# Patient Record
Sex: MALE | Race: WHITE | NOT HISPANIC OR LATINO | Employment: FULL TIME | URBAN - METROPOLITAN AREA
[De-identification: names, ages, dates, MRNs, and addresses within clinical notes are randomized per-mention and may not be internally consistent; named-entity substitution may affect disease eponyms.]

---

## 2017-04-28 DIAGNOSIS — E11.9 TYPE 2 DIABETES MELLITUS WITHOUT COMPLICATION: ICD-10-CM

## 2017-08-09 DIAGNOSIS — Z79.4 TYPE 2 DIABETES MELLITUS TREATED WITH INSULIN: ICD-10-CM

## 2017-08-09 DIAGNOSIS — E11.9 TYPE 2 DIABETES MELLITUS TREATED WITH INSULIN: ICD-10-CM

## 2017-08-10 RX ORDER — DAPAGLIFLOZIN 5 MG/1
5 TABLET, FILM COATED ORAL DAILY
Qty: 30 TABLET | Refills: 5 | OUTPATIENT
Start: 2017-08-10

## 2017-08-16 ENCOUNTER — OFFICE VISIT (OUTPATIENT)
Dept: FAMILY MEDICINE | Facility: CLINIC | Age: 47
End: 2017-08-16
Payer: COMMERCIAL

## 2017-08-16 VITALS
HEIGHT: 69 IN | DIASTOLIC BLOOD PRESSURE: 92 MMHG | RESPIRATION RATE: 16 BRPM | HEART RATE: 90 BPM | WEIGHT: 227.81 LBS | BODY MASS INDEX: 33.74 KG/M2 | SYSTOLIC BLOOD PRESSURE: 132 MMHG

## 2017-08-16 DIAGNOSIS — E78.5 HYPERLIPIDEMIA, UNSPECIFIED HYPERLIPIDEMIA TYPE: ICD-10-CM

## 2017-08-16 DIAGNOSIS — Z79.4 TYPE 2 DIABETES MELLITUS TREATED WITH INSULIN: ICD-10-CM

## 2017-08-16 DIAGNOSIS — Z79.4 TYPE 2 DIABETES MELLITUS WITH RETINOPATHY OF LEFT EYE, WITH LONG-TERM CURRENT USE OF INSULIN, MACULAR EDEMA PRESENCE UNSPECIFIED, UNSPECIFIED RETINOPATHY SEVERITY: Primary | ICD-10-CM

## 2017-08-16 DIAGNOSIS — F41.1 GENERALIZED ANXIETY DISORDER: ICD-10-CM

## 2017-08-16 DIAGNOSIS — E11.319 TYPE 2 DIABETES MELLITUS WITH RETINOPATHY OF LEFT EYE, WITH LONG-TERM CURRENT USE OF INSULIN, MACULAR EDEMA PRESENCE UNSPECIFIED, UNSPECIFIED RETINOPATHY SEVERITY: Primary | ICD-10-CM

## 2017-08-16 DIAGNOSIS — E11.9 TYPE 2 DIABETES MELLITUS TREATED WITH INSULIN: ICD-10-CM

## 2017-08-16 DIAGNOSIS — N52.9 ERECTILE DYSFUNCTION, UNSPECIFIED ERECTILE DYSFUNCTION TYPE: ICD-10-CM

## 2017-08-16 PROCEDURE — 99999 PR PBB SHADOW E&M-EST. PATIENT-LVL III: CPT | Mod: PBBFAC,,, | Performed by: FAMILY MEDICINE

## 2017-08-16 PROCEDURE — 3008F BODY MASS INDEX DOCD: CPT | Mod: S$GLB,,, | Performed by: FAMILY MEDICINE

## 2017-08-16 PROCEDURE — 99214 OFFICE O/P EST MOD 30 MIN: CPT | Mod: S$GLB,,, | Performed by: FAMILY MEDICINE

## 2017-08-16 RX ORDER — ESCITALOPRAM OXALATE 20 MG/1
20 TABLET ORAL DAILY
Qty: 90 TABLET | Refills: 1 | Status: SHIPPED | OUTPATIENT
Start: 2017-08-16 | End: 2018-01-22 | Stop reason: SDUPTHER

## 2017-08-16 RX ORDER — VARDENAFIL HYDROCHLORIDE 10 MG/1
10 TABLET ORAL DAILY PRN
Qty: 15 TABLET | Refills: 2 | Status: SHIPPED | OUTPATIENT
Start: 2017-08-16 | End: 2018-11-09

## 2017-08-16 RX ORDER — DAPAGLIFLOZIN 5 MG/1
5 TABLET, FILM COATED ORAL DAILY
Qty: 30 TABLET | Refills: 5 | Status: SHIPPED | OUTPATIENT
Start: 2017-08-16 | End: 2017-09-11 | Stop reason: SDUPTHER

## 2017-08-16 RX ORDER — PIOGLITAZONEHYDROCHLORIDE 30 MG/1
30 TABLET ORAL DAILY
Qty: 90 TABLET | Refills: 3 | Status: SHIPPED | OUTPATIENT
Start: 2017-08-16 | End: 2017-08-24

## 2017-08-16 RX ORDER — METFORMIN HYDROCHLORIDE 500 MG/1
500 TABLET, EXTENDED RELEASE ORAL
Qty: 30 TABLET | Refills: 2 | Status: SHIPPED | OUTPATIENT
Start: 2017-08-16 | End: 2017-09-08 | Stop reason: SDUPTHER

## 2017-08-16 RX ORDER — ATORVASTATIN CALCIUM 10 MG/1
10 TABLET, FILM COATED ORAL DAILY
Qty: 90 TABLET | Refills: 1 | Status: SHIPPED | OUTPATIENT
Start: 2017-08-16 | End: 2018-03-21 | Stop reason: SDUPTHER

## 2017-08-16 RX ORDER — INSULIN GLARGINE 100 [IU]/ML
70 INJECTION, SOLUTION SUBCUTANEOUS NIGHTLY
Qty: 5 SYRINGE | Refills: 5 | Status: SHIPPED | OUTPATIENT
Start: 2017-08-16 | End: 2017-08-24

## 2017-08-16 RX ORDER — TRAZODONE HYDROCHLORIDE 50 MG/1
50 TABLET ORAL NIGHTLY
Qty: 30 TABLET | Refills: 2 | Status: SHIPPED | OUTPATIENT
Start: 2017-08-16 | End: 2017-11-03 | Stop reason: SDUPTHER

## 2017-08-16 NOTE — PROGRESS NOTES
Subjective:       Patient ID: Abel Hackett is a 47 y.o. male.    Chief Complaint: Annual Exam and Medication Refill    HPI  47 year old non compliant patient comes in after not having been seen for a year. He notes that over the last 3 months he has been titrating his insulin by 5 U at night for blood sugars in the morning. He notes that he has blood sugars of 100-120s. He says that he has had some lows in the evenings when he is working out.    PMH, PSH, ALLERGIES, SH, FH reviewed in nurse's notes above  Medications reconciled in the nurse's notes      Review of Systems   Constitutional: Negative for chills and fever.   HENT: Negative for congestion, ear pain, postnasal drip, rhinorrhea, sore throat and trouble swallowing.    Eyes: Negative for redness and itching.   Respiratory: Negative for cough, shortness of breath and wheezing.    Cardiovascular: Negative for chest pain and palpitations.   Gastrointestinal: Negative for abdominal pain, diarrhea, nausea and vomiting.   Genitourinary: Negative for dysuria and frequency.   Musculoskeletal: Positive for arthralgias.   Skin: Negative for rash.   Neurological: Negative for weakness and headaches.       Objective:      Physical Exam   Constitutional: He is oriented to person, place, and time. He appears well-developed. No distress.   HENT:   Head: Normocephalic and atraumatic.   Eyes: Conjunctivae are normal. Pupils are equal, round, and reactive to light.   Neck: Normal range of motion. Neck supple. No thyromegaly present.   Cardiovascular: Normal rate, regular rhythm, normal heart sounds and intact distal pulses.    Pulses:       Dorsalis pedis pulses are 2+ on the right side, and 2+ on the left side.        Posterior tibial pulses are 2+ on the right side, and 2+ on the left side.   Pulmonary/Chest: Effort normal and breath sounds normal. No respiratory distress. He has no wheezes.   Abdominal: Soft. Bowel sounds are normal. There is no tenderness.    Musculoskeletal: Normal range of motion. He exhibits no edema.   Feet:   Right Foot:   Protective Sensation: 5 sites tested. 5 sites sensed.   Skin Integrity: Negative for ulcer, blister or callus.   Left Foot:   Protective Sensation: 5 sites tested.   Skin Integrity: Negative for ulcer, blister or callus.   Lymphadenopathy:     He has no cervical adenopathy.   Neurological: He is alert and oriented to person, place, and time.   Skin: Skin is warm and dry. No rash noted.   Psychiatric: He has a normal mood and affect. His behavior is normal.   Nursing note and vitals reviewed.         Results for orders placed or performed in visit on 08/05/16   Hemoglobin A1c   Result Value Ref Range    Hemoglobin A1C 8.2 (H) 4.5 - 6.2 %    Estimated Avg Glucose 189 (H) 68 - 131 mg/dL   Results for orders placed or performed in visit on 04/20/16   Hemoglobin A1c   Result Value Ref Range    Hemoglobin A1C 12.2 (H) 4.5 - 6.2 %    Estimated Avg Glucose 303 (H) 68 - 131 mg/dL     Results for orders placed or performed in visit on 04/20/16   Microalbumin/creatinine urine ratio   Result Value Ref Range    Microalbum.,U,Random 7.0 ug/mL    Creatinine, Random Ur 68.6 23.0 - 375.0 mg/dL    Microalb Creat Ratio 10.2 0.0 - 30.0 ug/mg     Results for orders placed or performed in visit on 08/05/16   Lipid panel   Result Value Ref Range    Cholesterol 132 120 - 199 mg/dL    Triglycerides 72 30 - 150 mg/dL    HDL 63 40 - 75 mg/dL    LDL Cholesterol 54.6 (L) 63.0 - 159.0 mg/dL    HDL/Chol Ratio 47.7 20.0 - 50.0 %    Total Cholesterol/HDL Ratio 2.1 2.0 - 5.0    Non-HDL Cholesterol 69 mg/dL       Health Maintenance   Topic Date Due    Pneumococcal PPSV23 (Medium Risk) (1) 04/17/1990    Urine Microalbumin  04/20/2017    Influenza Vaccine  08/01/2017    Eye Exam  09/22/2017 (Originally 7/13/2017)    Hemoglobin A1c  02/17/2018    Foot Exam  08/16/2018    Lipid Panel  08/17/2018    TETANUS VACCINE  08/30/2025       Assessment/Plan:       Abel  was seen today for annual exam and medication refill.    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  -     CBC auto differential; Future  -     Comprehensive metabolic panel; Future  -     Lipid panel; Future  -     Hemoglobin A1c; Future  -     Microalbumin/creatinine urine ratio    Type 2 diabetes mellitus treated with insulin  -     dapagliflozin (FARXIGA) 5 mg Tab tablet; Take 1 tablet (5 mg total) by mouth once daily.  -     CBC auto differential; Future  -     Comprehensive metabolic panel; Future  -     Lipid panel; Future  -     Hemoglobin A1c; Future  -     Microalbumin/creatinine urine ratio    Generalized anxiety disorder  -     trazodone (DESYREL) 50 MG tablet; Take 1 tablet (50 mg total) by mouth every evening.    Erectile dysfunction, unspecified erectile dysfunction type  -     Testosterone, free; Future    Other orders  -     escitalopram oxalate (LEXAPRO) 20 MG tablet; Take 1 tablet (20 mg total) by mouth once daily.  -     insulin glargine (LANTUS SOLOSTAR) 100 unit/mL (3 mL) InPn pen; Inject 70 Units into the skin every evening.  -     metformin (GLUCOPHAGE-XR) 500 MG 24 hr tablet; Take 1 tablet (500 mg total) by mouth daily with breakfast.  -     pioglitazone (ACTOS) 30 MG tablet; Take 1 tablet (30 mg total) by mouth once daily.  -     SITagliptin (JANUVIA) 100 MG Tab; Take 1 tablet (100 mg total) by mouth once daily.  -     vardenafil (LEVITRA) 10 MG tablet; Take 1 tablet (10 mg total) by mouth daily as needed for  Erectile Dysfunction.    RTC if condition acutely worsens or any other concerns, otherwise RTC as scheduled

## 2017-08-17 ENCOUNTER — PATIENT MESSAGE (OUTPATIENT)
Dept: FAMILY MEDICINE | Facility: CLINIC | Age: 47
End: 2017-08-17

## 2017-08-17 ENCOUNTER — LAB VISIT (OUTPATIENT)
Dept: LAB | Facility: HOSPITAL | Age: 47
End: 2017-08-17
Attending: FAMILY MEDICINE
Payer: COMMERCIAL

## 2017-08-17 DIAGNOSIS — E78.5 HYPERLIPIDEMIA, UNSPECIFIED HYPERLIPIDEMIA TYPE: ICD-10-CM

## 2017-08-17 DIAGNOSIS — N52.9 ERECTILE DYSFUNCTION, UNSPECIFIED ERECTILE DYSFUNCTION TYPE: ICD-10-CM

## 2017-08-17 DIAGNOSIS — E11.9 TYPE 2 DIABETES MELLITUS TREATED WITH INSULIN: ICD-10-CM

## 2017-08-17 DIAGNOSIS — Z79.4 TYPE 2 DIABETES MELLITUS WITH RETINOPATHY OF LEFT EYE, WITH LONG-TERM CURRENT USE OF INSULIN, MACULAR EDEMA PRESENCE UNSPECIFIED, UNSPECIFIED RETINOPATHY SEVERITY: ICD-10-CM

## 2017-08-17 DIAGNOSIS — E11.319 TYPE 2 DIABETES MELLITUS WITH RETINOPATHY OF LEFT EYE, WITH LONG-TERM CURRENT USE OF INSULIN, MACULAR EDEMA PRESENCE UNSPECIFIED, UNSPECIFIED RETINOPATHY SEVERITY: ICD-10-CM

## 2017-08-17 DIAGNOSIS — Z79.4 TYPE 2 DIABETES MELLITUS TREATED WITH INSULIN: ICD-10-CM

## 2017-08-17 LAB
ALBUMIN SERPL BCP-MCNC: 3.5 G/DL
ALP SERPL-CCNC: 55 U/L
ALT SERPL W/O P-5'-P-CCNC: 22 U/L
ANION GAP SERPL CALC-SCNC: 11 MMOL/L
AST SERPL-CCNC: 17 U/L
BASOPHILS # BLD AUTO: 0.02 K/UL
BASOPHILS NFR BLD: 0.3 %
BILIRUB SERPL-MCNC: 0.3 MG/DL
BUN SERPL-MCNC: 24 MG/DL
CALCIUM SERPL-MCNC: 9.2 MG/DL
CHLORIDE SERPL-SCNC: 104 MMOL/L
CHOLEST/HDLC SERPL: 3.4 {RATIO}
CO2 SERPL-SCNC: 25 MMOL/L
CREAT SERPL-MCNC: 0.9 MG/DL
DIFFERENTIAL METHOD: NORMAL
EOSINOPHIL # BLD AUTO: 0.1 K/UL
EOSINOPHIL NFR BLD: 1.8 %
ERYTHROCYTE [DISTWIDTH] IN BLOOD BY AUTOMATED COUNT: 13.2 %
EST. GFR  (AFRICAN AMERICAN): >60 ML/MIN/1.73 M^2
EST. GFR  (NON AFRICAN AMERICAN): >60 ML/MIN/1.73 M^2
ESTIMATED AVG GLUCOSE: 206 MG/DL
GLUCOSE SERPL-MCNC: 108 MG/DL
HBA1C MFR BLD HPLC: 8.8 %
HCT VFR BLD AUTO: 43.9 %
HDL/CHOLESTEROL RATIO: 29.5 %
HDLC SERPL-MCNC: 190 MG/DL
HDLC SERPL-MCNC: 56 MG/DL
HGB BLD-MCNC: 14.3 G/DL
LDLC SERPL CALC-MCNC: 119.6 MG/DL
LYMPHOCYTES # BLD AUTO: 1.4 K/UL
LYMPHOCYTES NFR BLD: 23 %
MCH RBC QN AUTO: 27.7 PG
MCHC RBC AUTO-ENTMCNC: 32.6 G/DL
MCV RBC AUTO: 85 FL
MONOCYTES # BLD AUTO: 0.6 K/UL
MONOCYTES NFR BLD: 10 %
NEUTROPHILS # BLD AUTO: 4 K/UL
NEUTROPHILS NFR BLD: 64.9 %
NONHDLC SERPL-MCNC: 134 MG/DL
PLATELET # BLD AUTO: 226 K/UL
PMV BLD AUTO: 9.6 FL
POTASSIUM SERPL-SCNC: 3.9 MMOL/L
PROT SERPL-MCNC: 7.6 G/DL
RBC # BLD AUTO: 5.17 M/UL
SODIUM SERPL-SCNC: 140 MMOL/L
TRIGL SERPL-MCNC: 72 MG/DL
WBC # BLD AUTO: 6.1 K/UL

## 2017-08-17 PROCEDURE — 80053 COMPREHEN METABOLIC PANEL: CPT

## 2017-08-17 PROCEDURE — 85025 COMPLETE CBC W/AUTO DIFF WBC: CPT

## 2017-08-17 PROCEDURE — 84402 ASSAY OF FREE TESTOSTERONE: CPT

## 2017-08-17 PROCEDURE — 83036 HEMOGLOBIN GLYCOSYLATED A1C: CPT

## 2017-08-17 PROCEDURE — 36415 COLL VENOUS BLD VENIPUNCTURE: CPT

## 2017-08-17 PROCEDURE — 80061 LIPID PANEL: CPT

## 2017-08-18 ENCOUNTER — PATIENT MESSAGE (OUTPATIENT)
Dept: FAMILY MEDICINE | Facility: CLINIC | Age: 47
End: 2017-08-18

## 2017-08-21 ENCOUNTER — PATIENT MESSAGE (OUTPATIENT)
Dept: FAMILY MEDICINE | Facility: CLINIC | Age: 47
End: 2017-08-21

## 2017-08-21 LAB — TESTOST FREE SERPL-MCNC: 8.9 PG/ML

## 2017-08-23 ENCOUNTER — TELEPHONE (OUTPATIENT)
Dept: FAMILY MEDICINE | Facility: CLINIC | Age: 47
End: 2017-08-23

## 2017-08-23 NOTE — TELEPHONE ENCOUNTER
----- Message from Keira Wheat MD sent at 8/23/2017  8:17 AM CDT -----  Please let Mr. Hackett know that his labs have returned.  As we know his a1c is not to goal and he has slipped some since last visit.  There is a newer insulin out that has a medication similar (but a little more efficient than) to actos.  It seems that he shuold be able to get this for $0 and it would replace his lantus and actos.  It is called soliqua. It'll take some adjustments.  If he would like to try it, let me know and I will call him with instructions on how to use it.  We also have to verify that it'll be covered.    Testosterone is normal as well as otherlabs.  mh

## 2017-08-23 NOTE — TELEPHONE ENCOUNTER
Notified pt's wife of results and pt verbalized understanding.   She will give results to pt and have him call office back in the morning with an answer about insulin.

## 2017-08-24 ENCOUNTER — PATIENT MESSAGE (OUTPATIENT)
Dept: FAMILY MEDICINE | Facility: CLINIC | Age: 47
End: 2017-08-24

## 2017-08-24 NOTE — TELEPHONE ENCOUNTER
soliqua at pharmacy.  Needs to stop lantus and actos.    Take soliqua in AM not in PM.  Start at 30 units and check blood sugar every morning.  If blood sugar is greater than 150 in the morning go up by 1 unit until 150 am fasting blood sugar is achieved.    RTC in 2 weeks with blood sugar logs.  mh

## 2017-08-28 ENCOUNTER — PATIENT MESSAGE (OUTPATIENT)
Dept: FAMILY MEDICINE | Facility: CLINIC | Age: 47
End: 2017-08-28

## 2017-09-06 ENCOUNTER — TELEPHONE (OUTPATIENT)
Dept: FAMILY MEDICINE | Facility: CLINIC | Age: 47
End: 2017-09-06

## 2017-09-06 NOTE — TELEPHONE ENCOUNTER
Called pt to confirm what pharmacy he is currently using . I received a fax from [facility] [place] requesting pts diabetic medications to be sent to them.    No answer LM

## 2017-09-08 ENCOUNTER — PATIENT MESSAGE (OUTPATIENT)
Dept: FAMILY MEDICINE | Facility: CLINIC | Age: 47
End: 2017-09-08

## 2017-09-08 RX ORDER — METFORMIN HYDROCHLORIDE 500 MG/1
500 TABLET, EXTENDED RELEASE ORAL
Qty: 30 TABLET | Refills: 2 | Status: SHIPPED | OUTPATIENT
Start: 2017-09-08 | End: 2017-11-27 | Stop reason: SDUPTHER

## 2017-09-11 DIAGNOSIS — E11.9 TYPE 2 DIABETES MELLITUS TREATED WITH INSULIN: ICD-10-CM

## 2017-09-11 DIAGNOSIS — Z79.4 TYPE 2 DIABETES MELLITUS TREATED WITH INSULIN: ICD-10-CM

## 2017-09-11 RX ORDER — DAPAGLIFLOZIN 5 MG/1
5 TABLET, FILM COATED ORAL DAILY
Qty: 30 TABLET | Refills: 5 | Status: SHIPPED | OUTPATIENT
Start: 2017-09-11 | End: 2017-09-15 | Stop reason: SDUPTHER

## 2017-09-15 DIAGNOSIS — E11.9 TYPE 2 DIABETES MELLITUS TREATED WITH INSULIN: ICD-10-CM

## 2017-09-15 DIAGNOSIS — Z79.4 TYPE 2 DIABETES MELLITUS TREATED WITH INSULIN: ICD-10-CM

## 2017-09-15 RX ORDER — DAPAGLIFLOZIN 5 MG/1
5 TABLET, FILM COATED ORAL DAILY
Qty: 30 TABLET | Refills: 5 | Status: SHIPPED | OUTPATIENT
Start: 2017-09-15 | End: 2017-09-18 | Stop reason: SDUPTHER

## 2017-09-15 NOTE — TELEPHONE ENCOUNTER
----- Message from Ghada Morrell sent at 9/15/2017  8:46 AM CDT -----  Contact: Self  Abel Hackett  MRN: [id number]  : 1970  PCP: Keira Wheat  Home Phone      [phone]  Work Phone      Not on file.  Mobile          [phone]    MESSAGE:   Needs RX SITagliptin (JANUVIA) 100 MG Tab and RX dapagliflozin (FARXIGA) 5 mg Tab tablet and RX insulin glargine-lixisenatide (SOLIQUA 100/33) 100 unit-33 mcg/mL InPn called in to different pharmacy    Pharmacy:  [facility]    Phone:  [phone]

## 2017-09-18 DIAGNOSIS — Z79.4 TYPE 2 DIABETES MELLITUS TREATED WITH INSULIN: ICD-10-CM

## 2017-09-18 DIAGNOSIS — E11.9 TYPE 2 DIABETES MELLITUS TREATED WITH INSULIN: ICD-10-CM

## 2017-09-18 NOTE — TELEPHONE ENCOUNTER
----- Message from Jessie Medina sent at 2017 10:40 AM CDT -----  Contact: SAMMIE WITH [facility] MIC[facility]EL Hackett  MRN: [id number]  : 1970  PCP: Keira Wheat  Home Phone      [phone]  Work Phone      Not on file.  Mobile          [phone]      MESSAGE: NEEDS FARXIGA AND SOLIQUA TO BE RE-SENT WITH 90 DAY SUPPLIES.     PHONE: [phone] PRESS ZERO, ASK FOR SAMMIE    [facility]

## 2017-09-19 ENCOUNTER — TELEPHONE (OUTPATIENT)
Dept: FAMILY MEDICINE | Facility: CLINIC | Age: 47
End: 2017-09-19

## 2017-09-19 RX ORDER — DAPAGLIFLOZIN 5 MG/1
5 TABLET, FILM COATED ORAL DAILY
Qty: 90 TABLET | Refills: 1 | Status: SHIPPED | OUTPATIENT
Start: 2017-09-19 | End: 2018-11-09 | Stop reason: SDUPTHER

## 2017-09-19 NOTE — TELEPHONE ENCOUNTER
----- Message from Dedra Villalba sent at 2017 12:32 PM CDT -----  Contact: marianne/ [facility]  Abel Hackett  MRN: [id number]  : 1970  PCP: Keria Wheat  Home Phone      [phone]  Work Phone      Not on file.  Mobile          [phone]      MESSAGE:    Insulin needed to be sent as 90 day supply so it needs to be sent as 30 mL    Phone:  [phone]    [facility]armacy:   [facility]

## 2017-09-19 NOTE — TELEPHONE ENCOUNTER
----- Message from Dedra Vlilalba sent at 2017 12:32 PM CDT -----  Contact: marianne/ [facility]  Abel Hackett  MRN: [id number]  : 1970  PCP: Keira Wheat  Home Phone      [phone]  Work Phone      Not on file.  Mobile          [phone]      MESSAGE:    Insulin needed to be sent as 90 day supply so it needs to be sent as 30 mL    Phone:  [phone]    [facility]   [facility]

## 2017-09-19 NOTE — TELEPHONE ENCOUNTER
[facility] called and stated pt's Soliqua needs to be resent in as a 90 day supply so it needs to be sent as 30 mL. PLease advise,thank you

## 2017-11-03 DIAGNOSIS — F41.1 GENERALIZED ANXIETY DISORDER: ICD-10-CM

## 2017-11-03 RX ORDER — TRAZODONE HYDROCHLORIDE 50 MG/1
TABLET ORAL
Qty: 30 TABLET | Refills: 2 | Status: SHIPPED | OUTPATIENT
Start: 2017-11-03 | End: 2018-02-01 | Stop reason: SDUPTHER

## 2017-11-03 RX ORDER — METFORMIN HYDROCHLORIDE 500 MG/1
TABLET, EXTENDED RELEASE ORAL
Qty: 30 TABLET | Refills: 2 | Status: SHIPPED | OUTPATIENT
Start: 2017-11-03 | End: 2017-11-15

## 2017-11-15 ENCOUNTER — OFFICE VISIT (OUTPATIENT)
Dept: FAMILY MEDICINE | Facility: CLINIC | Age: 47
End: 2017-11-15
Payer: COMMERCIAL

## 2017-11-15 ENCOUNTER — PATIENT OUTREACH (OUTPATIENT)
Dept: ADMINISTRATIVE | Facility: HOSPITAL | Age: 47
End: 2017-11-15

## 2017-11-15 VITALS
RESPIRATION RATE: 16 BRPM | BODY MASS INDEX: 34.34 KG/M2 | WEIGHT: 239.88 LBS | DIASTOLIC BLOOD PRESSURE: 76 MMHG | HEIGHT: 70 IN | HEART RATE: 106 BPM | SYSTOLIC BLOOD PRESSURE: 140 MMHG

## 2017-11-15 DIAGNOSIS — Z79.4 TYPE 2 DIABETES MELLITUS TREATED WITH INSULIN: Primary | ICD-10-CM

## 2017-11-15 DIAGNOSIS — E11.9 TYPE 2 DIABETES MELLITUS TREATED WITH INSULIN: Primary | ICD-10-CM

## 2017-11-15 PROCEDURE — 99213 OFFICE O/P EST LOW 20 MIN: CPT | Mod: S$GLB,,, | Performed by: FAMILY MEDICINE

## 2017-11-15 PROCEDURE — 99999 PR PBB SHADOW E&M-EST. PATIENT-LVL III: CPT | Mod: PBBFAC,,, | Performed by: FAMILY MEDICINE

## 2017-11-15 NOTE — PROGRESS NOTES
Subjective:       Patient ID: Abel Hackett is a 47 y.o. male.    Chief Complaint: Follow-up ( 3month )    HPI  47 year old male comes in for f/u of his dm. He is getting blood sugars of 110-120. He is still doing 30 U of soliqua and says it is doing very well. He has no complaints.    PMH, PSH, ALLERGIES, SH, FH reviewed in nurse's notes above  Medications reconciled in the nurse's notes      Review of Systems   Constitutional: Negative for chills and fever.   HENT: Negative for congestion, ear pain, postnasal drip, rhinorrhea, sore throat and trouble swallowing.    Eyes: Negative for redness and itching.   Respiratory: Negative for cough, shortness of breath and wheezing.    Cardiovascular: Negative for chest pain and palpitations.   Gastrointestinal: Negative for abdominal pain, diarrhea, nausea and vomiting.   Genitourinary: Negative for dysuria and frequency.   Skin: Negative for rash.   Neurological: Negative for weakness and headaches.       Objective:      Physical Exam   Constitutional: He is oriented to person, place, and time. He appears well-developed. No distress.   HENT:   Head: Normocephalic and atraumatic.   Eyes: Conjunctivae are normal. Pupils are equal, round, and reactive to light.   Neck: Normal range of motion. Neck supple. No thyromegaly present.   Cardiovascular: Normal rate, regular rhythm, normal heart sounds and intact distal pulses.    Pulmonary/Chest: Effort normal and breath sounds normal. No respiratory distress. He has no wheezes.   Abdominal: Soft. Bowel sounds are normal. There is no tenderness.   Musculoskeletal: Normal range of motion. He exhibits no edema.   Lymphadenopathy:     He has no cervical adenopathy.   Neurological: He is alert and oriented to person, place, and time.   Skin: Skin is warm and dry. No rash noted.   Psychiatric: He has a normal mood and affect. His behavior is normal.   Nursing note and vitals reviewed.       Assessment/Plan:       Problem List Items  Addressed This Visit        Endocrine    Type 2 diabetes mellitus treated with insulin - Primary    Relevant Orders    Hemoglobin A1c    Microalbumin/creatinine urine ratio      RTC if condition acutely worsens or any other concerns, otherwise RTC as scheduled

## 2017-11-17 ENCOUNTER — CLINICAL SUPPORT (OUTPATIENT)
Dept: FAMILY MEDICINE | Facility: CLINIC | Age: 47
End: 2017-11-17
Payer: COMMERCIAL

## 2017-11-17 DIAGNOSIS — Z79.4 TYPE 2 DIABETES MELLITUS TREATED WITH INSULIN: ICD-10-CM

## 2017-11-17 DIAGNOSIS — E11.9 TYPE 2 DIABETES MELLITUS TREATED WITH INSULIN: ICD-10-CM

## 2017-11-17 LAB
ESTIMATED AVG GLUCOSE: 134 MG/DL
HBA1C MFR BLD HPLC: 6.3 %

## 2017-11-17 PROCEDURE — 83036 HEMOGLOBIN GLYCOSYLATED A1C: CPT

## 2017-11-17 PROCEDURE — 36415 COLL VENOUS BLD VENIPUNCTURE: CPT | Mod: S$GLB,,, | Performed by: FAMILY MEDICINE

## 2017-11-17 PROCEDURE — 99999 PR PBB SHADOW E&M-EST. PATIENT-LVL I: CPT | Mod: PBBFAC,,,

## 2017-11-28 RX ORDER — METFORMIN HYDROCHLORIDE 500 MG/1
500 TABLET, EXTENDED RELEASE ORAL
Qty: 30 TABLET | Refills: 2 | Status: SHIPPED | OUTPATIENT
Start: 2017-11-28 | End: 2018-01-09 | Stop reason: SDUPTHER

## 2018-01-09 RX ORDER — METFORMIN HYDROCHLORIDE 500 MG/1
500 TABLET, EXTENDED RELEASE ORAL
Qty: 30 TABLET | Refills: 0 | Status: SHIPPED | OUTPATIENT
Start: 2018-01-09 | End: 2018-03-03 | Stop reason: SDUPTHER

## 2018-01-25 RX ORDER — ESCITALOPRAM OXALATE 20 MG/1
20 TABLET ORAL DAILY
Qty: 90 TABLET | Refills: 0 | Status: SHIPPED | OUTPATIENT
Start: 2018-01-25 | End: 2018-01-26 | Stop reason: SDUPTHER

## 2018-01-26 RX ORDER — ESCITALOPRAM OXALATE 20 MG/1
20 TABLET ORAL DAILY
Qty: 90 TABLET | Refills: 0 | Status: SHIPPED | OUTPATIENT
Start: 2018-01-26 | End: 2018-11-09 | Stop reason: SDUPTHER

## 2018-02-01 DIAGNOSIS — F41.1 GENERALIZED ANXIETY DISORDER: ICD-10-CM

## 2018-02-02 RX ORDER — TRAZODONE HYDROCHLORIDE 50 MG/1
TABLET ORAL
Qty: 30 TABLET | Refills: 2 | Status: SHIPPED | OUTPATIENT
Start: 2018-02-02 | End: 2018-11-09

## 2018-03-06 RX ORDER — METFORMIN HYDROCHLORIDE 500 MG/1
500 TABLET, EXTENDED RELEASE ORAL
Qty: 30 TABLET | Refills: 0 | Status: SHIPPED | OUTPATIENT
Start: 2018-03-06 | End: 2018-04-12 | Stop reason: SDUPTHER

## 2018-03-09 DIAGNOSIS — E11.9 TYPE 2 DIABETES MELLITUS WITHOUT COMPLICATION: ICD-10-CM

## 2018-03-21 DIAGNOSIS — E78.5 HYPERLIPIDEMIA, UNSPECIFIED HYPERLIPIDEMIA TYPE: ICD-10-CM

## 2018-03-21 RX ORDER — ATORVASTATIN CALCIUM 10 MG/1
TABLET, FILM COATED ORAL
Qty: 90 TABLET | Refills: 1 | Status: SHIPPED | OUTPATIENT
Start: 2018-03-21 | End: 2018-09-20 | Stop reason: SDUPTHER

## 2018-04-12 RX ORDER — METFORMIN HYDROCHLORIDE 500 MG/1
500 TABLET, EXTENDED RELEASE ORAL
Qty: 30 TABLET | Refills: 0 | Status: SHIPPED | OUTPATIENT
Start: 2018-04-12 | End: 2018-11-09 | Stop reason: SDUPTHER

## 2018-06-29 DIAGNOSIS — E11.9 TYPE 2 DIABETES MELLITUS WITHOUT COMPLICATION: ICD-10-CM

## 2018-09-07 DIAGNOSIS — E11.9 TYPE 2 DIABETES MELLITUS WITHOUT COMPLICATION: ICD-10-CM

## 2018-09-20 DIAGNOSIS — E78.5 HYPERLIPIDEMIA, UNSPECIFIED HYPERLIPIDEMIA TYPE: ICD-10-CM

## 2018-09-21 RX ORDER — ATORVASTATIN CALCIUM 10 MG/1
TABLET, FILM COATED ORAL
Qty: 30 TABLET | Refills: 0 | Status: SHIPPED | OUTPATIENT
Start: 2018-09-21 | End: 2018-11-09 | Stop reason: SDUPTHER

## 2018-09-25 RX ORDER — INSULIN GLARGINE AND LIXISENATIDE 100; 33 U/ML; UG/ML
INJECTION, SOLUTION SUBCUTANEOUS
Qty: 15 SYRINGE | Refills: 3 | OUTPATIENT
Start: 2018-09-25

## 2018-10-28 DIAGNOSIS — E78.5 HYPERLIPIDEMIA, UNSPECIFIED HYPERLIPIDEMIA TYPE: ICD-10-CM

## 2018-10-30 ENCOUNTER — PATIENT MESSAGE (OUTPATIENT)
Dept: FAMILY MEDICINE | Facility: CLINIC | Age: 48
End: 2018-10-30

## 2018-10-30 DIAGNOSIS — E29.1 ANDROGEN DEFICIENCY: ICD-10-CM

## 2018-10-30 RX ORDER — ATORVASTATIN CALCIUM 10 MG/1
TABLET, FILM COATED ORAL
Qty: 30 TABLET | Refills: 0 | OUTPATIENT
Start: 2018-10-30

## 2018-11-02 ENCOUNTER — CLINICAL SUPPORT (OUTPATIENT)
Dept: FAMILY MEDICINE | Facility: CLINIC | Age: 48
End: 2018-11-02
Payer: COMMERCIAL

## 2018-11-02 DIAGNOSIS — E11.9 TYPE 2 DIABETES MELLITUS WITHOUT COMPLICATION: ICD-10-CM

## 2018-11-02 DIAGNOSIS — E29.1 ANDROGEN DEFICIENCY: ICD-10-CM

## 2018-11-02 LAB
ALBUMIN SERPL BCP-MCNC: 3.8 G/DL
ALBUMIN/CREAT UR: 4.2 UG/MG
ALP SERPL-CCNC: 54 U/L
ALT SERPL W/O P-5'-P-CCNC: 27 U/L
ANION GAP SERPL CALC-SCNC: 10 MMOL/L
AST SERPL-CCNC: 26 U/L
BASOPHILS # BLD AUTO: 0.03 K/UL
BASOPHILS NFR BLD: 0.5 %
BILIRUB SERPL-MCNC: 0.4 MG/DL
BUN SERPL-MCNC: 30 MG/DL
CALCIUM SERPL-MCNC: 9.9 MG/DL
CHLORIDE SERPL-SCNC: 105 MMOL/L
CHOLEST SERPL-MCNC: 177 MG/DL
CHOLEST/HDLC SERPL: 4.4 {RATIO}
CO2 SERPL-SCNC: 25 MMOL/L
CREAT SERPL-MCNC: 1.1 MG/DL
CREAT UR-MCNC: 95.6 MG/DL
DIFFERENTIAL METHOD: NORMAL
EOSINOPHIL # BLD AUTO: 0.1 K/UL
EOSINOPHIL NFR BLD: 1.4 %
ERYTHROCYTE [DISTWIDTH] IN BLOOD BY AUTOMATED COUNT: 13.6 %
EST. GFR  (AFRICAN AMERICAN): >60 ML/MIN/1.73 M^2
EST. GFR  (NON AFRICAN AMERICAN): >60 ML/MIN/1.73 M^2
ESTIMATED AVG GLUCOSE: 148 MG/DL
GLUCOSE SERPL-MCNC: 136 MG/DL
HBA1C MFR BLD HPLC: 6.8 %
HCT VFR BLD AUTO: 44.5 %
HDLC SERPL-MCNC: 40 MG/DL
HDLC SERPL: 22.6 %
HGB BLD-MCNC: 14.4 G/DL
LDLC SERPL CALC-MCNC: 110.2 MG/DL
LYMPHOCYTES # BLD AUTO: 1.6 K/UL
LYMPHOCYTES NFR BLD: 25.2 %
MCH RBC QN AUTO: 28.2 PG
MCHC RBC AUTO-ENTMCNC: 32.4 G/DL
MCV RBC AUTO: 87 FL
MICROALBUMIN UR DL<=1MG/L-MCNC: 4 UG/ML
MONOCYTES # BLD AUTO: 0.7 K/UL
MONOCYTES NFR BLD: 11.1 %
NEUTROPHILS # BLD AUTO: 4 K/UL
NEUTROPHILS NFR BLD: 61.8 %
NONHDLC SERPL-MCNC: 137 MG/DL
PLATELET # BLD AUTO: 249 K/UL
PMV BLD AUTO: 10.6 FL
POTASSIUM SERPL-SCNC: 4.4 MMOL/L
PROT SERPL-MCNC: 7.4 G/DL
RBC # BLD AUTO: 5.11 M/UL
SODIUM SERPL-SCNC: 140 MMOL/L
TRIGL SERPL-MCNC: 134 MG/DL
WBC # BLD AUTO: 6.48 K/UL

## 2018-11-02 PROCEDURE — 80061 LIPID PANEL: CPT

## 2018-11-02 PROCEDURE — 36415 COLL VENOUS BLD VENIPUNCTURE: CPT | Mod: S$GLB,,, | Performed by: FAMILY MEDICINE

## 2018-11-02 PROCEDURE — 83036 HEMOGLOBIN GLYCOSYLATED A1C: CPT

## 2018-11-02 PROCEDURE — 82043 UR ALBUMIN QUANTITATIVE: CPT

## 2018-11-02 PROCEDURE — 80053 COMPREHEN METABOLIC PANEL: CPT

## 2018-11-02 PROCEDURE — 84402 ASSAY OF FREE TESTOSTERONE: CPT

## 2018-11-02 PROCEDURE — 85025 COMPLETE CBC W/AUTO DIFF WBC: CPT

## 2018-11-08 LAB — TESTOST FREE SERPL-MCNC: 11 PG/ML

## 2018-11-09 ENCOUNTER — OFFICE VISIT (OUTPATIENT)
Dept: FAMILY MEDICINE | Facility: CLINIC | Age: 48
End: 2018-11-09
Payer: COMMERCIAL

## 2018-11-09 VITALS
HEART RATE: 108 BPM | DIASTOLIC BLOOD PRESSURE: 76 MMHG | HEIGHT: 69 IN | OXYGEN SATURATION: 97 % | SYSTOLIC BLOOD PRESSURE: 128 MMHG | WEIGHT: 227.06 LBS | RESPIRATION RATE: 18 BRPM | BODY MASS INDEX: 33.63 KG/M2

## 2018-11-09 DIAGNOSIS — E78.5 HYPERLIPIDEMIA, UNSPECIFIED HYPERLIPIDEMIA TYPE: ICD-10-CM

## 2018-11-09 DIAGNOSIS — E11.9 TYPE 2 DIABETES MELLITUS TREATED WITH INSULIN: ICD-10-CM

## 2018-11-09 DIAGNOSIS — R53.83 FATIGUE, UNSPECIFIED TYPE: Primary | ICD-10-CM

## 2018-11-09 DIAGNOSIS — Z79.4 TYPE 2 DIABETES MELLITUS TREATED WITH INSULIN: ICD-10-CM

## 2018-11-09 LAB — TSH SERPL DL<=0.005 MIU/L-ACNC: 1.02 UIU/ML

## 2018-11-09 PROCEDURE — 36415 COLL VENOUS BLD VENIPUNCTURE: CPT | Mod: S$GLB,,, | Performed by: FAMILY MEDICINE

## 2018-11-09 PROCEDURE — 90471 IMMUNIZATION ADMIN: CPT | Mod: S$GLB,,, | Performed by: FAMILY MEDICINE

## 2018-11-09 PROCEDURE — 99214 OFFICE O/P EST MOD 30 MIN: CPT | Mod: 25,S$GLB,, | Performed by: FAMILY MEDICINE

## 2018-11-09 PROCEDURE — 90732 PPSV23 VACC 2 YRS+ SUBQ/IM: CPT | Mod: S$GLB,,, | Performed by: FAMILY MEDICINE

## 2018-11-09 PROCEDURE — 84443 ASSAY THYROID STIM HORMONE: CPT

## 2018-11-09 PROCEDURE — 3008F BODY MASS INDEX DOCD: CPT | Mod: CPTII,S$GLB,, | Performed by: FAMILY MEDICINE

## 2018-11-09 PROCEDURE — 99999 PR PBB SHADOW E&M-EST. PATIENT-LVL III: CPT | Mod: PBBFAC,,, | Performed by: FAMILY MEDICINE

## 2018-11-09 PROCEDURE — 3044F HG A1C LEVEL LT 7.0%: CPT | Mod: CPTII,S$GLB,, | Performed by: FAMILY MEDICINE

## 2018-11-09 RX ORDER — DAPAGLIFLOZIN 5 MG/1
5 TABLET, FILM COATED ORAL DAILY
Qty: 90 TABLET | Refills: 1 | Status: SHIPPED | OUTPATIENT
Start: 2018-11-09 | End: 2019-05-10 | Stop reason: SDUPTHER

## 2018-11-09 RX ORDER — SILDENAFIL 100 MG/1
50 TABLET, FILM COATED ORAL DAILY PRN
Qty: 6 TABLET | Refills: 5 | Status: SHIPPED | OUTPATIENT
Start: 2018-11-09 | End: 2018-12-09

## 2018-11-09 RX ORDER — METFORMIN HYDROCHLORIDE 500 MG/1
500 TABLET, EXTENDED RELEASE ORAL
Qty: 90 TABLET | Refills: 3 | Status: SHIPPED | OUTPATIENT
Start: 2018-11-09 | End: 2019-05-10 | Stop reason: SDUPTHER

## 2018-11-09 RX ORDER — ESCITALOPRAM OXALATE 20 MG/1
20 TABLET ORAL DAILY
Qty: 30 TABLET | Refills: 11 | Status: SHIPPED | OUTPATIENT
Start: 2018-11-09 | End: 2019-05-07 | Stop reason: SDUPTHER

## 2018-11-09 RX ORDER — ATORVASTATIN CALCIUM 10 MG/1
10 TABLET, FILM COATED ORAL DAILY
Qty: 30 TABLET | Refills: 11 | Status: SHIPPED | OUTPATIENT
Start: 2018-11-09 | End: 2019-05-08

## 2018-11-09 NOTE — PROGRESS NOTES
Subjective:       Patient ID: Abel Hackett is a 48 y.o. male.    Chief Complaint: Diabetes (Follow up)    HPI  48 year old male comes in for annual exam. He says he has been doing well. His 2 complaints are of fatigue and weight stagnation. He is exercising 5 days per week but not losing weight.    PMH, PSH, ALLERGIES, SH, FH reviewed in nurse's notes above  Medications reconciled in the nurse's notes    Review of Systems   Constitutional: Positive for fatigue.   Cardiovascular: Negative for chest pain.   Endocrine: Negative for polydipsia, polyphagia and polyuria.   Skin: Negative for pallor.   Neurological: Negative for dizziness, tremors, seizures, speech difficulty, weakness and headaches.   Psychiatric/Behavioral: Negative for confusion. The patient is not nervous/anxious.        Objective:      Physical Exam   Constitutional: He is oriented to person, place, and time. He appears well-developed. No distress.   HENT:   Head: Normocephalic and atraumatic.   Eyes: Conjunctivae are normal. Pupils are equal, round, and reactive to light.   Neck: Normal range of motion. Neck supple. No thyromegaly present.   Cardiovascular: Normal rate, regular rhythm, normal heart sounds and intact distal pulses.   Pulses:       Dorsalis pedis pulses are 2+ on the right side, and 2+ on the left side.        Posterior tibial pulses are 2+ on the right side, and 2+ on the left side.   Pulmonary/Chest: Effort normal and breath sounds normal. No respiratory distress. He has no wheezes.   Abdominal: Soft. Bowel sounds are normal. There is no tenderness.   Musculoskeletal: Normal range of motion. He exhibits no edema.   Feet:   Right Foot:   Protective Sensation: 5 sites tested. 5 sites sensed.   Skin Integrity: Negative for ulcer or blister.   Left Foot:   Protective Sensation: 5 sites tested. 5 sites sensed.   Skin Integrity: Negative for ulcer or blister.   Lymphadenopathy:     He has no cervical adenopathy.   Neurological: He is  alert and oriented to person, place, and time.   Skin: Skin is warm and dry. No rash noted.   Psychiatric: He has a normal mood and affect. His behavior is normal.   Nursing note and vitals reviewed.         Results for orders placed or performed in visit on 11/02/18   Hemoglobin A1c   Result Value Ref Range    Hemoglobin A1C 6.8 (H) 4.0 - 5.6 %    Estimated Avg Glucose 148 (H) 68 - 131 mg/dL   Results for orders placed or performed in visit on 11/17/17   Hemoglobin A1c   Result Value Ref Range    Hemoglobin A1C 6.3 (H) 4.0 - 5.6 %    Estimated Avg Glucose 134 (H) 68 - 131 mg/dL     Results for orders placed or performed in visit on 11/02/18   Microalbumin/creatinine urine ratio   Result Value Ref Range    Microalbum.,U,Random 4.0 ug/mL    Creatinine, Random Ur 95.6 23.0 - 375.0 mg/dL    Microalb Creat Ratio 4.2 0.0 - 30.0 ug/mg     Results for orders placed or performed in visit on 11/02/18   Lipid panel   Result Value Ref Range    Cholesterol 177 120 - 199 mg/dL    Triglycerides 134 30 - 150 mg/dL    HDL 40 40 - 75 mg/dL    LDL Cholesterol 110.2 63.0 - 159.0 mg/dL    HDL/Chol Ratio 22.6 20.0 - 50.0 %    Total Cholesterol/HDL Ratio 4.4 2.0 - 5.0    Non-HDL Cholesterol 137 mg/dL       Health Maintenance   Topic Date Due    Eye Exam  07/13/2017    Hemoglobin A1c  02/02/2019    Lipid Panel  11/02/2019    Urine Microalbumin  11/02/2019    Foot Exam  11/09/2019    TETANUS VACCINE  08/30/2025    Pneumococcal PPSV23 (Medium Risk) (2) 04/17/2037    Influenza Vaccine  Completed       Assessment/Plan:       Abel was seen today for diabetes.    Diagnoses and all orders for this visit:    Fatigue, unspecified type  -     TSH; Future  -     TSH    Type 2 diabetes mellitus treated with insulin  -     insulin glargine-lixisenatide (SOLIQUA 100/33) 100 unit-33 mcg/mL InPn; Inject 30 Units into the skin once daily.  -     dapagliflozin (FARXIGA) 5 mg Tab tablet; Take 1 tablet (5 mg total) by mouth once daily.  -      metFORMIN (GLUCOPHAGE-XR) 500 MG 24 hr tablet; Take 1 tablet (500 mg total) by mouth daily with breakfast.  -     (In Office Administered) Pneumococcal Polysaccharide Vaccine (23 Valent) (SQ/IM)    Hyperlipidemia, unspecified hyperlipidemia type  -     atorvastatin (LIPITOR) 10 MG tablet; Take 1 tablet (10 mg total) by mouth once daily.    Other orders  -     sildenafil (VIAGRA) 100 MG tablet; Take 0.5 tablets (50 mg total) by mouth daily as needed for Erectile Dysfunction.  -     escitalopram oxalate (LEXAPRO) 20 MG tablet; Take 1 tablet (20 mg total) by mouth once daily.      RTC if condition acutely worsens or any other concerns, otherwise RTC as scheduled in 6 months         Answers for HPI/ROS submitted by the patient on 11/2/2018   Diabetes problem  Diabetes type: type 2  MedicAlert ID: No  Disease duration: 3 years  blurred vision: No  foot paresthesias: No  foot ulcerations: No  visual change: No  weight loss: No  Symptom course: stable  hunger: No  mood changes: No  sleepiness: No  sweats: No  blackouts: No  hospitalization: No  nocturnal hypoglycemia: No  required assistance: No  required glucagon: No  CVA: No  heart disease: No  impotence: Yes  nephropathy: No  peripheral neuropathy: No  PVD: No  retinopathy: No  autonomic neuropathy: No  CAD risks: family history, obesity, diabetes mellitus, male sex  Current treatments: insulin injections, oral agent (triple therapy)  Treatment compliance: most of the time  Dose schedule: at bedtime  Given by: patient  Injection sites: abdominal wall  Home blood tests: 1-2 x per day  Home urines: <1 x per month  Monitoring compliance: good  Blood glucose trend: decreasing steadily  Weight trend: stable  Current diet: generally healthy  Meal planning: avoidance of concentrated sweets  Exercise: daily  Dietitian visit: No  Eye exam current: Yes  Sees podiatrist: No

## 2018-11-13 ENCOUNTER — TELEPHONE (OUTPATIENT)
Dept: FAMILY MEDICINE | Facility: CLINIC | Age: 48
End: 2018-11-13

## 2018-11-13 NOTE — TELEPHONE ENCOUNTER
----- Message from Keira Wheat MD sent at 11/12/2018  8:21 PM CST -----  Thyroid is normal.  If he is agreeable to it, I would recommend going forward with polysomnogram.  mh

## 2018-11-29 ENCOUNTER — HOSPITAL ENCOUNTER (OUTPATIENT)
Dept: SLEEP MEDICINE | Facility: HOSPITAL | Age: 48
Discharge: HOME | End: 2018-11-29
Attending: FAMILY MEDICINE
Payer: COMMERCIAL

## 2018-11-29 DIAGNOSIS — G47.33 OBSTRUCTIVE SLEEP APNEA (ADULT) (PEDIATRIC): ICD-10-CM

## 2018-11-29 PROCEDURE — 95800 SLP STDY UNATTENDED: CPT | Mod: 26,,, | Performed by: INTERNAL MEDICINE

## 2018-11-29 PROCEDURE — 95806 SLEEP STUDY UNATT&RESP EFFT: CPT

## 2018-12-07 ENCOUNTER — PATIENT MESSAGE (OUTPATIENT)
Dept: FAMILY MEDICINE | Facility: CLINIC | Age: 48
End: 2018-12-07

## 2019-05-06 ENCOUNTER — CLINICAL SUPPORT (OUTPATIENT)
Dept: FAMILY MEDICINE | Facility: CLINIC | Age: 49
End: 2019-05-06
Payer: COMMERCIAL

## 2019-05-06 DIAGNOSIS — E11.9 TYPE 2 DIABETES MELLITUS WITHOUT COMPLICATION: ICD-10-CM

## 2019-05-06 DIAGNOSIS — E11.9 TYPE 2 DIABETES MELLITUS WITHOUT COMPLICATION, UNSPECIFIED WHETHER LONG TERM INSULIN USE: ICD-10-CM

## 2019-05-06 LAB
CHOLEST SERPL-MCNC: 209 MG/DL (ref 120–199)
CHOLEST/HDLC SERPL: 4.8 {RATIO} (ref 2–5)
ESTIMATED AVG GLUCOSE: 134 MG/DL (ref 68–131)
HBA1C MFR BLD HPLC: 6.3 % (ref 4–5.6)
HDLC SERPL-MCNC: 44 MG/DL (ref 40–75)
HDLC SERPL: 21.1 % (ref 20–50)
LDLC SERPL CALC-MCNC: 121 MG/DL (ref 63–159)
NONHDLC SERPL-MCNC: 165 MG/DL
TRIGL SERPL-MCNC: 220 MG/DL (ref 30–150)

## 2019-05-06 PROCEDURE — 80061 LIPID PANEL: CPT

## 2019-05-06 PROCEDURE — 36415 COLL VENOUS BLD VENIPUNCTURE: CPT | Mod: S$GLB,,, | Performed by: FAMILY MEDICINE

## 2019-05-06 PROCEDURE — 36415 PR COLLECTION VENOUS BLOOD,VENIPUNCTURE: ICD-10-PCS | Mod: S$GLB,,, | Performed by: FAMILY MEDICINE

## 2019-05-06 PROCEDURE — 83036 HEMOGLOBIN GLYCOSYLATED A1C: CPT

## 2019-05-07 RX ORDER — ESCITALOPRAM OXALATE 20 MG/1
20 TABLET ORAL DAILY
Qty: 30 TABLET | Refills: 11 | Status: SHIPPED | OUTPATIENT
Start: 2019-05-07 | End: 2019-05-10 | Stop reason: SDUPTHER

## 2019-05-08 RX ORDER — ATORVASTATIN CALCIUM 40 MG/1
40 TABLET, FILM COATED ORAL DAILY
Qty: 30 TABLET | Refills: 5 | Status: SHIPPED | OUTPATIENT
Start: 2019-05-08 | End: 2019-05-10

## 2019-05-10 ENCOUNTER — OFFICE VISIT (OUTPATIENT)
Dept: FAMILY MEDICINE | Facility: CLINIC | Age: 49
End: 2019-05-10
Payer: COMMERCIAL

## 2019-05-10 VITALS
WEIGHT: 203.25 LBS | DIASTOLIC BLOOD PRESSURE: 66 MMHG | HEIGHT: 69 IN | BODY MASS INDEX: 30.1 KG/M2 | RESPIRATION RATE: 18 BRPM | HEART RATE: 88 BPM | SYSTOLIC BLOOD PRESSURE: 130 MMHG

## 2019-05-10 DIAGNOSIS — Z79.4 TYPE 2 DIABETES MELLITUS TREATED WITH INSULIN: ICD-10-CM

## 2019-05-10 DIAGNOSIS — E11.9 TYPE 2 DIABETES MELLITUS TREATED WITH INSULIN: ICD-10-CM

## 2019-05-10 DIAGNOSIS — E29.1 ANDROGEN DEFICIENCY: Primary | ICD-10-CM

## 2019-05-10 PROCEDURE — 3008F PR BODY MASS INDEX (BMI) DOCUMENTED: ICD-10-PCS | Mod: CPTII,S$GLB,, | Performed by: FAMILY MEDICINE

## 2019-05-10 PROCEDURE — 99214 PR OFFICE/OUTPT VISIT, EST, LEVL IV, 30-39 MIN: ICD-10-PCS | Mod: S$GLB,,, | Performed by: FAMILY MEDICINE

## 2019-05-10 PROCEDURE — 3044F HG A1C LEVEL LT 7.0%: CPT | Mod: CPTII,S$GLB,, | Performed by: FAMILY MEDICINE

## 2019-05-10 PROCEDURE — 99999 PR PBB SHADOW E&M-EST. PATIENT-LVL III: CPT | Mod: PBBFAC,,, | Performed by: FAMILY MEDICINE

## 2019-05-10 PROCEDURE — 99999 PR PBB SHADOW E&M-EST. PATIENT-LVL III: ICD-10-PCS | Mod: PBBFAC,,, | Performed by: FAMILY MEDICINE

## 2019-05-10 PROCEDURE — 3044F PR MOST RECENT HEMOGLOBIN A1C LEVEL <7.0%: ICD-10-PCS | Mod: CPTII,S$GLB,, | Performed by: FAMILY MEDICINE

## 2019-05-10 PROCEDURE — 3008F BODY MASS INDEX DOCD: CPT | Mod: CPTII,S$GLB,, | Performed by: FAMILY MEDICINE

## 2019-05-10 PROCEDURE — 99214 OFFICE O/P EST MOD 30 MIN: CPT | Mod: S$GLB,,, | Performed by: FAMILY MEDICINE

## 2019-05-10 RX ORDER — ESCITALOPRAM OXALATE 20 MG/1
20 TABLET ORAL DAILY
Qty: 30 TABLET | Refills: 11 | Status: SHIPPED | OUTPATIENT
Start: 2019-05-10 | End: 2019-11-12 | Stop reason: SDUPTHER

## 2019-05-10 RX ORDER — SILDENAFIL 100 MG/1
100 TABLET, FILM COATED ORAL
Qty: 10 TABLET | Refills: 4 | Status: SHIPPED | OUTPATIENT
Start: 2019-05-10 | End: 2019-12-16 | Stop reason: SDUPTHER

## 2019-05-10 RX ORDER — DAPAGLIFLOZIN 5 MG/1
5 TABLET, FILM COATED ORAL DAILY
Qty: 90 TABLET | Refills: 1 | Status: SHIPPED | OUTPATIENT
Start: 2019-05-10 | End: 2019-11-12

## 2019-05-10 RX ORDER — SILDENAFIL 100 MG/1
TABLET, FILM COATED ORAL
Refills: 4 | COMMUNITY
Start: 2019-03-29 | End: 2019-05-10 | Stop reason: SDUPTHER

## 2019-05-10 RX ORDER — ATORVASTATIN CALCIUM 40 MG/1
40 TABLET, FILM COATED ORAL DAILY
Qty: 30 TABLET | Refills: 5 | Status: SHIPPED | OUTPATIENT
Start: 2019-05-10 | End: 2019-10-24

## 2019-05-10 RX ORDER — METFORMIN HYDROCHLORIDE 500 MG/1
500 TABLET, EXTENDED RELEASE ORAL
Qty: 90 TABLET | Refills: 3 | Status: SHIPPED | OUTPATIENT
Start: 2019-05-10 | End: 2019-11-12 | Stop reason: SDUPTHER

## 2019-05-10 NOTE — PROGRESS NOTES
Subjective:       Patient ID: Abel Hackett is a 49 y.o. male.    Chief Complaint: Follow-up (6 month check up)    HPI  49 year old male comes in for f/u of DM. He notes that he is basically avoiding carbs completely and is working out regularly. He has stopped taking his insulin and is checking his blood sugar twice daily with normal BS numbers. No lows. No highs.    He has no other complaints today.    He has stopped taking his atorvastatin.    PMH, PSH, ALLERGIES, SH, FH reviewed in nurse's notes above  Medications reconciled in the nurse's notes      Review of Systems   Constitutional: Negative for fatigue.   Cardiovascular: Negative for chest pain.   Endocrine: Negative for polydipsia, polyphagia and polyuria.   Skin: Negative for pallor.   Neurological: Negative for dizziness, tremors, seizures, speech difficulty, weakness and headaches.   Psychiatric/Behavioral: Negative for confusion. The patient is not nervous/anxious.        Objective:      Physical Exam   Constitutional: He is oriented to person, place, and time. He appears well-developed. No distress.   HENT:   Head: Normocephalic and atraumatic.   Eyes: Pupils are equal, round, and reactive to light. Conjunctivae are normal.   Neck: Normal range of motion. Neck supple. No thyromegaly present.   Cardiovascular: Normal rate, regular rhythm, normal heart sounds and intact distal pulses.   Pulmonary/Chest: Effort normal and breath sounds normal. No respiratory distress. He has no wheezes.   Abdominal: Soft. Bowel sounds are normal. There is no tenderness.   Musculoskeletal: Normal range of motion. He exhibits no edema.   Lymphadenopathy:     He has no cervical adenopathy.   Neurological: He is alert and oriented to person, place, and time.   Skin: Skin is warm and dry. No rash noted.   Psychiatric: He has a normal mood and affect. His behavior is normal.   Nursing note and vitals reviewed.         Results for orders placed or performed in visit on 05/06/19    Hemoglobin A1c   Result Value Ref Range    Hemoglobin A1C 6.3 (H) 4.0 - 5.6 %    Estimated Avg Glucose 134 (H) 68 - 131 mg/dL   Results for orders placed or performed in visit on 11/02/18   Hemoglobin A1c   Result Value Ref Range    Hemoglobin A1C 6.8 (H) 4.0 - 5.6 %    Estimated Avg Glucose 148 (H) 68 - 131 mg/dL     Results for orders placed or performed in visit on 11/02/18   Microalbumin/creatinine urine ratio   Result Value Ref Range    Microalbum.,U,Random 4.0 ug/mL    Creatinine, Random Ur 95.6 23.0 - 375.0 mg/dL    Microalb Creat Ratio 4.2 0.0 - 30.0 ug/mg     Results for orders placed or performed in visit on 05/06/19   Lipid panel   Result Value Ref Range    Cholesterol 209 (H) 120 - 199 mg/dL    Triglycerides 220 (H) 30 - 150 mg/dL    HDL 44 40 - 75 mg/dL    LDL Cholesterol 121.0 63.0 - 159.0 mg/dL    Hdl/Cholesterol Ratio 21.1 20.0 - 50.0 %    Total Cholesterol/HDL Ratio 4.8 2.0 - 5.0    Non-HDL Cholesterol 165 mg/dL       Health Maintenance   Topic Date Due    Eye Exam  05/01/2019    Influenza Vaccine  08/01/2019    Hemoglobin A1c  08/06/2019    Urine Microalbumin  11/02/2019    Foot Exam  11/09/2019    Lipid Panel  05/06/2020    Low Dose Statin  05/10/2020    TETANUS VACCINE  08/30/2025    Pneumococcal Vaccine (Medium Risk)  Completed       Assessment/Plan:       Abel was seen today for follow-up.    Diagnoses and all orders for this visit:    Androgen deficiency  -     Testosterone; Future    Type 2 diabetes mellitus treated with insulin  -     metFORMIN (GLUCOPHAGE-XR) 500 MG 24 hr tablet; Take 1 tablet (500 mg total) by mouth daily with breakfast.  -     dapagliflozin (FARXIGA) 5 mg Tab tablet; Take 1 tablet (5 mg total) by mouth once daily.  -     atorvastatin (LIPITOR) 40 MG tablet; Take 1 tablet (40 mg total) by mouth once daily.  -     CBC auto differential; Future  -     Comprehensive metabolic panel; Future  -     Hemoglobin A1c; Future  -     Lipid panel; Future  -      Microalbumin/creatinine urine ratio; Future    Other orders  -     escitalopram oxalate (LEXAPRO) 20 MG tablet; Take 1 tablet (20 mg total) by mouth once daily.  -     sildenafil (VIAGRA) 100 MG tablet; Take 1 tablet (100 mg total) by mouth as needed for Erectile Dysfunction.            Answers for HPI/ROS submitted by the patient on 5/8/2019   Diabetes problem  Diabetes type: type 2  MedicAlert ID: No  Disease duration: 10 years  blurred vision: No  foot paresthesias: No  foot ulcerations: No  visual change: No  weight loss: Yes  Symptom course: improving  hunger: No  mood changes: No  sleepiness: No  sweats: No  blackouts: No  hospitalization: No  nocturnal hypoglycemia: No  required assistance: No  required glucagon: No  CVA: No  heart disease: No  impotence: Yes  nephropathy: No  peripheral neuropathy: No  PVD: No  retinopathy: No  autonomic neuropathy: No  CAD risks: family history, obesity, diabetes mellitus, male sex  Current treatments: diet, oral agent (dual therapy)  Treatment compliance: all of the time  Home blood tests: 3-4 x per week  Home urines: <1 x per month  Monitoring compliance: excellent  Blood glucose trend: decreasing steadily  Weight trend: decreasing steadily  Current diet: diabetic  Meal planning: none, carbohydrate counting  Exercise: daily  Dietitian visit: No  Eye exam current: Yes  Sees podiatrist: No    Diabetes Management Status    Statin: Taking  ACE/ARB: Not taking    Screening or Prevention Patient's value Goal Complete/Controlled?   HgA1C Testing and Control   Lab Results   Component Value Date    HGBA1C 6.3 (H) 05/06/2019      Annually/Less than 8% Yes   Lipid profile : 05/06/2019 Annually Yes   LDL control Lab Results   Component Value Date    LDLCALC 121.0 05/06/2019    Annually/Less than 100 mg/dl  No   Nephropathy screening Lab Results   Component Value Date    LABMICR 4.0 11/02/2018     Lab Results   Component Value Date    PROTEINUA Negative 04/04/2016    Annually Yes    Blood pressure BP Readings from Last 1 Encounters:   05/10/19 130/66    Less than 140/90 Yes   Dilated retinal exam : 05/01/2018 Annually No   Foot exam   : 11/09/2018 Annually Yes     RTC if condition acutely worsens or any other concerns, otherwise RTC as scheduled

## 2019-07-19 LAB — HBA1C MFR BLD: 6.6 %

## 2019-07-25 ENCOUNTER — PATIENT MESSAGE (OUTPATIENT)
Dept: FAMILY MEDICINE | Facility: CLINIC | Age: 49
End: 2019-07-25

## 2019-08-07 ENCOUNTER — PATIENT OUTREACH (OUTPATIENT)
Dept: ADMINISTRATIVE | Facility: HOSPITAL | Age: 49
End: 2019-08-07

## 2019-10-10 ENCOUNTER — TELEPHONE (OUTPATIENT)
Dept: FAMILY MEDICINE | Facility: CLINIC | Age: 49
End: 2019-10-10

## 2019-10-24 ENCOUNTER — TELEPHONE (OUTPATIENT)
Dept: FAMILY MEDICINE | Facility: CLINIC | Age: 49
End: 2019-10-24

## 2019-10-24 DIAGNOSIS — Z79.4 TYPE 2 DIABETES MELLITUS TREATED WITH INSULIN: ICD-10-CM

## 2019-10-24 DIAGNOSIS — E11.9 TYPE 2 DIABETES MELLITUS TREATED WITH INSULIN: ICD-10-CM

## 2019-10-24 RX ORDER — ATORVASTATIN CALCIUM 40 MG/1
40 TABLET, FILM COATED ORAL DAILY
Qty: 30 TABLET | Refills: 5 | Status: SHIPPED | OUTPATIENT
Start: 2019-10-24 | End: 2019-11-12 | Stop reason: SDUPTHER

## 2019-10-24 NOTE — TELEPHONE ENCOUNTER
----- Message from Fabiana Duncan RN sent at 10/9/2019 10:43 PM CDT -----  Regarding: Statin Therapy Measure  The patient is showing as non-compliant on the Statin Therapy Measure.   Lipitor is listed in the current medication list with an end date of 6/9/2019.    Please review if end date is accurate and if patient is currently taking the medication.     Thank You

## 2019-11-04 ENCOUNTER — CLINICAL SUPPORT (OUTPATIENT)
Dept: FAMILY MEDICINE | Facility: CLINIC | Age: 49
End: 2019-11-04
Payer: COMMERCIAL

## 2019-11-04 DIAGNOSIS — E29.1 ANDROGEN DEFICIENCY: ICD-10-CM

## 2019-11-04 DIAGNOSIS — Z79.4 TYPE 2 DIABETES MELLITUS TREATED WITH INSULIN: ICD-10-CM

## 2019-11-04 DIAGNOSIS — E11.9 TYPE 2 DIABETES MELLITUS TREATED WITH INSULIN: ICD-10-CM

## 2019-11-04 LAB
ALBUMIN SERPL BCP-MCNC: 3.8 G/DL (ref 3.5–5.2)
ALBUMIN/CREAT UR: 12.2 UG/MG (ref 0–30)
ALP SERPL-CCNC: 65 U/L (ref 55–135)
ALT SERPL W/O P-5'-P-CCNC: 25 U/L (ref 10–44)
ANION GAP SERPL CALC-SCNC: 8 MMOL/L (ref 8–16)
AST SERPL-CCNC: 21 U/L (ref 10–40)
BASOPHILS # BLD AUTO: 0.02 K/UL (ref 0–0.2)
BASOPHILS NFR BLD: 0.3 % (ref 0–1.9)
BILIRUB SERPL-MCNC: 0.4 MG/DL (ref 0.1–1)
BUN SERPL-MCNC: 24 MG/DL (ref 6–20)
CALCIUM SERPL-MCNC: 9.2 MG/DL (ref 8.7–10.5)
CHLORIDE SERPL-SCNC: 108 MMOL/L (ref 95–110)
CHOLEST SERPL-MCNC: 125 MG/DL (ref 120–199)
CHOLEST/HDLC SERPL: 3 {RATIO} (ref 2–5)
CO2 SERPL-SCNC: 26 MMOL/L (ref 23–29)
CREAT SERPL-MCNC: 0.9 MG/DL (ref 0.5–1.4)
CREAT UR-MCNC: 65.7 MG/DL (ref 23–375)
DIFFERENTIAL METHOD: ABNORMAL
EOSINOPHIL # BLD AUTO: 0.1 K/UL (ref 0–0.5)
EOSINOPHIL NFR BLD: 1.4 % (ref 0–8)
ERYTHROCYTE [DISTWIDTH] IN BLOOD BY AUTOMATED COUNT: 12.8 % (ref 11.5–14.5)
EST. GFR  (AFRICAN AMERICAN): >60 ML/MIN/1.73 M^2
EST. GFR  (NON AFRICAN AMERICAN): >60 ML/MIN/1.73 M^2
GLUCOSE SERPL-MCNC: 188 MG/DL (ref 70–110)
HCT VFR BLD AUTO: 47 % (ref 40–54)
HDLC SERPL-MCNC: 42 MG/DL (ref 40–75)
HDLC SERPL: 33.6 % (ref 20–50)
HGB BLD-MCNC: 14.6 G/DL (ref 14–18)
IMM GRANULOCYTES # BLD AUTO: 0.02 K/UL (ref 0–0.04)
IMM GRANULOCYTES NFR BLD AUTO: 0.3 % (ref 0–0.5)
LDLC SERPL CALC-MCNC: 42.2 MG/DL (ref 63–159)
LYMPHOCYTES # BLD AUTO: 1.2 K/UL (ref 1–4.8)
LYMPHOCYTES NFR BLD: 20.9 % (ref 18–48)
MCH RBC QN AUTO: 28.3 PG (ref 27–31)
MCHC RBC AUTO-ENTMCNC: 31.1 G/DL (ref 32–36)
MCV RBC AUTO: 91 FL (ref 82–98)
MICROALBUMIN UR DL<=1MG/L-MCNC: 8 UG/ML
MONOCYTES # BLD AUTO: 0.6 K/UL (ref 0.3–1)
MONOCYTES NFR BLD: 9.6 % (ref 4–15)
NEUTROPHILS # BLD AUTO: 3.9 K/UL (ref 1.8–7.7)
NEUTROPHILS NFR BLD: 67.5 % (ref 38–73)
NONHDLC SERPL-MCNC: 83 MG/DL
NRBC BLD-RTO: 0 /100 WBC
PLATELET # BLD AUTO: 193 K/UL (ref 150–350)
PMV BLD AUTO: 10.5 FL (ref 9.2–12.9)
POTASSIUM SERPL-SCNC: 4.9 MMOL/L (ref 3.5–5.1)
PROT SERPL-MCNC: 6.9 G/DL (ref 6–8.4)
RBC # BLD AUTO: 5.15 M/UL (ref 4.6–6.2)
SODIUM SERPL-SCNC: 142 MMOL/L (ref 136–145)
TRIGL SERPL-MCNC: 204 MG/DL (ref 30–150)
WBC # BLD AUTO: 5.74 K/UL (ref 3.9–12.7)

## 2019-11-04 PROCEDURE — 82043 UR ALBUMIN QUANTITATIVE: CPT

## 2019-11-04 PROCEDURE — 36415 PR COLLECTION VENOUS BLOOD,VENIPUNCTURE: ICD-10-PCS | Mod: S$GLB,,, | Performed by: FAMILY MEDICINE

## 2019-11-04 PROCEDURE — 80053 COMPREHEN METABOLIC PANEL: CPT

## 2019-11-04 PROCEDURE — 36415 COLL VENOUS BLD VENIPUNCTURE: CPT | Mod: S$GLB,,, | Performed by: FAMILY MEDICINE

## 2019-11-04 PROCEDURE — 84403 ASSAY OF TOTAL TESTOSTERONE: CPT

## 2019-11-04 PROCEDURE — 85025 COMPLETE CBC W/AUTO DIFF WBC: CPT

## 2019-11-04 PROCEDURE — 80061 LIPID PANEL: CPT

## 2019-11-04 PROCEDURE — 83036 HEMOGLOBIN GLYCOSYLATED A1C: CPT

## 2019-11-05 ENCOUNTER — PATIENT MESSAGE (OUTPATIENT)
Dept: FAMILY MEDICINE | Facility: CLINIC | Age: 49
End: 2019-11-05

## 2019-11-05 LAB
ESTIMATED AVG GLUCOSE: 160 MG/DL (ref 68–131)
HBA1C MFR BLD HPLC: 7.2 % (ref 4–5.6)
TESTOST SERPL-MCNC: 798 NG/DL (ref 304–1227)

## 2019-11-05 NOTE — PROGRESS NOTES
Testosterone is 800 - this is great.  A1c still at goal - but he needs to cont to watch carbs.  Cholesterol is BEAUTIFUL.  Kidney function looks great.  No changes.  mh

## 2019-11-12 ENCOUNTER — OFFICE VISIT (OUTPATIENT)
Dept: FAMILY MEDICINE | Facility: CLINIC | Age: 49
End: 2019-11-12
Payer: COMMERCIAL

## 2019-11-12 VITALS
HEIGHT: 69 IN | WEIGHT: 209.44 LBS | RESPIRATION RATE: 16 BRPM | DIASTOLIC BLOOD PRESSURE: 70 MMHG | BODY MASS INDEX: 31.02 KG/M2 | SYSTOLIC BLOOD PRESSURE: 128 MMHG | HEART RATE: 68 BPM

## 2019-11-12 DIAGNOSIS — F41.1 GENERALIZED ANXIETY DISORDER: Primary | ICD-10-CM

## 2019-11-12 DIAGNOSIS — Z79.4 TYPE 2 DIABETES MELLITUS TREATED WITH INSULIN: ICD-10-CM

## 2019-11-12 DIAGNOSIS — Z79.4 TYPE 2 DIABETES MELLITUS WITH RETINOPATHY OF LEFT EYE, WITH LONG-TERM CURRENT USE OF INSULIN, MACULAR EDEMA PRESENCE UNSPECIFIED, UNSPECIFIED RETINOPATHY SEVERITY: ICD-10-CM

## 2019-11-12 DIAGNOSIS — E11.319 TYPE 2 DIABETES MELLITUS WITH RETINOPATHY OF LEFT EYE, WITH LONG-TERM CURRENT USE OF INSULIN, MACULAR EDEMA PRESENCE UNSPECIFIED, UNSPECIFIED RETINOPATHY SEVERITY: ICD-10-CM

## 2019-11-12 DIAGNOSIS — G47.33 OBSTRUCTIVE SLEEP APNEA (ADULT) (PEDIATRIC): ICD-10-CM

## 2019-11-12 DIAGNOSIS — E11.9 TYPE 2 DIABETES MELLITUS TREATED WITH INSULIN: ICD-10-CM

## 2019-11-12 PROCEDURE — 99999 PR PBB SHADOW E&M-EST. PATIENT-LVL III: CPT | Mod: PBBFAC,,, | Performed by: FAMILY MEDICINE

## 2019-11-12 PROCEDURE — 99214 PR OFFICE/OUTPT VISIT, EST, LEVL IV, 30-39 MIN: ICD-10-PCS | Mod: S$GLB,,, | Performed by: FAMILY MEDICINE

## 2019-11-12 PROCEDURE — 99999 PR PBB SHADOW E&M-EST. PATIENT-LVL III: ICD-10-PCS | Mod: PBBFAC,,, | Performed by: FAMILY MEDICINE

## 2019-11-12 PROCEDURE — 3008F PR BODY MASS INDEX (BMI) DOCUMENTED: ICD-10-PCS | Mod: CPTII,S$GLB,, | Performed by: FAMILY MEDICINE

## 2019-11-12 PROCEDURE — 3008F BODY MASS INDEX DOCD: CPT | Mod: CPTII,S$GLB,, | Performed by: FAMILY MEDICINE

## 2019-11-12 PROCEDURE — 99214 OFFICE O/P EST MOD 30 MIN: CPT | Mod: S$GLB,,, | Performed by: FAMILY MEDICINE

## 2019-11-12 RX ORDER — ASPIRIN 81 MG/1
81 TABLET ORAL DAILY
Qty: 30 TABLET | Refills: 5 | Status: SHIPPED | OUTPATIENT
Start: 2019-11-12 | End: 2020-05-06

## 2019-11-12 RX ORDER — METFORMIN HYDROCHLORIDE 500 MG/1
500 TABLET, EXTENDED RELEASE ORAL
Qty: 90 TABLET | Refills: 3 | Status: SHIPPED | OUTPATIENT
Start: 2019-11-12 | End: 2021-11-23 | Stop reason: SDUPTHER

## 2019-11-12 RX ORDER — DAPAGLIFLOZIN 10 MG/1
10 TABLET, FILM COATED ORAL DAILY
Qty: 30 TABLET | Refills: 5 | Status: SHIPPED | OUTPATIENT
Start: 2019-11-12 | End: 2021-11-23 | Stop reason: SDUPTHER

## 2019-11-12 RX ORDER — ATORVASTATIN CALCIUM 40 MG/1
40 TABLET, FILM COATED ORAL DAILY
Qty: 30 TABLET | Refills: 5 | Status: SHIPPED | OUTPATIENT
Start: 2019-11-12 | End: 2021-11-23 | Stop reason: SDUPTHER

## 2019-11-12 RX ORDER — ESCITALOPRAM OXALATE 20 MG/1
20 TABLET ORAL DAILY
Qty: 30 TABLET | Refills: 11 | Status: SHIPPED | OUTPATIENT
Start: 2019-11-12 | End: 2021-01-05

## 2019-11-12 NOTE — PROGRESS NOTES
Subjective:       Patient ID: Abel Hackett is a 49 y.o. male.    Chief Complaint: Follow-up (6 month check up)    HPI  49 year old male comes in for f/u of htn, diabetes. He has recently done labs and is here to review them. No complaints today. Overall doing well. Complaint with meds.    PMH, PSH, ALLERGIES, SH, FH reviewed in nurse's notes above  Medications reconciled in the nurse's notes      Review of Systems   Constitutional: Negative for chills and fever.   HENT: Negative for congestion, ear pain, postnasal drip, rhinorrhea, sore throat and trouble swallowing.    Eyes: Negative for redness and itching.   Respiratory: Negative for cough, shortness of breath and wheezing.    Cardiovascular: Negative for chest pain and palpitations.   Gastrointestinal: Negative for abdominal pain, diarrhea, nausea and vomiting.   Genitourinary: Negative for dysuria and frequency.   Skin: Negative for rash.   Neurological: Negative for weakness and headaches.       Objective:      Physical Exam   Constitutional: He is oriented to person, place, and time. He appears well-developed. No distress.   HENT:   Head: Normocephalic and atraumatic.   Eyes: Pupils are equal, round, and reactive to light. Conjunctivae are normal.   Neck: Normal range of motion. Neck supple. No thyromegaly present.   Cardiovascular: Normal rate, regular rhythm, normal heart sounds and intact distal pulses.   Pulses:       Dorsalis pedis pulses are 2+ on the right side, and 2+ on the left side.        Posterior tibial pulses are 2+ on the right side, and 2+ on the left side.   Pulmonary/Chest: Effort normal and breath sounds normal. No respiratory distress. He has no wheezes.   Abdominal: Soft. Bowel sounds are normal. There is no tenderness.   Musculoskeletal: Normal range of motion. He exhibits no edema.   Feet:   Right Foot:   Protective Sensation: 5 sites tested. 5 sites sensed.   Skin Integrity: Negative for ulcer or callus.   Left Foot:   Protective  Sensation: 5 sites tested. 5 sites sensed.   Skin Integrity: Negative for ulcer or callus.   Lymphadenopathy:     He has no cervical adenopathy.   Neurological: He is alert and oriented to person, place, and time.   Skin: Skin is warm and dry. No rash noted.   Psychiatric: He has a normal mood and affect. His behavior is normal.   Nursing note and vitals reviewed.         Results for orders placed or performed in visit on 11/04/19   Hemoglobin A1c   Result Value Ref Range    Hemoglobin A1C 7.2 (H) 4.0 - 5.6 %    Estimated Avg Glucose 160 (H) 68 - 131 mg/dL   Results for orders placed or performed in visit on 08/07/19   Hemoglobin A1c   Result Value Ref Range    Hemoglobin A1C 6.6 %     Results for orders placed or performed in visit on 11/04/19   Microalbumin/creatinine urine ratio   Result Value Ref Range    Microalbum.,U,Random 8.0 ug/mL    Creatinine, Random Ur 65.7 23.0 - 375.0 mg/dL    Microalb Creat Ratio 12.2 0.0 - 30.0 ug/mg     Results for orders placed or performed in visit on 11/04/19   Lipid panel   Result Value Ref Range    Cholesterol 125 120 - 199 mg/dL    Triglycerides 204 (H) 30 - 150 mg/dL    HDL 42 40 - 75 mg/dL    LDL Cholesterol 42.2 (L) 63.0 - 159.0 mg/dL    Hdl/Cholesterol Ratio 33.6 20.0 - 50.0 %    Total Cholesterol/HDL Ratio 3.0 2.0 - 5.0    Non-HDL Cholesterol 83 mg/dL       Health Maintenance   Topic Date Due    Eye Exam  05/01/2019    Hemoglobin A1c  02/04/2020    Lipid Panel  11/04/2020    Urine Microalbumin  11/04/2020    Low Dose Statin  11/12/2020    Foot Exam  11/12/2020    TETANUS VACCINE  08/30/2025    Pneumococcal Vaccine (Medium Risk)  Completed       Assessment/Plan:       Able was seen today for follow-up.    Diagnoses and all orders for this visit:    Generalized anxiety disorder  -     escitalopram oxalate (LEXAPRO) 20 MG tablet; Take 1 tablet (20 mg total) by mouth once daily.    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  -     Hemoglobin A1c; Future    Obstructive sleep apnea (adult) (pediatric)    Type 2 diabetes mellitus treated with insulin  -     atorvastatin (LIPITOR) 40 MG tablet; Take 1 tablet (40 mg total) by mouth once daily.  -     metFORMIN (GLUCOPHAGE-XR) 500 MG 24 hr tablet; Take 1 tablet (500 mg total) by mouth daily with breakfast.  -     aspirin (ECOTRIN) 81 MG EC tablet; Take 1 tablet (81 mg total) by mouth once daily.    RTC if condition acutely worsens or any other concerns, otherwise RTC as scheduled      Increase farxiga. Labs ordered for 3 months. Cont diet and exercise modification.

## 2019-11-14 DIAGNOSIS — Z79.4 TYPE 2 DIABETES MELLITUS TREATED WITH INSULIN: ICD-10-CM

## 2019-11-14 DIAGNOSIS — E11.9 TYPE 2 DIABETES MELLITUS TREATED WITH INSULIN: ICD-10-CM

## 2019-11-14 RX ORDER — DAPAGLIFLOZIN 5 MG/1
TABLET, FILM COATED ORAL
Qty: 90 TABLET | Refills: 1 | OUTPATIENT
Start: 2019-11-14

## 2019-12-16 RX ORDER — SILDENAFIL 100 MG/1
100 TABLET, FILM COATED ORAL
Qty: 10 TABLET | Refills: 4 | Status: SHIPPED | OUTPATIENT
Start: 2019-12-16 | End: 2023-06-15

## 2020-04-27 LAB — HBA1C MFR BLD: 9.2 % (ref 4–6)

## 2020-05-06 DIAGNOSIS — Z79.4 TYPE 2 DIABETES MELLITUS TREATED WITH INSULIN: ICD-10-CM

## 2020-05-06 DIAGNOSIS — E11.9 TYPE 2 DIABETES MELLITUS TREATED WITH INSULIN: ICD-10-CM

## 2020-05-06 RX ORDER — ASPIRIN 81 MG/1
TABLET ORAL
Qty: 90 TABLET | Refills: 1 | Status: SHIPPED | OUTPATIENT
Start: 2020-05-06 | End: 2021-11-23 | Stop reason: SDUPTHER

## 2020-09-15 ENCOUNTER — TELEPHONE (OUTPATIENT)
Dept: ADMINISTRATIVE | Facility: HOSPITAL | Age: 50
End: 2020-09-15

## 2020-09-15 DIAGNOSIS — Z79.4 TYPE 2 DIABETES MELLITUS WITH HYPERGLYCEMIA, WITH LONG-TERM CURRENT USE OF INSULIN: ICD-10-CM

## 2020-09-15 DIAGNOSIS — E78.5 HYPERLIPIDEMIA, UNSPECIFIED HYPERLIPIDEMIA TYPE: Primary | ICD-10-CM

## 2020-09-15 DIAGNOSIS — E11.65 TYPE 2 DIABETES MELLITUS WITH HYPERGLYCEMIA, WITH LONG-TERM CURRENT USE OF INSULIN: ICD-10-CM

## 2020-09-24 ENCOUNTER — OFFICE VISIT (OUTPATIENT)
Dept: URGENT CARE | Facility: CLINIC | Age: 50
End: 2020-09-24

## 2020-09-24 VITALS
DIASTOLIC BLOOD PRESSURE: 72 MMHG | OXYGEN SATURATION: 97 % | HEIGHT: 69 IN | TEMPERATURE: 97 F | BODY MASS INDEX: 30.96 KG/M2 | SYSTOLIC BLOOD PRESSURE: 132 MMHG | HEART RATE: 111 BPM | WEIGHT: 209 LBS

## 2020-09-24 DIAGNOSIS — Z02.1 PHYSICAL EXAM, PRE-EMPLOYMENT: Primary | ICD-10-CM

## 2020-09-24 PROCEDURE — 99499 UNLISTED E&M SERVICE: CPT | Mod: S$GLB,,, | Performed by: FAMILY MEDICINE

## 2020-09-24 PROCEDURE — 99499 NO LOS: ICD-10-PCS | Mod: S$GLB,,, | Performed by: FAMILY MEDICINE

## 2020-09-24 NOTE — PROGRESS NOTES
Subjective:       Patient ID: Abel Hackett is a 50 y.o. male.    Chief Complaint: Work Physical    HPI    Constitution: Negative for chills, fatigue and fever.   HENT: Negative for congestion and sore throat.    Neck: Negative for painful lymph nodes.   Cardiovascular: Negative for chest pain and leg swelling.   Eyes: Negative for double vision and blurred vision.   Respiratory: Negative for cough and shortness of breath.    Gastrointestinal: Negative for nausea, vomiting and diarrhea.   Genitourinary: Negative for dysuria, frequency and urgency.   Musculoskeletal: Negative for joint pain, joint swelling, muscle cramps and muscle ache.   Skin: Negative for color change, pale and rash.   Allergic/Immunologic: Negative for seasonal allergies.   Neurological: Negative for dizziness, history of vertigo, light-headedness, passing out and headaches.   Hematologic/Lymphatic: Negative for swollen lymph nodes, easy bruising/bleeding and history of blood clots. Does not bruise/bleed easily.   Psychiatric/Behavioral: Negative for nervous/anxious, sleep disturbance and depression. The patient is not nervous/anxious.         Objective:      Physical Exam  Vitals signs and nursing note reviewed.   Constitutional:       General: He is not in acute distress.     Appearance: Normal appearance. He is well-developed. He is not ill-appearing, toxic-appearing or diaphoretic.   HENT:      Head: Normocephalic and atraumatic.      Jaw: No trismus.      Right Ear: Hearing, tympanic membrane, ear canal and external ear normal.      Left Ear: Hearing, tympanic membrane, ear canal and external ear normal.      Nose: Nose normal. No nasal deformity, mucosal edema or rhinorrhea.      Right Sinus: No maxillary sinus tenderness or frontal sinus tenderness.      Left Sinus: No maxillary sinus tenderness or frontal sinus tenderness.      Mouth/Throat:      Dentition: Normal dentition.      Pharynx: Uvula midline. No posterior oropharyngeal erythema  or uvula swelling.   Eyes:      General: Lids are normal. No scleral icterus.        Right eye: No discharge.         Left eye: No discharge.      Conjunctiva/sclera: Conjunctivae normal.      Comments: Sclera clear bilat   Neck:      Musculoskeletal: Full passive range of motion without pain, normal range of motion and neck supple.      Trachea: Trachea and phonation normal.   Cardiovascular:      Rate and Rhythm: Normal rate and regular rhythm.      Pulses: Normal pulses.      Heart sounds: Normal heart sounds.   Pulmonary:      Effort: Pulmonary effort is normal. No respiratory distress.      Breath sounds: Normal breath sounds.   Abdominal:      General: Bowel sounds are normal. There is no distension.      Palpations: Abdomen is soft. There is no mass or pulsatile mass.      Tenderness: There is no abdominal tenderness.   Musculoskeletal: Normal range of motion.         General: No deformity.   Skin:     General: Skin is warm and dry.      Coloration: Skin is not pale.   Neurological:      Mental Status: He is alert and oriented to person, place, and time.      Motor: No abnormal muscle tone.      Coordination: Coordination normal.   Psychiatric:         Speech: Speech normal.         Behavior: Behavior normal. Behavior is cooperative.         Thought Content: Thought content normal.         Judgment: Judgment normal.         Assessment:       1. Physical exam, pre-employment        Plan:       OK for work, form completed for company.    Please drink plenty of fluids.  Please get plenty of rest.  Please return here or go to the Emergency Department for any concerns or worsening of condition.    If not allergic, please take over the counter Tylenol (Acetaminophen) and/or Motrin (Ibuprofen) as directed for control of pain and/or fever.    Please follow up with your primary care doctor or specialist as needed.  Keira Wheat MD  [phone]    You must understand that you have received treatment at an Urgent  Care facility only, and that you may be  released before all of your medical problems are known or treated. Urgent Care facilities are not equipped to  handle life threatening emergencies. It is recommended that you seek care at an Emergency Department for  further evaluation of worsening or concerning symptoms, or possibly life threatening conditions as  discussed.            Follow up if symptoms worsen or fail to improve.

## 2020-09-24 NOTE — PATIENT INSTRUCTIONS
OK for begin work, form completed for company.    Please drink plenty of fluids.  Please get plenty of rest.  Please return here or go to the Emergency Department for any concerns or worsening of condition.    If not allergic, please take over the counter Tylenol (Acetaminophen) and/or Motrin (Ibuprofen) as directed for control of pain and/or fever.    Please follow up with your primary care doctor or specialist as needed.  Keira Wheat MD  [phone]    You must understand that you have received treatment at an Urgent Care facility only, and that you may be  released before all of your medical problems are known or treated. Urgent Care facilities are not equipped to  handle life threatening emergencies. It is recommended that you seek care at an Emergency Department for  further evaluation of worsening or concerning symptoms, or possibly life threatening conditions as  discussed.

## 2020-10-05 ENCOUNTER — PATIENT MESSAGE (OUTPATIENT)
Dept: ADMINISTRATIVE | Facility: HOSPITAL | Age: 50
End: 2020-10-05

## 2020-11-12 ENCOUNTER — TELEPHONE (OUTPATIENT)
Dept: ADMINISTRATIVE | Facility: HOSPITAL | Age: 50
End: 2020-11-12

## 2021-04-05 ENCOUNTER — PATIENT MESSAGE (OUTPATIENT)
Dept: ADMINISTRATIVE | Facility: HOSPITAL | Age: 51
End: 2021-04-05

## 2021-05-17 ENCOUNTER — PATIENT OUTREACH (OUTPATIENT)
Dept: ADMINISTRATIVE | Facility: HOSPITAL | Age: 51
End: 2021-05-17

## 2021-05-17 DIAGNOSIS — Z79.4 TYPE 2 DIABETES MELLITUS WITH HYPERGLYCEMIA, WITH LONG-TERM CURRENT USE OF INSULIN: Primary | ICD-10-CM

## 2021-05-17 DIAGNOSIS — E11.65 TYPE 2 DIABETES MELLITUS WITH HYPERGLYCEMIA, WITH LONG-TERM CURRENT USE OF INSULIN: Primary | ICD-10-CM

## 2021-07-06 ENCOUNTER — PATIENT MESSAGE (OUTPATIENT)
Dept: ADMINISTRATIVE | Facility: HOSPITAL | Age: 51
End: 2021-07-06

## 2021-08-03 ENCOUNTER — PATIENT MESSAGE (OUTPATIENT)
Dept: FAMILY MEDICINE | Facility: CLINIC | Age: 51
End: 2021-08-03

## 2021-08-03 DIAGNOSIS — E11.9 WELL CONTROLLED TYPE 2 DIABETES MELLITUS: Primary | ICD-10-CM

## 2021-08-04 ENCOUNTER — PATIENT MESSAGE (OUTPATIENT)
Dept: ADMINISTRATIVE | Facility: HOSPITAL | Age: 51
End: 2021-08-04

## 2021-10-18 ENCOUNTER — PATIENT MESSAGE (OUTPATIENT)
Dept: ADMINISTRATIVE | Facility: HOSPITAL | Age: 51
End: 2021-10-18
Payer: COMMERCIAL

## 2021-11-23 ENCOUNTER — OFFICE VISIT (OUTPATIENT)
Dept: FAMILY MEDICINE | Facility: CLINIC | Age: 51
End: 2021-11-23
Payer: COMMERCIAL

## 2021-11-23 ENCOUNTER — CLINICAL SUPPORT (OUTPATIENT)
Dept: FAMILY MEDICINE | Facility: CLINIC | Age: 51
End: 2021-11-23
Payer: COMMERCIAL

## 2021-11-23 VITALS
BODY MASS INDEX: 31.53 KG/M2 | WEIGHT: 212.88 LBS | HEART RATE: 76 BPM | RESPIRATION RATE: 16 BRPM | DIASTOLIC BLOOD PRESSURE: 80 MMHG | HEIGHT: 69 IN | SYSTOLIC BLOOD PRESSURE: 137 MMHG

## 2021-11-23 DIAGNOSIS — E11.319 TYPE 2 DIABETES MELLITUS WITH RETINOPATHY OF LEFT EYE, WITH LONG-TERM CURRENT USE OF INSULIN, MACULAR EDEMA PRESENCE UNSPECIFIED, UNSPECIFIED RETINOPATHY SEVERITY: ICD-10-CM

## 2021-11-23 DIAGNOSIS — F41.1 GENERALIZED ANXIETY DISORDER: ICD-10-CM

## 2021-11-23 DIAGNOSIS — Z79.4 TYPE 2 DIABETES MELLITUS WITH RETINOPATHY OF LEFT EYE, WITH LONG-TERM CURRENT USE OF INSULIN, MACULAR EDEMA PRESENCE UNSPECIFIED, UNSPECIFIED RETINOPATHY SEVERITY: ICD-10-CM

## 2021-11-23 DIAGNOSIS — E11.9 TYPE 2 DIABETES MELLITUS TREATED WITH INSULIN: ICD-10-CM

## 2021-11-23 DIAGNOSIS — E11.9 WELL CONTROLLED TYPE 2 DIABETES MELLITUS: ICD-10-CM

## 2021-11-23 DIAGNOSIS — Z79.4 TYPE 2 DIABETES MELLITUS TREATED WITH INSULIN: ICD-10-CM

## 2021-11-23 LAB
ALBUMIN SERPL BCP-MCNC: 3.7 G/DL (ref 3.5–5.2)
ALBUMIN/CREAT UR: 14.5 UG/MG (ref 0–30)
ALP SERPL-CCNC: 56 U/L (ref 55–135)
ALT SERPL W/O P-5'-P-CCNC: 28 U/L (ref 10–44)
ANION GAP SERPL CALC-SCNC: 10 MMOL/L (ref 8–16)
AST SERPL-CCNC: 20 U/L (ref 10–40)
BASOPHILS # BLD AUTO: 0.03 K/UL (ref 0–0.2)
BASOPHILS NFR BLD: 0.6 % (ref 0–1.9)
BILIRUB SERPL-MCNC: 0.3 MG/DL (ref 0.1–1)
BUN SERPL-MCNC: 29 MG/DL (ref 6–20)
CALCIUM SERPL-MCNC: 10.2 MG/DL (ref 8.7–10.5)
CHLORIDE SERPL-SCNC: 105 MMOL/L (ref 95–110)
CHOLEST SERPL-MCNC: 186 MG/DL (ref 120–199)
CHOLEST/HDLC SERPL: 3.7 {RATIO} (ref 2–5)
CO2 SERPL-SCNC: 23 MMOL/L (ref 23–29)
CREAT SERPL-MCNC: 1 MG/DL (ref 0.5–1.4)
CREAT UR-MCNC: 41.4 MG/DL (ref 23–375)
DIFFERENTIAL METHOD: NORMAL
EOSINOPHIL # BLD AUTO: 0.1 K/UL (ref 0–0.5)
EOSINOPHIL NFR BLD: 1.9 % (ref 0–8)
ERYTHROCYTE [DISTWIDTH] IN BLOOD BY AUTOMATED COUNT: 12.2 % (ref 11.5–14.5)
EST. GFR  (AFRICAN AMERICAN): >60 ML/MIN/1.73 M^2
EST. GFR  (NON AFRICAN AMERICAN): >60 ML/MIN/1.73 M^2
ESTIMATED AVG GLUCOSE: 292 MG/DL (ref 68–131)
GLUCOSE SERPL-MCNC: 210 MG/DL (ref 70–110)
HBA1C MFR BLD: 11.8 % (ref 4–5.6)
HCT VFR BLD AUTO: 44.5 % (ref 40–54)
HDLC SERPL-MCNC: 50 MG/DL (ref 40–75)
HDLC SERPL: 26.9 % (ref 20–50)
HGB BLD-MCNC: 14.3 G/DL (ref 14–18)
IMM GRANULOCYTES # BLD AUTO: 0.01 K/UL (ref 0–0.04)
IMM GRANULOCYTES NFR BLD AUTO: 0.2 % (ref 0–0.5)
LDLC SERPL CALC-MCNC: 111 MG/DL (ref 63–159)
LYMPHOCYTES # BLD AUTO: 1.1 K/UL (ref 1–4.8)
LYMPHOCYTES NFR BLD: 19.6 % (ref 18–48)
MCH RBC QN AUTO: 28.7 PG (ref 27–31)
MCHC RBC AUTO-ENTMCNC: 32.1 G/DL (ref 32–36)
MCV RBC AUTO: 89 FL (ref 82–98)
MICROALBUMIN UR DL<=1MG/L-MCNC: 6 UG/ML
MONOCYTES # BLD AUTO: 0.5 K/UL (ref 0.3–1)
MONOCYTES NFR BLD: 8.7 % (ref 4–15)
NEUTROPHILS # BLD AUTO: 3.7 K/UL (ref 1.8–7.7)
NEUTROPHILS NFR BLD: 69 % (ref 38–73)
NONHDLC SERPL-MCNC: 136 MG/DL
NRBC BLD-RTO: 0 /100 WBC
PLATELET # BLD AUTO: 211 K/UL (ref 150–450)
PMV BLD AUTO: 10.5 FL (ref 9.2–12.9)
POTASSIUM SERPL-SCNC: 4.9 MMOL/L (ref 3.5–5.1)
PROT SERPL-MCNC: 7.2 G/DL (ref 6–8.4)
RBC # BLD AUTO: 4.98 M/UL (ref 4.6–6.2)
SODIUM SERPL-SCNC: 138 MMOL/L (ref 136–145)
TRIGL SERPL-MCNC: 125 MG/DL (ref 30–150)
WBC # BLD AUTO: 5.4 K/UL (ref 3.9–12.7)

## 2021-11-23 PROCEDURE — 36415 COLL VENOUS BLD VENIPUNCTURE: CPT | Mod: S$GLB,,, | Performed by: FAMILY MEDICINE

## 2021-11-23 PROCEDURE — 82570 ASSAY OF URINE CREATININE: CPT | Performed by: FAMILY MEDICINE

## 2021-11-23 PROCEDURE — 99999 PR PBB SHADOW E&M-EST. PATIENT-LVL III: ICD-10-PCS | Mod: PBBFAC,,, | Performed by: FAMILY MEDICINE

## 2021-11-23 PROCEDURE — 85025 COMPLETE CBC W/AUTO DIFF WBC: CPT | Performed by: FAMILY MEDICINE

## 2021-11-23 PROCEDURE — 99214 PR OFFICE/OUTPT VISIT, EST, LEVL IV, 30-39 MIN: ICD-10-PCS | Mod: S$GLB,,, | Performed by: FAMILY MEDICINE

## 2021-11-23 PROCEDURE — 99999 PR PBB SHADOW E&M-EST. PATIENT-LVL III: CPT | Mod: PBBFAC,,, | Performed by: FAMILY MEDICINE

## 2021-11-23 PROCEDURE — 80061 LIPID PANEL: CPT | Performed by: FAMILY MEDICINE

## 2021-11-23 PROCEDURE — 83036 HEMOGLOBIN GLYCOSYLATED A1C: CPT | Performed by: FAMILY MEDICINE

## 2021-11-23 PROCEDURE — 36415 PR COLLECTION VENOUS BLOOD,VENIPUNCTURE: ICD-10-PCS | Mod: S$GLB,,, | Performed by: FAMILY MEDICINE

## 2021-11-23 PROCEDURE — 99214 OFFICE O/P EST MOD 30 MIN: CPT | Mod: S$GLB,,, | Performed by: FAMILY MEDICINE

## 2021-11-23 PROCEDURE — 80053 COMPREHEN METABOLIC PANEL: CPT | Performed by: FAMILY MEDICINE

## 2021-11-23 RX ORDER — METFORMIN HYDROCHLORIDE 500 MG/1
500 TABLET, EXTENDED RELEASE ORAL
Qty: 90 TABLET | Refills: 3 | Status: SHIPPED | OUTPATIENT
Start: 2021-11-23 | End: 2022-02-23 | Stop reason: SDUPTHER

## 2021-11-23 RX ORDER — DAPAGLIFLOZIN 10 MG/1
10 TABLET, FILM COATED ORAL DAILY
Qty: 90 TABLET | Refills: 3 | Status: SHIPPED | OUTPATIENT
Start: 2021-11-23 | End: 2022-02-23 | Stop reason: SDUPTHER

## 2021-11-23 RX ORDER — ESCITALOPRAM OXALATE 20 MG/1
20 TABLET ORAL DAILY
Qty: 90 TABLET | Refills: 3 | Status: SHIPPED | OUTPATIENT
Start: 2021-11-23 | End: 2022-02-23 | Stop reason: SDUPTHER

## 2021-11-23 RX ORDER — ASPIRIN 81 MG/1
81 TABLET ORAL DAILY
Qty: 90 TABLET | Refills: 1 | Status: SHIPPED | OUTPATIENT
Start: 2021-11-23 | End: 2023-03-21 | Stop reason: SDUPTHER

## 2021-11-23 RX ORDER — DICLOFENAC SODIUM 75 MG/1
75 TABLET, DELAYED RELEASE ORAL 2 TIMES DAILY
Qty: 28 TABLET | Refills: 0 | Status: SHIPPED | OUTPATIENT
Start: 2021-11-23 | End: 2021-12-07

## 2021-11-23 RX ORDER — INSULIN GLARGINE AND LIXISENATIDE 100; 33 U/ML; UG/ML
INJECTION, SOLUTION SUBCUTANEOUS
COMMUNITY
Start: 2021-11-01 | End: 2021-11-23 | Stop reason: SDUPTHER

## 2021-11-23 RX ORDER — INSULIN GLARGINE AND LIXISENATIDE 100; 33 U/ML; UG/ML
40 INJECTION, SOLUTION SUBCUTANEOUS
Qty: 12 PEN | Refills: 3 | Status: SHIPPED | OUTPATIENT
Start: 2021-11-23 | End: 2022-01-18 | Stop reason: SDUPTHER

## 2021-11-23 RX ORDER — ATORVASTATIN CALCIUM 40 MG/1
40 TABLET, FILM COATED ORAL DAILY
Qty: 90 TABLET | Refills: 3 | Status: SHIPPED | OUTPATIENT
Start: 2021-11-23 | End: 2022-02-23 | Stop reason: SDUPTHER

## 2021-12-02 ENCOUNTER — TELEPHONE (OUTPATIENT)
Dept: FAMILY MEDICINE | Facility: CLINIC | Age: 51
End: 2021-12-02
Payer: COMMERCIAL

## 2021-12-28 ENCOUNTER — PATIENT MESSAGE (OUTPATIENT)
Dept: FAMILY MEDICINE | Facility: CLINIC | Age: 51
End: 2021-12-28
Payer: COMMERCIAL

## 2021-12-28 DIAGNOSIS — U07.1 COVID-19: Primary | ICD-10-CM

## 2022-01-16 ENCOUNTER — PATIENT MESSAGE (OUTPATIENT)
Dept: FAMILY MEDICINE | Facility: CLINIC | Age: 52
End: 2022-01-16
Payer: COMMERCIAL

## 2022-01-18 RX ORDER — INSULIN GLARGINE AND LIXISENATIDE 100; 33 U/ML; UG/ML
40 INJECTION, SOLUTION SUBCUTANEOUS
Qty: 12 PEN | Refills: 3 | Status: SHIPPED | OUTPATIENT
Start: 2022-01-18 | End: 2022-02-23 | Stop reason: SDUPTHER

## 2022-01-18 NOTE — TELEPHONE ENCOUNTER
No new care gaps identified.  Powered by [employer]. Reference number: [id number].   1/18/2022 9:06:00 AM CST

## 2022-01-18 NOTE — TELEPHONE ENCOUNTER
Patient asking for Rx refill for Soliqua, patient requesting 3m supply to pharmacy.     LOV 11/23/21    Rx pending  pharmacy confirmed  Please advise.

## 2022-02-11 ENCOUNTER — PATIENT MESSAGE (OUTPATIENT)
Dept: FAMILY MEDICINE | Facility: CLINIC | Age: 52
End: 2022-02-11
Payer: COMMERCIAL

## 2022-02-11 DIAGNOSIS — E11.65 UNCONTROLLED TYPE 2 DIABETES MELLITUS WITH HYPERGLYCEMIA: Primary | ICD-10-CM

## 2022-02-23 ENCOUNTER — OFFICE VISIT (OUTPATIENT)
Dept: FAMILY MEDICINE | Facility: CLINIC | Age: 52
End: 2022-02-23
Payer: COMMERCIAL

## 2022-02-23 ENCOUNTER — CLINICAL SUPPORT (OUTPATIENT)
Dept: FAMILY MEDICINE | Facility: CLINIC | Age: 52
End: 2022-02-23
Payer: COMMERCIAL

## 2022-02-23 VITALS
BODY MASS INDEX: 31.92 KG/M2 | RESPIRATION RATE: 14 BRPM | WEIGHT: 215.5 LBS | DIASTOLIC BLOOD PRESSURE: 80 MMHG | HEIGHT: 69 IN | SYSTOLIC BLOOD PRESSURE: 131 MMHG | HEART RATE: 72 BPM

## 2022-02-23 DIAGNOSIS — Z79.4 TYPE 2 DIABETES MELLITUS WITH RETINOPATHY OF LEFT EYE, WITH LONG-TERM CURRENT USE OF INSULIN, MACULAR EDEMA PRESENCE UNSPECIFIED, UNSPECIFIED RETINOPATHY SEVERITY: ICD-10-CM

## 2022-02-23 DIAGNOSIS — E11.9 TYPE 2 DIABETES MELLITUS TREATED WITH INSULIN: ICD-10-CM

## 2022-02-23 DIAGNOSIS — E11.65 UNCONTROLLED TYPE 2 DIABETES MELLITUS WITH HYPERGLYCEMIA: ICD-10-CM

## 2022-02-23 DIAGNOSIS — E11.319 TYPE 2 DIABETES MELLITUS WITH RETINOPATHY OF LEFT EYE, WITH LONG-TERM CURRENT USE OF INSULIN, MACULAR EDEMA PRESENCE UNSPECIFIED, UNSPECIFIED RETINOPATHY SEVERITY: ICD-10-CM

## 2022-02-23 DIAGNOSIS — F41.1 GENERALIZED ANXIETY DISORDER: ICD-10-CM

## 2022-02-23 DIAGNOSIS — Z79.4 TYPE 2 DIABETES MELLITUS TREATED WITH INSULIN: ICD-10-CM

## 2022-02-23 LAB
ANION GAP SERPL CALC-SCNC: 11 MMOL/L (ref 8–16)
BUN SERPL-MCNC: 20 MG/DL (ref 6–20)
CALCIUM SERPL-MCNC: 10.1 MG/DL (ref 8.7–10.5)
CHLORIDE SERPL-SCNC: 105 MMOL/L (ref 95–110)
CO2 SERPL-SCNC: 23 MMOL/L (ref 23–29)
CREAT SERPL-MCNC: 0.9 MG/DL (ref 0.5–1.4)
EST. GFR  (AFRICAN AMERICAN): >60 ML/MIN/1.73 M^2
EST. GFR  (NON AFRICAN AMERICAN): >60 ML/MIN/1.73 M^2
ESTIMATED AVG GLUCOSE: 209 MG/DL (ref 68–131)
GLUCOSE SERPL-MCNC: 207 MG/DL (ref 70–110)
HBA1C MFR BLD: 8.9 % (ref 4–5.6)
POTASSIUM SERPL-SCNC: 4.3 MMOL/L (ref 3.5–5.1)
SODIUM SERPL-SCNC: 139 MMOL/L (ref 136–145)

## 2022-02-23 PROCEDURE — 3079F PR MOST RECENT DIASTOLIC BLOOD PRESSURE 80-89 MM HG: ICD-10-PCS | Mod: CPTII,S$GLB,, | Performed by: FAMILY MEDICINE

## 2022-02-23 PROCEDURE — 80048 BASIC METABOLIC PNL TOTAL CA: CPT | Performed by: FAMILY MEDICINE

## 2022-02-23 PROCEDURE — 1159F MED LIST DOCD IN RCRD: CPT | Mod: CPTII,S$GLB,, | Performed by: FAMILY MEDICINE

## 2022-02-23 PROCEDURE — 36415 PR COLLECTION VENOUS BLOOD,VENIPUNCTURE: ICD-10-PCS | Mod: S$GLB,,, | Performed by: FAMILY MEDICINE

## 2022-02-23 PROCEDURE — 3008F BODY MASS INDEX DOCD: CPT | Mod: CPTII,S$GLB,, | Performed by: FAMILY MEDICINE

## 2022-02-23 PROCEDURE — 3079F DIAST BP 80-89 MM HG: CPT | Mod: CPTII,S$GLB,, | Performed by: FAMILY MEDICINE

## 2022-02-23 PROCEDURE — 99999 PR PBB SHADOW E&M-EST. PATIENT-LVL III: CPT | Mod: PBBFAC,,, | Performed by: FAMILY MEDICINE

## 2022-02-23 PROCEDURE — 99213 OFFICE O/P EST LOW 20 MIN: CPT | Mod: S$GLB,,, | Performed by: FAMILY MEDICINE

## 2022-02-23 PROCEDURE — 99999 PR PBB SHADOW E&M-EST. PATIENT-LVL III: ICD-10-PCS | Mod: PBBFAC,,, | Performed by: FAMILY MEDICINE

## 2022-02-23 PROCEDURE — 36415 COLL VENOUS BLD VENIPUNCTURE: CPT | Mod: S$GLB,,, | Performed by: FAMILY MEDICINE

## 2022-02-23 PROCEDURE — 83036 HEMOGLOBIN GLYCOSYLATED A1C: CPT | Performed by: FAMILY MEDICINE

## 2022-02-23 PROCEDURE — 99213 PR OFFICE/OUTPT VISIT, EST, LEVL III, 20-29 MIN: ICD-10-PCS | Mod: S$GLB,,, | Performed by: FAMILY MEDICINE

## 2022-02-23 PROCEDURE — 1159F PR MEDICATION LIST DOCUMENTED IN MEDICAL RECORD: ICD-10-PCS | Mod: CPTII,S$GLB,, | Performed by: FAMILY MEDICINE

## 2022-02-23 PROCEDURE — 3075F PR MOST RECENT SYSTOLIC BLOOD PRESS GE 130-139MM HG: ICD-10-PCS | Mod: CPTII,S$GLB,, | Performed by: FAMILY MEDICINE

## 2022-02-23 PROCEDURE — 3008F PR BODY MASS INDEX (BMI) DOCUMENTED: ICD-10-PCS | Mod: CPTII,S$GLB,, | Performed by: FAMILY MEDICINE

## 2022-02-23 PROCEDURE — 3075F SYST BP GE 130 - 139MM HG: CPT | Mod: CPTII,S$GLB,, | Performed by: FAMILY MEDICINE

## 2022-02-23 RX ORDER — ESCITALOPRAM OXALATE 20 MG/1
20 TABLET ORAL DAILY
Qty: 90 TABLET | Refills: 3 | Status: SHIPPED | OUTPATIENT
Start: 2022-02-23 | End: 2022-07-19

## 2022-02-23 RX ORDER — METFORMIN HYDROCHLORIDE 500 MG/1
500 TABLET, EXTENDED RELEASE ORAL
Qty: 90 TABLET | Refills: 3 | Status: SHIPPED | OUTPATIENT
Start: 2022-02-23 | End: 2022-07-19

## 2022-02-23 RX ORDER — ATORVASTATIN CALCIUM 40 MG/1
40 TABLET, FILM COATED ORAL DAILY
Qty: 90 TABLET | Refills: 3 | Status: SHIPPED | OUTPATIENT
Start: 2022-02-23 | End: 2023-06-15 | Stop reason: DRUGHIGH

## 2022-02-23 RX ORDER — DAPAGLIFLOZIN 10 MG/1
10 TABLET, FILM COATED ORAL DAILY
Qty: 90 TABLET | Refills: 3 | Status: SHIPPED | OUTPATIENT
Start: 2022-02-23 | End: 2022-08-09

## 2022-02-23 RX ORDER — INSULIN GLARGINE AND LIXISENATIDE 100; 33 U/ML; UG/ML
38 INJECTION, SOLUTION SUBCUTANEOUS
Qty: 12 PEN | Refills: 3 | Status: SHIPPED | OUTPATIENT
Start: 2022-02-23 | End: 2022-04-25 | Stop reason: SDUPTHER

## 2022-02-23 NOTE — PROGRESS NOTES
Subjective:       Patient ID: Abel Hackett is a 51 y.o. male.    Chief Complaint: Follow-up (3m check up )    HPI  51 year old amle comes in for f/u of dm - blood sguars good great with digital medicine.     PMH, PSH, ALLERGIES, SH, FH reviewed in nurse's notes above  Medications reconciled in the nurse's notes    Review of Systems   Constitutional: Negative for chills and fever.   HENT: Negative for congestion, ear pain, postnasal drip, rhinorrhea, sore throat and trouble swallowing.    Eyes: Negative for redness and itching.   Respiratory: Negative for cough, shortness of breath and wheezing.    Cardiovascular: Negative for chest pain and palpitations.   Gastrointestinal: Negative for abdominal pain, diarrhea, nausea and vomiting.   Genitourinary: Negative for dysuria and frequency.   Skin: Negative for rash.   Neurological: Negative for weakness and headaches.       Objective:      Physical Exam  Vitals and nursing note reviewed.   Constitutional:       General: He is not in acute distress.     Appearance: He is well-developed.   HENT:      Head: Normocephalic and atraumatic.   Eyes:      Conjunctiva/sclera: Conjunctivae normal.      Pupils: Pupils are equal, round, and reactive to light.   Neck:      Thyroid: No thyromegaly.   Cardiovascular:      Rate and Rhythm: Normal rate and regular rhythm.      Heart sounds: Normal heart sounds.   Pulmonary:      Effort: Pulmonary effort is normal. No respiratory distress.      Breath sounds: Normal breath sounds. No wheezing.   Abdominal:      General: Bowel sounds are normal.      Palpations: Abdomen is soft.      Tenderness: There is no abdominal tenderness.   Musculoskeletal:         General: Normal range of motion.      Cervical back: Normal range of motion and neck supple.   Lymphadenopathy:      Cervical: No cervical adenopathy.   Skin:     General: Skin is warm and dry.      Findings: No rash.   Neurological:      Mental Status: He is alert and oriented to person,  place, and time.   Psychiatric:         Behavior: Behavior normal.          Assessment/Plan:       Problem List Items Addressed This Visit        Psychiatric    Generalized anxiety disorder    Relevant Medications    EScitalopram oxalate (LEXAPRO) 20 MG tablet       Ophtho    Type 2 diabetes mellitus with diabetic retinopathy    Relevant Medications    SOLIQUA 100/33 100 unit-33 mcg/mL InPn pen    metFORMIN (GLUCOPHAGE-XR) 500 MG ER 24hr tablet    dapagliflozin (FARXIGA) 10 mg tablet       Endocrine    Type 2 diabetes mellitus treated with insulin    Relevant Medications    SOLIQUA 100/33 100 unit-33 mcg/mL InPn pen    atorvastatin (LIPITOR) 40 MG tablet    metFORMIN (GLUCOPHAGE-XR) 500 MG ER 24hr tablet        Plan for c-scope after 52 birthday as strong family history of colon cancer.    RTC if condition acutely worsens or any other concerns, otherwise RTC as scheduled

## 2022-02-25 ENCOUNTER — PATIENT OUTREACH (OUTPATIENT)
Dept: ADMINISTRATIVE | Facility: HOSPITAL | Age: 52
End: 2022-02-25
Payer: COMMERCIAL

## 2022-02-25 ENCOUNTER — PATIENT MESSAGE (OUTPATIENT)
Dept: ADMINISTRATIVE | Facility: HOSPITAL | Age: 52
End: 2022-02-25
Payer: COMMERCIAL

## 2022-02-25 ENCOUNTER — TELEPHONE (OUTPATIENT)
Dept: FAMILY MEDICINE | Facility: CLINIC | Age: 52
End: 2022-02-25
Payer: COMMERCIAL

## 2022-02-25 NOTE — PROGRESS NOTES
The patient is on non-compliant report for Eye Exam.   Immunizations reviewed. Legacy reviewed. Care Everywhere reviewed. Media tab reviewed.   Portal message sent to patient re: overdue eye exam.         CHELSIE

## 2022-03-11 ENCOUNTER — PATIENT OUTREACH (OUTPATIENT)
Dept: ADMINISTRATIVE | Facility: HOSPITAL | Age: 52
End: 2022-03-11
Payer: COMMERCIAL

## 2022-03-11 DIAGNOSIS — Z12.11 COLON CANCER SCREENING: Primary | ICD-10-CM

## 2022-03-11 NOTE — PROGRESS NOTES
Chart reviewed, immunization record updated.  No new results noted on [facility] or [facility] web site.  Care Everywhere updated.   Patient care coordination note updated.  Patient has scheduled PCP visit on 5/25/2022.  Fit kit order placed.  Left detailed message for patient to return call to discuss Colorectal Cancer Screening.

## 2022-03-21 ENCOUNTER — PATIENT MESSAGE (OUTPATIENT)
Dept: ADMINISTRATIVE | Facility: HOSPITAL | Age: 52
End: 2022-03-21
Payer: COMMERCIAL

## 2022-04-04 ENCOUNTER — PATIENT MESSAGE (OUTPATIENT)
Dept: ADMINISTRATIVE | Facility: HOSPITAL | Age: 52
End: 2022-04-04
Payer: COMMERCIAL

## 2022-04-10 ENCOUNTER — PATIENT MESSAGE (OUTPATIENT)
Dept: OTHER | Facility: OTHER | Age: 52
End: 2022-04-10
Payer: COMMERCIAL

## 2022-04-23 ENCOUNTER — PATIENT MESSAGE (OUTPATIENT)
Dept: FAMILY MEDICINE | Facility: CLINIC | Age: 52
End: 2022-04-23
Payer: COMMERCIAL

## 2022-04-23 DIAGNOSIS — Z79.4 TYPE 2 DIABETES MELLITUS TREATED WITH INSULIN: ICD-10-CM

## 2022-04-23 DIAGNOSIS — E11.319 TYPE 2 DIABETES MELLITUS WITH RETINOPATHY OF LEFT EYE, WITH LONG-TERM CURRENT USE OF INSULIN, MACULAR EDEMA PRESENCE UNSPECIFIED, UNSPECIFIED RETINOPATHY SEVERITY: ICD-10-CM

## 2022-04-23 DIAGNOSIS — Z79.4 TYPE 2 DIABETES MELLITUS WITH RETINOPATHY OF LEFT EYE, WITH LONG-TERM CURRENT USE OF INSULIN, MACULAR EDEMA PRESENCE UNSPECIFIED, UNSPECIFIED RETINOPATHY SEVERITY: ICD-10-CM

## 2022-04-23 DIAGNOSIS — E11.9 TYPE 2 DIABETES MELLITUS TREATED WITH INSULIN: ICD-10-CM

## 2022-04-25 RX ORDER — INSULIN GLARGINE AND LIXISENATIDE 100; 33 U/ML; UG/ML
38 INJECTION, SOLUTION SUBCUTANEOUS
Qty: 12 PEN | Refills: 3 | Status: SHIPPED | OUTPATIENT
Start: 2022-04-25 | End: 2022-05-05 | Stop reason: SDUPTHER

## 2022-04-25 NOTE — TELEPHONE ENCOUNTER
Patient asking if 90day supply of soliqua can be called in.   Patient has appointment with you end of may but states he will be out by that time.     Please advise   rx pending   Pharmacy confirmed.

## 2022-04-25 NOTE — TELEPHONE ENCOUNTER
No new care gaps identified.  Powered by [email or web address]. Reference number: [id number].   4/25/2022 10:28:16 AM CDT

## 2022-05-05 RX ORDER — INSULIN GLARGINE AND LIXISENATIDE 100; 33 U/ML; UG/ML
38 INJECTION, SOLUTION SUBCUTANEOUS
Qty: 12 PEN | Refills: 3 | Status: SHIPPED | OUTPATIENT
Start: 2022-05-05 | End: 2022-07-19

## 2022-05-05 NOTE — TELEPHONE ENCOUNTER
Can 90 RX be sent in for seloquia? Patient states he may  from [facility] if it is a 90 supply     Please advise   Patient is currently out

## 2022-05-12 ENCOUNTER — PATIENT MESSAGE (OUTPATIENT)
Dept: FAMILY MEDICINE | Facility: CLINIC | Age: 52
End: 2022-05-12
Payer: COMMERCIAL

## 2022-05-12 DIAGNOSIS — E11.65 UNCONTROLLED TYPE 2 DIABETES MELLITUS WITH HYPERGLYCEMIA: Primary | ICD-10-CM

## 2022-05-12 NOTE — TELEPHONE ENCOUNTER
Patient has appointment with you for 5/25/22, patient asking if lab work should be completed before appointment. Last labs drawn in November.     Labs pending.   Please advise - will schedule lab appointment if need.

## 2022-06-17 ENCOUNTER — OFFICE VISIT (OUTPATIENT)
Dept: FAMILY MEDICINE | Facility: CLINIC | Age: 52
End: 2022-06-17
Payer: COMMERCIAL

## 2022-06-17 ENCOUNTER — LAB VISIT (OUTPATIENT)
Dept: FAMILY MEDICINE | Facility: CLINIC | Age: 52
End: 2022-06-17
Payer: COMMERCIAL

## 2022-06-17 VITALS
DIASTOLIC BLOOD PRESSURE: 79 MMHG | SYSTOLIC BLOOD PRESSURE: 132 MMHG | HEART RATE: 72 BPM | WEIGHT: 218.5 LBS | BODY MASS INDEX: 32.36 KG/M2 | RESPIRATION RATE: 16 BRPM | HEIGHT: 69 IN

## 2022-06-17 DIAGNOSIS — Z79.4 TYPE 2 DIABETES MELLITUS WITH RETINOPATHY OF LEFT EYE, WITH LONG-TERM CURRENT USE OF INSULIN, MACULAR EDEMA PRESENCE UNSPECIFIED, UNSPECIFIED RETINOPATHY SEVERITY: ICD-10-CM

## 2022-06-17 DIAGNOSIS — Z12.11 COLON CANCER SCREENING: Primary | ICD-10-CM

## 2022-06-17 DIAGNOSIS — E11.65 UNCONTROLLED TYPE 2 DIABETES MELLITUS WITH HYPERGLYCEMIA: ICD-10-CM

## 2022-06-17 DIAGNOSIS — E11.319 TYPE 2 DIABETES MELLITUS WITH RETINOPATHY OF LEFT EYE, WITH LONG-TERM CURRENT USE OF INSULIN, MACULAR EDEMA PRESENCE UNSPECIFIED, UNSPECIFIED RETINOPATHY SEVERITY: ICD-10-CM

## 2022-06-17 LAB
ESTIMATED AVG GLUCOSE: 217 MG/DL (ref 68–131)
HBA1C MFR BLD: 9.2 % (ref 4–5.6)

## 2022-06-17 PROCEDURE — 99999 PR PBB SHADOW E&M-EST. PATIENT-LVL III: ICD-10-PCS | Mod: PBBFAC,,, | Performed by: FAMILY MEDICINE

## 2022-06-17 PROCEDURE — 3008F PR BODY MASS INDEX (BMI) DOCUMENTED: ICD-10-PCS | Mod: CPTII,S$GLB,, | Performed by: FAMILY MEDICINE

## 2022-06-17 PROCEDURE — 3052F HG A1C>EQUAL 8.0%<EQUAL 9.0%: CPT | Mod: CPTII,S$GLB,, | Performed by: FAMILY MEDICINE

## 2022-06-17 PROCEDURE — 3008F BODY MASS INDEX DOCD: CPT | Mod: CPTII,S$GLB,, | Performed by: FAMILY MEDICINE

## 2022-06-17 PROCEDURE — 1159F PR MEDICATION LIST DOCUMENTED IN MEDICAL RECORD: ICD-10-PCS | Mod: CPTII,S$GLB,, | Performed by: FAMILY MEDICINE

## 2022-06-17 PROCEDURE — 3075F PR MOST RECENT SYSTOLIC BLOOD PRESS GE 130-139MM HG: ICD-10-PCS | Mod: CPTII,S$GLB,, | Performed by: FAMILY MEDICINE

## 2022-06-17 PROCEDURE — 3075F SYST BP GE 130 - 139MM HG: CPT | Mod: CPTII,S$GLB,, | Performed by: FAMILY MEDICINE

## 2022-06-17 PROCEDURE — 3078F PR MOST RECENT DIASTOLIC BLOOD PRESSURE < 80 MM HG: ICD-10-PCS | Mod: CPTII,S$GLB,, | Performed by: FAMILY MEDICINE

## 2022-06-17 PROCEDURE — 3052F PR MOST RECENT HEMOGLOBIN A1C LEVEL 8.0 - < 9.0%: ICD-10-PCS | Mod: CPTII,S$GLB,, | Performed by: FAMILY MEDICINE

## 2022-06-17 PROCEDURE — 99213 PR OFFICE/OUTPT VISIT, EST, LEVL III, 20-29 MIN: ICD-10-PCS | Mod: S$GLB,,, | Performed by: FAMILY MEDICINE

## 2022-06-17 PROCEDURE — 99213 OFFICE O/P EST LOW 20 MIN: CPT | Mod: S$GLB,,, | Performed by: FAMILY MEDICINE

## 2022-06-17 PROCEDURE — 36415 COLL VENOUS BLD VENIPUNCTURE: CPT | Mod: S$GLB,,, | Performed by: FAMILY MEDICINE

## 2022-06-17 PROCEDURE — 1159F MED LIST DOCD IN RCRD: CPT | Mod: CPTII,S$GLB,, | Performed by: FAMILY MEDICINE

## 2022-06-17 PROCEDURE — 36415 PR COLLECTION VENOUS BLOOD,VENIPUNCTURE: ICD-10-PCS | Mod: S$GLB,,, | Performed by: FAMILY MEDICINE

## 2022-06-17 PROCEDURE — 83036 HEMOGLOBIN GLYCOSYLATED A1C: CPT | Performed by: FAMILY MEDICINE

## 2022-06-17 PROCEDURE — 99999 PR PBB SHADOW E&M-EST. PATIENT-LVL III: CPT | Mod: PBBFAC,,, | Performed by: FAMILY MEDICINE

## 2022-06-17 PROCEDURE — 3078F DIAST BP <80 MM HG: CPT | Mod: CPTII,S$GLB,, | Performed by: FAMILY MEDICINE

## 2022-06-17 NOTE — PROGRESS NOTES
Subjective:       Patient ID: Abel Hackett is a 52 y.o. male.    Chief Complaint: Follow-up    HPI  52 year old male comes in for f/u of diabetes.    No issues.    Health maint reviewed.    PMH, PSH, ALLERGIES, SH, FH reviewed in nurse's notes above  Medications reconciled in the nurse's notes    Review of Systems   Constitutional: Negative for chills and fever.   HENT: Negative for congestion, ear pain, postnasal drip, rhinorrhea, sore throat and trouble swallowing.    Eyes: Negative for redness and itching.   Respiratory: Negative for cough, shortness of breath and wheezing.    Cardiovascular: Negative for chest pain and palpitations.   Gastrointestinal: Negative for abdominal pain, diarrhea, nausea and vomiting.   Genitourinary: Negative for dysuria and frequency.   Skin: Negative for rash.   Neurological: Negative for weakness and headaches.       Objective:      Physical Exam  Vitals and nursing note reviewed.   Constitutional:       General: He is not in acute distress.     Appearance: He is well-developed.   HENT:      Head: Normocephalic and atraumatic.   Eyes:      Conjunctiva/sclera: Conjunctivae normal.      Pupils: Pupils are equal, round, and reactive to light.   Neck:      Thyroid: No thyromegaly.   Cardiovascular:      Rate and Rhythm: Normal rate and regular rhythm.      Heart sounds: Normal heart sounds.   Pulmonary:      Effort: Pulmonary effort is normal. No respiratory distress.      Breath sounds: Normal breath sounds. No wheezing.   Abdominal:      General: Bowel sounds are normal.      Palpations: Abdomen is soft.      Tenderness: There is no abdominal tenderness.   Musculoskeletal:         General: Normal range of motion.      Cervical back: Normal range of motion and neck supple.   Lymphadenopathy:      Cervical: No cervical adenopathy.   Skin:     General: Skin is warm and dry.      Findings: No rash.   Neurological:      Mental Status: He is alert and oriented to person, place, and time.    Psychiatric:         Behavior: Behavior normal.          Assessment/Plan:       Problem List Items Addressed This Visit        Endocrine    Type 2 diabetes mellitus with diabetic retinopathy      Other Visit Diagnoses     Colon cancer screening    -  Primary    Relevant Orders    Cologuard Screening (Multitarget Stool DNA)        [id number] ordered for today.    Seeing dr. hernandez at [facility] for eye exam.    RTC if condition acutely worsens or any other concerns, otherwise RTC as scheduled

## 2022-06-22 ENCOUNTER — PATIENT MESSAGE (OUTPATIENT)
Dept: FAMILY MEDICINE | Facility: CLINIC | Age: 52
End: 2022-06-22
Payer: COMMERCIAL

## 2022-06-22 ENCOUNTER — PATIENT MESSAGE (OUTPATIENT)
Dept: OTHER | Facility: OTHER | Age: 52
End: 2022-06-22
Payer: COMMERCIAL

## 2022-06-23 RX ORDER — SEMAGLUTIDE 1.34 MG/ML
1 INJECTION, SOLUTION SUBCUTANEOUS
Qty: 3 PEN | Refills: 3 | Status: SHIPPED | OUTPATIENT
Start: 2022-06-23 | End: 2022-08-16 | Stop reason: SDUPTHER

## 2022-06-23 RX ORDER — EMPAGLIFLOZIN, METFORMIN HYDROCHLORIDE 25; 1000 MG/1; MG/1
1 TABLET, EXTENDED RELEASE ORAL DAILY
Qty: 30 TABLET | Refills: 5 | Status: SHIPPED | OUTPATIENT
Start: 2022-06-23 | End: 2022-08-16 | Stop reason: SDUPTHER

## 2022-06-23 RX ORDER — INSULIN GLARGINE 100 [IU]/ML
40 INJECTION, SOLUTION SUBCUTANEOUS NIGHTLY
Qty: 3 ML | Refills: 5 | Status: SHIPPED | OUTPATIENT
Start: 2022-06-23 | End: 2022-07-20

## 2022-06-23 NOTE — TELEPHONE ENCOUNTER
No new care gaps identified.  [facility] Embedded Care Gaps. Reference number: [id number]. 6/23/2022   8:48:53 AM MARLAT

## 2022-06-24 NOTE — TELEPHONE ENCOUNTER
Refill Routing Note   Medication(s) are not appropriate for processing by [facility] for the following reason(s):      - Alternative Requested:DRUG NOT COVERED.    [facility] action(s):  Defer          Medication reconciliation completed: No     Appointments  past 12m or future 3m with PCP    Date Provider   Last Visit   6/17/2022 Keira Wheat MD   Next Visit   Visit date not found Keira Wheat MD   ED visits in past 90 days: 0        Note composed:7:12 PM 06/23/2022

## 2022-07-01 NOTE — TELEPHONE ENCOUNTER
Please call dylon and find out if the med changes were covered and verify which meds he is getting.  jomar

## 2022-07-04 ENCOUNTER — PATIENT MESSAGE (OUTPATIENT)
Dept: FAMILY MEDICINE | Facility: CLINIC | Age: 52
End: 2022-07-04
Payer: COMMERCIAL

## 2022-07-07 ENCOUNTER — OFFICE VISIT (OUTPATIENT)
Dept: FAMILY MEDICINE | Facility: CLINIC | Age: 52
End: 2022-07-07
Payer: COMMERCIAL

## 2022-07-07 ENCOUNTER — CLINICAL SUPPORT (OUTPATIENT)
Dept: FAMILY MEDICINE | Facility: CLINIC | Age: 52
End: 2022-07-07
Payer: COMMERCIAL

## 2022-07-07 VITALS
HEIGHT: 69 IN | DIASTOLIC BLOOD PRESSURE: 86 MMHG | SYSTOLIC BLOOD PRESSURE: 134 MMHG | HEART RATE: 74 BPM | BODY MASS INDEX: 31.82 KG/M2 | RESPIRATION RATE: 18 BRPM | WEIGHT: 214.81 LBS

## 2022-07-07 DIAGNOSIS — R41.3 MEMORY LOSS: ICD-10-CM

## 2022-07-07 DIAGNOSIS — R00.2 PALPITATION: ICD-10-CM

## 2022-07-07 DIAGNOSIS — R41.3 MEMORY LOSS: Primary | ICD-10-CM

## 2022-07-07 DIAGNOSIS — R41.3 OTHER AMNESIA: ICD-10-CM

## 2022-07-07 LAB — TSH SERPL DL<=0.005 MIU/L-ACNC: 1.25 UIU/ML (ref 0.4–4)

## 2022-07-07 PROCEDURE — 82607 VITAMIN B-12: CPT | Performed by: FAMILY MEDICINE

## 2022-07-07 PROCEDURE — 3046F PR MOST RECENT HEMOGLOBIN A1C LEVEL > 9.0%: ICD-10-PCS | Mod: CPTII,S$GLB,, | Performed by: FAMILY MEDICINE

## 2022-07-07 PROCEDURE — 36415 COLL VENOUS BLD VENIPUNCTURE: CPT | Mod: S$GLB,,, | Performed by: FAMILY MEDICINE

## 2022-07-07 PROCEDURE — 3046F HEMOGLOBIN A1C LEVEL >9.0%: CPT | Mod: CPTII,S$GLB,, | Performed by: FAMILY MEDICINE

## 2022-07-07 PROCEDURE — 3079F PR MOST RECENT DIASTOLIC BLOOD PRESSURE 80-89 MM HG: ICD-10-PCS | Mod: CPTII,S$GLB,, | Performed by: FAMILY MEDICINE

## 2022-07-07 PROCEDURE — 1159F MED LIST DOCD IN RCRD: CPT | Mod: CPTII,S$GLB,, | Performed by: FAMILY MEDICINE

## 2022-07-07 PROCEDURE — 3079F DIAST BP 80-89 MM HG: CPT | Mod: CPTII,S$GLB,, | Performed by: FAMILY MEDICINE

## 2022-07-07 PROCEDURE — 3075F SYST BP GE 130 - 139MM HG: CPT | Mod: CPTII,S$GLB,, | Performed by: FAMILY MEDICINE

## 2022-07-07 PROCEDURE — 99999 PR PBB SHADOW E&M-EST. PATIENT-LVL IV: CPT | Mod: PBBFAC,,, | Performed by: FAMILY MEDICINE

## 2022-07-07 PROCEDURE — 99215 PR OFFICE/OUTPT VISIT, EST, LEVL V, 40-54 MIN: ICD-10-PCS | Mod: S$GLB,,, | Performed by: FAMILY MEDICINE

## 2022-07-07 PROCEDURE — 86592 SYPHILIS TEST NON-TREP QUAL: CPT | Performed by: FAMILY MEDICINE

## 2022-07-07 PROCEDURE — 99215 OFFICE O/P EST HI 40 MIN: CPT | Mod: S$GLB,,, | Performed by: FAMILY MEDICINE

## 2022-07-07 PROCEDURE — 84443 ASSAY THYROID STIM HORMONE: CPT | Performed by: FAMILY MEDICINE

## 2022-07-07 PROCEDURE — 3075F PR MOST RECENT SYSTOLIC BLOOD PRESS GE 130-139MM HG: ICD-10-PCS | Mod: CPTII,S$GLB,, | Performed by: FAMILY MEDICINE

## 2022-07-07 PROCEDURE — 99999 PR PBB SHADOW E&M-EST. PATIENT-LVL IV: ICD-10-PCS | Mod: PBBFAC,,, | Performed by: FAMILY MEDICINE

## 2022-07-07 PROCEDURE — 3008F PR BODY MASS INDEX (BMI) DOCUMENTED: ICD-10-PCS | Mod: CPTII,S$GLB,, | Performed by: FAMILY MEDICINE

## 2022-07-07 PROCEDURE — 3008F BODY MASS INDEX DOCD: CPT | Mod: CPTII,S$GLB,, | Performed by: FAMILY MEDICINE

## 2022-07-07 PROCEDURE — 36415 PR COLLECTION VENOUS BLOOD,VENIPUNCTURE: ICD-10-PCS | Mod: S$GLB,,, | Performed by: FAMILY MEDICINE

## 2022-07-07 PROCEDURE — 1159F PR MEDICATION LIST DOCUMENTED IN MEDICAL RECORD: ICD-10-PCS | Mod: CPTII,S$GLB,, | Performed by: FAMILY MEDICINE

## 2022-07-07 NOTE — TELEPHONE ENCOUNTER
Patient states he is currently taking the synjardy, ozempic and farxiga.   Not currently taking the lantus because patient states not sure if covered by insurance or not.   I spoke with pharmacy and she states that lantus is 60$ and generic is not covered by insurance.     Is patient supposed take the lantus with the ozempic and faxiga and synjardy?

## 2022-07-07 NOTE — PROGRESS NOTES
"Subjective:       Patient ID: Abel Hackett is a 52 y.o. male.    Chief Complaint: Memory Loss (Patient states that he has been having problems with memory/forgetfulness since approximately January 2022.  He reports that his wife has brought it up a few times.  Stated that he 'put milk in th pantry" and more recently on Saturday he had another episode where he "forgot where he was going")    HPI  52 year old male with long standing diabetes and well controlled htn, without smoking history comes in with c/o worsening memory issues. He gives examples of leaving things atop the fridge and misplacing things. He is most concerned because thsi weekend he was driving to his sons house and forgot where he was going. He notes that he has been having issues progressively since the beginning of the year pointed out by his wife. He also notes that he had a weird 'kaleidoscope' sensation across his vision recently. Upon pressing more he does c/o palpitations and 'irregular' heart beat at times. His mom had significant dementia - vascular in nature - and a quick decline. Aside from her history, the family does not have alzheimers or other early onset type memory concerns. He denies any neurologic deficits. Since his last visit, he has stopped soliqua as instructed and started ozempic - however, his lantus has not been covered. So far he has had no elevation of his sugars - on the contrary his fasting has been <140.    PMH, PSH, ALLERGIES, SH, FH reviewed in nurse's notes above  Medications reconciled in the nurse's notes      Review of Systems   Constitutional: Negative for chills and fever.   HENT: Negative for congestion, ear pain, postnasal drip, rhinorrhea, sore throat and trouble swallowing.    Respiratory: Negative for cough, shortness of breath and wheezing.    Cardiovascular: Positive for palpitations. Negative for chest pain.   Gastrointestinal: Negative for abdominal pain, diarrhea, nausea and vomiting.   Skin: Negative " for rash.   Neurological: Negative for weakness and headaches.   Psychiatric/Behavioral: Positive for decreased concentration. Negative for dysphoric mood. The patient is not nervous/anxious.        Objective:      Physical Exam  Vitals and nursing note reviewed.   Constitutional:       General: He is not in acute distress.     Appearance: He is well-developed.   HENT:      Head: Normocephalic and atraumatic.   Eyes:      Conjunctiva/sclera: Conjunctivae normal.      Pupils: Pupils are equal, round, and reactive to light.   Neck:      Thyroid: No thyromegaly.   Cardiovascular:      Rate and Rhythm: Normal rate and regular rhythm.      Heart sounds: Normal heart sounds.   Pulmonary:      Effort: Pulmonary effort is normal. No respiratory distress.      Breath sounds: Normal breath sounds. No wheezing.   Abdominal:      General: Bowel sounds are normal.      Palpations: Abdomen is soft.      Tenderness: There is no abdominal tenderness.   Musculoskeletal:         General: Normal range of motion.      Cervical back: Normal range of motion and neck supple.   Lymphadenopathy:      Cervical: No cervical adenopathy.   Skin:     General: Skin is warm and dry.      Findings: No rash.   Neurological:      Mental Status: He is alert and oriented to person, place, and time.   Psychiatric:         Behavior: Behavior normal.          Assessment/Plan:       Problem List Items Addressed This Visit    None     Visit Diagnoses     Memory loss    -  Primary    Relevant Orders    Vitamin B12    RPR    TSH    CT Head Without Contrast    Other amnesia        Relevant Orders    CT Head Without Contrast    Palpitation        Relevant Orders    Holter monitor - 48 hour    Comprehensive Metabolic Panel        RTC if condition acutely worsens or any other concerns, otherwise RTC as scheduled      MMSE - 29  Animals - 14

## 2022-07-08 LAB
RPR SER QL: NORMAL
VIT B12 SERPL-MCNC: 708 PG/ML (ref 210–950)

## 2022-07-08 NOTE — TELEPHONE ENCOUNTER
He can stop the farxiga- it's the same as jardiance and getting it in his synjardy.  We can try tresiba or levemir instead of his lantus. THOUGH - currently his sugars are okay according to him. SO, for now, I am okay with synjardy and ozempic ONLY. If his fasting sugars start to climb, we will add in one of the above insulins (tresiba or levemir).  mh

## 2022-07-11 ENCOUNTER — HOSPITAL ENCOUNTER (OUTPATIENT)
Dept: RADIOLOGY | Facility: HOSPITAL | Age: 52
Discharge: HOME | End: 2022-07-11
Attending: FAMILY MEDICINE
Payer: COMMERCIAL

## 2022-07-11 ENCOUNTER — HOSPITAL ENCOUNTER (OUTPATIENT)
Dept: PULMONOLOGY | Facility: HOSPITAL | Age: 52
Discharge: HOME | End: 2022-07-11
Attending: FAMILY MEDICINE
Payer: COMMERCIAL

## 2022-07-11 ENCOUNTER — PATIENT MESSAGE (OUTPATIENT)
Dept: ADMINISTRATIVE | Facility: HOSPITAL | Age: 52
End: 2022-07-11
Payer: COMMERCIAL

## 2022-07-11 DIAGNOSIS — R00.2 PALPITATION: ICD-10-CM

## 2022-07-11 DIAGNOSIS — R41.3 MEMORY LOSS: ICD-10-CM

## 2022-07-11 DIAGNOSIS — R41.3 OTHER AMNESIA: ICD-10-CM

## 2022-07-11 PROCEDURE — 70450 CT HEAD/BRAIN W/O DYE: CPT | Mod: 26,,, | Performed by: RADIOLOGY

## 2022-07-11 PROCEDURE — 70450 CT HEAD/BRAIN W/O DYE: CPT | Mod: TC

## 2022-07-11 PROCEDURE — 70450 CT HEAD WITHOUT CONTRAST: ICD-10-PCS | Mod: 26,,, | Performed by: RADIOLOGY

## 2022-07-11 PROCEDURE — 93225 XTRNL ECG REC<48 HRS REC: CPT

## 2022-07-11 PROCEDURE — 93227 XTRNL ECG REC<48 HR R&I: CPT | Mod: ,,, | Performed by: INTERNAL MEDICINE

## 2022-07-11 PROCEDURE — 93227 HOLTER MONITOR - 48 HOUR (CUPID ONLY): ICD-10-PCS | Mod: ,,, | Performed by: INTERNAL MEDICINE

## 2022-07-11 RX ORDER — INSULIN GLARGINE 100 [IU]/ML
INJECTION, SOLUTION SUBCUTANEOUS
Qty: 30 EACH | Refills: 5 | OUTPATIENT
Start: 2022-07-11

## 2022-07-11 NOTE — TELEPHONE ENCOUNTER
Spoke with patient and informed him about medications. Patient states understanding and will monitor Bs and let us know if they start to increase to add in insulin.

## 2022-07-12 ENCOUNTER — TELEPHONE (OUTPATIENT)
Dept: FAMILY MEDICINE | Facility: CLINIC | Age: 52
End: 2022-07-12
Payer: COMMERCIAL

## 2022-07-12 DIAGNOSIS — R41.3 MEMORY LOSS: Primary | ICD-10-CM

## 2022-07-12 NOTE — TELEPHONE ENCOUNTER
Please let mr dylon know that his blood work is reassuring. There is no obvious stroke. Will get him set up with Dr. Cavanaugh as we discussed for further workup.  mh

## 2022-07-13 NOTE — TELEPHONE ENCOUNTER
Spoke with patient about results. Patient states understanding and referral sent to Dr. sears office for appt.

## 2022-07-15 LAB — NONINV COLON CA DNA+OCC BLD SCRN STL QL: POSITIVE

## 2022-07-16 ENCOUNTER — PATIENT MESSAGE (OUTPATIENT)
Dept: ADMINISTRATIVE | Facility: OTHER | Age: 52
End: 2022-07-16
Payer: COMMERCIAL

## 2022-07-18 ENCOUNTER — PATIENT MESSAGE (OUTPATIENT)
Dept: FAMILY MEDICINE | Facility: CLINIC | Age: 52
End: 2022-07-18
Payer: COMMERCIAL

## 2022-07-18 ENCOUNTER — HOSPITAL ENCOUNTER (EMERGENCY)
Facility: HOSPITAL | Age: 52
Discharge: HOME | End: 2022-07-18
Attending: SURGERY
Payer: COMMERCIAL

## 2022-07-18 VITALS
HEIGHT: 69 IN | BODY MASS INDEX: 31.23 KG/M2 | RESPIRATION RATE: 16 BRPM | WEIGHT: 210.88 LBS | DIASTOLIC BLOOD PRESSURE: 79 MMHG | OXYGEN SATURATION: 99 % | TEMPERATURE: 98 F | HEART RATE: 92 BPM | SYSTOLIC BLOOD PRESSURE: 138 MMHG

## 2022-07-18 DIAGNOSIS — K21.9 GASTROESOPHAGEAL REFLUX DISEASE, UNSPECIFIED WHETHER ESOPHAGITIS PRESENT: Primary | ICD-10-CM

## 2022-07-18 LAB
ALBUMIN SERPL BCP-MCNC: 3.7 G/DL (ref 3.5–5.2)
ALP SERPL-CCNC: 56 U/L (ref 55–135)
ALT SERPL W/O P-5'-P-CCNC: 23 U/L (ref 10–44)
ANION GAP SERPL CALC-SCNC: 12 MMOL/L (ref 8–16)
AST SERPL-CCNC: 18 U/L (ref 10–40)
BASOPHILS # BLD AUTO: 0.03 K/UL (ref 0–0.2)
BASOPHILS NFR BLD: 0.6 % (ref 0–1.9)
BILIRUB SERPL-MCNC: 0.3 MG/DL (ref 0.1–1)
BNP SERPL-MCNC: <10 PG/ML (ref 0–99)
BUN SERPL-MCNC: 22 MG/DL (ref 6–20)
CALCIUM SERPL-MCNC: 9 MG/DL (ref 8.7–10.5)
CHLORIDE SERPL-SCNC: 106 MMOL/L (ref 95–110)
CK MB SERPL-MCNC: 0.7 NG/ML (ref 0.1–6.5)
CK MB SERPL-RTO: 1.1 % (ref 0–5)
CK SERPL-CCNC: 63 U/L (ref 20–200)
CK SERPL-CCNC: 63 U/L (ref 20–200)
CO2 SERPL-SCNC: 21 MMOL/L (ref 23–29)
CREAT SERPL-MCNC: 0.8 MG/DL (ref 0.5–1.4)
D DIMER PPP IA.FEU-MCNC: <0.19 MG/L FEU
DIFFERENTIAL METHOD: NORMAL
EOSINOPHIL # BLD AUTO: 0.1 K/UL (ref 0–0.5)
EOSINOPHIL NFR BLD: 1.6 % (ref 0–8)
ERYTHROCYTE [DISTWIDTH] IN BLOOD BY AUTOMATED COUNT: 12.6 % (ref 11.5–14.5)
EST. GFR  (AFRICAN AMERICAN): >60 ML/MIN/1.73 M^2
EST. GFR  (NON AFRICAN AMERICAN): >60 ML/MIN/1.73 M^2
GLUCOSE SERPL-MCNC: 178 MG/DL (ref 70–110)
HCT VFR BLD AUTO: 42.8 % (ref 40–54)
HGB BLD-MCNC: 14.1 G/DL (ref 14–18)
IMM GRANULOCYTES # BLD AUTO: 0.02 K/UL (ref 0–0.04)
IMM GRANULOCYTES NFR BLD AUTO: 0.4 % (ref 0–0.5)
LYMPHOCYTES # BLD AUTO: 1.2 K/UL (ref 1–4.8)
LYMPHOCYTES NFR BLD: 24.1 % (ref 18–48)
MCH RBC QN AUTO: 28 PG (ref 27–31)
MCHC RBC AUTO-ENTMCNC: 32.9 G/DL (ref 32–36)
MCV RBC AUTO: 85 FL (ref 82–98)
MONOCYTES # BLD AUTO: 0.5 K/UL (ref 0.3–1)
MONOCYTES NFR BLD: 10.1 % (ref 4–15)
NEUTROPHILS # BLD AUTO: 3.1 K/UL (ref 1.8–7.7)
NEUTROPHILS NFR BLD: 63.2 % (ref 38–73)
NRBC BLD-RTO: 0 /100 WBC
PLATELET # BLD AUTO: 190 K/UL (ref 150–450)
PMV BLD AUTO: 10 FL (ref 9.2–12.9)
POTASSIUM SERPL-SCNC: 3.8 MMOL/L (ref 3.5–5.1)
PROT SERPL-MCNC: 7 G/DL (ref 6–8.4)
RBC # BLD AUTO: 5.03 M/UL (ref 4.6–6.2)
SODIUM SERPL-SCNC: 139 MMOL/L (ref 136–145)
TROPONIN I SERPL DL<=0.01 NG/ML-MCNC: <0.006 NG/ML (ref 0–0.03)
WBC # BLD AUTO: 4.93 K/UL (ref 3.9–12.7)

## 2022-07-18 PROCEDURE — 25000003 PHARM REV CODE 250: Performed by: SURGERY

## 2022-07-18 PROCEDURE — 96375 TX/PRO/DX INJ NEW DRUG ADDON: CPT

## 2022-07-18 PROCEDURE — 80053 COMPREHEN METABOLIC PANEL: CPT | Performed by: SURGERY

## 2022-07-18 PROCEDURE — 83880 ASSAY OF NATRIURETIC PEPTIDE: CPT | Performed by: SURGERY

## 2022-07-18 PROCEDURE — 84484 ASSAY OF TROPONIN QUANT: CPT | Mod: 91 | Performed by: SURGERY

## 2022-07-18 PROCEDURE — 96374 THER/PROPH/DIAG INJ IV PUSH: CPT

## 2022-07-18 PROCEDURE — 93010 ELECTROCARDIOGRAM REPORT: CPT | Mod: ,,, | Performed by: INTERNAL MEDICINE

## 2022-07-18 PROCEDURE — 85379 FIBRIN DEGRADATION QUANT: CPT | Performed by: SURGERY

## 2022-07-18 PROCEDURE — C9113 INJ PANTOPRAZOLE SODIUM, VIA: HCPCS | Performed by: SURGERY

## 2022-07-18 PROCEDURE — 82553 CREATINE MB FRACTION: CPT | Performed by: SURGERY

## 2022-07-18 PROCEDURE — 93010 EKG 12-LEAD: ICD-10-PCS | Mod: ,,, | Performed by: INTERNAL MEDICINE

## 2022-07-18 PROCEDURE — 93005 ELECTROCARDIOGRAM TRACING: CPT

## 2022-07-18 PROCEDURE — 63600175 PHARM REV CODE 636 W HCPCS: Performed by: SURGERY

## 2022-07-18 PROCEDURE — 85025 COMPLETE CBC W/AUTO DIFF WBC: CPT | Performed by: SURGERY

## 2022-07-18 PROCEDURE — 99285 EMERGENCY DEPT VISIT HI MDM: CPT | Mod: 25

## 2022-07-18 PROCEDURE — 36415 COLL VENOUS BLD VENIPUNCTURE: CPT | Performed by: SURGERY

## 2022-07-18 RX ORDER — ONDANSETRON 2 MG/ML
4 INJECTION INTRAMUSCULAR; INTRAVENOUS
Status: COMPLETED | OUTPATIENT
Start: 2022-07-18 | End: 2022-07-18

## 2022-07-18 RX ORDER — PANTOPRAZOLE SODIUM 40 MG/10ML
80 INJECTION, POWDER, LYOPHILIZED, FOR SOLUTION INTRAVENOUS
Status: COMPLETED | OUTPATIENT
Start: 2022-07-18 | End: 2022-07-18

## 2022-07-18 RX ORDER — MAG HYDROX/ALUMINUM HYD/SIMETH 200-200-20
30 SUSPENSION, ORAL (FINAL DOSE FORM) ORAL ONCE
Status: COMPLETED | OUTPATIENT
Start: 2022-07-18 | End: 2022-07-18

## 2022-07-18 RX ORDER — LIDOCAINE HYDROCHLORIDE 20 MG/ML
10 SOLUTION OROPHARYNGEAL ONCE
Status: COMPLETED | OUTPATIENT
Start: 2022-07-18 | End: 2022-07-18

## 2022-07-18 RX ORDER — PANTOPRAZOLE SODIUM 40 MG/1
40 TABLET, DELAYED RELEASE ORAL DAILY
Qty: 30 TABLET | Refills: 0 | Status: SHIPPED | OUTPATIENT
Start: 2022-07-18 | End: 2024-03-22

## 2022-07-18 RX ORDER — LORAZEPAM 2 MG/ML
1 INJECTION INTRAMUSCULAR
Status: COMPLETED | OUTPATIENT
Start: 2022-07-18 | End: 2022-07-18

## 2022-07-18 RX ORDER — ASPIRIN 325 MG
325 TABLET ORAL
Status: COMPLETED | OUTPATIENT
Start: 2022-07-18 | End: 2022-07-18

## 2022-07-18 RX ORDER — MORPHINE SULFATE 2 MG/ML
2 INJECTION, SOLUTION INTRAMUSCULAR; INTRAVENOUS
Status: COMPLETED | OUTPATIENT
Start: 2022-07-18 | End: 2022-07-18

## 2022-07-18 RX ADMIN — PANTOPRAZOLE SODIUM 80 MG: 40 INJECTION, POWDER, FOR SOLUTION INTRAVENOUS at 12:07

## 2022-07-18 RX ADMIN — ALUMINUM HYDROXIDE, MAGNESIUM HYDROXIDE, AND SIMETHICONE 30 ML: 200; 200; 20 SUSPENSION ORAL at 09:07

## 2022-07-18 RX ADMIN — LIDOCAINE HYDROCHLORIDE 10 ML: 20 SOLUTION ORAL; TOPICAL at 08:07

## 2022-07-18 RX ADMIN — ONDANSETRON HYDROCHLORIDE 4 MG: 2 INJECTION, SOLUTION INTRAMUSCULAR; INTRAVENOUS at 12:07

## 2022-07-18 RX ADMIN — MORPHINE SULFATE 2 MG: 2 INJECTION, SOLUTION INTRAMUSCULAR; INTRAVENOUS at 12:07

## 2022-07-18 RX ADMIN — LORAZEPAM 1 MG: 2 INJECTION INTRAMUSCULAR; INTRAVENOUS at 09:07

## 2022-07-18 RX ADMIN — ASPIRIN 325 MG ORAL TABLET 325 MG: 325 PILL ORAL at 07:07

## 2022-07-18 NOTE — ED PROVIDER NOTES
Encounter Date: 7/18/2022       History     Chief Complaint   Patient presents with    Chest Pain     52-year-old male presents with on again off again chest pain this morning at home  Patient has been belching with GERD symptoms as well with poor diabetic diet   Patient has no known coronary artery disease history with NSR on EKG today  No ST changes, no obvious signs of acute cardiac pathology on EKG this a.m.  Patient states that he ate sausage yesterday with belching and indigestion after        Review of patient's allergies indicates:  No Known Allergies     Past Medical History:   Diagnosis Date    Depression     Diabetes mellitus     Diabetes mellitus, type 2      Past Surgical History:   Procedure Laterality Date    REFRACTIVE SURGERY  4/13/2016    VASECTOMY       Family History   Problem Relation Age of Onset    Heart disease Mother     Hypertension Mother     Diabetes Mother     Heart attack Mother     Heart disease Father     Hypertension Father     Diabetes Father     Heart attack Father     Heart attack Maternal Uncle     Heart attack Maternal Grandfather      Social History     Tobacco Use    Smoking status: Never Smoker    Smokeless tobacco: Never Used   Substance Use Topics    Alcohol use: Not Currently     Comment: occassional    Drug use: No     Review of Systems   Constitutional: Negative for activity change, appetite change, fatigue, fever and unexpected weight change.   HENT: Negative for congestion, ear pain, mouth sores, nosebleeds, rhinorrhea, sinus pressure, sneezing and sore throat.    Eyes: Negative for pain, discharge, redness and itching.   Respiratory: Negative for apnea, cough, chest tightness and shortness of breath.    Cardiovascular: Positive for chest pain. Negative for palpitations and leg swelling.   Gastrointestinal: Negative for abdominal distention, abdominal pain, anal bleeding, constipation, diarrhea, nausea and vomiting.   Endocrine: Negative.     Genitourinary: Negative for dysuria, enuresis, flank pain and frequency.   Musculoskeletal: Negative for arthralgias, back pain, neck pain and neck stiffness.   Skin: Negative for color change and wound.   Allergic/Immunologic: Negative.    Neurological: Negative for dizziness, tremors, syncope, facial asymmetry, speech difficulty, weakness, light-headedness, numbness and headaches.   Hematological: Negative for adenopathy. Does not bruise/bleed easily.   Psychiatric/Behavioral: Negative for agitation, behavioral problems, hallucinations, self-injury and suicidal ideas. The patient is not nervous/anxious.        Physical Exam     Initial Vitals [07/18/22 0730]   BP Pulse Resp Temp SpO2   134/69 95 20 97 °F (36.1 °C) 96 %      MAP       --         Physical Exam    Constitutional: Vital signs are normal. He appears well-developed and well-nourished. He is cooperative.   HENT:   Head: Normocephalic and atraumatic.   Right Ear: Hearing, tympanic membrane, external ear and ear canal normal.   Left Ear: Hearing, tympanic membrane, external ear and ear canal normal.   Nose: Nose normal.   Mouth/Throat: Uvula is midline, oropharynx is clear and moist and mucous membranes are normal.   Eyes: Conjunctivae, EOM and lids are normal. Pupils are equal, round, and reactive to light.   Neck: Neck supple. No JVD present.   Normal range of motion.   Full passive range of motion without pain.     Cardiovascular: Normal rate, regular rhythm, S1 normal, S2 normal, normal heart sounds, intact distal pulses and normal pulses.   Pulmonary/Chest: Effort normal and breath sounds normal.   Abdominal: Abdomen is soft and flat. Bowel sounds are normal.   Musculoskeletal:         General: Normal range of motion.      Cervical back: Full passive range of motion without pain, normal range of motion and neck supple.     Neurological: He is alert and oriented to person, place, and time. He has normal strength.   Skin: Skin is warm, dry and intact.  Capillary refill takes less than 2 seconds.         ED Course   Procedures  Labs Reviewed   COMPREHENSIVE METABOLIC PANEL - Abnormal; Notable for the following components:       Result Value    CO2 21 (*)     Glucose 178 (*)     BUN 22 (*)     All other components within normal limits   TROPONIN I   CBC W/ AUTO DIFFERENTIAL   CK   B-TYPE NATRIURETIC PEPTIDE   CK-MB   D DIMER, QUANTITATIVE   TROPONIN I   TROPONIN I     EKG Readings: (Independently Interpreted)   Initial Reading: No STEMI. Rhythm: Normal Sinus Rhythm. Heart Rate: 80s. Ectopy: No Ectopy. ST Segments: Normal ST Segments. T Waves: Normal. Axis: Normal.     ECG Results          EKG 12-lead (Final result)  Result time 07/18/22 09:21:00    Final result by Interface, Lab In [facility] (07/18/22 09:21:00)                 Narrative:    Test Reason : R07.9    Vent. Rate : 093 BPM     Atrial Rate : 093 BPM     P-R Int : 172 ms          QRS Dur : 098 ms      QT Int : 362 ms       P-R-T Axes : 047 085 053 degrees     QTc Int : 450 ms    Normal sinus rhythm  Normal ECG  When compared with ECG of 25-APR-2016 09:34,  No significant change was found  Confirmed by Naga Reno MD ([id number]) on 7/18/2022 9:20:52 AM    Referred By: [email or web address]   SELF           Confirmed By:Naga Reno MD                            Imaging Results          US Abdomen Limited (Final result)  Result time 07/18/22 13:06:07    Final result by Gisele Fowler MD (07/18/22 13:06:07)                 Impression:      Fatty infiltration of the liver..      Electronically signed by: Gisele Fowler MD  Date:    07/18/2022  Time:    13:06             Narrative:    EXAMINATION:  US ABDOMEN LIMITED    CLINICAL HISTORY:  Epigastric pain;    TECHNIQUE:  Limited ultrasound of the right upper quadrant of the abdomen (including pancreas, liver, gallbladder, common bile duct, and right kidney) was performed.    COMPARISON:  None    FINDINGS:  Liver: Normal in size. The liver demonstrates fatty infiltration.   No focal hepatic lesions are seen.    Biliary system: The gallbladder demonstrates no evidence of calculi. No gallbladder wall thickening.  No sonographic Dozier sign. No pericholecystic fluid. The common duct is not dilated, measuring 0.2 normal in cm. No intrahepatic ductal dilatation.    Pancreas: The visualized portions of pancreas appear normal    Right kidney: Normal in size measuring 12.6 cmwith no hydronephrosis, mass, or calculi.    Vascular: The portions of the aorta, vena cava, and portal vein appear free of acute abnormality.                               X-Ray Chest 1 View (Final result)  Result time 07/18/22 08:33:01    Final result by Gisele Fowler MD (07/18/22 08:33:01)                 Impression:      No acute abnormality.      Electronically signed by: Gisele Fowler MD  Date:    07/18/2022  Time:    08:33             Narrative:    EXAMINATION:  XR CHEST 1 VIEW    CLINICAL HISTORY:  CP;    TECHNIQUE:  Single frontal view of the chest was performed.    COMPARISON:  None    FINDINGS:  The lungs are clear with normal appearance of pulmonary vasculature. No pleural effusion. No evident pneumothorax.    The cardiac silhouette is normal in size. The hilar and mediastinal contours are unremarkable.    Bones are intact.                                 Medications   aspirin tablet 325 mg (325 mg Oral Given 7/18/22 0748)   aluminum-magnesium hydroxide-simethicone 200-200-20 mg/5 mL suspension 30 mL (30 mLs Oral Given 7/18/22 0900)     And   LIDOcaine HCl 2% oral solution 10 mL (10 mLs Oral Given 7/18/22 0859)   lorazepam injection 1 mg (1 mg Intravenous Given 7/18/22 0956)   pantoprazole injection 80 mg (80 mg Intravenous Given 7/18/22 1220)   morphine injection 2 mg (2 mg Intravenous Given 7/18/22 1223)   ondansetron injection 4 mg (4 mg Intravenous Given 7/18/22 1219)     Medical Decision Making:   Initial Assessment:   Belching and substernal chest pain this morning at home  Poor diabetic diet,  patient with GERD symptoms for several months  No coronary artery disease history with a normal EKG on triage    Differential Diagnosis:   GERD, indigestion, PUD, angina, STEMI, non-STEMI, musculoskeletal pain    Clinical Tests:   Lab Tests: Ordered and Reviewed  Radiological Study: Reviewed and Ordered  Medical Tests: Ordered and Reviewed    ED Management:  Patient with a normal EKG and extensive negative cardiac workup in the ER today  Patient had 3 sets of cardiac enzymes that were completely normal, [facility] consult  [facility] cardiology recommends Protonix, outpatient stress test tomorrow in the clinic  Cardiologist Dr. Gentile does not feel that this is cardiac in nature, likely GERD  Gallbladder ultrasound showed no acute findings, patient feeling better after meds  Protonix prescription on discharge, counseled on diet with cardiology follow-up   Will see cardiologist and PCP tomorrow morning, asymptomatic on discharge  Return to the ER with any concerning signs symptoms after discharge today                      Clinical Impression:   Final diagnoses:  [K21.9] Gastroesophageal reflux disease, unspecified whether esophagitis present (Primary)          ED Disposition Condition    Discharge Stable        ED Prescriptions     Medication Sig Dispense Start Date End Date Auth. Provider    pantoprazole (PROTONIX) 40 MG tablet Take 1 tablet (40 mg total) by mouth once daily. 30 tablet 7/18/2022 8/17/2022 Navi Bragg MD        Follow-up Information     Follow up With Specialties Details Why Contact Info    Juan Gentile MD Cardiology Go in 1 day  [address] [place] [address]  [phone]      Keira Wheat MD Family Medicine Go in 1 day  [address] [place] [address]  [phone]             Navi Bragg MD  07/18/22 1731

## 2022-07-18 NOTE — ED TRIAGE NOTES
C/o non radiating mid sternal chest pressure for a few hours that awaken patient this morning. C/o nausea, denies SOB.

## 2022-07-18 NOTE — CONSULTS
[facility] - Emergency Dept  Cardiology  Consult Note    Patient Name: Abel Hackett  MRN: [id number]  Admission Date: 7/18/2022  Hospital Length of Stay: 0 days  Code Status: No Order   Attending Provider: Navi Bragg MD   Consulting Provider: Chelsea Stearns NP  Primary Care Physician: Keira Wheat MD  Principal Problem:<principal problem not specified>    Patient information was obtained from patient, past medical records and ER records.     Consults  Subjective:     Chief Complaint:  CP     HPI: 51 yo wm hx HTN, dyslipidemia, DM, anxiety presents to [place] ER with c/o epigastric/ midsternal CP which awoke him from sleep this morning. Has been having a lot of GI discomfort, using lots of Tums.  (+) anxiety unrelieved with Ativan in ER.  CE negative x 2  EKG shows NSR, no acute ischemic abnormality    Past Medical History:   Diagnosis Date    Depression     Diabetes mellitus     Diabetes mellitus, type 2        Past Surgical History:   Procedure Laterality Date    REFRACTIVE SURGERY  4/13/2016    VASECTOMY         Review of patient's allergies indicates:  No Known Allergies    No current facility-administered medications on file prior to encounter.     Current Outpatient Medications on File Prior to Encounter   Medication Sig    aspirin (ECOTRIN) 81 MG EC tablet Take 1 tablet (81 mg total) by mouth once daily.    atorvastatin (LIPITOR) 40 MG tablet Take 1 tablet (40 mg total) by mouth once daily.    dapagliflozin (FARXIGA) 10 mg tablet Take 1 tablet (10 mg total) by mouth once daily.    empagliflozin-metformin (SYNJARDY XR) 25-1,000 mg TBph Take 1 tablet by mouth once daily at 6am.    EScitalopram oxalate (LEXAPRO) 20 MG tablet Take 1 tablet (20 mg total) by mouth once daily.    insulin (LANTUS SOLOSTAR U-100 INSULIN) glargine 100 units/mL (3mL) SubQ pen Inject 40 Units into the skin every evening.    metFORMIN (GLUCOPHAGE-XR) 500 MG ER 24hr tablet Take 1 tablet (500 mg total) by mouth daily  with breakfast.    semaglutide (OZEMPIC) 1 mg/dose (4 mg/3 mL) Inject 1 mg into the skin every 7 days.    sildenafil (VIAGRA) 100 MG tablet Take 1 tablet (100 mg total) by mouth as needed for Erectile Dysfunction.    SOLIQUA 100/33 100 unit-33 mcg/mL InPn pen Inject 38 Units into the skin daily with breakfast. (Patient not taking: Reported on 7/7/2022)     Family History     Problem Relation (Age of Onset)    Diabetes Mother, Father    Heart attack Mother, Father, Maternal Uncle, Maternal Grandfather    Heart disease Mother, Father    Hypertension Mother, Father        Tobacco Use    Smoking status: Never Smoker    Smokeless tobacco: Never Used   Substance and Sexual Activity    Alcohol use: Not Currently     Comment: occassional    Drug use: No    Sexual activity: Yes     Partners: Female     Review of Systems   Constitutional: Negative.   HENT: Negative.    Eyes: Negative.    Cardiovascular: Positive for chest pain.   Respiratory: Negative.    Endocrine: Negative.    Hematologic/Lymphatic: Negative.    Musculoskeletal: Negative.    Gastrointestinal: Negative.    Genitourinary: Negative.    Neurological: Negative.    Allergic/Immunologic: Negative.      Objective:     Vital Signs (Most Recent):  Temp: 97.8 °F (36.6 °C) (07/18/22 0957)  Pulse: 92 (07/18/22 0957)  Resp: 18 (07/18/22 0957)  BP: (!) 143/75 (07/18/22 0957)  SpO2: 99 % (07/18/22 0957) Vital Signs (24h Range):  Temp:  [97 °F (36.1 °C)-97.8 °F (36.6 °C)] 97.8 °F (36.6 °C)  Pulse:  [92-95] 92  Resp:  [18-20] 18  SpO2:  [96 %-99 %] 99 %  BP: (134-143)/(69-75) 143/75     Weight: 95.7 kg (210 lb 13.9 oz)  Body mass index is 31.14 kg/m².    SpO2: 99 %  O2 Device (Oxygen Therapy): room air    No intake or output data in the 24 hours ending 07/18/22 1205    Lines/Drains/Airways     Peripheral Intravenous Line  Duration                Peripheral IV - Single Lumen 07/18/22 0740 20 G Right Wrist <1 day                Physical Exam  Vitals and nursing note  reviewed.   Constitutional:       Appearance: Normal appearance.   Cardiovascular:      Rate and Rhythm: Normal rate and regular rhythm.      Pulses: Normal pulses.      Heart sounds: Normal heart sounds.   Pulmonary:      Effort: Pulmonary effort is normal.      Breath sounds: Normal breath sounds.   Abdominal:      General: Abdomen is flat.   Musculoskeletal:         General: Normal range of motion.   Skin:     General: Skin is warm and dry.   Neurological:      General: No focal deficit present.      Mental Status: He is alert and oriented to person, place, and time.   Psychiatric:         Mood and Affect: Mood normal.         Significant Labs:   BMP:   Recent Labs   Lab 07/18/22  0744   *      K 3.8      CO2 21*   BUN 22*   CREATININE 0.8   CALCIUM 9.0   , CMP   Recent Labs   Lab 07/18/22  0744      K 3.8      CO2 21*   *   BUN 22*   CREATININE 0.8   CALCIUM 9.0   PROT 7.0   ALBUMIN 3.7   BILITOT 0.3   ALKPHOS 56   AST 18   ALT 23   ANIONGAP 12   ESTGFRAFRICA >60   EGFRNONAA >60   , CBC   Recent Labs   Lab 07/18/22  0744   WBC 4.93   HGB 14.1   HCT 42.8       and Troponin   Recent Labs   Lab 07/18/22  0744 07/18/22  1000   TROPONINI <0.006 <0.006       Significant Imaging: Echocardiogram: Transthoracic echo (TTE) complete (Cupid Only): No results found for this or any previous visit.  Assessment and Plan:     Chest pain--consider GI etiology with nausea and +cologuard  DM-uncontrolled  Recurrent GERD with high Tums consumption  HTN  Dyslipidemia  Obesity    Plan:  Low suspicion for ACS/low risk group with normal ekg and troponin  Will pursue outpt ischemic evaluation--ETT tomorrow.  ASA, statin, control of DM is essential  Try Protonix    There are no hospital problems to display for this patient.      VTE Risk Mitigation (From admission, onward)    None          Thank you for your consult. I will follow-up with patient. Please contact us if you have any additional  questions.    Chelsea Stearns NP scribe for BE Gentile MD  Cardiology   [facility] - Emergency Dept  I attest that I have personally seen and examined this patient. I have reviewed and discussed the management in detail as outlined above.

## 2022-07-19 ENCOUNTER — OFFICE VISIT (OUTPATIENT)
Dept: FAMILY MEDICINE | Facility: CLINIC | Age: 52
End: 2022-07-19
Payer: COMMERCIAL

## 2022-07-19 ENCOUNTER — TELEPHONE (OUTPATIENT)
Dept: FAMILY MEDICINE | Facility: CLINIC | Age: 52
End: 2022-07-19

## 2022-07-19 VITALS
SYSTOLIC BLOOD PRESSURE: 134 MMHG | WEIGHT: 211.19 LBS | BODY MASS INDEX: 31.28 KG/M2 | DIASTOLIC BLOOD PRESSURE: 80 MMHG | HEIGHT: 69 IN | RESPIRATION RATE: 16 BRPM | HEART RATE: 86 BPM

## 2022-07-19 DIAGNOSIS — F32.A DEPRESSION, UNSPECIFIED DEPRESSION TYPE: ICD-10-CM

## 2022-07-19 DIAGNOSIS — F41.1 GAD (GENERALIZED ANXIETY DISORDER): Primary | ICD-10-CM

## 2022-07-19 DIAGNOSIS — R19.5 POSITIVE COLORECTAL CANCER SCREENING USING COLOGUARD TEST: ICD-10-CM

## 2022-07-19 DIAGNOSIS — F03.90 DEMENTIA WITHOUT BEHAVIORAL DISTURBANCE, UNSPECIFIED DEMENTIA TYPE: ICD-10-CM

## 2022-07-19 PROCEDURE — 3079F PR MOST RECENT DIASTOLIC BLOOD PRESSURE 80-89 MM HG: ICD-10-PCS | Mod: CPTII,S$GLB,, | Performed by: FAMILY MEDICINE

## 2022-07-19 PROCEDURE — 3008F PR BODY MASS INDEX (BMI) DOCUMENTED: ICD-10-PCS | Mod: CPTII,S$GLB,, | Performed by: FAMILY MEDICINE

## 2022-07-19 PROCEDURE — 3046F PR MOST RECENT HEMOGLOBIN A1C LEVEL > 9.0%: ICD-10-PCS | Mod: CPTII,S$GLB,, | Performed by: FAMILY MEDICINE

## 2022-07-19 PROCEDURE — 99215 OFFICE O/P EST HI 40 MIN: CPT | Mod: S$GLB,,, | Performed by: FAMILY MEDICINE

## 2022-07-19 PROCEDURE — 1159F MED LIST DOCD IN RCRD: CPT | Mod: CPTII,S$GLB,, | Performed by: FAMILY MEDICINE

## 2022-07-19 PROCEDURE — 3008F BODY MASS INDEX DOCD: CPT | Mod: CPTII,S$GLB,, | Performed by: FAMILY MEDICINE

## 2022-07-19 PROCEDURE — 3075F SYST BP GE 130 - 139MM HG: CPT | Mod: CPTII,S$GLB,, | Performed by: FAMILY MEDICINE

## 2022-07-19 PROCEDURE — 99999 PR PBB SHADOW E&M-EST. PATIENT-LVL III: ICD-10-PCS | Mod: PBBFAC,,, | Performed by: FAMILY MEDICINE

## 2022-07-19 PROCEDURE — 99215 PR OFFICE/OUTPT VISIT, EST, LEVL V, 40-54 MIN: ICD-10-PCS | Mod: S$GLB,,, | Performed by: FAMILY MEDICINE

## 2022-07-19 PROCEDURE — 3075F PR MOST RECENT SYSTOLIC BLOOD PRESS GE 130-139MM HG: ICD-10-PCS | Mod: CPTII,S$GLB,, | Performed by: FAMILY MEDICINE

## 2022-07-19 PROCEDURE — 1159F PR MEDICATION LIST DOCUMENTED IN MEDICAL RECORD: ICD-10-PCS | Mod: CPTII,S$GLB,, | Performed by: FAMILY MEDICINE

## 2022-07-19 PROCEDURE — 99999 PR PBB SHADOW E&M-EST. PATIENT-LVL III: CPT | Mod: PBBFAC,,, | Performed by: FAMILY MEDICINE

## 2022-07-19 PROCEDURE — 3046F HEMOGLOBIN A1C LEVEL >9.0%: CPT | Mod: CPTII,S$GLB,, | Performed by: FAMILY MEDICINE

## 2022-07-19 PROCEDURE — 3079F DIAST BP 80-89 MM HG: CPT | Mod: CPTII,S$GLB,, | Performed by: FAMILY MEDICINE

## 2022-07-19 RX ORDER — VENLAFAXINE HYDROCHLORIDE 37.5 MG/1
37.5 CAPSULE, EXTENDED RELEASE ORAL DAILY
Qty: 30 CAPSULE | Refills: 11 | Status: SHIPPED | OUTPATIENT
Start: 2022-07-19 | End: 2022-10-31 | Stop reason: ALTCHOICE

## 2022-07-19 NOTE — TELEPHONE ENCOUNTER
----- Message from Keira Wheat MD sent at 7/19/2022  6:01 AM CDT -----  Cologuard positive. Needs to be set up for colonoscopy.    Cologuard Screening (Multitarget Stool DNA)

## 2022-07-19 NOTE — PROGRESS NOTES
Subjective:       Patient ID: Abel Hackett is a 52 y.o. male.    Chief Complaint: Follow-up    HPI  52 year old male with well controlled htn, dm and dyslipidemia comes in after abrupt onset of chest pain that started at 2:00 am and woke him from sleep. He says that he was seen in the ER and given a gi cocktail and ativan as well as morphine. The only thing that really helped with the dose of morphine.    About 6 months ago he had a promotion at work which he worked very hard for. This has increased his stressors.    He apparently stopped his lexapro over the last few months because he didn't feel it was doing anything. His wife adds that she has noticed that he has been more withdrawn.    Since his last visit here, he was able to see a neurologist that he works with and says that he was concerned about a cva in the past. This could or could not be contributing to his memory issues but he has neuropsyche appt upcoming to help better to tease out dementia vs pseudodementia. He has started on an aspirin.    He had a retinal bleed noted on his eye exam s/p treatment.     He had a positive fit test.    He also notes that he is going to take time from work via fmla to work through anxiety/depression/above medical issues.    PMH, PSH, ALLERGIES, SH, FH reviewed in nurse's notes above  Medications reconciled in the nurse's notes    Review of Systems   Constitutional: Positive for fatigue. Negative for chills and fever.   HENT: Negative for congestion, ear pain, postnasal drip, rhinorrhea, sore throat and trouble swallowing.    Eyes: Negative for redness and itching.   Respiratory: Negative for cough, shortness of breath and wheezing.    Cardiovascular: Positive for chest pain. Negative for palpitations.   Gastrointestinal: Negative for abdominal pain, diarrhea, nausea and vomiting.   Genitourinary: Negative for dysuria and frequency.   Skin: Negative for rash.   Neurological: Negative for weakness and headaches.    Psychiatric/Behavioral: Positive for confusion, decreased concentration and dysphoric mood. Negative for suicidal ideas. The patient is nervous/anxious.        Objective:      Physical Exam  Vitals and nursing note reviewed.   Constitutional:       General: He is not in acute distress.     Appearance: He is well-developed.   HENT:      Head: Normocephalic and atraumatic.   Eyes:      Conjunctiva/sclera: Conjunctivae normal.      Pupils: Pupils are equal, round, and reactive to light.   Neck:      Thyroid: No thyromegaly.   Cardiovascular:      Rate and Rhythm: Normal rate and regular rhythm.      Heart sounds: Normal heart sounds.   Pulmonary:      Effort: Pulmonary effort is normal. No respiratory distress.      Breath sounds: Normal breath sounds. No wheezing.   Abdominal:      General: Bowel sounds are normal.      Palpations: Abdomen is soft.      Tenderness: There is no abdominal tenderness.   Musculoskeletal:         General: Normal range of motion.      Cervical back: Normal range of motion and neck supple.   Lymphadenopathy:      Cervical: No cervical adenopathy.   Skin:     General: Skin is warm and dry.      Findings: No rash.   Neurological:      Mental Status: He is alert and oriented to person, place, and time.   Psychiatric:         Behavior: Behavior normal.          Assessment/Plan:       Problem List Items Addressed This Visit        Psychiatric    Depression    Relevant Medications    venlafaxine (EFFEXOR-XR) 37.5 MG 24 hr capsule      Other Visit Diagnoses     CLAUDIA (generalized anxiety disorder)    -  Primary    Relevant Orders    Ambulatory referral/consult to Psychiatry    Positive colorectal cancer screening using Cologuard test        Relevant Orders    Case Request Endoscopy: COLONOSCOPY (Completed)    Dementia without behavioral disturbance, unspecified dementia type        Relevant Medications    venlafaxine (EFFEXOR-XR) 37.5 MG 24 hr capsule        Has upcoming appt with  neuropsyche.  Needs to see counseling and psychiatry.    Schedule colonoscopy with me.    Stop lexapro and start on effexor - dad was on cymbalta.    Blood sugars much better controlled.    Has appt with [facility] for stress testing today.    Okay to cont with protonix.    Will fill out fmla today.    RTC if condition acutely worsens or any other concerns, otherwise RTC as scheduled

## 2022-07-20 RX ORDER — AMITRIPTYLINE HYDROCHLORIDE 25 MG/1
25 TABLET, FILM COATED ORAL NIGHTLY PRN
Qty: 30 TABLET | Refills: 2 | Status: SHIPPED | OUTPATIENT
Start: 2022-07-20 | End: 2022-08-09

## 2022-07-20 NOTE — TELEPHONE ENCOUNTER
Patient was seen in clinic 7/19/22. Patient was scheduled for cscope with Dr. Wheat at this time. cscope set for 8/2/2022.

## 2022-07-22 LAB
OHS CV EVENT MONITOR DAY: 0
OHS CV HOLTER LENGTH DECIMAL HOURS: 48
OHS CV HOLTER LENGTH HOURS: 48
OHS CV HOLTER LENGTH MINUTES: 0
OHS CV HOLTER SINUS AVERAGE HR: 97
OHS CV HOLTER SINUS MAX HR: 143
OHS CV HOLTER SINUS MIN HR: 68

## 2022-08-01 ENCOUNTER — PATIENT MESSAGE (OUTPATIENT)
Dept: FAMILY MEDICINE | Facility: CLINIC | Age: 52
End: 2022-08-01
Payer: COMMERCIAL

## 2022-08-02 ENCOUNTER — HOSPITAL ENCOUNTER (OUTPATIENT)
Facility: HOSPITAL | Age: 52
Discharge: HOME | End: 2022-08-02
Attending: FAMILY MEDICINE | Admitting: FAMILY MEDICINE
Payer: COMMERCIAL

## 2022-08-02 ENCOUNTER — ANESTHESIA EVENT (OUTPATIENT)
Dept: ENDOSCOPY | Facility: HOSPITAL | Age: 52
End: 2022-08-02
Payer: COMMERCIAL

## 2022-08-02 ENCOUNTER — ANESTHESIA (OUTPATIENT)
Dept: ENDOSCOPY | Facility: HOSPITAL | Age: 52
End: 2022-08-02
Payer: COMMERCIAL

## 2022-08-02 VITALS
RESPIRATION RATE: 16 BRPM | OXYGEN SATURATION: 96 % | SYSTOLIC BLOOD PRESSURE: 117 MMHG | HEART RATE: 88 BPM | TEMPERATURE: 97 F | DIASTOLIC BLOOD PRESSURE: 71 MMHG

## 2022-08-02 PROBLEM — R19.5 POSITIVE COLORECTAL CANCER SCREENING USING COLOGUARD TEST: Status: ACTIVE | Noted: 2022-08-02

## 2022-08-02 LAB — CRC RECOMMENDATION EXT: NORMAL

## 2022-08-02 PROCEDURE — 00811 ANES LWR INTST NDSC NOS: CPT | Mod: QZ,P2 | Performed by: NURSE ANESTHETIST, CERTIFIED REGISTERED

## 2022-08-02 PROCEDURE — 63600175 PHARM REV CODE 636 W HCPCS: Performed by: NURSE ANESTHETIST, CERTIFIED REGISTERED

## 2022-08-02 PROCEDURE — 45385 PR COLONOSCOPY,REMV LESN,SNARE: ICD-10-PCS | Mod: ,,, | Performed by: FAMILY MEDICINE

## 2022-08-02 PROCEDURE — 45385 COLONOSCOPY W/LESION REMOVAL: CPT | Performed by: FAMILY MEDICINE

## 2022-08-02 PROCEDURE — 37000009 HC ANESTHESIA EA ADD 15 MINS: Performed by: FAMILY MEDICINE

## 2022-08-02 PROCEDURE — 45385 COLONOSCOPY W/LESION REMOVAL: CPT | Mod: ,,, | Performed by: FAMILY MEDICINE

## 2022-08-02 PROCEDURE — D9220AH HC ANESTHESIA PROFESSIONAL FEE: Mod: QZ,P2 | Performed by: NURSE ANESTHETIST, CERTIFIED REGISTERED

## 2022-08-02 PROCEDURE — 88305 TISSUE EXAM BY PATHOLOGIST: CPT | Mod: 26,,, | Performed by: PATHOLOGY

## 2022-08-02 PROCEDURE — 27201089 HC SNARE, DISP (ANY): Performed by: FAMILY MEDICINE

## 2022-08-02 PROCEDURE — 37000008 HC ANESTHESIA 1ST 15 MINUTES: Performed by: FAMILY MEDICINE

## 2022-08-02 PROCEDURE — 25000003 PHARM REV CODE 250: Performed by: NURSE ANESTHETIST, CERTIFIED REGISTERED

## 2022-08-02 PROCEDURE — 88305 TISSUE EXAM BY PATHOLOGIST: CPT | Performed by: PATHOLOGY

## 2022-08-02 PROCEDURE — 88305 TISSUE EXAM BY PATHOLOGIST: ICD-10-PCS | Mod: 26,,, | Performed by: PATHOLOGY

## 2022-08-02 RX ORDER — PROPOFOL 10 MG/ML
VIAL (ML) INTRAVENOUS
Status: DISCONTINUED | OUTPATIENT
Start: 2022-08-02 | End: 2022-08-02

## 2022-08-02 RX ORDER — DEXTROMETHORPHAN/PSEUDOEPHED 2.5-7.5/.8
DROPS ORAL
Status: DISCONTINUED
Start: 2022-08-02 | End: 2022-08-02 | Stop reason: HOSPADM

## 2022-08-02 RX ORDER — LIDOCAINE HYDROCHLORIDE 20 MG/ML
INJECTION, SOLUTION EPIDURAL; INFILTRATION; INTRACAUDAL; PERINEURAL
Status: DISCONTINUED | OUTPATIENT
Start: 2022-08-02 | End: 2022-08-02

## 2022-08-02 RX ADMIN — PROPOFOL 60 MG: 10 INJECTION, EMULSION INTRAVENOUS at 07:08

## 2022-08-02 RX ADMIN — SODIUM CHLORIDE, SODIUM LACTATE, POTASSIUM CHLORIDE, AND CALCIUM CHLORIDE: .6; .31; .03; .02 INJECTION, SOLUTION INTRAVENOUS at 07:08

## 2022-08-02 RX ADMIN — PROPOFOL 150 MG: 10 INJECTION, EMULSION INTRAVENOUS at 07:08

## 2022-08-02 RX ADMIN — PROPOFOL 50 MG: 10 INJECTION, EMULSION INTRAVENOUS at 07:08

## 2022-08-02 RX ADMIN — PROPOFOL 60 MG: 10 INJECTION, EMULSION INTRAVENOUS at 08:08

## 2022-08-02 RX ADMIN — LIDOCAINE HYDROCHLORIDE 60 MG: 20 INJECTION, SOLUTION EPIDURAL; INFILTRATION; INTRACAUDAL; PERINEURAL at 07:08

## 2022-08-02 RX ADMIN — PROPOFOL 20 MG: 10 INJECTION, EMULSION INTRAVENOUS at 07:08

## 2022-08-02 RX ADMIN — PROPOFOL 50 MG: 10 INJECTION, EMULSION INTRAVENOUS at 08:08

## 2022-08-02 NOTE — BRIEF OP NOTE
Operative Note     SUMMARY     Surgery Date: 8/2/2022     Surgeon(s) and Role:     * Keira Wheat MD - Primary    Pre-op Diagnosis:  Positive colorectal cancer screening using Cologuard test [R19.5]    Post-op Diagnosis:  Colon polyp    Procedure(s) (LRB):  COLONOSCOPY (N/A)    Anesthesia: General    Findings/Key Components:  2mm pendunculated polyp    Specimens (From admission, onward)     Start     Ordered    08/02/22 0819  Specimen to Pathology, Surgery Other  Once        Comments: Pre- + Cologuard  Spec- Descending colon polyp  Post- Polyps  Proc- Colonoscopy  Dr Wheat   References:    Click here for ordering Quick Tip   Question Answer Comment   Procedure Type: Other    Specimen Class: Routine/Screening        08/02/22 0819                  Discharge Note      SUMMARY     Admit Date: 8/2/2022    Attending Physician: Keira Wheat MD     Discharge Physician: Keira Wheat MD    Discharge Date: 8/2/2022     Final Diagnosis: see post-op dx above    Disposition: Home or Self Care    Patient Instructions:   Current Discharge Medication List      CONTINUE these medications which have NOT CHANGED    Details   amitriptyline (ELAVIL) 25 MG tablet Take 1 tablet (25 mg total) by mouth nightly as needed for Insomnia.  Qty: 30 tablet, Refills: 2      aspirin (ECOTRIN) 81 MG EC tablet Take 1 tablet (81 mg total) by mouth once daily.  Qty: 90 tablet, Refills: 1    Associated Diagnoses: Type 2 diabetes mellitus treated with insulin      atorvastatin (LIPITOR) 40 MG tablet Take 1 tablet (40 mg total) by mouth once daily.  Qty: 90 tablet, Refills: 3    Associated Diagnoses: Type 2 diabetes mellitus treated with insulin      dapagliflozin (FARXIGA) 10 mg tablet Take 1 tablet (10 mg total) by mouth once daily.  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  Qty: 30 tablet, Refills: 5      pantoprazole (PROTONIX) 40 MG tablet Take 1 tablet (40 mg total) by mouth once daily.  Qty: 30 tablet, Refills: 0      semaglutide (OZEMPIC) 1 mg/dose (4 mg/3 mL) Inject 1 mg into the skin every 7 days.  Qty: 3 pen, Refills: 3      sildenafil (VIAGRA) 100 MG tablet Take 1 tablet (100 mg total) by mouth as needed for Erectile Dysfunction.  Qty: 10 tablet, Refills: 4      venlafaxine (EFFEXOR-XR) 37.5 MG 24 hr capsule Take 1 capsule (37.5 mg total) by mouth once daily.  Qty: 30 capsule, Refills: 11    Associated Diagnoses: Depression, unspecified depression type; Dementia without behavioral disturbance, unspecified dementia type             Discharge Procedure Orders (must include Diet, Follow-up, Activity)  No discharge procedures on file.

## 2022-08-02 NOTE — TRANSFER OF CARE
Anesthesia Transfer of Care Note    Patient: Abel Hackett    Procedure(s) Performed: Procedure(s) (LRB):  COLONOSCOPY (N/A)    Patient location: GI    Anesthesia Type: general    Transport from OR: Transported from OR on 6-10 L/min O2 by face mask with adequate spontaneous ventilation    Post pain: adequate analgesia    Post assessment: no apparent anesthetic complications and tolerated procedure well    Post vital signs: stable    Level of consciousness: sedated    Nausea/Vomiting: no nausea/vomiting    Complications: none    Transfer of care protocol was followed      Last vitals:   Visit Vitals  BP (!) 151/84   Pulse 92   Temp 36.3 °C (97.3 °F) (Oral)   Resp 16   SpO2 98%

## 2022-08-02 NOTE — DISCHARGE INSTRUCTIONS
1.No driving today.  2.no heavy lifting or straining.  3. You may feel bloated due to air placed in your colon. You may experience some excessive gas and bloating.  4. A small amount of blood from rectum is not serious, notify Dr for any large amounts of rectal bleeding.  5. Go directly to ER if you notice any of the following:  -Chills, fever over 101 within 24 hours of your procedure  -Persistent vomiting assoc with blood  - Severe abdominal pain, other than gas cramps  -Severe chest pain  - black, tarry stools  - bright red bleeding from your rectum (greater than 1 tablespoon)    Call your Dr for any unusual pain, bleeding, or fever........[phone]

## 2022-08-02 NOTE — ANESTHESIA PREPROCEDURE EVALUATION
08/02/2022  Abel Hackett is a 52 y.o., male.      Pre-op Assessment    I have reviewed the Patient Summary Reports.     I have reviewed the Nursing Notes. I have reviewed the NPO Status.   I have reviewed the Medications.     Review of Systems  Anesthesia Hx:  No problems with previous Anesthesia  History of prior surgery of interest to airway management or planning:  Denies Personal Hx of Anesthesia complications.   Social:  Non-Smoker, No Alcohol Use    Hematology/Oncology:  Hematology Normal   Oncology Normal     EENT/Dental:EENT/Dental Normal   Cardiovascular:  Cardiovascular Normal Exercise tolerance: good     Pulmonary:  Pulmonary Normal    Renal/:  Renal/ Normal     Hepatic/GI:  Hepatic/GI Normal    Musculoskeletal:  Musculoskeletal Normal    Neurological:   Neuromuscular Disease,    Endocrine:   Diabetes, well controlled, type 2    Dermatological:  Skin Normal    Psych:   Psychiatric History          Physical Exam  General: Well nourished, Cooperative, Alert and Oriented    Airway:  Mallampati: II   Mouth Opening: Normal  TM Distance: Normal  Tongue: Normal  Neck ROM: Normal ROM    Dental:  Intact        Anesthesia Plan  Type of Anesthesia, risks & benefits discussed:    Anesthesia Type: Gen Natural Airway  Intra-op Monitoring Plan: Standard ASA Monitors  Induction:  IV  Informed Consent: Informed consent signed with the Patient and all parties understand the risks and agree with anesthesia plan.  All questions answered. Patient consented to blood products? Yes  ASA Score: 2  Day of Surgery Review of History & Physical: H&P Update referred to the surgeon/provider.I have interviewed and examined the patient. I have reviewed the patient's H&P dated: 8/2/22. There are no significant changes.     Ready For Surgery From Anesthesia Perspective.     .

## 2022-08-02 NOTE — OP NOTE
Procedure: Colonoscopy    Procedure date: 8/2/2022    Surgeon: Keira Wheat    Preop diagnosis: Positive colorectal screening using Cologuard test     Postop diagnosis: Colon polyp K63.5; internal hemorrhoid K64.8    EBL: Minimal    The patient presented to the endoscopy suite for an initial screening colonoscopy after + cologuard. The patient signed the informed consent. After undergoing bowel prep the night before the procedure, IV fluids were started. The patient was given IV sedation using propofol with good results. Rectal exam was performed. The patient had good sphincter tone, no masses palpable. Prostate about 20 gram smooth and firm. Colonoscope was inserted and with air insufflation and direct visualization of the lumen the colonoscope was advanced to the cecum. In the descending colon, there was a pedunculated polyp about 2mm in diameter which was completely resected using a cold snare. It was retrieved with some difficulty in finding it. The cecum, ascending, transverse, descending, and sigmoid colon were normal. The scope was retroflexed in the rectal area reviewing the anal rectal junction. This demonstrated small flat hemorrhoids. No bleeding was noted. No fissures. Scope was placed back into neutral position and removed. Patient recovered nicely and was discharged home without any events.     Plan will be to repeat the colonoscopy as directed by the pathology. Handout was given for the proper treatment of hemorrhoids.

## 2022-08-02 NOTE — ANESTHESIA POSTPROCEDURE EVALUATION
Anesthesia Post Evaluation    Patient: Abel Hackett    Procedure(s) Performed: Procedure(s) (LRB):  COLONOSCOPY (N/A)    Final Anesthesia Type: general      Patient location during evaluation: PACU  Patient participation: Yes- Able to Participate  Level of consciousness: awake and alert, oriented and awake  Post-procedure vital signs: reviewed and stable  Pain management: adequate  Airway patency: patent    PONV status at discharge: No PONV  Anesthetic complications: no      Cardiovascular status: blood pressure returned to baseline, hemodynamically stable and stable  Respiratory status: unassisted, spontaneous ventilation and room air  Hydration status: euvolemic  Follow-up not needed.          Vitals Value Taken Time   /71 08/02/22 0845   Temp  08/02/22 1002   Pulse 88 08/02/22 0845   Resp 16 08/02/22 0845   SpO2 96 % 08/02/22 0845         Event Time   Out of Recovery 08:51:26         Pain/Pat Score: Pat Score: 10 (8/2/2022  8:46 AM)

## 2022-08-04 LAB
FINAL PATHOLOGIC DIAGNOSIS: NORMAL
GROSS: NORMAL
Lab: NORMAL

## 2022-08-04 NOTE — PROGRESS NOTES
Patient's polyp was hyperplastic - meaning NOT cancerous and really not even pre-cancerous. We will plan to repeat his colonoscopy in 5 years.   jomar

## 2022-08-09 ENCOUNTER — TELEPHONE (OUTPATIENT)
Dept: PSYCHIATRY | Facility: CLINIC | Age: 52
End: 2022-08-09
Payer: COMMERCIAL

## 2022-08-09 ENCOUNTER — OFFICE VISIT (OUTPATIENT)
Dept: NEUROLOGY | Facility: CLINIC | Age: 52
End: 2022-08-09
Payer: COMMERCIAL

## 2022-08-09 VITALS
BODY MASS INDEX: 30.57 KG/M2 | DIASTOLIC BLOOD PRESSURE: 78 MMHG | SYSTOLIC BLOOD PRESSURE: 126 MMHG | HEART RATE: 98 BPM | HEIGHT: 69 IN | RESPIRATION RATE: 18 BRPM | WEIGHT: 206.38 LBS

## 2022-08-09 DIAGNOSIS — R41.3 MEMORY LOSS: Primary | ICD-10-CM

## 2022-08-09 DIAGNOSIS — E78.5 DYSLIPIDEMIA: ICD-10-CM

## 2022-08-09 DIAGNOSIS — E11.9 TYPE 2 DIABETES MELLITUS WITHOUT COMPLICATION, WITHOUT LONG-TERM CURRENT USE OF INSULIN: ICD-10-CM

## 2022-08-09 DIAGNOSIS — I10 PRIMARY HYPERTENSION: ICD-10-CM

## 2022-08-09 PROCEDURE — 3008F BODY MASS INDEX DOCD: CPT | Mod: CPTII,S$GLB,, | Performed by: PSYCHIATRY & NEUROLOGY

## 2022-08-09 PROCEDURE — 4010F PR ACE/ARB THEARPY RXD/TAKEN: ICD-10-PCS | Mod: CPTII,S$GLB,, | Performed by: PSYCHIATRY & NEUROLOGY

## 2022-08-09 PROCEDURE — 1159F MED LIST DOCD IN RCRD: CPT | Mod: CPTII,S$GLB,, | Performed by: PSYCHIATRY & NEUROLOGY

## 2022-08-09 PROCEDURE — 4010F ACE/ARB THERAPY RXD/TAKEN: CPT | Mod: CPTII,S$GLB,, | Performed by: PSYCHIATRY & NEUROLOGY

## 2022-08-09 PROCEDURE — 3074F PR MOST RECENT SYSTOLIC BLOOD PRESSURE < 130 MM HG: ICD-10-PCS | Mod: CPTII,S$GLB,, | Performed by: PSYCHIATRY & NEUROLOGY

## 2022-08-09 PROCEDURE — 3078F DIAST BP <80 MM HG: CPT | Mod: CPTII,S$GLB,, | Performed by: PSYCHIATRY & NEUROLOGY

## 2022-08-09 PROCEDURE — 3078F PR MOST RECENT DIASTOLIC BLOOD PRESSURE < 80 MM HG: ICD-10-PCS | Mod: CPTII,S$GLB,, | Performed by: PSYCHIATRY & NEUROLOGY

## 2022-08-09 PROCEDURE — 99204 OFFICE O/P NEW MOD 45 MIN: CPT | Mod: S$GLB,,, | Performed by: PSYCHIATRY & NEUROLOGY

## 2022-08-09 PROCEDURE — 1159F PR MEDICATION LIST DOCUMENTED IN MEDICAL RECORD: ICD-10-PCS | Mod: CPTII,S$GLB,, | Performed by: PSYCHIATRY & NEUROLOGY

## 2022-08-09 PROCEDURE — 1160F RVW MEDS BY RX/DR IN RCRD: CPT | Mod: CPTII,S$GLB,, | Performed by: PSYCHIATRY & NEUROLOGY

## 2022-08-09 PROCEDURE — 99204 PR OFFICE/OUTPT VISIT, NEW, LEVL IV, 45-59 MIN: ICD-10-PCS | Mod: S$GLB,,, | Performed by: PSYCHIATRY & NEUROLOGY

## 2022-08-09 PROCEDURE — 1160F PR REVIEW ALL MEDS BY PRESCRIBER/CLIN PHARMACIST DOCUMENTED: ICD-10-PCS | Mod: CPTII,S$GLB,, | Performed by: PSYCHIATRY & NEUROLOGY

## 2022-08-09 PROCEDURE — 99999 PR PBB SHADOW E&M-EST. PATIENT-LVL IV: CPT | Mod: PBBFAC,,, | Performed by: PSYCHIATRY & NEUROLOGY

## 2022-08-09 PROCEDURE — 3074F SYST BP LT 130 MM HG: CPT | Mod: CPTII,S$GLB,, | Performed by: PSYCHIATRY & NEUROLOGY

## 2022-08-09 PROCEDURE — 3008F PR BODY MASS INDEX (BMI) DOCUMENTED: ICD-10-PCS | Mod: CPTII,S$GLB,, | Performed by: PSYCHIATRY & NEUROLOGY

## 2022-08-09 PROCEDURE — 3046F PR MOST RECENT HEMOGLOBIN A1C LEVEL > 9.0%: ICD-10-PCS | Mod: CPTII,S$GLB,, | Performed by: PSYCHIATRY & NEUROLOGY

## 2022-08-09 PROCEDURE — 3046F HEMOGLOBIN A1C LEVEL >9.0%: CPT | Mod: CPTII,S$GLB,, | Performed by: PSYCHIATRY & NEUROLOGY

## 2022-08-09 PROCEDURE — 99999 PR PBB SHADOW E&M-EST. PATIENT-LVL IV: ICD-10-PCS | Mod: PBBFAC,,, | Performed by: PSYCHIATRY & NEUROLOGY

## 2022-08-09 RX ORDER — DILTIAZEM HYDROCHLORIDE 120 MG/1
120 CAPSULE, COATED, EXTENDED RELEASE ORAL DAILY
COMMUNITY
Start: 2022-08-03 | End: 2023-01-12

## 2022-08-09 RX ORDER — NITROGLYCERIN 0.4 MG/1
TABLET SUBLINGUAL
COMMUNITY
Start: 2022-08-03

## 2022-08-09 RX ORDER — METOPROLOL SUCCINATE 25 MG/1
25 TABLET, EXTENDED RELEASE ORAL 2 TIMES DAILY
COMMUNITY
Start: 2022-08-03 | End: 2023-01-12

## 2022-08-09 RX ORDER — VALSARTAN 80 MG/1
80 TABLET ORAL DAILY
COMMUNITY
Start: 2022-07-19 | End: 2023-03-03

## 2022-08-09 NOTE — PROGRESS NOTES
Consult from Dr. Wheat    HPI: Abel Hackett is a 52 y.o. male with a history of memory problems first noted by patient's wife and coworkers The symptoms started 6 months ago.   Patient states he does not really notice the symptoms  He is here alone today and gives the following examples:  Examples of memory problems noted include? Wife has told him he forgets he said something more than once, he forgets to put things away, forget to brush his teeth. Was driving to his son-in-law's and passed up his street and then had to turn around. He was not confused  He has forgotten to go to meetings at works and has forgotten some task  He is the director or medical clinic at [employer]   Seems to happen almost daily    Impairment in ADLS such as doing bills, cooking, doing laundry, dressing self?  Wife now does the bills as he was forgetting to pay things. Cooking with CO and Smoker detector. He does his own ALDs but has forgotten clothes in the washer.     Patient had MRI and MRA and EEG at [facility] since CT head showed some atrophy. [place] [facility] [place] also ordered Neuropsychological testing which he would prefer to do off [facility]'s campus. MRI showed a cerebellar abnormality- see below    EEG was normal    Mood is described as normal and sometimes frustrated. Taking Effexor for anxiety which is well controlled per him.   The patient does not have any delusions, hallucinations, paranoia,  falls, or tremors. No known prior stroke symptoms. There is not a prior history of head trauma. There is a Family History of memory disorders in his parents who are both - both had dementia and  in their 70s. Mom had vascular dementia and dad had AD.  The patient is still driving without accidents or incident      Patient reports he is on FMLA time due to work challanges      Review of Systems   Constitutional: Negative for fever.   HENT: Negative for nosebleeds.    Eyes: Negative for double vision.    Respiratory: Negative for hemoptysis.    Cardiovascular: Negative for leg swelling.   Gastrointestinal: Negative for blood in stool.   Genitourinary: Negative for hematuria.   Musculoskeletal: Negative for falls.   Skin: Negative for rash.   Neurological: Negative for tremors.   Psychiatric/Behavioral: Positive for memory loss.         I have reviewed all of this patient's past medical and surgical histories as well as family and social histories and active allergies and medications as documented in the electronic medical record.        Exam:  Gen Appearance, well developed/nourished in no apparent distress  CV: 2+ distal pulses with no edema or swelling  Neuro:  MS: Awake, alert, oriented to place, person, time, situation. Sustains attention. Recent/remote memory intact to gross verbal testing, Language is full to spontaneous speech/repetition/naming/comprehension. Fund of Knowledge is full  Clock drawing is normal but difficulty for him to remain on task with distractions  Go-no go testing with mild slowing  CN: Optic discs are flat with normal vasculature, PERRL, Extraoccular movements and visual fields are full. Normal facial sensation and strength, Hearing symmetric, Tongue and Palate are midline and strong. Shoulder Shrug symmetric and strong.  Motor: Normal bulk, tone, no abnormal movements. 5/5 strength bilateral upper/lower extremities with 1+ reflexes and no clonus  Sensory: symmetric to light touch, pain, temp, and vibration. Romberg mildly positive  Cerebellar: Finger-nose,Heal-shin, Rapid alternating movements intact  Gait: Normal stance, no ataxia    Imagin2022 CT head:   Mild cortical atrophy without acute intracranial abnormality.    2022 MRI brain: Small focus of encephalomalacia right inferior cerebellum  MRA head and neck normal    2022 EEG: normal    2022 Holter monitor (done by PCP for palpitations complaint): Normal    Labs:  RPR, TSH, B12, CBC normal. CMP: elevated glucose  consistent with his known poorly controlled DM2    Assessment/Plan: Abel Hackett is a 52 y.o. male with 6 months of difficulty with short term memory nearly daily  I recommend:     1. Noting mild cortical atrophy by CT head 7/2022    2. 7/2022 [place] [facility] [place] Small focus of encephalomalacia right inferior cerebellum without atrophy mentioned  -Likely a small vessel CVA due to poorly controlled DM2 (was noncompliant)  -MRA head and neck normal  -Now on anti-HTN meds and poorly controlled DM2 is improving with treatment with PCP. Continue daily ASA and statin for CVA prevention    3. Note: Patient is seeing cardiology for echo/angiogram (Dr. Gentiel)    4. His mom had  vascular dementia and dad had AD.   -EEG at [facility] unremarkable 2022    5. Needs Neuropsychological testing per orders  -Looking for vascular MCI +/- other causes and contributing factors. Needs excellent control of vascular risk factor  -Pending psychiatry  Evaluation as well for mood  -No driving restrictions but he is off of work currently due to symptoms. Advised he remain off until completion of testing.       RTC given  CC:   Dr. Wheat

## 2022-08-11 ENCOUNTER — TELEPHONE (OUTPATIENT)
Dept: PSYCHIATRY | Facility: CLINIC | Age: 52
End: 2022-08-11
Payer: COMMERCIAL

## 2022-08-11 ENCOUNTER — TELEPHONE (OUTPATIENT)
Dept: NEUROLOGY | Facility: CLINIC | Age: 52
End: 2022-08-11
Payer: COMMERCIAL

## 2022-08-11 NOTE — TELEPHONE ENCOUNTER
----- Message from Leslie Yu sent at 8/11/2022  2:35 PM CDT -----  Contact: pt    ----- Message -----  From: Aury Deal  Sent: 8/11/2022  11:31 AM CDT  To: , #    Pt requesting call back re: scheduling appt.     Confirmed contact below:  Contact Name:Abel Hackett  Phone Number: [phone]

## 2022-08-12 ENCOUNTER — PATIENT MESSAGE (OUTPATIENT)
Dept: RADIOLOGY | Facility: CLINIC | Age: 52
End: 2022-08-12
Payer: COMMERCIAL

## 2022-08-24 ENCOUNTER — PATIENT OUTREACH (OUTPATIENT)
Dept: ADMINISTRATIVE | Facility: HOSPITAL | Age: 52
End: 2022-08-24
Payer: COMMERCIAL

## 2022-08-24 NOTE — PROGRESS NOTES
Chart reviewed, immunization record updated.  No new results noted on [facility] or [facility] web site.  Care Everywhere updated.   Patient care coordination note updated.  LOV with PCP 7/19/2022.  Narrative for Colonoscopy completed on 8/02/2022 per Dr. Wheat, updated in .  Left detailed message for patient to return call to discuss scheduling 3 month HBA1C recheck and discuss if up to date on eye exam.

## 2022-09-10 NOTE — PROGRESS NOTES
NEUROPSYCHOLOGICAL EVALUATION - CONFIDENTIAL    Referring Provider: Ethan Cavanaugh MD   Medical Necessity: Evaluate cognitive and emotional functioning, participate in treatment planning/management, and provide supportive therapy in the setting of memory loss  Date Conducted: 2022  Present At Visit: the patient  Billin/37572 or 54991 = 50 minutes  Referral Diagnoses: R41.3 (ICD-10-CM) - Memory loss  Consent: The patient expressed an understanding of the purpose of the evaluation and consented to all procedures. We discussed the limits of confidentiality and discussed an emergency plan.    Telemedicine Details:   The patient location is: home  The chief complaint leading to consultation is: memory loss  Visit type: Virtual visit with synchronous audio and video  Total time spent with patient: 50 minutes  Each patient to whom he or she provides medical services by telemedicine is: (1) informed of the relationship between the physician and patient and the respective role of any other health care provider with respect to management of the patient; and (2) notified that he or she may decline to receive medical services by telemedicine and may withdraw from such care at any time.    ASSESSMENT & PLAN:   Mr. Abel Hackett is an 52 y.o., male with his THUY and pertinent medical history including hyperlipidemia, poorly controlled type 2 diabetes, and moderate obstructive sleep apnea (per sleep study records. Patient seemed unaware of this today so possibly going untreated - will need to clarify with him) who was referred for a neuropsychological evaluation in the setting of memory loss.     2022 [place] [facility] [place] Small focus of encephalomalacia right inferior cerebellum without atrophy mentioned  -Likely a small vessel CVA due to poorly controlled DM2 (was noncompliant)  -MRA head and neck normal  -Now on anti-HTN meds and poorly controlled DM2 is improving with treatment with  PCP. Continue daily ASA and statin for CVA prevention    Full report to follow completion of testing.   Problem List Items Addressed This Visit          Psychiatric    Anxiety - Primary     Other Visit Diagnoses       Memory loss            Thank you for allowing me to assist in Mr. Abel Hackett's care. If you have any questions, please contact me at [phone].      Nunu Steiner, PhD  Licensed Clinical Neuropsychologist  [facility]     CLINICAL INTERVIEW & RECORD REVIEW:     Cognitive Functioning   Cognitive screener: none  Previous evaluation(s): none  Onset & course of difficulty: Patient states he does not really notice any changes in his thinking but his coworkers brought to his attention maybe 6 months ago that he was forgetful. He discussed with his wife, who then shared some of the things she has noticed that he had forgotten. He denies noticing anything out of the ordinary 6 months ago, and states that as far as he was concerned, things were going fairly well. Hard to say if it's any worse than it was at onset.    First symptom/problem observed: memory  Fluctuations: none  Examples:   Attention/Working Memory/Executive Functioning:   Processing Speed: kind of. Sometimes harder for him to understand directions or put directions together   Language: word-finding difficulty   Visuospatial: one time got lost while driving. Does have trouble parking the car now.  Learning & Memory: forgetting deadlines at work, missed a few meetings. Keeping constant lists. Wife has told him he forgets he said something more than once, he forgets to put things away, forget to brush his teeth. Was driving to his son-in-law's and passed up his street and then had to turn around. He was not confused. He has forgotten to go to meetings at works and has forgotten some task. Seems to happen almost daily  Exacerbating factors: none  Ameliorating factors: none  Medication for  "cognition: none     Daily Functioning   ADLs:    Bathing: Independent and without difficulty  Dressing: Independent and without difficulty  Grooming: Independent and without difficulty   Toileting: Independent and without difficulty  Transferring: Independent and without difficulty.  Eating: Independent and without difficulty.   IADLs:    Finances: Used to be just him but he missed a few payments. Now doing together with wife.   Medication Mgmt: missing a few doses each week   Driving: got lost one time.   Household Mgmt: Independent and without difficulty Cooking/Meal Preparation: cooks sometimes and has left the stove on  Shopping: needs to keep a list to keep from forgetting items  Appointment Mgmt: missed an eye doctor appointment recently  Employment: missed a few meetings at work and some deadlines.      Psychiatric/Neuropsychiatric Symptoms   Mood: "fine"  Depression: feels some aggravation because he doesn't know what's going on, but does not feel depressed about it  Caity/Hypomania: no  Anxiety: none reported today. Medical records reveal history of anxiety.   Stress: moderate - forgetfulness primarily  Social Withdrawal: no  Neurovegetative Sxs:  Appetite: normal  Sleep: some problems falling asleep but staying asleep once he does fall asleep. 8 to 9 hours a night. Denied heavy snoring, gasping for air, sleep apnea-related issues. That said, he was diagnosed with moderate sleep apnea in 2018 after completing a sleep study. Not moving around in his sleep or acting out his dreams.   Energy: feels rested in the am. Normal energy throughout the day.    Hallucinations: no  Delusional/Paranoid Thinking: no  Impulsivity: no  Obsessive/Compulsive Behaviors: no  Disinhibition: no  Irritability/Agitation: yes - some secondary to his forgetfulness  Aggression: no  Apathy/Indifference: no  Other changes in personality: no     Physical Functioning   Tremor: noticing some tremor in his hands for about a year. Not " worsening. More in his left hand. Comes on when trying to do something, not at rest.   Difficulty walking: no  Imbalance: no  Falls: no  Weakness: no  Trouble with fine motor movements: no Lightheadedness: no  Urinary Urgency: no  Sensory Sxs: no  Pain: no  Physical Exercise Routine: he is exercising now. Has a treadmill that he walks on everyday     RELEVANT HISTORY  This patient has a past medical history of Depression, Diabetes mellitus, and Diabetes mellitus, type 2.    Past Surgical History:   Procedure Laterality Date    COLONOSCOPY N/A 2022    Procedure: COLONOSCOPY;  Surgeon: Keira Wheat MD;  Location: [facility];  Service: Endoscopy;  Laterality: N/A;    REFRACTIVE SURGERY  2016    VASECTOMY         Neurological History    Headaches/Migraines: no  TBI: no  Seizures: no  Stroke: no  Tumor: no Previous Episodes of Delirium: no  Movement Disorder: no  CNS Infection: no  Other: no       Neurodiagnostics   Imagin2022 CT head:   Mild cortical atrophy without acute intracranial abnormality.     2022 MRI brain: Small focus of encephalomalacia right inferior cerebellum  MRA head and neck normal     2022 EEG: normal     2022 Holter monitor (done by PCP for palpitations complaint): Normal     Labs:  RPR, TSH, B12, CBC normal. CMP: elevated glucose consistent with his known poorly controlled DM2    Pertinent Lab Work     Lab Results   Component Value Date    HSCXIZUK52 708 2022     Lab Results   Component Value Date    RPR Non-reactive 2022     No results found for: FOLATE  Lab Results   Component Value Date    TSH 1.249 2022     Lab Results   Component Value Date    HGBA1C 9.2 (H) 2022     No results found for: HIV1X2, GOZ84RDMG      Medications     Current Outpatient Medications   Medication Instructions    aspirin (ECOTRIN) 81 mg, Oral, Daily    atorvastatin (LIPITOR) 40 mg, Oral, Daily    diltiaZEM (CARDIZEM CD) 120 mg, Oral, Daily    empagliflozin-metformin  (SYNJARDY XR) 25-1,000 mg TBph 1 tablet, Oral, Daily    metoprolol succinate (TOPROL-XL) 25 mg, Oral, 2 times daily    nitroGLYCERIN (NITROSTAT) 0.4 MG SL tablet PLEASE SEE ATTACHED FOR DETAILED DIRECTIONS    OZEMPIC 1 mg, Subcutaneous, Every 7 days    pantoprazole (PROTONIX) 40 mg, Oral, Daily    sildenafiL (VIAGRA) 100 mg, Oral, As needed (PRN)    valsartan (DIOVAN) 80 mg, Oral, Daily    venlafaxine (EFFEXOR-XR) 37.5 mg, Oral, Daily     Psychiatric History   Prior Diagnoses: none  History of Trauma: no  History of Abuse: no  History of Suicide Attempts: no  Current Ideation, Intention, or Plan: no  Homicidal Ideation: no   Medication(s): Effexor - not really noticing any changes in it  Hospitalization(s): no  Psychotherapy/Counseling: no  Other: no         Substance Use History     Social History     Tobacco Use    Smoking status: Never    Smokeless tobacco: Never   Substance and Sexual Activity    Alcohol use: Not Currently     Comment: occassional    Drug use: No    Sexual activity: Yes     Partners: Female     History of abuse/overuse: no    Family Neurological & Psychiatric History       Family History   Problem Relation Age of Onset    Heart disease Mother     Hypertension Mother     Diabetes Mother     Heart attack Mother     Heart disease Father     Hypertension Father     Diabetes Father     Heart attack Father     Heart attack Maternal Uncle     Heart attack Maternal Grandfather      Neurologic: Father had AD (74 y/o when passed away) and mother had vascular dementia  Psychiatric: Negative for heritable risk factors.    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 1 child, 23 y/o  Support System: good - wife, extended family, friends, daughter  Hobbies/Activities: fishing, travel  Current Living Situation: lives at home with his wife and daughter       Legal History   Current: none    OBJECTIVE:     MENTAL STATUS AND OBSERVATIONS:   Appearance: Casually dressed and adequate grooming/hygiene.   Alertness: Attentive and alert.   Orientation:   With the exception of being off by 1 when stating the day of the month ("14th" when it was the 13th), he was O x 4   Gait:  Unable to assess   Psychomotor:  Unable to assess   Handedness:  Right   Vision & Hearing:  Adequate for session   Speech/language: Normal in rate, rhythm, tone, and volume. No significant word finding difficulty observed. Comprehension was normal.   Mood/Affect:  The patients stated mood was "fine." Affect was congruent with stated mood.    Interpersonal Behavior:  Rapport was quickly and easily established    Suicidality/Homicidality: Denied   Hallucinations/Delusions:  None evidenced or endorsed   Thought Content: Logical   Though Processes: Goal-directed   Insight & Judgment:  Appropriate   Participation in Interview:  Full      PROCEDURES/TESTS ADMINISTERED: Performed a review of pertinent medical records, reviewed limits to confidentiality, conducted a clinical interview, and explained procedures.                                 "

## 2022-09-13 ENCOUNTER — OFFICE VISIT (OUTPATIENT)
Dept: NEUROLOGY | Facility: CLINIC | Age: 52
End: 2022-09-13
Payer: COMMERCIAL

## 2022-09-13 DIAGNOSIS — R41.3 MEMORY LOSS: ICD-10-CM

## 2022-09-13 DIAGNOSIS — F41.9 ANXIETY: Primary | ICD-10-CM

## 2022-09-13 PROCEDURE — 3046F PR MOST RECENT HEMOGLOBIN A1C LEVEL > 9.0%: ICD-10-PCS | Mod: CPTII,95,, | Performed by: CLINICAL NEUROPSYCHOLOGIST

## 2022-09-13 PROCEDURE — 3046F HEMOGLOBIN A1C LEVEL >9.0%: CPT | Mod: CPTII,95,, | Performed by: CLINICAL NEUROPSYCHOLOGIST

## 2022-09-13 PROCEDURE — 90791 PSYCH DIAGNOSTIC EVALUATION: CPT | Mod: 95,,, | Performed by: CLINICAL NEUROPSYCHOLOGIST

## 2022-09-13 PROCEDURE — 4010F ACE/ARB THERAPY RXD/TAKEN: CPT | Mod: CPTII,95,, | Performed by: CLINICAL NEUROPSYCHOLOGIST

## 2022-09-13 PROCEDURE — 99499 UNLISTED E&M SERVICE: CPT | Mod: 95,,, | Performed by: CLINICAL NEUROPSYCHOLOGIST

## 2022-09-13 PROCEDURE — 99499 NO LOS: ICD-10-PCS | Mod: 95,,, | Performed by: CLINICAL NEUROPSYCHOLOGIST

## 2022-09-13 PROCEDURE — 90791 PR PSYCHIATRIC DIAGNOSTIC EVALUATION: ICD-10-PCS | Mod: 95,,, | Performed by: CLINICAL NEUROPSYCHOLOGIST

## 2022-09-13 PROCEDURE — 4010F PR ACE/ARB THEARPY RXD/TAKEN: ICD-10-PCS | Mod: CPTII,95,, | Performed by: CLINICAL NEUROPSYCHOLOGIST

## 2022-09-20 ENCOUNTER — OFFICE VISIT (OUTPATIENT)
Dept: NEUROLOGY | Facility: CLINIC | Age: 52
End: 2022-09-20
Payer: COMMERCIAL

## 2022-09-20 ENCOUNTER — LAB VISIT (OUTPATIENT)
Dept: LAB | Facility: HOSPITAL | Age: 52
End: 2022-09-20
Payer: COMMERCIAL

## 2022-09-20 DIAGNOSIS — E11.311 TYPE 2 DIABETES MELLITUS WITH RETINOPATHY AND MACULAR EDEMA, WITHOUT LONG-TERM CURRENT USE OF INSULIN, UNSPECIFIED LATERALITY, UNSPECIFIED RETINOPATHY SEVERITY: ICD-10-CM

## 2022-09-20 DIAGNOSIS — R41.3 MEMORY LOSS: Primary | ICD-10-CM

## 2022-09-20 DIAGNOSIS — F43.20 ADJUSTMENT DISORDER, UNSPECIFIED TYPE: ICD-10-CM

## 2022-09-20 LAB
ESTIMATED AVG GLUCOSE: 197 MG/DL (ref 68–131)
HBA1C MFR BLD: 8.5 % (ref 4–5.6)

## 2022-09-20 PROCEDURE — 96138 PSYCL/NRPSYC TECH 1ST: CPT | Mod: S$GLB,,, | Performed by: CLINICAL NEUROPSYCHOLOGIST

## 2022-09-20 PROCEDURE — 96132 PR NEUROPSYCHOLOGIC TEST EVAL SVCS, 1ST HR: ICD-10-PCS | Mod: S$GLB,,, | Performed by: CLINICAL NEUROPSYCHOLOGIST

## 2022-09-20 PROCEDURE — 96132 NRPSYC TST EVAL PHYS/QHP 1ST: CPT | Mod: S$GLB,,, | Performed by: CLINICAL NEUROPSYCHOLOGIST

## 2022-09-20 PROCEDURE — 99499 UNLISTED E&M SERVICE: CPT | Mod: S$GLB,,, | Performed by: CLINICAL NEUROPSYCHOLOGIST

## 2022-09-20 PROCEDURE — 4010F ACE/ARB THERAPY RXD/TAKEN: CPT | Mod: CPTII,S$GLB,, | Performed by: CLINICAL NEUROPSYCHOLOGIST

## 2022-09-20 PROCEDURE — 96133 PR NEUROPSYCHOLOGIC TEST EVAL SVCS, EA ADDTL HR: ICD-10-PCS | Mod: S$GLB,,, | Performed by: CLINICAL NEUROPSYCHOLOGIST

## 2022-09-20 PROCEDURE — 96138 PR PSYCH/NEUROPSYCH TEST ADMIN/SCORING, BY TECH, 2+ TESTS, 1ST 30 MIN: ICD-10-PCS | Mod: S$GLB,,, | Performed by: CLINICAL NEUROPSYCHOLOGIST

## 2022-09-20 PROCEDURE — 83036 HEMOGLOBIN GLYCOSYLATED A1C: CPT | Performed by: FAMILY MEDICINE

## 2022-09-20 PROCEDURE — 36415 COLL VENOUS BLD VENIPUNCTURE: CPT | Performed by: FAMILY MEDICINE

## 2022-09-20 PROCEDURE — 3052F PR MOST RECENT HEMOGLOBIN A1C LEVEL 8.0 - < 9.0%: ICD-10-PCS | Mod: CPTII,S$GLB,, | Performed by: CLINICAL NEUROPSYCHOLOGIST

## 2022-09-20 PROCEDURE — 96139 PR PSYCH/NEUROPSYCH TEST ADMIN/SCORING, BY TECH, 2+ TESTS, EA ADDTL 30 MIN: ICD-10-PCS | Mod: S$GLB,,, | Performed by: CLINICAL NEUROPSYCHOLOGIST

## 2022-09-20 PROCEDURE — 4010F PR ACE/ARB THEARPY RXD/TAKEN: ICD-10-PCS | Mod: CPTII,S$GLB,, | Performed by: CLINICAL NEUROPSYCHOLOGIST

## 2022-09-20 PROCEDURE — 99499 NO LOS: ICD-10-PCS | Mod: S$GLB,,, | Performed by: CLINICAL NEUROPSYCHOLOGIST

## 2022-09-20 PROCEDURE — 96133 NRPSYC TST EVAL PHYS/QHP EA: CPT | Mod: S$GLB,,, | Performed by: CLINICAL NEUROPSYCHOLOGIST

## 2022-09-20 PROCEDURE — 99999 PR PBB SHADOW E&M-EST. PATIENT-LVL II: ICD-10-PCS | Mod: PBBFAC,,, | Performed by: CLINICAL NEUROPSYCHOLOGIST

## 2022-09-20 PROCEDURE — 96139 PSYCL/NRPSYC TST TECH EA: CPT | Mod: S$GLB,,, | Performed by: CLINICAL NEUROPSYCHOLOGIST

## 2022-09-20 PROCEDURE — 3052F HG A1C>EQUAL 8.0%<EQUAL 9.0%: CPT | Mod: CPTII,S$GLB,, | Performed by: CLINICAL NEUROPSYCHOLOGIST

## 2022-09-20 PROCEDURE — 99999 PR PBB SHADOW E&M-EST. PATIENT-LVL II: CPT | Mod: PBBFAC,,, | Performed by: CLINICAL NEUROPSYCHOLOGIST

## 2022-09-20 NOTE — LETTER
September 23, 2022          No Recipients             [facility]  [address]  [facility] [address]  Phone: [phone]  Fax: [phone]   Patient: Abel Hackett   MR Number: [id number]   YOB: 1970   Date of Visit: 9/20/2022       Dear Dr. Gomez Recipients:    Thank you for referring Abel Hackett to me for evaluation. Below are the relevant portions of my assessment and plan of care.            If you have questions, please do not hesitate to call me. I look forward to following Abel along with you.    Sincerely,      Nunu Steiner, PhD           CC    No Recipients

## 2022-09-20 NOTE — PROGRESS NOTES
NEUROPSYCHOLOGICAL EVALUATION - CONFIDENTIAL    Referring Provider: Ethan Cavanaugh MD   Medical Necessity: Evaluate cognitive and emotional functioning, participate in treatment planning/management, and provide supportive therapy in the setting of memory loss  Date Conducted: 9/13/2022 & 9/20/2022  Present At Visit: the patient  Referral Diagnoses: R41.3 (ICD-10-CM) - Memory loss  Consent: The patient expressed an understanding of the purpose of the evaluation and consented to all procedures. We discussed the limits of confidentiality and discussed an emergency plan.    ASSESSMENT & PLAN:   Mr. Abel Hackett is an 52 y.o., male with his THUY and pertinent medical history including hyperlipidemia, poorly controlled type 2 diabetes (improving with treatment with PCP), untreated moderate to severe obstructive sleep apnea (per sleep study records), and anxiety who was referred for a neuropsychological evaluation in the setting of memory loss for the past 6 months brought to his attention by his coworkers. MRI of the brain done with Dr. Vila at [facility] [place] in July 2022 was read as showing a small focus of encephalomalacia in the right inferior cerebellum without any atrophy mentioned, whereas a CT of the head from July 2022 was read as showing mild cortical atrophy without acute intracranial abnormality. MRA of the head and neck was read as normal. He has reportedly made errors in several IADLs (missing several medication doses each week, has left the stove on, missed one appointment, has gotten lost one time while driving, missed a few meetings at work). He reports increased stress and irritability as a result of his thinking difficulties.     As stated below, scores on stand-alone and embedded performance validity measures were variable, with several falling below cutoffs, suggesting reduced effort and/or engagement in the testing process. As such, the current results could underestimate the patient's current  functioning and results are therefore interpreted with caution. Any average range or higher scores will  be considered the minimum of the patient's capability. Any low average range or lower scores will not be interpreted, as they may not represent true cognitive impairment.     Compared to high average range premorbid estimates (based on both demographic information and a word reading test), results of the current evaluation reveal intact/at expectation visuospatial skills as well as some measures of language functioning (naming, verbal reasoning), and some measures of memory, simple attention, and executive functioning. Temporal orientation was largely intact (off by one day when asked the day of the month). He was a strong historian during the clinical interview.     Overall, while I cannot comment on the type or severity of cognitive impairments the patient may be experiencing with any real confidence, I am concerned that his vascular risk factors were/are impacting his cognition (namely, hyperlipidemia, poorly controlled type 2 diabetes, untreated moderate to severe obstructive sleep apnea), with neuroimaging revealing of a small vessel CVA, which his neurologist posits was likely due to poorly controlled DM2.     While psychological screening questionnaires were unrevealing of clinically significant symptoms of depression and anxiety, he reported during the clinical interview that he has been feeling an increase in both stress and irritability since these cognitive changes were brought to his attention. He also has a history of anxiety and was just recently started on Effexor, without any real benefit noticed as of yet.     Close management of vascular risk factors is strongly recommended. He is to be commended for the changes he has already made in terms of his exercise habits and compliance with medications.   He is encouraged to reach back out to sleep medicine, as records make it seem as if he should be  using a CPAP machine (whereas he stated that he was told he did not need this machine). Untreated sleep apnea can have both acute and chronic impact on cognition.   Encouraged to consider attending some talk therapy/counseling sessions and/or focus on stress management techniques.   Encouraged to participate in brain health behaviors. A list is included in the after visit summary.   A list of cognitive tips and strategies is also included included in the after visit summary.  We can monitor his cognition over time and repeat this evaluation down the road should he notice continued cognitive difficulty despite optimized management of other health factors. Caution should be applied if using this evaluation for comparison.     Problem List Items Addressed This Visit    None  Visit Diagnoses       Memory loss    -  Primary    Adjustment disorder, unspecified type            Thank you for allowing me to assist in Mr. Abel Hackett's care. If you have any questions, please contact me at [phone].      Nunu Steiner, PhD  Licensed Clinical Neuropsychologist  [facility]     CLINICAL INTERVIEW & RECORD REVIEW:     Cognitive Functioning   Cognitive screener: none  Previous evaluation(s): none  Onset & course of difficulty: Patient states he does not really notice any changes in his thinking but his coworkers brought to his attention maybe 6 months ago that he was forgetful. He discussed with his wife, who then shared some of the things she has noticed that he had forgotten. He denies noticing anything out of the ordinary 6 months ago, and states that as far as he was concerned, things were going fairly well. Hard to say if it's any worse than it was at onset.    First symptom/problem observed: memory  Fluctuations: none  Examples:   Attention/Working Memory/Executive Functioning: no problems identified  Processing Speed: kind of. Sometimes harder for him to understand  "directions or put directions together   Language: word-finding difficulty   Visuospatial: one time got lost while driving. Does have trouble parking the car now.  Learning & Memory: forgetting deadlines at work, missed a few meetings. Keeping constant lists. Wife has told him he forgets he said something more than once, he forgets to put things away, forget to brush his teeth. Was driving to his son-in-law's and passed up his street and then had to turn around. He was not confused. He has forgotten to go to meetings at works and has forgotten some task. Seems to happen almost daily  Exacerbating factors: none  Ameliorating factors: none  Medication for cognition: none     Daily Functioning   ADLs:    Bathing: Independent and without difficulty  Dressing: Independent and without difficulty  Grooming: Independent and without difficulty   Toileting: Independent and without difficulty  Transferring: Independent and without difficulty.  Eating: Independent and without difficulty.   IADLs:    Finances: Used to be just him but he missed a few payments. Now doing together with wife.   Medication Mgmt: missing a few doses each week   Driving: got lost one time.   Household Mgmt: Independent and without difficulty Cooking/Meal Preparation: cooks sometimes and has left the stove on  Shopping: needs to keep a list to keep from forgetting items  Appointment Mgmt: missed one eye doctor appointment recently  Employment: missed a few meetings at work and some deadlines.      Psychiatric/Neuropsychiatric Symptoms   Mood: "fine"  Depression: feels some aggravation because he doesn't know what's going on, but does not feel depressed about it  Caity/Hypomania: no  Anxiety: none reported today. Medical records reveal history of anxiety.   Stress: moderate - forgetfulness primarily  Social Withdrawal: no  Neurovegetative Sxs:  Appetite: normal  Sleep: some problems falling asleep but staying asleep once he does fall asleep. 8 to 9 hours " a night. Denied heavy snoring, gasping for air, sleep apnea-related issues. That said, he was diagnosed with moderate to severe sleep apnea in 2018 after completing a sleep study. He explains that the results showed that he did not need a CPAP and thus, he has never worn one. Records do indicate recommendation of a CPAP machine. Not moving around in his sleep or acting out his dreams.   Energy: feels rested in the am. Normal energy throughout the day.    Hallucinations: no  Delusional/Paranoid Thinking: no  Impulsivity: no  Obsessive/Compulsive Behaviors: no  Disinhibition: no  Irritability/Agitation: yes - some secondary to his forgetfulness  Aggression: no  Apathy/Indifference: no  Other changes in personality: no     Physical Functioning   Tremor: noticing some tremor in his hands for about a year. Not worsening. More in his left hand. Comes on when trying to do something, not at rest.   Difficulty walking: no  Imbalance: no  Falls: no  Weakness: no  Trouble with fine motor movements: no Lightheadedness: no  Urinary Urgency: no  Sensory Sxs: no  Pain: no  Physical Exercise Routine: he is exercising now. Has a treadmill that he walks on everyday     RELEVANT HISTORY  This patient has a past medical history of Depression, Diabetes mellitus, and Diabetes mellitus, type 2.    Past Surgical History:   Procedure Laterality Date    COLONOSCOPY N/A 2022    Procedure: COLONOSCOPY;  Surgeon: Keira Wheat MD;  Location: [facility];  Service: Endoscopy;  Laterality: N/A;    REFRACTIVE SURGERY  2016    VASECTOMY       Neurological History    Headaches/Migraines: no  TBI: no  Seizures: no  Stroke: none reported. See imaging results below, however.   Tumor: no Previous Episodes of Delirium: no  Movement Disorder: no  CNS Infection: no  Other: no       Neurodiagnostics   Imagin2022 CT head: Mild cortical atrophy without acute intracranial abnormality.     2022 MRI brain: Small focus of encephalomalacia right  inferior cerebellum.   MRA head and neck normal     7/2022 EEG: normal     7/2022 Holter monitor (done by PCP for palpitations complaint): Normal     Labs: 2022 RPR, TSH, B12, CBC normal. CMP: elevated glucose consistent with his known poorly controlled DM2    Pertinent Lab Work     Lab Results   Component Value Date    [id number] 708 07/07/2022     Lab Results   Component Value Date    RPR Non-reactive 07/07/2022     No results found for: FOLATE  Lab Results   Component Value Date    TSH 1.249 07/07/2022     Lab Results   Component Value Date    HGBA1C 8.5 (H) 09/20/2022     No results found for: HIV1X2, [id number]    Medications     Current Outpatient Medications   Medication Instructions    aspirin (ECOTRIN) 81 mg, Oral, Daily    atorvastatin (LIPITOR) 40 mg, Oral, Daily    diltiaZEM (CARDIZEM CD) 120 mg, Oral, Daily    empagliflozin-metformin (SYNJARDY XR) 25-1,000 mg TBph 1 tablet, Oral, Daily    metoprolol succinate (TOPROL-XL) 25 mg, Oral, 2 times daily    nitroGLYCERIN (NITROSTAT) 0.4 MG SL tablet PLEASE SEE ATTACHED FOR DETAILED DIRECTIONS    OZEMPIC 1 mg, Subcutaneous, Every 7 days    pantoprazole (PROTONIX) 40 mg, Oral, Daily    sildenafiL (VIAGRA) 100 mg, Oral, As needed (PRN)    valsartan (DIOVAN) 80 mg, Oral, Daily    venlafaxine (EFFEXOR-XR) 37.5 mg, Oral, Daily     Psychiatric History   Prior Diagnoses: none  History of Trauma: no  History of Abuse: no  History of Suicide Attempts: no  Current Ideation, Intention, or Plan: no  Homicidal Ideation: no   Medication(s): Effexor - not really noticing any changes since taking it it  Hospitalization(s): no  Psychotherapy/Counseling: no  Other: no         Substance Use History     Social History     Tobacco Use    Smoking status: Never    Smokeless tobacco: Never   Substance and Sexual Activity    Alcohol use: Not Currently     Comment: occassional    Drug use: No    Sexual activity: Yes     Partners: Female     History of abuse/overuse: no    Family  "Neurological & Psychiatric History       Family History   Problem Relation Age of Onset    Heart disease Mother     Hypertension Mother     Diabetes Mother     Heart attack Mother     Heart disease Father     Hypertension Father     Diabetes Father     Heart attack Father     Heart attack Maternal Uncle     Heart attack Maternal Grandfather      Neurologic: Father had AD (72 y/o when passed away) and mother had vascular dementia  Psychiatric: Negative for heritable risk factors.    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 Likes his job.  Family Status:  for 23 years. 1 child, 21 y/o  Support System: good - wife, extended family, friends, daughter  Hobbies/Activities: fishing, travel  Current Living Situation: lives at home with his wife and daughter       Legal History   Current: none    OBJECTIVE:     MENTAL STATUS AND OBSERVATIONS:   Appearance: Casually dressed and adequate grooming/hygiene.   Alertness: Attentive and alert.   Orientation:   With the exception of being off by 1 when stating the day of the month ("14th" when it was the 13th), he was O x 4 during the clinical interview. He was off by 1 when stating the day of the month during testing ("" when it was the ), but was otherwise O x 4.     Gait:  Independent    Psychomotor:  Unremarkable   Handedness:  Right   Vision & Hearing:  Wore glasses on the day of testing. Hearing was adequate for session   Speech/language: Normal in rate, rhythm, and tone. Volume was quiet on day of testing. No " "significant word finding difficulty observed. Comprehension was normal during the clinical interview though he required repetition and clarification of complex task instructions to ensure his comprehension during testing.    Mood/Affect:  The patients stated mood was "fine." Affect was congruent with stated mood during the interview. His affect was flat on the day of testing.    Interpersonal Behavior:  Rapport was quickly and easily established    Suicidality/Homicidality: Denied   Hallucinations/Delusions:  None evidenced or endorsed   Thought Content: Logical   Though Processes: Goal-directed   Insight & Judgment:  Appropriate   Participation in Interview:  Full      PROCEDURES/TESTS ADMINISTERED: In addition to performing a review of pertinent medical records, reviewing limits to confidentiality, conducting a clinical interview, and explaining procedures, the following measures were administered: MSVT; Test of Premorbid Functioning (TOPF); Wechsler Adult Intelligence Scale, Fourth Edition (WAIS-IV) [Digit Span, Arithmetic, Symbol Search, Similarities, and Matrix Reasoning subtests]; Symbol Digit Modalities Test (SDMT); Wechsler Memory Scale, Fourth Edition (WMS-IV) [Logical Memory subtest]; [place] Verbal Learning Test-Second Edition (CVLT-2; Standard Form); Brief Visuospatial Memory Test-Revised (BVMT-R, form 1); Neuropsychological Assessment Battery (NAB) [Naming subtest, form 1]; Verbal fluency tests (FAS & animal naming; Adelina et al., 2004 norms); Greg Complex Figure Test (RCFT) [copy only]; Clock Drawing Test (Schretlen et al. 2006 norms);Eure Making Test, parts A and B (Adelina et al., 2004 norms); [place] Card Sorting Test (WCST-128); Grooved Pegboard Test (GPT, Adelina et al., 2004 norms); Lucero Depression Inventory-Second Edition (BDI-2); and Generalized Anxiety Disorder - 7 Item Scale (CLAUDIA-7). Manual norms were used unless otherwise indicated.      TEST TAKING BEHAVIOR AND VALIDITY: Rosalba Hackett " "remained quiet throughout the session. He bounced his knees and tapped his hands on his knees at times during testing.  He often took deep breaths. He repeated his responses on speeded verbal fluency measures. On working memory task, he reverted back to the previous rule set. He required redirection from the examiner twice on this task. He appeared to briefly zone out and lost attention one time. When prompted by the examiner he appeared startled and exclaimed "oh!"  He confused symbols that were similar on a speeded digit symbol matching task. He skipped a response and lost his place one time. Overall, he worked at a slow pace.        Scores on stand-alone and embedded performance validity measures were variable, with several falling below cutoffs, suggesting reduced effort and/or engagement in the testing process. As such, the current results could underestimates the patient's current functioning and results are therefore interpreted with caution. Any average range or higher scores will  be considered the minimum of the patient's capability. Any low average range or lower scores will not be interpreted, as they may not represent true cognitive impairment.     TEST RESULTS    Raw Score Type of Standardized Score Standardized Score Percentile/CP   MSVT IR 90 - - -   MSVT DR 85 - - -   MSVT Cons 85 - - -   ACS LM II Rec 24 - - -   ACS RDS 6 - - -   CVLT-II FC 11 - - -   PREMORBID FUNCTIONING Raw Score Type of Standardized Score Standardized Score Percentile/CP   TOPF simple dem. eFSIQ -  87   TOPF pred. eFSIQ -  58   TOPF simple + pred. eFSIQ -  77   LANGUAGE FUNCTIONING Raw Score Type of Standardized Score Standardized Score Percentile/CP   WAIS-IV Similarities 28 ss 11 63   TOPF Word Reading 45  55   NAB Naming 31 Tscore 52 58   FAS 25 Tscore 29 2   Animal Naming 12 Tscore 26 1   VISUOSPATIAL FUNCTIONING Raw Score Type of Standardized Score Standardized Score Percentile/CP   WAIS-IV Matrix " Reasoning 19 ss 11 63   RCFT Copy 33 - - >16   RCFT Time to Copy 124 - - >16   BVMT-R Copy 11 - - -   LEARNING & MEMORY Raw Score Type of Standardized Score Standardized Score Percentile/CP   CVLT-II       Trials 1-5 (T-Score) 37 Tscore 43 25   List A Trial 1 6 zscore -0.5 30.854   List A Trial 5 9 zscore -1 16   List B 5 zscore -0.5 31   SDFR 5 zscore -1 16   SDCR 6 zscore -1.5 7   LDFR 5 zscore -1 16   LDCR 6 zscore -1.5 7   Semantic Clustering -0.1 zscore -0.5 31   Learning Gray 0.8 zscore -0.5 31   Repetitions 0 zscore -1 16   Intrusions 10 zscore 1 84   Recognition Hits 8 zscore -3.5 0   False Positives 6 zscore 1 84   Discriminability 0.9 zscore -2 2   WMS-IV       Auditory Immediate (additional score) - SS 83 13   Auditory Delayed (additional score) - SS 89 23   Auditory Memory - SS 85 16   WMS-IV Subtests       LM I 17 ss 6 9   LM II 16 ss 8 25   LM Recognition 24 - - 26-50   (CVLT-II Trials 1-5) 43  8 25   (CVLT-II Long Delay) -1  8 25   BVMT-R       IR 18 Tscore 38 12   DR 5 Tscore 29 2   Discrimination Index 3 - - 1-2   ATTENTION/WORKING MEMORY Raw Score Type of Standardized Score Standardized Score Percentile/CP   WAIS-IV WMI - SS 97 42   WAIS-IV Digit Span 25 ss 9 37         DS Forward 8 ss 7 16         DS Backward 8 ss 9 37         DS Sequence 9 ss 11 63         Longest Digit Forward 7 - - -         Longest Digit Backward 5 - - -         Longest Digit Sequence 6 - - -   WAIS-IV Arithmetic 14 ss 10 50   MENTAL PROCESSING SPEED Raw Score Type of Standardized Score Standardized Score Percentile/CP   SMDT Written 42 zscore -1.21 11   SMDT Oral 44 zscore -1.65 5   TMT A  39 Tscore 34 5   TMT A errors 0 - - -   EXECUTIVE FUNCTIONING Raw Score Type of Standardized Score Standardized Score Percentile/CP   TMT B 85 Tscore 39 14   TMT B errors 1 - - -   WCST-128       Total Correct 74 - - -   Total Errors 54 SS 73 4   Perseverative Resp. 27 SS 82 12   Perseverative Err. 25 SS 80 9   Nonperseverative Err. 29 SS  70 2   Concept. Level Response 58 SS 75 5   Categories Completed 3 - - 6-10   FMS 0 - - >16   Learning to Learn 1..36 - - >16   WAIS-IV Similarities 28 ss 11 63   WAIS-IV Matrix Reasoning 19 ss 11 63   FRONTOMOTOR  Raw Score Type of Standardized Score Standardized Score Percentile/CP   GPT DH (Right) 98 Tscore 29 2   GPD  Tscore 30 2   MOOD & PERSONALITY Raw Score Type of Standardized Score Standardized Score Percentile/CP   BDI-2 6 - - -   CLAUDIA-7 3 - - -   ss = scaled score (mean = 10, SD = 3); SS = standard score (mean = 100, SD = 15); Tscore mean = 50, SD = 10; zscore (mean = 0.00, SD = 1)    It is important to note that scores/percentiles should only be interpreted by a neuropsychologist. It is common for healthy individuals to have 1-3 isolated low/unusual scores that are not indicative of any significant cognitive dysfunction.       BILLING  Code Description Minutes Units   35485 Psychiatric Interview 0    03405 Nubhvl xm phys/qhp 1st hr 0    13374 Nubhvl xm phy/qhp ea addl hr 0    31824 Psycl tst eval phys/qhp 1st 0    65039 Psycl tst eval phys/qhp ea 0    67221 Nrpsyc tst eval phys/qhp 1st 60 1   42105 Nrpsyc tst eval phys/qhp ea 97 2     Referral review/test selection 25      Tech consult/test review/modifications 10      Patient limitation management 0      Patient behavior management 0      Patient symptom monitoring 0      Record Review/Integration/Report Generation 87      Face-to-Face interpretive Feedback 35    22346 Psycl/nrpsyc tst phy/qhp 1st 0    14035 Psycl/nrpsyc tst phy/qhp ea 0    18513 Psycl/nrpsyc tech 1st 30 1   19149 Psycl/nrpsyc tst tech ea 188         6

## 2022-09-22 ENCOUNTER — TELEPHONE (OUTPATIENT)
Dept: FAMILY MEDICINE | Facility: CLINIC | Age: 52
End: 2022-09-22
Payer: COMMERCIAL

## 2022-09-22 NOTE — PATIENT INSTRUCTIONS
MANAGING VASCULAR RISK FACTORS  A personal history of disorders that affect the cardiovascular system (e.g., hypertension, high cholesterol, diabetes, heart disease) can have a negative impact on brain functioning especially over many years. Therefore, it is very important for this patient to maintain good control over his/her risk factors. The following is recommended:  Take all medications as prescribed and follow-up with recommendations above.  Get regular physical exercise to the extent that it is possible. Family may need to structure this into their loved one's day or week and develop a transportation plan.  Eat a well-balanced diet and following the MIND diet (see handout) has been shown to be most brain protective.   Check your blood pressure, cholesterol levels, blood sugar, and others as appropriate.    PRACTICE GOOD COGNITIVE HYGIENE  Engage in regular exercise, which increases alertness and arousal and can improve attention and focus.  Consider lower impact exercises, such as yoga or light walking. Try to exercise for at least 150 minutes per week  Get a good night's sleep, as this can enhance alertness and cognition.  Eat healthy foods and balanced meals. It is notable that research indicates certain nutrients may aid in brain function, such as B vitamins (especially B6, B12, and folic acid), antioxidants (such as vitamins C and E, and beta carotene), and Omega-3 fatty acids. Here are some common tips for diet (Adopted from Penelope et al, [place], 2018):  Eat primarily plant-based foods, such as fruits and vegetables, whole grains, legumes   (beans) and nuts.  Limit refined carbohydrates (white pasta, bread, rice).  Replace butter with healthy fats such as olive oil.  Use herbs and spices instead of salt to flavor foods.  Limit red meat and processed meats to no more than a few times a month.  Avoid sugary sodas, bakery goods, and sweets.  Eat fish and poultry at least twice a week.  Keep your brain  active. Find activities to stay mentally active, such as reading, games (cards, checkers), puzzles (crosswords, Sudoku, jig saw), crafts (models, woodworking), gardening, or participating in activities in the community.  Stay socially engaged. Continue staying active with your family and friends.    RESOURCE  Consider purchasing the book, High-Octane Brain: 5 Science-Based Steps to Sharpen Your Memory and Reduce Your Risk of Alzheimer's by Dr. Mari Rodriges.    COGNITIVE TIPS AND STRATEGIES  The following tips and strategies are provided to help assist in daily activities:      Attention: Remember that inattention and lack of focus are major culprits to forgetting information so be sure and practice paying attention for adequate learning of information. If you rely on passive attention to remembering something (e.g., yeah, uh-huh approach), you'll find you cannot recall it later. I recommend the following to improve attention, which may aid in later recall:  1. Reduce distractions in the area as much as possible  2. Look at the person as they are speaking to you.   3. Paraphrase as they are speaking  4. Write down important pieces of information   5. Ask them to repeat if you zone out.  6. Have them simplify and reduce information that you need to attend to during conversation.  7. Have visual cues to remind you if you need to do something later.     Processing Speed:  1. Using multiple modalities (e.g., listening, writing notes, asking questions, recording) to learn new information is likely to allow additional time for processing, thus improving memory for the material.   2. Allowing sufficient time to complete tasks will reduce frustration and help to ensure completion.     Executive Functionin. Don't attempt to multi-task.  Separate tasks so that each can be completed one at a time  2. Consider using a calendar/day planner, as that may be effective to help you plan and stay on track.  Color-coding  specific tasks by importance may add additional benefit to your planner  3. Break down large projects into smaller tasks and write down the steps to completing the task.  Taking notes while reading can help with recall.     Storing Information: Use the below strategies to help you further enhance how information is stored  1. Rehearse - Immediately after seeing/hearing something, try to recall it.  Wait a few minutes, then check again.  Gradually lengthen the intervals between rehearsals.  2. Repetition of learned material is critical to ensure storage of information to be learned. Self-test at home to ensure learning.  3. Write down important information to improve your attention and focus and to have something to look back on when you need to recall it.  4. Make sure the person doesn't rattle off, but presents in a clear, logical, and unhurried manner.      Recalling Information:  1. Jog your memory - Lose something?  Think back to when you last had it.  What did you do next?  And after that?  Mentally walk yourself through each activity that followed.  Prodding your memory this way may enable you to recall the location of the missing item.  2. Use a cue - Symbolic reminders (the proverbial string around the finger) are helpful.  So too are memos, timers, calendar notes, etc.--keep them in visible, appropriate place  3. Get organized - Have fixed locations for all important papers, key phone numbers, medications, keys, wallet, glasses, tools, etc.  4. Develop routines - Routines can anchor memories so they do not drift away.       Word Finding:   Not being able to find a word when you need it is a common and very annoying problem. It is not strictly a memory issue, but more a filing and retrieval issue. You know the word or name you want, but it cannot be found in your brain file. It is like having all the files in your file cabinet emptied on the floor. Every piece of information is there but finding it can be  a challenge.   One strategy that may help is working with a speech pathologist/therapist to learn techniques to decrease word finding problems. Some of those strategies/techniques include the following:  Use circumlocutions. Describe the object you're trying to name instead of naming it.  Recite you're A, B, Cs. Go through the alphabet to see if a letter triggers finding the word.  Picture it. Try to visualize the spelling or writing of the word.  Relax. The harder you try to force yourself to come up with the word, the more frustrated you get and the worse you function.   Write it down. In situations where using correct words is critical, such as communicating on the radio while flying, write down the key words so that they are in front of you if needed.  Use word association. For words or names you continually misfile and can't find, try to come up with a word association, something that reminds you of the word or name.  Write a script. Try scripting something important that you want to say. Either write it out or practice it beforehand, so that you get it right.  Play word games. Doing crossword puzzles and playing word finding games may help your brain become more efficient at filing and retrieving words.     BRAIN TRAINING APPS  · BrainHQ (https://www.brain[employer].DNsolution/) - BrainHQ has more than two dozen brain-training exercises organized into six categories: Attention, Brain Speed, Memory, People Skills, Intelligence, and Navigation. It allows you to fit brain exercises into your busy life, and access brain training on most internet-connected devices. Plus, each exercise continuously adapts to your unique performance. So you train at the right level for you.     · Mind Games (https://www.mindgames.com/) - Mind Games is a great collection of games based in part on principles derived from cognitive tasks to help you practice different mental skills. This includes a handful of free games. Additionally, there are a  number of trial games included that can be played 3 times. All games include your score history and a graph of your progress. Using some principles of standardized testing, your scores are also converted to a standard scale so that you can see where you need work and excel. The training center does the work for you by picking the perfect mix of exercises to keep you engaged.     · Elevate (https://Redgage.com/) - Elevate was selected by [employer] as Fernando of the Year! Elevate is a brain training program designed to improve focus, speaking abilities, processing speed, memory, math skills, and more. Each person is provided with a personalized training program that adjusts over time to maximize results. Theoretically, the more you train with Elevate, the more you'll improve critical cognitive skills that are proven to boost productivity, earning power, and self-confidence. Users who train at least 3 times per week have reported dramatic gains and increased confidence. In-fernando purchases.     · Peak (https://www.[employer].SiConnect/) - Reach Peak performance with over 40 unique games, each one developed by neuroscientists and game experts to challenge your cognitive skills and push you further. Use , the  for your brain, to find the right workout for you at the right time. Choose from 's best recommendations to push your skills to the max. Or take contextual workouts like Coffee Break if you're short on time.  will help you track your progress using in-depth insights and keep you going when you need it most. Play for free or upgrade to Pro and get the best brain training experience available. In-fernando purchases.     OTHER SUGGESTIONS  Games and Apps like Sudoku; Crossword Puzzles; Word Find; Memory Games; Logic Games; Solitaire; and Hidden Object Mysteries. Find some you like and play them. It will exercise many of the skills necessary to improve function.    STRESS REDUCTION & RELAXATION TECHNIQUES    TO  GET STARTED  1. Find a quiet, relaxing place, where you will be alone for 10-20 minutes to do these exercises. The techniques work best if there are no distractions.  2. Practice once or twice a day.  3. Stick with the technique that works best for you. Not every technique will work for every person.  4. Keep trying. Don't worry if you don't notice a major change immediately. You may need to practice for a few weeks before you begin to feel the benefits.  5. Try one or more of the techniques described below.     PROGRESSIVE MUSCLE RELAXATION  This technique can help you relax the major muscle groups in your body. And, it's easy to do.     1. Wear loose, comfortable clothing. Sit in a favorite chair or lie down.   2. Begin with your facial muscles. Frown hard for 5-10 seconds and then relax all your muscles.   3. Work other facial muscles by scrunching your face up or knitting your eyebrows for 5-10 seconds. Release. You should feel a noticeable difference between the tense and relaxed muscles.   4. Move on to your jaw. Then, move on to other muscle groups - shoulders, arms, chest, legs, etc. - until you've tensed and relaxed individual muscle groups throughout your whole body.     MEDITATION  This is the process of focusing on a single word or object to clear your mind. As a result, you feel calm and refreshed.     1. Wear loose, comfortable clothing. Sit or lie in a relaxing position.  2. Close your eyes and concentrate on a calming thought, word or object.  3. You may find that other thoughts pop into your mind. Don't worry, this is normal. Try not to dwell on them. Just keep focusing on your image or sound.  5. If you're having trouble, try repeating a word or sound over and over. (Some people find it helpful to play soothing music while meditating.)  6. Gradually, you'll begin to feel more and more relaxed.    VISUALIZATION  This technique uses your imagination, a great resource when it comes to reducing stress.      1. Sit or lie down in a comfortable position.   2. Imagine a pleasant, peaceful scene, such as a lush forest or a [place] beach. Picture yourself in this setting.   3. Focus on the scene for a set amount of time (any amount of time you are comfortable with), then gradually return to the present.     DEEP BREATHING  One of the easiest ways to relieve tension is deep breathing.     1. Lie on your back with a pillow under your head. Bend your knees (or put a pillow under them) to relax your stomach.  2. Put one hand on your stomach, just below your rib cage.  3. Slowly breathe in through your nose. Your stomach should feel like it's rising.  4. Exhale slowly through your mouth, emptying your lungs completely and letting your stomach fall.  5. Repeat several times until you feel calm and relaxed. Practice daily.     Once you are able to do this easily, you can practice this technique almost anywhere, at any time.

## 2022-09-22 NOTE — TELEPHONE ENCOUNTER
----- Message from Keira Wheat MD sent at 9/21/2022  8:15 AM CDT -----  Moving in the right direction.  Please find out exactly how he is taking his meds and we can make some adjustments.  mh

## 2022-09-23 ENCOUNTER — PATIENT OUTREACH (OUTPATIENT)
Dept: ADMINISTRATIVE | Facility: HOSPITAL | Age: 52
End: 2022-09-23
Payer: COMMERCIAL

## 2022-09-23 ENCOUNTER — PATIENT MESSAGE (OUTPATIENT)
Dept: NEUROLOGY | Facility: CLINIC | Age: 52
End: 2022-09-23
Payer: COMMERCIAL

## 2022-10-04 ENCOUNTER — PATIENT MESSAGE (OUTPATIENT)
Dept: NEUROLOGY | Facility: CLINIC | Age: 52
End: 2022-10-04
Payer: COMMERCIAL

## 2022-10-04 ENCOUNTER — OFFICE VISIT (OUTPATIENT)
Dept: NEUROLOGY | Facility: CLINIC | Age: 52
End: 2022-10-04
Payer: COMMERCIAL

## 2022-10-04 DIAGNOSIS — F41.9 ANXIETY: ICD-10-CM

## 2022-10-04 DIAGNOSIS — R41.89 COGNITIVE CHANGES: Primary | ICD-10-CM

## 2022-10-04 DIAGNOSIS — F32.A DEPRESSION, UNSPECIFIED DEPRESSION TYPE: ICD-10-CM

## 2022-10-04 PROCEDURE — 3052F PR MOST RECENT HEMOGLOBIN A1C LEVEL 8.0 - < 9.0%: ICD-10-PCS | Mod: CPTII,95,, | Performed by: CLINICAL NEUROPSYCHOLOGIST

## 2022-10-04 PROCEDURE — 99499 NO LOS: ICD-10-PCS | Mod: 95,,, | Performed by: CLINICAL NEUROPSYCHOLOGIST

## 2022-10-04 PROCEDURE — 3052F HG A1C>EQUAL 8.0%<EQUAL 9.0%: CPT | Mod: CPTII,95,, | Performed by: CLINICAL NEUROPSYCHOLOGIST

## 2022-10-04 PROCEDURE — 4010F ACE/ARB THERAPY RXD/TAKEN: CPT | Mod: CPTII,95,, | Performed by: CLINICAL NEUROPSYCHOLOGIST

## 2022-10-04 PROCEDURE — 4010F PR ACE/ARB THEARPY RXD/TAKEN: ICD-10-PCS | Mod: CPTII,95,, | Performed by: CLINICAL NEUROPSYCHOLOGIST

## 2022-10-04 PROCEDURE — 99499 UNLISTED E&M SERVICE: CPT | Mod: 95,,, | Performed by: CLINICAL NEUROPSYCHOLOGIST

## 2022-10-04 NOTE — PROGRESS NOTES
NEUROPSYCHOLOGICAL EVALUATION FEEDBACK    TELEMEDICINE DETAILS:   The patient location is: home  The chief complaint leading to consultation is: feedback regarding neuropsychological test results  Visit type: Virtual visit with synchronous audio and video  Total time spent with patient: 31 minutes  Each patient to whom he or she provides medical services by telemedicine is: (1) informed of the relationship between the physician and patient and the respective role of any other health care provider with respect to management of the patient; and (2) notified that he or she may decline to receive medical services by telemedicine and may withdraw from such care at any time.  Notes: See below.    Abel Hackett attended a feedback session today. We discussed the results of the neuropsychological evaluation and I gave time to discuss questions and concerns. For full evaluation details, please see the note from this provider dated 9/20/2022. A copy of the report was provided via [employer].      Problem List Items Addressed This Visit          Psychiatric    Anxiety    Depression     Other Visit Diagnoses       Cognitive changes    -  Primary            Nunu Steiner, PhD  Licensed Clinical Neuropsychologist  [facility] - Department of Neurology

## 2022-10-06 ENCOUNTER — PATIENT MESSAGE (OUTPATIENT)
Dept: NEUROLOGY | Facility: CLINIC | Age: 52
End: 2022-10-06
Payer: COMMERCIAL

## 2022-10-06 DIAGNOSIS — R41.3 MEMORY LOSS DUE TO MEDICAL CONDITION: Primary | ICD-10-CM

## 2022-10-07 NOTE — TELEPHONE ENCOUNTER
Sent referral message via In basket to [facility] Stroke pool to schedule pt for consult.

## 2022-10-18 ENCOUNTER — PATIENT MESSAGE (OUTPATIENT)
Dept: OTHER | Facility: OTHER | Age: 52
End: 2022-10-18
Payer: COMMERCIAL

## 2022-10-31 ENCOUNTER — OFFICE VISIT (OUTPATIENT)
Dept: PSYCHIATRY | Facility: CLINIC | Age: 52
End: 2022-10-31
Payer: COMMERCIAL

## 2022-10-31 VITALS
RESPIRATION RATE: 20 BRPM | BODY MASS INDEX: 29.06 KG/M2 | OXYGEN SATURATION: 100 % | HEIGHT: 69 IN | DIASTOLIC BLOOD PRESSURE: 60 MMHG | HEART RATE: 86 BPM | SYSTOLIC BLOOD PRESSURE: 140 MMHG | WEIGHT: 196.19 LBS

## 2022-10-31 DIAGNOSIS — G47.00 INSOMNIA, UNSPECIFIED TYPE: ICD-10-CM

## 2022-10-31 DIAGNOSIS — F41.1 GENERALIZED ANXIETY DISORDER: Primary | ICD-10-CM

## 2022-10-31 DIAGNOSIS — F41.1 GAD (GENERALIZED ANXIETY DISORDER): ICD-10-CM

## 2022-10-31 PROCEDURE — 99999 PR PBB SHADOW E&M-EST. PATIENT-LVL III: CPT | Mod: PBBFAC,,, | Performed by: STUDENT IN AN ORGANIZED HEALTH CARE EDUCATION/TRAINING PROGRAM

## 2022-10-31 PROCEDURE — 3078F DIAST BP <80 MM HG: CPT | Mod: CPTII,S$GLB,, | Performed by: STUDENT IN AN ORGANIZED HEALTH CARE EDUCATION/TRAINING PROGRAM

## 2022-10-31 PROCEDURE — 3052F HG A1C>EQUAL 8.0%<EQUAL 9.0%: CPT | Mod: CPTII,S$GLB,, | Performed by: STUDENT IN AN ORGANIZED HEALTH CARE EDUCATION/TRAINING PROGRAM

## 2022-10-31 PROCEDURE — 3052F PR MOST RECENT HEMOGLOBIN A1C LEVEL 8.0 - < 9.0%: ICD-10-PCS | Mod: CPTII,S$GLB,, | Performed by: STUDENT IN AN ORGANIZED HEALTH CARE EDUCATION/TRAINING PROGRAM

## 2022-10-31 PROCEDURE — 4010F ACE/ARB THERAPY RXD/TAKEN: CPT | Mod: CPTII,S$GLB,, | Performed by: STUDENT IN AN ORGANIZED HEALTH CARE EDUCATION/TRAINING PROGRAM

## 2022-10-31 PROCEDURE — 3078F PR MOST RECENT DIASTOLIC BLOOD PRESSURE < 80 MM HG: ICD-10-PCS | Mod: CPTII,S$GLB,, | Performed by: STUDENT IN AN ORGANIZED HEALTH CARE EDUCATION/TRAINING PROGRAM

## 2022-10-31 PROCEDURE — 1159F PR MEDICATION LIST DOCUMENTED IN MEDICAL RECORD: ICD-10-PCS | Mod: CPTII,S$GLB,, | Performed by: STUDENT IN AN ORGANIZED HEALTH CARE EDUCATION/TRAINING PROGRAM

## 2022-10-31 PROCEDURE — 1160F PR REVIEW ALL MEDS BY PRESCRIBER/CLIN PHARMACIST DOCUMENTED: ICD-10-PCS | Mod: CPTII,S$GLB,, | Performed by: STUDENT IN AN ORGANIZED HEALTH CARE EDUCATION/TRAINING PROGRAM

## 2022-10-31 PROCEDURE — 3077F SYST BP >= 140 MM HG: CPT | Mod: CPTII,S$GLB,, | Performed by: STUDENT IN AN ORGANIZED HEALTH CARE EDUCATION/TRAINING PROGRAM

## 2022-10-31 PROCEDURE — 1160F RVW MEDS BY RX/DR IN RCRD: CPT | Mod: CPTII,S$GLB,, | Performed by: STUDENT IN AN ORGANIZED HEALTH CARE EDUCATION/TRAINING PROGRAM

## 2022-10-31 PROCEDURE — 1159F MED LIST DOCD IN RCRD: CPT | Mod: CPTII,S$GLB,, | Performed by: STUDENT IN AN ORGANIZED HEALTH CARE EDUCATION/TRAINING PROGRAM

## 2022-10-31 PROCEDURE — 90792 PSYCH DIAG EVAL W/MED SRVCS: CPT | Mod: S$GLB,,, | Performed by: STUDENT IN AN ORGANIZED HEALTH CARE EDUCATION/TRAINING PROGRAM

## 2022-10-31 PROCEDURE — 90792 PR PSYCHIATRIC DIAGNOSTIC EVALUATION W/MEDICAL SERVICES: ICD-10-PCS | Mod: S$GLB,,, | Performed by: STUDENT IN AN ORGANIZED HEALTH CARE EDUCATION/TRAINING PROGRAM

## 2022-10-31 PROCEDURE — 3077F PR MOST RECENT SYSTOLIC BLOOD PRESSURE >= 140 MM HG: ICD-10-PCS | Mod: CPTII,S$GLB,, | Performed by: STUDENT IN AN ORGANIZED HEALTH CARE EDUCATION/TRAINING PROGRAM

## 2022-10-31 PROCEDURE — 99999 PR PBB SHADOW E&M-EST. PATIENT-LVL III: ICD-10-PCS | Mod: PBBFAC,,, | Performed by: STUDENT IN AN ORGANIZED HEALTH CARE EDUCATION/TRAINING PROGRAM

## 2022-10-31 PROCEDURE — 4010F PR ACE/ARB THEARPY RXD/TAKEN: ICD-10-PCS | Mod: CPTII,S$GLB,, | Performed by: STUDENT IN AN ORGANIZED HEALTH CARE EDUCATION/TRAINING PROGRAM

## 2022-10-31 RX ORDER — ESZOPICLONE 1 MG/1
1 TABLET, FILM COATED ORAL NIGHTLY
Qty: 30 TABLET | Refills: 2 | Status: SHIPPED | OUTPATIENT
Start: 2022-10-31 | End: 2022-11-30

## 2022-10-31 RX ORDER — SERTRALINE HYDROCHLORIDE 50 MG/1
TABLET, FILM COATED ORAL
Qty: 30 TABLET | Refills: 3 | Status: SHIPPED | OUTPATIENT
Start: 2022-10-31 | End: 2022-12-07

## 2022-10-31 RX ORDER — RANOLAZINE 500 MG/1
500 TABLET, EXTENDED RELEASE ORAL 2 TIMES DAILY
COMMUNITY
End: 2023-01-12

## 2022-10-31 NOTE — PROGRESS NOTES
"10/31/2022  1:21 PM  Abel Hackett  [id number]        Psychiatric Initial Clinic Evaluation      Chief complaint/reason for seeking care: anxiety and memory    HPI:    Reports "I've been having memory issues.... recent things, not long term things... I had a stroke at some point, not sure when."     He states he is "anxious all the time, things running through my mind," reports has been worsening for the last 3-4 months, reports now occurring daily, worries about FEMLA, his health, recent cardiac issues, testing, procedures, "its scary," worried he could lose his job. He states in the past he was treated with lexapro for panic attacks which started 22 years ago when his daughter was born, "I would get shortness of breath, chest pain," he states after starting lexapro he has had not had another panic attack. He states worry started in childhood, "I would get nervous around people."  Recently switched to effexor by PCP as he felt lexapro was no longer working, however, does not notice any benefit of effexor and feels the dose may be too low.               Stressors:     Psychiatric ROS:    Symptoms of Depression: diminished mood - No, loss of interest/anhedonia - No;     Changes in Sleep: trouble with initiation- Yes, maintenance, - Yes early morning awakening with inability to return to sleep - No, hypersomnolence - No    Suicidal- active/passive ideations - No, organized plans, future intentions - No    Homicidal ideations: active/passive ideations - No, organized plans, future intentions - No    Symptoms of psychosis: hallucinations - No, delusions - No, disorganized speech - No, disorganized behavior or abnormal motor behavior - No, or negative symptoms (diminshed emotional expression, avolition, anhedonia, alogia, asociality) - No, active phase symptoms >1 month - No, continuous signs of illness > 6 months - No, since onset of illness decreased level of functioning present - No    Symptoms of jv or hypomania: " "elevated, expansive, or irritable mood with increased energy or activity - No; > 4 days - No,  >7 days - No; with inflated self-esteem or grandiosity - No, decreased need for sleep - No, increased rate of speech - No, FOI or racing thoughts - No, distractibility - No, increased goal directed activity or PMA - No, risky/disinhibited behavior - No    Symptoms of CLAUDIA: excessive anxiety/worry/fear, more days than not, about numerous issues - Yes, ongoing for >6 months - Yes, difficult to control - Yes, with restlessness - Yes, fatigue - Yes, poor concentration - No, irritability - Yes, muscle tension - No, sleep disturbance - Yes; causes functionally impairing distress - Yes    Symptoms of Panic Disorder: recurrent panic attacks (palpitations/heart racing, sweating, shakiness, dyspnea, choking, chest pain/discomfort, Gi symptoms, dizzy/lightheadedness, hot/col flashes, paresthesias, derealization, fear of losing control or fear of dying or fear of "going crazy") - No, precipitated - No, un-precipitated - No, source of worry and/or behavioral changes secondary for 1 month or longer- No, agoraphobia - No    +h/o panic attacks, none in over 20 years    Symptoms of PTSD: h/o trauma exposure - No; re-experiencing/intrusive symptoms - No, avoidant behavior - No, 2 or more negative alterations in cognition or mood - No, 2 or more hyperarousal symptoms - No; with dissociative symptoms - No, ongoing for 1 or more  months - No    Symptoms of OCD: obsessions (recurrent thoughts/urges/images; intrusive and/or unwanted; uses other thoughts/actions to suppress) - No; compulsions (repetitive behaviors used to lower distress/anxiety/obsessions) - No, time-consuming (over 1 hour per day) or cause significant distress/impairment - - No    Symptoms of Anorexia: restriction of caloric intake leading to significantly low body weight - No, intense fear of gaining weight or persistent behavior that interferes with weight gain even thought at " a significantly low weight - No, disturbance in the way in which one's body weight or shape is experienced, undue influence of body weight or shape on self evaluation, or persistent lack of recognition of the seriousness of the current low body weight - No    Symptoms of Bulimia: recurrent episodes of binge eating (definitely larger amount  than what others would eat and lack of a sense of control over eating during episode) - No, recurrent inappropriate compensatory behaviors in order to prevent weight gain (fasting, medications, exercise, vomiting) - No, binges and compensatory behaviors both occur on average at least once a week for 3 months - No, self evaluations is unduly influenced by body shape/weight- - No    Symptoms of Binge eating: recurrent episodes of binge eating (definitely larger amount than what others would eat and lack of a sense of control over eating during episode) - No, 3 or more of following (eating much more rapidly, eating until uncomfortably full, large amounts when not hungry, eating alone because of embarrassed by how much,  feeling disgusted with oneself, depressed or very guilty afterward) - No, distress regarding binges - No, binges occur on average at least once a week for 3 months - No          PAST MEDICAL & SURGICAL HISTORY   Active Ambulatory Problems     Diagnosis Date Noted    Type 2 diabetes mellitus with diabetic retinopathy 04/06/2016    Generalized anxiety disorder 04/06/2016    Anxiety 04/19/2016    Depression 12/04/2015    Diabetes mellitus 09/06/2012    Type 2 diabetes mellitus treated with insulin 04/19/2016    Hyperlipidemia 12/23/2014    Impotence of organic origin 12/23/2014    Insomnia 07/10/2013    Uncontrolled type 2 diabetes mellitus 07/10/2013    Ulnar nerve entrapment 09/06/2012    Atypical chest pain 04/25/2016    Obstructive sleep apnea (adult) (pediatric)     Positive colorectal cancer screening using Cologuard test 08/02/2022     Resolved Ambulatory  Problems     Diagnosis Date Noted    Acute sinusitis 12/04/2015    Routine general medical examination at a health care facility 07/10/2013    Encounter for general adult medical examination without abnormal findings 07/10/2013     Past Medical History:   Diagnosis Date    Diabetes mellitus, type 2        Current Outpatient Medications:     aspirin (ECOTRIN) 81 MG EC tablet, Take 1 tablet (81 mg total) by mouth once daily., Disp: 90 tablet, Rfl: 1    atorvastatin (LIPITOR) 40 MG tablet, Take 1 tablet (40 mg total) by mouth once daily., Disp: 90 tablet, Rfl: 3    diltiaZEM (CARDIZEM CD) 120 MG Cp24, Take 120 mg by mouth once daily., Disp: , Rfl:     empagliflozin-metformin (SYNJARDY XR) 25-1,000 mg TBph, Take 1 tablet by mouth once daily at 6am., Disp: 90 tablet, Rfl: 1    metoprolol succinate (TOPROL-XL) 25 MG 24 hr tablet, Take 25 mg by mouth 2 (two) times daily., Disp: , Rfl:     nitroGLYCERIN (NITROSTAT) 0.4 MG SL tablet, PLEASE SEE ATTACHED FOR DETAILED DIRECTIONS, Disp: , Rfl:     pantoprazole (PROTONIX) 40 MG tablet, Take 1 tablet (40 mg total) by mouth once daily., Disp: 30 tablet, Rfl: 0    ranolazine (RANEXA) 500 MG Tb12, Take 500 mg by mouth 2 (two) times daily., Disp: , Rfl:     semaglutide (OZEMPIC) 1 mg/dose (4 mg/3 mL), Inject 1 mg into the skin every 7 days., Disp: 3 pen, Rfl: 3    sildenafil (VIAGRA) 100 MG tablet, Take 1 tablet (100 mg total) by mouth as needed for Erectile Dysfunction., Disp: 10 tablet, Rfl: 4    ticagrelor (BRILINTA) 90 mg tablet, Take 90 mg by mouth 2 (two) times daily., Disp: , Rfl:     valsartan (DIOVAN) 80 MG tablet, Take 80 mg by mouth once daily., Disp: , Rfl:     venlafaxine (EFFEXOR-XR) 37.5 MG 24 hr capsule, Take 1 capsule (37.5 mg total) by mouth once daily., Disp: 30 capsule, Rfl: 11   Past Surgical History:   Procedure Laterality Date    COLONOSCOPY N/A 8/2/2022    Procedure: COLONOSCOPY;  Surgeon: Keira Wheat MD;  Location: [facility];  Service: Endoscopy;   "Laterality: N/A;    REFRACTIVE SURGERY  4/13/2016    VASECTOMY        Review of patient's allergies indicates:  No Known Allergies          PAST PSYCHIATRIC HISTORY  Previous Psychiatric Hospitalizations: No  Previous SI/HI: No  Previous Suicide Attempts: No  Previous Medication Trials: Yes, lexapro, trazodone- "restless legs," elavil - "restless legs"  Psychiatric Care (current & past): No  History of Psychotherapy: No  History of Violence: No  History of physical/sexual abuse: No      Home Meds:   Prior to Admission medications    Medication Sig Start Date End Date Taking? Authorizing Provider   aspirin (ECOTRIN) 81 MG EC tablet Take 1 tablet (81 mg total) by mouth once daily. 11/23/21  Yes Keira Wheat MD   atorvastatin (LIPITOR) 40 MG tablet Take 1 tablet (40 mg total) by mouth once daily. 2/23/22  Yes Keira Wheat MD   diltiaZEM (CARDIZEM CD) 120 MG Cp24 Take 120 mg by mouth once daily. 8/3/22  Yes Historical Provider   empagliflozin-metformin (SYNJARDY XR) 25-1,000 mg TBph Take 1 tablet by mouth once daily at 6am. 8/17/22  Yes Keira Wheat MD   metoprolol succinate (TOPROL-XL) 25 MG 24 hr tablet Take 25 mg by mouth 2 (two) times daily. 8/3/22  Yes Historical Provider   nitroGLYCERIN (NITROSTAT) 0.4 MG SL tablet PLEASE SEE ATTACHED FOR DETAILED DIRECTIONS 8/3/22  Yes Historical Provider   pantoprazole (PROTONIX) 40 MG tablet Take 1 tablet (40 mg total) by mouth once daily. 7/18/22 10/31/22 Yes Navi Bragg MD   ranolazine (RANEXA) 500 MG Tb12 Take 500 mg by mouth 2 (two) times daily.   Yes Historical Provider   semaglutide (OZEMPIC) 1 mg/dose (4 mg/3 mL) Inject 1 mg into the skin every 7 days. 8/17/22 8/17/23 Yes Keira Wheat MD   sildenafil (VIAGRA) 100 MG tablet Take 1 tablet (100 mg total) by mouth as needed for Erectile Dysfunction. 12/16/19  Yes Keira Wheat MD   ticagrelor (BRILINTA) 90 mg tablet Take 90 mg by mouth 2 (two) times daily.   Yes Historical Provider   valsartan " (DIOVAN) 80 MG tablet Take 80 mg by mouth once daily. 7/19/22  Yes Historical Provider   venlafaxine (EFFEXOR-XR) 37.5 MG 24 hr capsule Take 1 capsule (37.5 mg total) by mouth once daily. 7/19/22 7/19/23 Yes Keira Wheat MD         Scheduled Meds:    PRN Meds:    Psychotherapeutics (From admission, onward)      None              NEUROLOGIC HISTORY  Seizures: No  Head trauma: No    SOCIAL HISTORY:  Developmental/Childhood:Achieved all developmental milestones timely  Education: Director of ambulatory clinics at [employer]  Employment Status/Finances: Master's in HC administration    Relationship Status/Sexual Orientation:  x23 years  Children: 1  Housing Status: Home with wife   history:  NO  Access to Firearms: yes, Locked up? YES   ,  Baptism: no  Recreational activities: fishing    SUBSTANCE ABUSE HISTORY   Recreational Drugs:  denies  Use of Alcohol: occasional, social use  Rehab History:no   Tobacco Use:no    LEGAL HISTORY:   Past charges/incarcerations: No   Pending charges:No     FAMILY PSYCHIATRIC HISTORY   Family History   Problem Relation Age of Onset    Heart disease Mother     Hypertension Mother     Diabetes Mother     Heart attack Mother     Heart disease Father     Hypertension Father     Diabetes Father     Heart attack Father     Heart attack Maternal Uncle     Heart attack Maternal Grandfather      Father- PTSD 2/2 [place]           ROS  Review of Systems   Constitutional:  Negative for chills and fever.   HENT:  Negative for hearing loss.    Eyes:  Negative for blurred vision and double vision.   Respiratory:  Negative for shortness of breath.    Cardiovascular:  Negative for chest pain and palpitations.   Gastrointestinal:  Negative for constipation, diarrhea, nausea and vomiting.   Genitourinary:  Negative for dysuria.   Musculoskeletal:  Negative for back pain and neck pain.   Neurological:  Negative for dizziness and headaches.   Endo/Heme/Allergies:  Negative for environmental  allergies.           Vitals:    10/31/22 1306   BP: (!) 140/60   Pulse: 86   Resp: 20       Body mass index is 28.98 kg/m².        LABORATORY DATA   No results found for this or any previous visit (from the past 72 hour(s)).   No results found for: PHENYTOIN, PHENOBARB, VALPROATE, CBMZ          CONSTITUTIONAL  General Appearance: unremarkable, age appropriate    MUSCULOSKELETAL  Muscle Strength and Tone:no tremor, no tic  Abnormal Involuntary Movements: No  Gait and Station: non-ataxic    PSYCHIATRIC   Level of Consciousness: awake and alert   Orientation: person, place, and situation  Grooming: Casually dressed and Well groomed  Psychomotor Behavior: normal, cooperative  Speech: normal tone, normal rate, normal pitch, normal volume  Language: grossly intact  Mood: anxious  Affect: Consistent with mood  Thought Process: linear, logical  Associations: intact   Thought Content: DENIES suicidal ideation and DENIES homicidal ideation  Perceptions: denies hallucinations  Memory: Able to recall past events, Remote intact, and Recent intact  Attention:Attends to interview without distraction  Fund of Knowledge: Aware of current events and Vocabulary appropriate   Estimate if Intelligence:  Average based on work/education history, vocabulary and mental status exam  Insight: has awareness of illness  Judgment: behavior is adequate to circumstances          Assessment/Impression:    CLAUDIA  Insomnia due to another mental disorder  Psychosocial stressors      Plan:    CLAUDIA  - stop effexor as would likely need higher doses however c/w cardiac comorbidites  - start zololf 25 mg PO qd x 1 week then increase to 50 mg PO qd  - consider psychotherapy  - pt counseled      Insomnia due to another mental disorder  - now using CPAP, he does not feel it helps, consider repeat PSG  - start lunesta 1 mg PO qhs   - pt counseled      Psychosocial stressors  - consider psychotherapy  - pt counseled        Discussed diagnosis, risks and benefits  of proposed treatment above vs alternative treatments vs no treatment, potential side effects of these treatments, and the inherent unpredictability of treatment. The patient expresses understanding of the above and displays the capacity to agree with this treatment given said understanding. Patient also agrees that, currently, the benefits outweigh the risks and would like to pursue treatment at this time.     Any medications being used off-label were discussed with the patient inclusive of the evidence base for the use of the medications and consent was obtained for the off-label use of the medication.       Return to clinic 4 weeks       Froylan Marie III, MD  10/31/2022

## 2022-11-01 ENCOUNTER — PATIENT MESSAGE (OUTPATIENT)
Dept: NEUROLOGY | Facility: CLINIC | Age: 52
End: 2022-11-01
Payer: COMMERCIAL

## 2022-11-21 ENCOUNTER — TELEPHONE (OUTPATIENT)
Dept: NEUROLOGY | Facility: CLINIC | Age: 52
End: 2022-11-21

## 2022-11-21 ENCOUNTER — OFFICE VISIT (OUTPATIENT)
Dept: NEUROLOGY | Facility: CLINIC | Age: 52
End: 2022-11-21
Payer: COMMERCIAL

## 2022-11-21 VITALS
SYSTOLIC BLOOD PRESSURE: 128 MMHG | RESPIRATION RATE: 20 BRPM | WEIGHT: 194 LBS | DIASTOLIC BLOOD PRESSURE: 70 MMHG | HEART RATE: 68 BPM | OXYGEN SATURATION: 100 % | BODY MASS INDEX: 28.73 KG/M2 | HEIGHT: 69 IN

## 2022-11-21 DIAGNOSIS — G47.33 OSA (OBSTRUCTIVE SLEEP APNEA): ICD-10-CM

## 2022-11-21 DIAGNOSIS — I25.10 CORONARY ARTERY DISEASE INVOLVING NATIVE CORONARY ARTERY OF NATIVE HEART WITHOUT ANGINA PECTORIS: ICD-10-CM

## 2022-11-21 DIAGNOSIS — R41.3 POOR SHORT TERM MEMORY: Primary | ICD-10-CM

## 2022-11-21 DIAGNOSIS — E11.311 TYPE 2 DIABETES MELLITUS WITH RETINOPATHY AND MACULAR EDEMA, WITHOUT LONG-TERM CURRENT USE OF INSULIN, UNSPECIFIED LATERALITY, UNSPECIFIED RETINOPATHY SEVERITY: ICD-10-CM

## 2022-11-21 PROCEDURE — 99214 PR OFFICE/OUTPT VISIT, EST, LEVL IV, 30-39 MIN: ICD-10-PCS | Mod: S$GLB,,, | Performed by: PHYSICIAN ASSISTANT

## 2022-11-21 PROCEDURE — 99999 PR PBB SHADOW E&M-EST. PATIENT-LVL IV: CPT | Mod: PBBFAC,,, | Performed by: PHYSICIAN ASSISTANT

## 2022-11-21 PROCEDURE — 3074F SYST BP LT 130 MM HG: CPT | Mod: CPTII,S$GLB,, | Performed by: PHYSICIAN ASSISTANT

## 2022-11-21 PROCEDURE — 3008F PR BODY MASS INDEX (BMI) DOCUMENTED: ICD-10-PCS | Mod: CPTII,S$GLB,, | Performed by: PHYSICIAN ASSISTANT

## 2022-11-21 PROCEDURE — 1160F RVW MEDS BY RX/DR IN RCRD: CPT | Mod: CPTII,S$GLB,, | Performed by: PHYSICIAN ASSISTANT

## 2022-11-21 PROCEDURE — 3078F PR MOST RECENT DIASTOLIC BLOOD PRESSURE < 80 MM HG: ICD-10-PCS | Mod: CPTII,S$GLB,, | Performed by: PHYSICIAN ASSISTANT

## 2022-11-21 PROCEDURE — 3008F BODY MASS INDEX DOCD: CPT | Mod: CPTII,S$GLB,, | Performed by: PHYSICIAN ASSISTANT

## 2022-11-21 PROCEDURE — 3078F DIAST BP <80 MM HG: CPT | Mod: CPTII,S$GLB,, | Performed by: PHYSICIAN ASSISTANT

## 2022-11-21 PROCEDURE — 4010F ACE/ARB THERAPY RXD/TAKEN: CPT | Mod: CPTII,S$GLB,, | Performed by: PHYSICIAN ASSISTANT

## 2022-11-21 PROCEDURE — 3052F HG A1C>EQUAL 8.0%<EQUAL 9.0%: CPT | Mod: CPTII,S$GLB,, | Performed by: PHYSICIAN ASSISTANT

## 2022-11-21 PROCEDURE — 99214 OFFICE O/P EST MOD 30 MIN: CPT | Mod: S$GLB,,, | Performed by: PHYSICIAN ASSISTANT

## 2022-11-21 PROCEDURE — 4010F PR ACE/ARB THEARPY RXD/TAKEN: ICD-10-PCS | Mod: CPTII,S$GLB,, | Performed by: PHYSICIAN ASSISTANT

## 2022-11-21 PROCEDURE — 3052F PR MOST RECENT HEMOGLOBIN A1C LEVEL 8.0 - < 9.0%: ICD-10-PCS | Mod: CPTII,S$GLB,, | Performed by: PHYSICIAN ASSISTANT

## 2022-11-21 PROCEDURE — 3074F PR MOST RECENT SYSTOLIC BLOOD PRESSURE < 130 MM HG: ICD-10-PCS | Mod: CPTII,S$GLB,, | Performed by: PHYSICIAN ASSISTANT

## 2022-11-21 PROCEDURE — 1159F PR MEDICATION LIST DOCUMENTED IN MEDICAL RECORD: ICD-10-PCS | Mod: CPTII,S$GLB,, | Performed by: PHYSICIAN ASSISTANT

## 2022-11-21 PROCEDURE — 1159F MED LIST DOCD IN RCRD: CPT | Mod: CPTII,S$GLB,, | Performed by: PHYSICIAN ASSISTANT

## 2022-11-21 PROCEDURE — 1160F PR REVIEW ALL MEDS BY PRESCRIBER/CLIN PHARMACIST DOCUMENTED: ICD-10-PCS | Mod: CPTII,S$GLB,, | Performed by: PHYSICIAN ASSISTANT

## 2022-11-21 PROCEDURE — 99999 PR PBB SHADOW E&M-EST. PATIENT-LVL IV: ICD-10-PCS | Mod: PBBFAC,,, | Performed by: PHYSICIAN ASSISTANT

## 2022-11-21 NOTE — PROGRESS NOTES
Subjective:       Patient ID: Abel Hackett is a 52 y.o. male.    Chief Complaint: Neurologic Problem (3 month follow up)    HPI  Abel Hackett is a 52 y.o. male here for follow up visit.he is followed for cognitive changes. He had neuropsychological evaluation. Results were inconclusive. Recommended risk factor control, ie blood glucose control, CPAP compliance. Diagnosed with JEANNINE in 2018. Has auto CPAP, but he never really used it due to it bothering him.     He has been noncompliant with diabetes treatment for years. Only recently compliant. He has vascular complications and will be having several more stents placed in coronaries next week.   Vascular studies on Brain done at [facility] did not reveal significant stenosis in carotids or brain. MRI of brain showed remote lacunar infarcts.    DM control is improved with Ozempic. He is now compliant with healthcare. He is also seeing psychiatry for anxiety. Therapy recommended by neuropsych, but psychiatrist is adjusting meds for now.     Neuropsych eval inconclusive. Suspect improved control of risk factors for vascular dementia and compliance with CPAP will be helpful.     He continues with daily memory problems.  Examples of memory problems noted include? Wife has told him he forgets he said something more than once, he forgets to put things away, forget to brush his teeth. Was driving to his son-in-law's and passed up his street and then had to turn around. He was not confused  He has forgotten to go to meetings at works and has forgotten some task  He is the director or medical clinic at [employer]   Seems to happen almost daily.   Impairment in ADLS such as doing bills, cooking, doing laundry, dressing self?  Wife now does the bills as he was forgetting to pay things. Cooking with CO and Smoker detector. He does his own ALDs but has forgotten clothes in the washer. Just yesterday he walked away from stove with burner on twice. He was using an excavator  outside, and he forgot the bucket was up. He ran it into the house.  He is on short term disability from work due to memory problems and other health issues.    Neuropsychological assessment:  Mr. Abel Hackett is an 52 y.o., male with his THUY and pertinent medical history including hyperlipidemia, poorly controlled type 2 diabetes (improving with treatment with PCP), untreated moderate to severe obstructive sleep apnea (per sleep study records), and anxiety who was referred for a neuropsychological evaluation in the setting of memory loss for the past 6 months brought to his attention by his coworkers. [place] of the brain done [place]. [place] [facility] [place] was read as showing a small focus of encephalomalacia in the right inferior cerebellum without any atrophy mentioned, whereas a CT of the head from July 2022 was read as showing mild cortical atrophy without acute intracranial abnormality. MRA of the head and neck was read as normal. He has reportedly made errors in several IADLs (missing several medication doses each week, has left the stove on, missed one appointment, has gotten lost one time while driving, missed a few meetings at work). He reports increased stress and irritability as a result of his thinking difficulties.      As stated below, scores on stand-alone and embedded performance validity measures were variable, with several falling below cutoffs, suggesting reduced effort and/or engagement in the testing process. As such, the current results could underestimate the patient's current functioning and results are therefore interpreted with caution. Any average range or higher scores will  be considered the minimum of the patient's capability. Any low average range or lower scores will not be interpreted, as they may not represent true cognitive impairment.      Compared to high average range premorbid estimates (based on both demographic information and a word reading test), results of the  current evaluation reveal intact/at expectation visuospatial skills as well as some measures of language functioning (naming, verbal reasoning), and some measures of memory, simple attention, and executive functioning. Temporal orientation was largely intact (off by one day when asked the day of the month). He was a strong historian during the clinical interview.      Overall, while I cannot comment on the type or severity of cognitive impairments the patient may be experiencing with any real confidence, I am concerned that his vascular risk factors were/are impacting his cognition (namely, hyperlipidemia, poorly controlled type 2 diabetes, untreated moderate to severe obstructive sleep apnea), with neuroimaging revealing of a small vessel CVA, which his neurologist posits was likely due to poorly controlled DM2.      While psychological screening questionnaires were unrevealing of clinically significant symptoms of depression and anxiety, he reported during the clinical interview that he has been feeling an increase in both stress and irritability since these cognitive changes were brought to his attention. He also has a history of anxiety and was just recently started on Effexor, without any real benefit noticed as of yet.      Close management of vascular risk factors is strongly recommended. He is to be commended for the changes he has already made in terms of his exercise habits and compliance with medications.   He is encouraged to reach back out to sleep medicine, as records make it seem as if he should be using a CPAP machine (whereas he stated that he was told he did not need this machine). Untreated sleep apnea can have both acute and chronic impact on cognition.   Encouraged to consider attending some talk therapy/counseling sessions and/or focus on stress management techniques.   Encouraged to participate in brain health behaviors. A list is included in the after visit summary.   A list of cognitive tips  and strategies is also included included in the after visit summary.  We can monitor his cognition over time and repeat this evaluation down the road should he notice continued cognitive difficulty despite optimized management of other health factors. Caution should be applied if using this evaluation for comparison      Patient had MRI and MRA and EEG at [facility] since CT head showed some atrophy. [place]. [place] [facility] [place] who also ordered Neuropsychological testing which he would prefer to do off [facility]'s campus. MRI showed a cerebellar abnormality- see below     EEG was normal     Mood is described as normal and sometimes frustrated. Taking Zoloft now for anxiety per Dr. Pena.     The patient does not have any delusions, hallucinations, paranoia,  falls, or tremors. No known prior stroke symptoms. There is not a prior history of head trauma. There is a Family History of memory disorders in his parents who are both - both had dementia and  in their 70s. Mom had vascular dementia and dad had AD.  The patient is still driving without accidents or incident       Review of Systems   Constitutional:  Positive for activity change and unexpected weight change (significant weight loss with Ozempic). Negative for appetite change and fever.   HENT:  Negative for sore throat.    Eyes:  Negative for visual disturbance.   Respiratory:  Positive for chest tightness (CAD). Negative for cough and shortness of breath.    Cardiovascular:  Negative for chest pain.   Gastrointestinal:  Negative for nausea and vomiting.   Endocrine: Negative for cold intolerance and heat intolerance.   Genitourinary:  Negative for difficulty urinating.   Musculoskeletal:  Negative for arthralgias, back pain and neck pain.   Skin:  Negative for rash.   Allergic/Immunologic: Negative for food allergies.   Neurological:  Positive for numbness (feet without pain). Negative for dizziness, tremors, seizures, speech difficulty, weakness  and headaches.   Hematological:  Does not bruise/bleed easily.   Psychiatric/Behavioral:  Negative for agitation, decreased concentration and sleep disturbance. The patient is nervous/anxious.         Short term memory problems daily     Objective:    CT head  COMPARISON:  CT 11/25/2016.     FINDINGS:  There is no acute hemorrhage or infarction.  There is mild cortical atrophy.  There is a normal pattern of gray-white matter differentiation.  Small focus of chronic encephalomalacia involving the right cerebellum.     No extra-axial fluid collections.  Ventricles are normal in size, shape and configuration.  The basal cisterns are patent.     The imaged paranasal sinuses and ethmoid air cells are well aerated.     The mastoid air cells and middle ears are normally pneumatized.     Impression:     Mild cortical atrophy without acute intracranial abnormality.        Electronically signed by: Jimbo Garcia  Date:                                            07/11/2022  Neurologic Exam     Mental Status   Oriented to person, place, and time.     Cranial Nerves     CN III, IV, VI   Pupils are equal, round, and reactive to light.    Gait, Coordination, and Reflexes     Gait  Gait: normal    Reflexes   Right brachioradialis: 2+  Left brachioradialis: 2+  Right biceps: 2+  Left biceps: 2+  Right triceps: 2+  Left triceps: 2+  Right patellar: 0  Left patellar: 0  Right achilles: 0  Left achilles: 0  Physical Exam  Vitals and nursing note reviewed.   Constitutional:       General: He is not in acute distress.     Appearance: Normal appearance. He is normal weight.   HENT:      Head: Normocephalic and atraumatic.      Nose: Nose normal.      Mouth/Throat:      Mouth: Mucous membranes are moist.      Pharynx: Oropharynx is clear.   Eyes:      Extraocular Movements: Extraocular movements intact.      Conjunctiva/sclera: Conjunctivae normal.      Pupils: Pupils are equal, round, and reactive to light.   Cardiovascular:      Rate  and Rhythm: Normal rate and regular rhythm.      Pulses: Normal pulses.   Pulmonary:      Effort: Pulmonary effort is normal. No respiratory distress.   Abdominal:      General: There is no distension.      Palpations: Abdomen is soft.      Tenderness: There is no abdominal tenderness.   Genitourinary:     Comments: Deferred    Musculoskeletal:         General: No swelling or tenderness. Normal range of motion.      Cervical back: Normal range of motion and neck supple. No tenderness.   Skin:     General: Skin is warm and dry.   Neurological:      General: No focal deficit present.      Mental Status: He is alert and oriented to person, place, and time. Mental status is at baseline.      Cranial Nerves: No cranial nerve deficit or dysarthria.      Motor: Motor function is intact. No weakness or tremor.      Coordination: Coordination is intact.      Gait: Gait is intact.      Deep Tendon Reflexes:      Reflex Scores:       Tricep reflexes are 2+ on the right side and 2+ on the left side.       Bicep reflexes are 2+ on the right side and 2+ on the left side.       Brachioradialis reflexes are 2+ on the right side and 2+ on the left side.       Patellar reflexes are 0 on the right side and 0 on the left side.       Achilles reflexes are 0 on the right side and 0 on the left side.     Comments: Full fund of knowledge  Speech is fluent   Psychiatric:         Mood and Affect: Mood normal.         Behavior: Behavior normal.         Thought Content: Thought content normal.         Judgment: Judgment normal.       Assessment:       1. Poor short term memory    2. JEANNINE (obstructive sleep apnea)    3. 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 I do not recommend returning to work now.  Neuropsych eval inconclusive.  Recommend tight control of risk factors for  vascular dementia including compliance with CPAP.  He uses Lunesta for sleep without CPAP. Highly recommend stopping this practice.  A1C is trending down with Ozempic.  Compliance with DM treatment improved.    JEANNINE (obstructive sleep apnea)  -     CPAP/BIPAP SUPPLIES  He needs to be fitted with equipment he can be compliant with. He never really gave CPAP a chance.    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    He is scheduled with vascular neurology after the new year.  He will follow up with Dr. Cavanaugh in 6 months.  No further treatment for memory issues at this time recommended.  He will likely need follow up with neuropsychiatry with repeat testing in one year (October 2023)    Discussed risks and benefits of potential treatment options at length as well as potential side effects of medication changes. Medications were changed. Please refer to medication reconciliation report for details. Medication compliance discussed. Instructed to call clinic if concerned or to ED if emergency.    MARIA A Morales

## 2022-12-09 ENCOUNTER — PATIENT MESSAGE (OUTPATIENT)
Dept: FAMILY MEDICINE | Facility: CLINIC | Age: 52
End: 2022-12-09
Payer: COMMERCIAL

## 2022-12-12 ENCOUNTER — PATIENT MESSAGE (OUTPATIENT)
Dept: PSYCHIATRY | Facility: CLINIC | Age: 52
End: 2022-12-12
Payer: COMMERCIAL

## 2022-12-13 RX ORDER — SERTRALINE HYDROCHLORIDE 100 MG/1
100 TABLET, FILM COATED ORAL DAILY
Qty: 90 TABLET | Refills: 0 | Status: SHIPPED | OUTPATIENT
Start: 2022-12-13 | End: 2023-03-02 | Stop reason: SDUPTHER

## 2022-12-14 RX ORDER — ESZOPICLONE 3 MG/1
3 TABLET, FILM COATED ORAL NIGHTLY
Qty: 30 TABLET | Refills: 0 | Status: SHIPPED | OUTPATIENT
Start: 2022-12-14 | End: 2023-01-23

## 2023-01-12 ENCOUNTER — PATIENT MESSAGE (OUTPATIENT)
Dept: ADMINISTRATIVE | Facility: OTHER | Age: 53
End: 2023-01-12
Payer: COMMERCIAL

## 2023-02-01 ENCOUNTER — PATIENT MESSAGE (OUTPATIENT)
Dept: FAMILY MEDICINE | Facility: CLINIC | Age: 53
End: 2023-02-01
Payer: COMMERCIAL

## 2023-02-03 ENCOUNTER — PATIENT MESSAGE (OUTPATIENT)
Dept: FAMILY MEDICINE | Facility: CLINIC | Age: 53
End: 2023-02-03
Payer: COMMERCIAL

## 2023-02-03 DIAGNOSIS — E11.65 UNCONTROLLED TYPE 2 DIABETES MELLITUS WITH HYPERGLYCEMIA: Primary | ICD-10-CM

## 2023-02-06 RX ORDER — ORAL SEMAGLUTIDE 3 MG/1
3 TABLET ORAL DAILY
Qty: 30 TABLET | Refills: 5 | Status: SHIPPED | OUTPATIENT
Start: 2023-02-06 | End: 2023-03-03

## 2023-02-06 NOTE — TELEPHONE ENCOUNTER
Pt having issues finding ozempic at any pharmacy would like to know if it can be changed to rybelsus?

## 2023-02-07 ENCOUNTER — PATIENT MESSAGE (OUTPATIENT)
Dept: FAMILY MEDICINE | Facility: CLINIC | Age: 53
End: 2023-02-07
Payer: COMMERCIAL

## 2023-02-07 NOTE — TELEPHONE ENCOUNTER
Patient schedule appointment with you for march 3rd. Patient asking about bloodwork prior to this appointment. Has microalbumin/Cr and A1C pending. Anything else you would like to add for patient?      Please advise

## 2023-02-13 ENCOUNTER — PATIENT MESSAGE (OUTPATIENT)
Dept: FAMILY MEDICINE | Facility: CLINIC | Age: 53
End: 2023-02-13
Payer: COMMERCIAL

## 2023-02-16 NOTE — TELEPHONE ENCOUNTER
Patient is asking if he is due for bloodwork prior to appointment on [id number]. Patient has A1c and microalbumin pending, but had A1C done on 9/20/22

## 2023-02-27 ENCOUNTER — CLINICAL SUPPORT (OUTPATIENT)
Dept: FAMILY MEDICINE | Facility: CLINIC | Age: 53
End: 2023-02-27
Payer: COMMERCIAL

## 2023-02-27 DIAGNOSIS — E11.311 TYPE 2 DIABETES MELLITUS WITH RETINOPATHY AND MACULAR EDEMA, WITHOUT LONG-TERM CURRENT USE OF INSULIN, UNSPECIFIED LATERALITY, UNSPECIFIED RETINOPATHY SEVERITY: ICD-10-CM

## 2023-02-27 LAB
ALBUMIN/CREAT UR: 10.2 UG/MG (ref 0–30)
CREAT UR-MCNC: 98.1 MG/DL (ref 23–375)
MICROALBUMIN UR DL<=1MG/L-MCNC: 10 UG/ML

## 2023-02-27 PROCEDURE — 36415 PR COLLECTION VENOUS BLOOD,VENIPUNCTURE: ICD-10-PCS | Mod: S$GLB,,, | Performed by: FAMILY MEDICINE

## 2023-02-27 PROCEDURE — 83036 HEMOGLOBIN GLYCOSYLATED A1C: CPT | Performed by: FAMILY MEDICINE

## 2023-02-27 PROCEDURE — 36415 COLL VENOUS BLD VENIPUNCTURE: CPT | Mod: S$GLB,,, | Performed by: FAMILY MEDICINE

## 2023-02-27 PROCEDURE — 82570 ASSAY OF URINE CREATININE: CPT | Performed by: FAMILY MEDICINE

## 2023-02-28 LAB
ESTIMATED AVG GLUCOSE: 157 MG/DL (ref 68–131)
HBA1C MFR BLD: 7.1 % (ref 4–5.6)

## 2023-03-01 ENCOUNTER — OFFICE VISIT (OUTPATIENT)
Dept: NEUROLOGY | Facility: CLINIC | Age: 53
End: 2023-03-01
Payer: COMMERCIAL

## 2023-03-01 ENCOUNTER — LAB VISIT (OUTPATIENT)
Dept: LAB | Facility: HOSPITAL | Age: 53
End: 2023-03-01
Attending: NURSE PRACTITIONER
Payer: COMMERCIAL

## 2023-03-01 VITALS
HEIGHT: 69 IN | WEIGHT: 189.81 LBS | BODY MASS INDEX: 28.11 KG/M2 | DIASTOLIC BLOOD PRESSURE: 77 MMHG | SYSTOLIC BLOOD PRESSURE: 137 MMHG | HEART RATE: 104 BPM

## 2023-03-01 DIAGNOSIS — Z86.73 HX OF ARTERIAL ISCHEMIC STROKE: ICD-10-CM

## 2023-03-01 DIAGNOSIS — R41.3 MEMORY LOSS DUE TO MEDICAL CONDITION: ICD-10-CM

## 2023-03-01 DIAGNOSIS — I63.89 OTHER CEREBRAL INFARCTION: ICD-10-CM

## 2023-03-01 DIAGNOSIS — I63.89 OTHER CEREBRAL INFARCTION: Primary | ICD-10-CM

## 2023-03-01 LAB
ANION GAP SERPL CALC-SCNC: 10 MMOL/L (ref 8–16)
BUN SERPL-MCNC: 23 MG/DL (ref 6–20)
CALCIUM SERPL-MCNC: 10 MG/DL (ref 8.7–10.5)
CHLORIDE SERPL-SCNC: 105 MMOL/L (ref 95–110)
CO2 SERPL-SCNC: 22 MMOL/L (ref 23–29)
CREAT SERPL-MCNC: 0.8 MG/DL (ref 0.5–1.4)
EST. GFR  (NO RACE VARIABLE): >60 ML/MIN/1.73 M^2
GLUCOSE SERPL-MCNC: 123 MG/DL (ref 70–110)
POTASSIUM SERPL-SCNC: 4.1 MMOL/L (ref 3.5–5.1)
SODIUM SERPL-SCNC: 137 MMOL/L (ref 136–145)

## 2023-03-01 PROCEDURE — 99215 OFFICE O/P EST HI 40 MIN: CPT | Mod: S$GLB,,, | Performed by: STUDENT IN AN ORGANIZED HEALTH CARE EDUCATION/TRAINING PROGRAM

## 2023-03-01 PROCEDURE — 3066F NEPHROPATHY DOC TX: CPT | Mod: CPTII,S$GLB,, | Performed by: STUDENT IN AN ORGANIZED HEALTH CARE EDUCATION/TRAINING PROGRAM

## 2023-03-01 PROCEDURE — 3075F PR MOST RECENT SYSTOLIC BLOOD PRESS GE 130-139MM HG: ICD-10-PCS | Mod: CPTII,S$GLB,, | Performed by: STUDENT IN AN ORGANIZED HEALTH CARE EDUCATION/TRAINING PROGRAM

## 2023-03-01 PROCEDURE — 3078F DIAST BP <80 MM HG: CPT | Mod: CPTII,S$GLB,, | Performed by: STUDENT IN AN ORGANIZED HEALTH CARE EDUCATION/TRAINING PROGRAM

## 2023-03-01 PROCEDURE — 3061F PR NEG MICROALBUMINURIA RESULT DOCUMENTED/REVIEW: ICD-10-PCS | Mod: CPTII,S$GLB,, | Performed by: STUDENT IN AN ORGANIZED HEALTH CARE EDUCATION/TRAINING PROGRAM

## 2023-03-01 PROCEDURE — 4010F ACE/ARB THERAPY RXD/TAKEN: CPT | Mod: CPTII,S$GLB,, | Performed by: STUDENT IN AN ORGANIZED HEALTH CARE EDUCATION/TRAINING PROGRAM

## 2023-03-01 PROCEDURE — 99215 PR OFFICE/OUTPT VISIT, EST, LEVL V, 40-54 MIN: ICD-10-PCS | Mod: S$GLB,,, | Performed by: STUDENT IN AN ORGANIZED HEALTH CARE EDUCATION/TRAINING PROGRAM

## 2023-03-01 PROCEDURE — 4010F PR ACE/ARB THEARPY RXD/TAKEN: ICD-10-PCS | Mod: CPTII,S$GLB,, | Performed by: STUDENT IN AN ORGANIZED HEALTH CARE EDUCATION/TRAINING PROGRAM

## 2023-03-01 PROCEDURE — 3008F BODY MASS INDEX DOCD: CPT | Mod: CPTII,S$GLB,, | Performed by: STUDENT IN AN ORGANIZED HEALTH CARE EDUCATION/TRAINING PROGRAM

## 2023-03-01 PROCEDURE — 3051F HG A1C>EQUAL 7.0%<8.0%: CPT | Mod: CPTII,S$GLB,, | Performed by: STUDENT IN AN ORGANIZED HEALTH CARE EDUCATION/TRAINING PROGRAM

## 2023-03-01 PROCEDURE — 1159F MED LIST DOCD IN RCRD: CPT | Mod: CPTII,S$GLB,, | Performed by: STUDENT IN AN ORGANIZED HEALTH CARE EDUCATION/TRAINING PROGRAM

## 2023-03-01 PROCEDURE — 36415 COLL VENOUS BLD VENIPUNCTURE: CPT | Performed by: STUDENT IN AN ORGANIZED HEALTH CARE EDUCATION/TRAINING PROGRAM

## 2023-03-01 PROCEDURE — 3061F NEG MICROALBUMINURIA REV: CPT | Mod: CPTII,S$GLB,, | Performed by: STUDENT IN AN ORGANIZED HEALTH CARE EDUCATION/TRAINING PROGRAM

## 2023-03-01 PROCEDURE — 80048 BASIC METABOLIC PNL TOTAL CA: CPT | Performed by: STUDENT IN AN ORGANIZED HEALTH CARE EDUCATION/TRAINING PROGRAM

## 2023-03-01 PROCEDURE — 3075F SYST BP GE 130 - 139MM HG: CPT | Mod: CPTII,S$GLB,, | Performed by: STUDENT IN AN ORGANIZED HEALTH CARE EDUCATION/TRAINING PROGRAM

## 2023-03-01 PROCEDURE — 99999 PR PBB SHADOW E&M-EST. PATIENT-LVL V: ICD-10-PCS | Mod: PBBFAC,,, | Performed by: STUDENT IN AN ORGANIZED HEALTH CARE EDUCATION/TRAINING PROGRAM

## 2023-03-01 PROCEDURE — 3078F PR MOST RECENT DIASTOLIC BLOOD PRESSURE < 80 MM HG: ICD-10-PCS | Mod: CPTII,S$GLB,, | Performed by: STUDENT IN AN ORGANIZED HEALTH CARE EDUCATION/TRAINING PROGRAM

## 2023-03-01 PROCEDURE — 3066F PR DOCUMENTATION OF TREATMENT FOR NEPHROPATHY: ICD-10-PCS | Mod: CPTII,S$GLB,, | Performed by: STUDENT IN AN ORGANIZED HEALTH CARE EDUCATION/TRAINING PROGRAM

## 2023-03-01 PROCEDURE — 3051F PR MOST RECENT HEMOGLOBIN A1C LEVEL 7.0 - < 8.0%: ICD-10-PCS | Mod: CPTII,S$GLB,, | Performed by: STUDENT IN AN ORGANIZED HEALTH CARE EDUCATION/TRAINING PROGRAM

## 2023-03-01 PROCEDURE — 1159F PR MEDICATION LIST DOCUMENTED IN MEDICAL RECORD: ICD-10-PCS | Mod: CPTII,S$GLB,, | Performed by: STUDENT IN AN ORGANIZED HEALTH CARE EDUCATION/TRAINING PROGRAM

## 2023-03-01 PROCEDURE — 3008F PR BODY MASS INDEX (BMI) DOCUMENTED: ICD-10-PCS | Mod: CPTII,S$GLB,, | Performed by: STUDENT IN AN ORGANIZED HEALTH CARE EDUCATION/TRAINING PROGRAM

## 2023-03-01 PROCEDURE — 99999 PR PBB SHADOW E&M-EST. PATIENT-LVL V: CPT | Mod: PBBFAC,,, | Performed by: STUDENT IN AN ORGANIZED HEALTH CARE EDUCATION/TRAINING PROGRAM

## 2023-03-01 NOTE — PROGRESS NOTES
Chief Complaint and Duration     Cognitive impairment, hx of stroke    History of Present Illness     Abel Hackett is a 52 y.o. male with a history of multiple medical diagnoses w cognitive impairment, hx of a stroke in 2022, hx of HTN, JEANNINE not compliant w CPAP, uncontrolled DMII. Has seen neuorology 11/2022, felt cognitive impairment multifactorial.    On aspirin and plavix DAPT for at least a year per cardiology 2/2 to multiple stent placement this past December. Outside imaging not available to review but noted in prior notes there was a R inferior cerebellar stroke, vessel imaging wo large vessel occlusion.     Review of Systems: (positive bolded)  Constitutional: Negative for fatigue, activity change, fevers, or chills.   HENT:  Negative for new visual disturbances or difficulty swallowing.    Respiratory:  Negative for shortness of breath.    Cardiovascular:  Negative for palpitations.   Gastrointestinal:  Negative for constipation, nausea, or vomiting.   Endocrine: Negative for temperature instability/intolerance.   Genitourinary:  Negative for difficulty urinating.   Musculoskeletal:  Negative for neck pain, back pain, myalgias, joint swelling, or gait problem.  Skin:  Negative for rash or lesions.   Neurological:  Negative for seizures, headaches, dizziness, tremors, syncope, speech difficulty, weakness, light-headedness, or numbness.   Hematological:  Does not bruise/bleed easily.   Psychiatric/Behavioral: Negative for decreased concentration or sleep disturbance.    Review of patient's allergies indicates:  No Known Allergies  Current Outpatient Medications   Medication Sig Dispense Refill    amoxicillin (AMOXIL) 500 MG capsule Take 500 mg by mouth 2 (two) times daily.      aspirin (ECOTRIN) 81 MG EC tablet Take 1 tablet (81 mg total) by mouth once daily. 90 tablet 1    atorvastatin (LIPITOR) 40 MG tablet Take 1 tablet (40 mg total) by mouth once daily. 90 tablet 3    clopidogreL (PLAVIX) 75 mg tablet  Take 75 mg by mouth.      diltiaZEM (CARDIZEM CD) 180 MG 24 hr capsule Take 180 mg by mouth 2 (two) times daily.      empagliflozin-metformin (SYNJARDY XR) 25-1,000 mg TBph Take 1 tablet by mouth once daily at 6am. 90 tablet 1    eszopiclone (LUNESTA) 3 mg Tab TAKE 1 TABLET BY MOUTH EVERY DAY IN THE EVENING 30 tablet 0    ibuprofen (ADVIL,MOTRIN) 400 MG tablet Take by mouth.      isosorbide mononitrate (IMDUR) 30 MG 24 hr tablet Take 30 mg by mouth.      metoprolol succinate (TOPROL-XL) 100 MG 24 hr tablet Take 100 mg by mouth 2 (two) times daily.      nebivoloL (BYSTOLIC) 10 MG Tab Take 10 mg by mouth.      nitroGLYCERIN (NITROSTAT) 0.4 MG SL tablet PLEASE SEE ATTACHED FOR DETAILED DIRECTIONS      pantoprazole (PROTONIX) 40 MG tablet Take 1 tablet (40 mg total) by mouth once daily. 30 tablet 0    ranolazine (RANEXA) 1,000 mg Tb12 Take 1,000 mg by mouth 2 (two) times daily.      semaglutide (OZEMPIC) 1 mg/dose (4 mg/3 mL) Inject 1 mg into the skin every 7 days. 3 pen 3    semaglutide (RYBELSUS) 3 mg tablet Take 1 tablet (3 mg total) by mouth once daily. 30 tablet 5    sertraline (ZOLOFT) 100 MG tablet Take 1 tablet (100 mg total) by mouth once daily. 90 tablet 0    sildenafil (VIAGRA) 100 MG tablet Take 1 tablet (100 mg total) by mouth as needed for Erectile Dysfunction. 10 tablet 4    ticagrelor (BRILINTA) 90 mg tablet Take 90 mg by mouth 2 (two) times daily.      valsartan (DIOVAN) 80 MG tablet Take 80 mg by mouth once daily.       No current facility-administered medications for this visit.       Medical History     Past Medical History:   Diagnosis Date    Depression     Diabetes mellitus     Diabetes mellitus, type 2     History of right coronary artery stent placement     done at [facility]     Past Surgical History:   Procedure Laterality Date    COLONOSCOPY N/A 8/2/2022    Procedure: COLONOSCOPY;  Surgeon: Keira Wheat MD;  Location: [facility];  Service: Endoscopy;  Laterality: N/A;    REFRACTIVE SURGERY   4/13/2016    VASECTOMY       Family History   Problem Relation Age of Onset    Heart disease Mother     Hypertension Mother     Diabetes Mother     Heart attack Mother     Heart disease Father     Hypertension Father     Diabetes Father     Heart attack Father     Heart attack Maternal Uncle     Heart attack Maternal Grandfather      Social History     Socioeconomic History    Marital status:    Tobacco Use    Smoking status: Never    Smokeless tobacco: Never   Substance and Sexual Activity    Alcohol use: Not Currently     Comment: occassional    Drug use: No    Sexual activity: Yes     Partners: Female     Social Determinants of Health     Financial Resource Strain: Low Risk     Difficulty of Paying Living Expenses: Not very hard   Food Insecurity: No Food Insecurity    Worried About Running Out of Food in the Last Year: Never true    Ran Out of Food in the Last Year: Never true   Transportation Needs: No Transportation Needs    Lack of Transportation (Medical): No    Lack of Transportation (Non-Medical): No   Physical Activity: Insufficiently Active    Days of Exercise per Week: 3 days    Minutes of Exercise per Session: 30 min   Stress: Stress Concern Present    Feeling of Stress : Rather much   Social Connections: Unknown    Frequency of Communication with Friends and Family: Once a week    Frequency of Social Gatherings with Friends and Family: Once a week    Active Member of Clubs or Organizations: No    Attends Club or Organization Meetings: Never    Marital Status:    Housing Stability: Unknown    Unable to Pay for Housing in the Last Year: No    Unstable Housing in the Last Year: No       Exam     Vitals:    03/01/23 1132   BP: 137/77   Pulse: 104      Physical Exam:  General: he is not in acute distress. he is not ill-appearing.   HENT: Normocephalic and atraumatic. Moist mucous membranes.  Eyes: Conjunctivae normal.   Pulmonary: Pulmonary effort is normal.   Abdominal: Abdomen is soft and  flat.   Skin: Skin is warm and dry. No rashes.   Psychiatric: Mood normal.        Neurologic Exam   Mental status: oriented to person, place, and time  Attention: Normal. Concentration: normal.  Speech: speech is normal.  Cranial Nerves: PERRL, EOMI intact, V1-V3 Facial sensation intact. Symmetric facies. Hearing grossly intact. Palate and uvula midline, symmetric. No tongue deviation. Trapezius strength intact.     Motor exam: bulk and tone normal. Strength 5/5 in bilateral upper extremities: deltoids, biceps, triceps, wrist flexion/extension, finger abduction/adduction. Strength 5/5 in bilateral lower extremities: hip flexion/extension, thigh adduction/abduction, knee flexion/extension, dorsiflexion/plantarflexion, foot eversion/inversion.    Reflexes: 2+ in bilateral upper extremities: biceps and brachiaradialis, 2+ in bilateral lower extremities: patellar and achilles  Plantar reflex: normal  Tinoco's/Clonus: negative    Sensory exam: light touch intact    Gait exam: normal  Romberg: negative  Coordination: normal     Tremor: none    Labs and Imaging     Labs: reviewed  A1C 7.1      Assessment and Plan     Problem List Items Addressed This Visit    None  Visit Diagnoses       Other cerebral infarction    -  Primary    Relevant Orders    MRI Brain Without Contrast    CTA Head and Neck (xpd)    Basic metabolic panel (Completed)    Memory loss due to medical condition        Hx of arterial ischemic stroke        Relevant Orders    MRI Brain Without Contrast    CTA Head and Neck (xpd)    Basic metabolic panel (Completed)          Continued cognitive impairment post CVA with multifactorial component. Stroke workup ordered, continue aspirin and plavix DAPT and high intensity statin. Likely etiology small vessel, 2/2 uncontrolled DMII.   Continue followup w primary care.     Follow-up: after imaging    Time spent on this encounter: 40 minutes. This includes face to face time and non-face to face time preparing  to see the patient (eg, review of tests), obtaining and/or reviewing separately obtained history, documenting clinical information in the electronic or other health record, independently interpreting results and communicating results to the patient/family/caregiver, or care coordinator.

## 2023-03-02 ENCOUNTER — OFFICE VISIT (OUTPATIENT)
Dept: PSYCHIATRY | Facility: CLINIC | Age: 53
End: 2023-03-02
Payer: COMMERCIAL

## 2023-03-02 ENCOUNTER — TELEPHONE (OUTPATIENT)
Dept: NEUROLOGY | Facility: CLINIC | Age: 53
End: 2023-03-02
Payer: COMMERCIAL

## 2023-03-02 DIAGNOSIS — Z65.8 PSYCHOSOCIAL STRESSORS: ICD-10-CM

## 2023-03-02 DIAGNOSIS — G47.00 INSOMNIA, UNSPECIFIED TYPE: ICD-10-CM

## 2023-03-02 DIAGNOSIS — F41.1 GENERALIZED ANXIETY DISORDER: Primary | ICD-10-CM

## 2023-03-02 PROCEDURE — 3051F HG A1C>EQUAL 7.0%<8.0%: CPT | Mod: CPTII,95,, | Performed by: STUDENT IN AN ORGANIZED HEALTH CARE EDUCATION/TRAINING PROGRAM

## 2023-03-02 PROCEDURE — 3066F NEPHROPATHY DOC TX: CPT | Mod: CPTII,95,, | Performed by: STUDENT IN AN ORGANIZED HEALTH CARE EDUCATION/TRAINING PROGRAM

## 2023-03-02 PROCEDURE — 4010F PR ACE/ARB THEARPY RXD/TAKEN: ICD-10-PCS | Mod: CPTII,95,, | Performed by: STUDENT IN AN ORGANIZED HEALTH CARE EDUCATION/TRAINING PROGRAM

## 2023-03-02 PROCEDURE — 1160F RVW MEDS BY RX/DR IN RCRD: CPT | Mod: CPTII,95,, | Performed by: STUDENT IN AN ORGANIZED HEALTH CARE EDUCATION/TRAINING PROGRAM

## 2023-03-02 PROCEDURE — 3061F PR NEG MICROALBUMINURIA RESULT DOCUMENTED/REVIEW: ICD-10-PCS | Mod: CPTII,95,, | Performed by: STUDENT IN AN ORGANIZED HEALTH CARE EDUCATION/TRAINING PROGRAM

## 2023-03-02 PROCEDURE — 99214 OFFICE O/P EST MOD 30 MIN: CPT | Mod: 95,,, | Performed by: STUDENT IN AN ORGANIZED HEALTH CARE EDUCATION/TRAINING PROGRAM

## 2023-03-02 PROCEDURE — 3061F NEG MICROALBUMINURIA REV: CPT | Mod: CPTII,95,, | Performed by: STUDENT IN AN ORGANIZED HEALTH CARE EDUCATION/TRAINING PROGRAM

## 2023-03-02 PROCEDURE — 3051F PR MOST RECENT HEMOGLOBIN A1C LEVEL 7.0 - < 8.0%: ICD-10-PCS | Mod: CPTII,95,, | Performed by: STUDENT IN AN ORGANIZED HEALTH CARE EDUCATION/TRAINING PROGRAM

## 2023-03-02 PROCEDURE — 1159F MED LIST DOCD IN RCRD: CPT | Mod: CPTII,95,, | Performed by: STUDENT IN AN ORGANIZED HEALTH CARE EDUCATION/TRAINING PROGRAM

## 2023-03-02 PROCEDURE — 1160F PR REVIEW ALL MEDS BY PRESCRIBER/CLIN PHARMACIST DOCUMENTED: ICD-10-PCS | Mod: CPTII,95,, | Performed by: STUDENT IN AN ORGANIZED HEALTH CARE EDUCATION/TRAINING PROGRAM

## 2023-03-02 PROCEDURE — 1159F PR MEDICATION LIST DOCUMENTED IN MEDICAL RECORD: ICD-10-PCS | Mod: CPTII,95,, | Performed by: STUDENT IN AN ORGANIZED HEALTH CARE EDUCATION/TRAINING PROGRAM

## 2023-03-02 PROCEDURE — 99214 PR OFFICE/OUTPT VISIT, EST, LEVL IV, 30-39 MIN: ICD-10-PCS | Mod: 95,,, | Performed by: STUDENT IN AN ORGANIZED HEALTH CARE EDUCATION/TRAINING PROGRAM

## 2023-03-02 PROCEDURE — 4010F ACE/ARB THERAPY RXD/TAKEN: CPT | Mod: CPTII,95,, | Performed by: STUDENT IN AN ORGANIZED HEALTH CARE EDUCATION/TRAINING PROGRAM

## 2023-03-02 PROCEDURE — 3066F PR DOCUMENTATION OF TREATMENT FOR NEPHROPATHY: ICD-10-PCS | Mod: CPTII,95,, | Performed by: STUDENT IN AN ORGANIZED HEALTH CARE EDUCATION/TRAINING PROGRAM

## 2023-03-02 RX ORDER — ESZOPICLONE 3 MG/1
3 TABLET, FILM COATED ORAL NIGHTLY
Qty: 30 TABLET | Refills: 3 | Status: SHIPPED | OUTPATIENT
Start: 2023-03-02 | End: 2023-06-27 | Stop reason: SDUPTHER

## 2023-03-02 RX ORDER — SERTRALINE HYDROCHLORIDE 100 MG/1
100 TABLET, FILM COATED ORAL DAILY
Qty: 90 TABLET | Refills: 1 | Status: SHIPPED | OUTPATIENT
Start: 2023-03-02 | End: 2023-03-20

## 2023-03-02 NOTE — PROGRESS NOTES
"  03/02/2023  8:40 AM  Abel Hackett  [id number]    Outpatient Psychiatry Follow-Up Visit (MD/NP)    3/2/2023    Clinical Status of Patient:  Outpatient (Ambulatory)    Chief Complaint:  Abel Hackett is a 52 y.o. male who presents today for follow-up of anxiety.  Met with patient.      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 He is currently sleeping 8-9 hours per night which he finds is restorative, no AM grogginess. No new stressors.      Psychiatric Review Of Systems - Is patient experiencing or having changes in:  Depressed mood: no  sleep: improved  appetite: no  energy/anergy: no  interest/pleasure/anhedonia: no  anxiety/panic: less  guilty/hopelessness/worthlessness: no  concentration: no  S.I.B.s/risky behavior: no  Irritability: no  Substance abuse: no  Racing thoughts: no  Impulsive behaviors: no  Paranoia: no  AVH: no        Medical Review of Systems   Review of Systems   Constitutional:  Negative for chills and fever.   HENT:  Negative for hearing loss.    Eyes:  Negative for blurred vision.   Respiratory:  Negative for shortness of breath.    Cardiovascular:  Negative for chest pain and palpitations.   Gastrointestinal:  Negative for constipation, diarrhea and nausea.   Genitourinary:  Negative for dysuria.   Musculoskeletal:  Negative for back pain and neck pain.   Skin:  Negative for rash.   Neurological:  Negative for dizziness and headaches.   Endo/Heme/Allergies:  Negative for environmental allergies.     Past Medical, Family and Social History: The patient's past medical, family and social history have been reviewed and updated as appropriate " within the electronic medical record - see encounter notes.    Social History     Socioeconomic History    Marital status:    Tobacco Use    Smoking status: Never    Smokeless tobacco: Never   Substance and Sexual Activity    Alcohol use: Not Currently     Comment: occassional    Drug use: No    Sexual activity: Yes     Partners: Female     Social Determinants of Health     Financial Resource Strain: Low Risk     Difficulty of Paying Living Expenses: Not very hard   Food Insecurity: No Food Insecurity    Worried About Running Out of Food in the Last Year: Never true    Ran Out of Food in the Last Year: Never true   Transportation Needs: No Transportation Needs    Lack of Transportation (Medical): No    Lack of Transportation (Non-Medical): No   Physical Activity: Insufficiently Active    Days of Exercise per Week: 3 days    Minutes of Exercise per Session: 30 min   Stress: Stress Concern Present    Feeling of Stress : Rather much   Social Connections: Unknown    Frequency of Communication with Friends and Family: Once a week    Frequency of Social Gatherings with Friends and Family: Once a week    Active Member of Clubs or Organizations: No    Attends Club or Organization Meetings: Never    Marital Status:    Housing Stability: Unknown    Unable to Pay for Housing in the Last Year: No    Unstable Housing in the Last Year: No         Compliance: yes    Side effects: None    Risk Parameters:  Patient reports no suicidal ideation  Patient reports no homicidal ideation  Patient reports no self-injurious behavior  Patient reports no violent behavior      Exam (detailed: at least 9 elements; comprehensive: all 15 elements)     Vitals:    Vitals could not be obtained 2/2 virtual visit.       Weight/BMI could not be obtained 2/2 virtual visit.          CONSTITUTIONAL  General Appearance: unremarkable, age appropriate    MUSCULOSKELETAL  Muscle Strength and Tone:no tremor, no tic  Abnormal Involuntary  Movements: No  Gait and Station: non-ataxic    PSYCHIATRIC   Level of Consciousness: awake and alert   Orientation: person, place, and situation  Grooming: Casually dressed and Well groomed  Psychomotor Behavior: normal, cooperative  Speech: normal tone, normal rate, normal pitch, normal volume  Language: grossly intact  Mood: fine  Affect: Consistent with mood  Thought Process: linear, logical  Associations: intact   Thought Content: DENIES suicidal ideation and DENIES homicidal ideation  Perceptions: denies hallucinations  Memory: Able to recall past events, Remote intact, and Recent intact  Attention:Attends to interview without distraction  Fund of Knowledge: Aware of current events and Vocabulary appropriate   Estimate if Intelligence:  Average based on work/education history, vocabulary and mental status exam  Insight: has awareness of illness  Judgment: behavior is adequate to circumstances        Assessment and Diagnosis   Status/Progress: Based on the examination today, the patient's problem(s) is/are improved.  New problems have not been presented today.   Co-morbidities are complicating management of the primary condition.        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 Instructed to call 911 or present to ER for emergency including SI or HI.    - Discussed diagnosis, risks and benefits of proposed treatment above vs alternative treatments vs no treatment, and potential side effects of these  treatments. Discussed the inherent unpredictability of treatment. The patient expresses understanding of the above and displays the capacity to agree with this treatment given said understanding. Patient also agrees that, currently, the benefits outweigh the risks and would like to pursue this treatment at this time.     - Any medications being used off-label were discussed with the patient inclusive of the evidence base for the use of the medications and consent was obtained for the off-label use of the medication.     The patient location is: [place]    Visit type: audiovisual    Face to Face time with patient: 10  20 minutes of total time spent on the encounter, which includes face to face time and non-face to face time preparing to see the patient (eg, review of tests), Obtaining and/or reviewing separately obtained history, Documenting clinical information in the electronic or other health record, Independently interpreting results (not separately reported) and communicating results to the patient/family/caregiver, or Care coordination (not separately reported).     Each patient to whom he or she provides medical services by telemedicine is:  (1) informed of the relationship between the physician and patient and the respective role of any other health care provider with respect to management of the patient; and (2) notified that he or she may decline to receive medical services by telemedicine and may withdraw from such care at any time.        Froylan Marie III, MD    Return to Clinic: 3 months

## 2023-03-02 NOTE — TELEPHONE ENCOUNTER
----- Message from Jossie Adams sent at 3/2/2023 11:49 AM CST -----  Regarding: Returning Call  .Type:  Patient Returning Call    Who Called: Self    Who Left Message for Patient: Brenda    Does the patient know what this is regarding?: Appt    Would the patient rather a call back or a response via My [facility]? Call back    Best Call Back Number: [phone]     Additional Information:      Confirmed appointment

## 2023-03-03 ENCOUNTER — OFFICE VISIT (OUTPATIENT)
Dept: FAMILY MEDICINE | Facility: CLINIC | Age: 53
End: 2023-03-03
Payer: COMMERCIAL

## 2023-03-03 VITALS
DIASTOLIC BLOOD PRESSURE: 69 MMHG | HEART RATE: 96 BPM | SYSTOLIC BLOOD PRESSURE: 132 MMHG | HEIGHT: 69 IN | RESPIRATION RATE: 16 BRPM | BODY MASS INDEX: 28.33 KG/M2 | WEIGHT: 191.25 LBS

## 2023-03-03 DIAGNOSIS — G47.33 OBSTRUCTIVE SLEEP APNEA (ADULT) (PEDIATRIC): ICD-10-CM

## 2023-03-03 DIAGNOSIS — E78.5 HYPERLIPIDEMIA, UNSPECIFIED HYPERLIPIDEMIA TYPE: ICD-10-CM

## 2023-03-03 DIAGNOSIS — E11.311 TYPE 2 DIABETES MELLITUS WITH RETINOPATHY AND MACULAR EDEMA, WITHOUT LONG-TERM CURRENT USE OF INSULIN, UNSPECIFIED LATERALITY, UNSPECIFIED RETINOPATHY SEVERITY: ICD-10-CM

## 2023-03-03 DIAGNOSIS — F41.1 GENERALIZED ANXIETY DISORDER: ICD-10-CM

## 2023-03-03 DIAGNOSIS — Z79.4 TYPE 2 DIABETES MELLITUS TREATED WITH INSULIN: ICD-10-CM

## 2023-03-03 DIAGNOSIS — I25.118 CORONARY ARTERY DISEASE OF NATIVE ARTERY OF NATIVE HEART WITH STABLE ANGINA PECTORIS: ICD-10-CM

## 2023-03-03 DIAGNOSIS — E11.9 TYPE 2 DIABETES MELLITUS TREATED WITH INSULIN: ICD-10-CM

## 2023-03-03 DIAGNOSIS — F32.5 MAJOR DEPRESSIVE DISORDER WITH SINGLE EPISODE, IN FULL REMISSION: Primary | ICD-10-CM

## 2023-03-03 PROCEDURE — 3051F HG A1C>EQUAL 7.0%<8.0%: CPT | Mod: CPTII,S$GLB,, | Performed by: FAMILY MEDICINE

## 2023-03-03 PROCEDURE — 99999 PR PBB SHADOW E&M-EST. PATIENT-LVL III: ICD-10-PCS | Mod: PBBFAC,,, | Performed by: FAMILY MEDICINE

## 2023-03-03 PROCEDURE — 99999 PR PBB SHADOW E&M-EST. PATIENT-LVL III: CPT | Mod: PBBFAC,,, | Performed by: FAMILY MEDICINE

## 2023-03-03 PROCEDURE — 3051F PR MOST RECENT HEMOGLOBIN A1C LEVEL 7.0 - < 8.0%: ICD-10-PCS | Mod: CPTII,S$GLB,, | Performed by: FAMILY MEDICINE

## 2023-03-03 PROCEDURE — 3066F NEPHROPATHY DOC TX: CPT | Mod: CPTII,S$GLB,, | Performed by: FAMILY MEDICINE

## 2023-03-03 PROCEDURE — 3078F PR MOST RECENT DIASTOLIC BLOOD PRESSURE < 80 MM HG: ICD-10-PCS | Mod: CPTII,S$GLB,, | Performed by: FAMILY MEDICINE

## 2023-03-03 PROCEDURE — 3061F NEG MICROALBUMINURIA REV: CPT | Mod: CPTII,S$GLB,, | Performed by: FAMILY MEDICINE

## 2023-03-03 PROCEDURE — 3075F SYST BP GE 130 - 139MM HG: CPT | Mod: CPTII,S$GLB,, | Performed by: FAMILY MEDICINE

## 2023-03-03 PROCEDURE — 1159F PR MEDICATION LIST DOCUMENTED IN MEDICAL RECORD: ICD-10-PCS | Mod: CPTII,S$GLB,, | Performed by: FAMILY MEDICINE

## 2023-03-03 PROCEDURE — 1159F MED LIST DOCD IN RCRD: CPT | Mod: CPTII,S$GLB,, | Performed by: FAMILY MEDICINE

## 2023-03-03 PROCEDURE — 3008F PR BODY MASS INDEX (BMI) DOCUMENTED: ICD-10-PCS | Mod: CPTII,S$GLB,, | Performed by: FAMILY MEDICINE

## 2023-03-03 PROCEDURE — 3078F DIAST BP <80 MM HG: CPT | Mod: CPTII,S$GLB,, | Performed by: FAMILY MEDICINE

## 2023-03-03 PROCEDURE — 99214 PR OFFICE/OUTPT VISIT, EST, LEVL IV, 30-39 MIN: ICD-10-PCS | Mod: S$GLB,,, | Performed by: FAMILY MEDICINE

## 2023-03-03 PROCEDURE — 4010F PR ACE/ARB THEARPY RXD/TAKEN: ICD-10-PCS | Mod: CPTII,S$GLB,, | Performed by: FAMILY MEDICINE

## 2023-03-03 PROCEDURE — 99214 OFFICE O/P EST MOD 30 MIN: CPT | Mod: S$GLB,,, | Performed by: FAMILY MEDICINE

## 2023-03-03 PROCEDURE — 3008F BODY MASS INDEX DOCD: CPT | Mod: CPTII,S$GLB,, | Performed by: FAMILY MEDICINE

## 2023-03-03 PROCEDURE — 3061F PR NEG MICROALBUMINURIA RESULT DOCUMENTED/REVIEW: ICD-10-PCS | Mod: CPTII,S$GLB,, | Performed by: FAMILY MEDICINE

## 2023-03-03 PROCEDURE — 4010F ACE/ARB THERAPY RXD/TAKEN: CPT | Mod: CPTII,S$GLB,, | Performed by: FAMILY MEDICINE

## 2023-03-03 PROCEDURE — 3066F PR DOCUMENTATION OF TREATMENT FOR NEPHROPATHY: ICD-10-PCS | Mod: CPTII,S$GLB,, | Performed by: FAMILY MEDICINE

## 2023-03-03 PROCEDURE — 3075F PR MOST RECENT SYSTOLIC BLOOD PRESS GE 130-139MM HG: ICD-10-PCS | Mod: CPTII,S$GLB,, | Performed by: FAMILY MEDICINE

## 2023-03-03 NOTE — PROGRESS NOTES
Subjective:       Patient ID: Abel Hackett is a 52 y.o. male.    Chief Complaint: Diabetes    HPI  52 year old male comes in for visit and has had significant changes in the interrem since his last visit here. He was evaluated for fatigue and found to have significant cad - treated with stenting. He has small vessel disease and is currently being looked at for microvascular neurolgoic changes leading to cva and memory issues. He says he is doing well but has a flattened affect today which differs from previous. He also notes that he has had some issues with getting medications - but currently has all prescribed meds and is doing well with these.    PMH, PSH, ALLERGIES, SH, FH reviewed in nurse's notes above  Medications reconciled in the nurse's notes      Review of Systems   Constitutional:  Positive for fatigue.   Cardiovascular:  Positive for chest pain.   Endocrine: Negative for polydipsia, polyphagia and polyuria.   Skin:  Negative for pallor.   Neurological:  Positive for weakness. Negative for dizziness, tremors, seizures, speech difficulty and headaches.   Psychiatric/Behavioral:  Negative for confusion. The patient is not nervous/anxious.      Objective:      Physical Exam  Vitals and nursing note reviewed.   Constitutional:       General: He is not in acute distress.     Appearance: He is well-developed.   HENT:      Head: Normocephalic and atraumatic.   Eyes:      Conjunctiva/sclera: Conjunctivae normal.      Pupils: Pupils are equal, round, and reactive to light.   Neck:      Thyroid: No thyromegaly.   Cardiovascular:      Rate and Rhythm: Normal rate and regular rhythm.      Heart sounds: Normal heart sounds.   Pulmonary:      Effort: Pulmonary effort is normal. No respiratory distress.      Breath sounds: Normal breath sounds. No wheezing.   Abdominal:      General: Bowel sounds are normal.      Palpations: Abdomen is soft.      Tenderness: There is no abdominal tenderness.   Musculoskeletal:          General: Normal range of motion.      Cervical back: Normal range of motion and neck supple.   Lymphadenopathy:      Cervical: No cervical adenopathy.   Skin:     General: Skin is warm and dry.      Findings: No rash.   Neurological:      Mental Status: He is alert and oriented to person, place, and time.   Psychiatric:         Behavior: Behavior normal.        Assessment/Plan:       Problem List Items Addressed This Visit          Psychiatric    Generalized anxiety disorder    Depression - Primary       Cardiac/Vascular    Hyperlipidemia    Overview     Overview:   ICD-10 Transition            Endocrine    Type 2 diabetes mellitus with diabetic retinopathy    Type 2 diabetes mellitus treated with insulin       Other    Obstructive sleep apnea (adult) (pediatric)     Other Visit Diagnoses       Coronary artery disease of native artery of native heart with stable angina pectoris            Seeing psyche. Doing well.  On SGLT2, Ace, Statin, GLP, Plavix, BB, aspirin    Using cpap.    RTC if condition acutely worsens or any other concerns, otherwise RTC as scheduled          Answers submitted by the patient for this visit:  Diabetes Questionnaire (Submitted on 3/1/2023)  Chief Complaint: Diabetes problem  Diabetes type: type 2  MedicAlert ID: No  Disease duration: 12 Years  blurred vision: No  foot paresthesias: Yes  foot ulcerations: No  visual change: No  weight loss: Yes  Symptom course: worsening  hunger: No  mood changes: No  sleepiness: No  sweats: No  blackouts: No  hospitalization: No  nocturnal hypoglycemia: No  required assistance: No  required glucagon: No  CVA: Yes  heart disease: Yes  impotence: Yes  nephropathy: No  peripheral neuropathy: No  PVD: No  retinopathy: Yes  autonomic neuropathy: No  CAD risks: dyslipidemia, family history, obesity, stress, diabetes mellitus, male sex  Current treatments: oral agent (triple therapy)  Treatment compliance: all of the time  Injection sites: abdominal wall  Home  blood tests: 1-2 x per week  Home urines: <1 x per month  Monitoring compliance: adequate  Blood glucose trend: decreasing steadily  Weight trend: decreasing steadily  Current diet: generally healthy  Meal planning: none  Exercise: three times a week  Dietitian visit: No  Eye exam current: Yes  Sees podiatrist: No

## 2023-03-06 LAB
CHOLEST SERPL-MSCNC: 135 MG/DL (ref 0–200)
CHOLEST/HDLC SERPL: 2.9 {RATIO}
EGFR: 89.3
HDLC SERPL-MCNC: 47 MG/DL (ref 35–70)
LDL CHOLESTEROL DIRECT: 71 MG/DL
NON HDL CHOL. (LDL+VLDL): 88
TRIGL SERPL-MCNC: 139 MG/DL (ref 40–160)
VLDL CHOLESTEROL: 28 MG/DL

## 2023-03-07 ENCOUNTER — PATIENT MESSAGE (OUTPATIENT)
Dept: NEUROLOGY | Facility: CLINIC | Age: 53
End: 2023-03-07
Payer: COMMERCIAL

## 2023-03-21 ENCOUNTER — OFFICE VISIT (OUTPATIENT)
Dept: NEUROLOGY | Facility: CLINIC | Age: 53
End: 2023-03-21
Payer: COMMERCIAL

## 2023-03-21 VITALS
HEIGHT: 69 IN | WEIGHT: 191.13 LBS | DIASTOLIC BLOOD PRESSURE: 69 MMHG | BODY MASS INDEX: 28.31 KG/M2 | HEART RATE: 114 BPM | SYSTOLIC BLOOD PRESSURE: 139 MMHG

## 2023-03-21 DIAGNOSIS — Z86.73 HX OF ARTERIAL ISCHEMIC STROKE: ICD-10-CM

## 2023-03-21 DIAGNOSIS — R41.89 COGNITIVE IMPAIRMENT: Primary | ICD-10-CM

## 2023-03-21 PROBLEM — R41.3 POOR SHORT TERM MEMORY: Status: RESOLVED | Noted: 2022-11-21 | Resolved: 2023-03-21

## 2023-03-21 PROCEDURE — 99999 PR PBB SHADOW E&M-EST. PATIENT-LVL III: CPT | Mod: PBBFAC,,, | Performed by: STUDENT IN AN ORGANIZED HEALTH CARE EDUCATION/TRAINING PROGRAM

## 2023-03-21 PROCEDURE — 3008F BODY MASS INDEX DOCD: CPT | Mod: CPTII,S$GLB,, | Performed by: STUDENT IN AN ORGANIZED HEALTH CARE EDUCATION/TRAINING PROGRAM

## 2023-03-21 PROCEDURE — 3075F SYST BP GE 130 - 139MM HG: CPT | Mod: CPTII,S$GLB,, | Performed by: STUDENT IN AN ORGANIZED HEALTH CARE EDUCATION/TRAINING PROGRAM

## 2023-03-21 PROCEDURE — 3051F HG A1C>EQUAL 7.0%<8.0%: CPT | Mod: CPTII,S$GLB,, | Performed by: STUDENT IN AN ORGANIZED HEALTH CARE EDUCATION/TRAINING PROGRAM

## 2023-03-21 PROCEDURE — 3061F PR NEG MICROALBUMINURIA RESULT DOCUMENTED/REVIEW: ICD-10-PCS | Mod: CPTII,S$GLB,, | Performed by: STUDENT IN AN ORGANIZED HEALTH CARE EDUCATION/TRAINING PROGRAM

## 2023-03-21 PROCEDURE — 4010F PR ACE/ARB THEARPY RXD/TAKEN: ICD-10-PCS | Mod: CPTII,S$GLB,, | Performed by: STUDENT IN AN ORGANIZED HEALTH CARE EDUCATION/TRAINING PROGRAM

## 2023-03-21 PROCEDURE — 3078F PR MOST RECENT DIASTOLIC BLOOD PRESSURE < 80 MM HG: ICD-10-PCS | Mod: CPTII,S$GLB,, | Performed by: STUDENT IN AN ORGANIZED HEALTH CARE EDUCATION/TRAINING PROGRAM

## 2023-03-21 PROCEDURE — 3078F DIAST BP <80 MM HG: CPT | Mod: CPTII,S$GLB,, | Performed by: STUDENT IN AN ORGANIZED HEALTH CARE EDUCATION/TRAINING PROGRAM

## 2023-03-21 PROCEDURE — 3051F PR MOST RECENT HEMOGLOBIN A1C LEVEL 7.0 - < 8.0%: ICD-10-PCS | Mod: CPTII,S$GLB,, | Performed by: STUDENT IN AN ORGANIZED HEALTH CARE EDUCATION/TRAINING PROGRAM

## 2023-03-21 PROCEDURE — 99999 PR PBB SHADOW E&M-EST. PATIENT-LVL III: ICD-10-PCS | Mod: PBBFAC,,, | Performed by: STUDENT IN AN ORGANIZED HEALTH CARE EDUCATION/TRAINING PROGRAM

## 2023-03-21 PROCEDURE — 99214 PR OFFICE/OUTPT VISIT, EST, LEVL IV, 30-39 MIN: ICD-10-PCS | Mod: S$GLB,,, | Performed by: STUDENT IN AN ORGANIZED HEALTH CARE EDUCATION/TRAINING PROGRAM

## 2023-03-21 PROCEDURE — 3075F PR MOST RECENT SYSTOLIC BLOOD PRESS GE 130-139MM HG: ICD-10-PCS | Mod: CPTII,S$GLB,, | Performed by: STUDENT IN AN ORGANIZED HEALTH CARE EDUCATION/TRAINING PROGRAM

## 2023-03-21 PROCEDURE — 3066F PR DOCUMENTATION OF TREATMENT FOR NEPHROPATHY: ICD-10-PCS | Mod: CPTII,S$GLB,, | Performed by: STUDENT IN AN ORGANIZED HEALTH CARE EDUCATION/TRAINING PROGRAM

## 2023-03-21 PROCEDURE — 3008F PR BODY MASS INDEX (BMI) DOCUMENTED: ICD-10-PCS | Mod: CPTII,S$GLB,, | Performed by: STUDENT IN AN ORGANIZED HEALTH CARE EDUCATION/TRAINING PROGRAM

## 2023-03-21 PROCEDURE — 3061F NEG MICROALBUMINURIA REV: CPT | Mod: CPTII,S$GLB,, | Performed by: STUDENT IN AN ORGANIZED HEALTH CARE EDUCATION/TRAINING PROGRAM

## 2023-03-21 PROCEDURE — 3066F NEPHROPATHY DOC TX: CPT | Mod: CPTII,S$GLB,, | Performed by: STUDENT IN AN ORGANIZED HEALTH CARE EDUCATION/TRAINING PROGRAM

## 2023-03-21 PROCEDURE — 99214 OFFICE O/P EST MOD 30 MIN: CPT | Mod: S$GLB,,, | Performed by: STUDENT IN AN ORGANIZED HEALTH CARE EDUCATION/TRAINING PROGRAM

## 2023-03-21 PROCEDURE — 4010F ACE/ARB THERAPY RXD/TAKEN: CPT | Mod: CPTII,S$GLB,, | Performed by: STUDENT IN AN ORGANIZED HEALTH CARE EDUCATION/TRAINING PROGRAM

## 2023-03-21 RX ORDER — ASPIRIN 81 MG/1
81 TABLET ORAL DAILY
Qty: 90 TABLET | Refills: 1
Start: 2023-03-21 | End: 2023-03-22

## 2023-03-21 RX ORDER — CLOPIDOGREL BISULFATE 75 MG/1
75 TABLET ORAL DAILY
Qty: 180 TABLET | Refills: 5
Start: 2023-03-21 | End: 2023-03-22 | Stop reason: ALTCHOICE

## 2023-03-21 NOTE — PROGRESS NOTES
Chief Complaint and Duration     Cognitive impairment, hx of stroke    History of Present Illness     Abel Hackett is a 52 y.o. male with a history of multiple medical diagnoses w cognitive impairment, hx of a stroke in 2022, hx of HTN, JEANNINE not compliant w CPAP, uncontrolled DMII. Has seen neuorology 11/2022, felt cognitive impairment multifactorial.    On aspirin and plavix DAPT for at least a year per cardiology 2/2 to multiple stent placement this past December. Outside imaging not available to review but noted in prior notes there was a R inferior cerebellar stroke, vessel imaging wo large vessel occlusion.     Interim period:  3/21/23 - here for MRI results. Has seen psychiatry for insomnia, CLAUDIA. On aspirin plavix DAPT. Continued cognitive impairment.     Review of patient's allergies indicates:  No Known Allergies  Current Outpatient Medications   Medication Sig Dispense Refill    aspirin (ECOTRIN) 81 MG EC tablet Take 1 tablet (81 mg total) by mouth once daily. 90 tablet 1    atorvastatin (LIPITOR) 40 MG tablet Take 1 tablet (40 mg total) by mouth once daily. 90 tablet 3    clopidogreL (PLAVIX) 75 mg tablet Take 1 tablet (75 mg total) by mouth once daily. 180 tablet 5    diltiaZEM (CARDIZEM CD) 180 MG 24 hr capsule Take 180 mg by mouth 2 (two) times daily.      empagliflozin-metformin (SYNJARDY XR) 25-1,000 mg TBph Take 1 tablet by mouth once daily at 6am. 90 tablet 1    eszopiclone (LUNESTA) 3 mg Tab Take 1 tablet (3 mg total) by mouth every evening. 30 tablet 3    isosorbide mononitrate (IMDUR) 30 MG 24 hr tablet Take 30 mg by mouth.      nebivoloL (BYSTOLIC) 10 MG Tab Take 10 mg by mouth.      nitroGLYCERIN (NITROSTAT) 0.4 MG SL tablet PLEASE SEE ATTACHED FOR DETAILED DIRECTIONS      pantoprazole (PROTONIX) 40 MG tablet Take 1 tablet (40 mg total) by mouth once daily. 30 tablet 0    ranolazine (RANEXA) 1,000 mg Tb12 Take 1,000 mg by mouth 2 (two) times daily.      semaglutide (OZEMPIC) 1 mg/dose (4 mg/3  mL) Inject 1 mg into the skin every 7 days. 3 pen 3    sertraline (ZOLOFT) 100 MG tablet Take 1 tablet (100 mg total) by mouth once daily. 90 tablet 1    sildenafil (VIAGRA) 100 MG tablet Take 1 tablet (100 mg total) by mouth as needed for Erectile Dysfunction. 10 tablet 4     No current facility-administered medications for this visit.       Medical History     Past Medical History:   Diagnosis Date    Depression     Diabetes mellitus     Diabetes mellitus, type 2     History of right coronary artery stent placement     done at [facility]     Past Surgical History:   Procedure Laterality Date    COLONOSCOPY N/A 8/2/2022    Procedure: COLONOSCOPY;  Surgeon: Keira Wheat MD;  Location: [facility];  Service: Endoscopy;  Laterality: N/A;    REFRACTIVE SURGERY  4/13/2016    VASECTOMY       Family History   Problem Relation Age of Onset    Heart disease Mother     Hypertension Mother     Diabetes Mother     Heart attack Mother     Heart disease Father     Hypertension Father     Diabetes Father     Heart attack Father     Heart attack Maternal Uncle     Heart attack Maternal Grandfather      Social History     Socioeconomic History    Marital status:    Tobacco Use    Smoking status: Never    Smokeless tobacco: Never   Substance and Sexual Activity    Alcohol use: Not Currently     Comment: occassional    Drug use: No    Sexual activity: Yes     Partners: Female     Social Determinants of Health     Financial Resource Strain: Low Risk     Difficulty of Paying Living Expenses: Not very hard   Food Insecurity: No Food Insecurity    Worried About Running Out of Food in the Last Year: Never true    Ran Out of Food in the Last Year: Never true   Transportation Needs: No Transportation Needs    Lack of Transportation (Medical): No    Lack of Transportation (Non-Medical): No   Physical Activity: Insufficiently Active    Days of Exercise per Week: 3 days    Minutes of Exercise per Session: 30 min   Stress: Stress Concern  Present    Feeling of Stress : Rather much   Social Connections: Unknown    Frequency of Communication with Friends and Family: Once a week    Frequency of Social Gatherings with Friends and Family: Once a week    Active Member of Clubs or Organizations: No    Attends Club or Organization Meetings: Never    Marital Status:    Housing Stability: Unknown    Unable to Pay for Housing in the Last Year: No    Unstable Housing in the Last Year: No       Exam     Vitals:    03/21/23 0901   BP: 139/69   Pulse: (!) 114       Physical Exam:  General: he is not in acute distress. he is not ill-appearing.   HENT: Normocephalic and atraumatic. Moist mucous membranes.  Eyes: Conjunctivae normal.   Pulmonary: Pulmonary effort is normal.   Abdominal: Abdomen is soft and flat.   Skin: Skin is warm and dry. No rashes.   Psychiatric: Mood normal.        Neurologic Exam   Mental status: oriented to person, place, and time  Attention: Normal. Concentration: normal.  Speech: speech is normal.  Cranial Nerves: PERRL, EOMI intact, V1-V3 Facial sensation intact. Symmetric facies. Hearing grossly intact. Palate and uvula midline, symmetric. No tongue deviation. Trapezius strength intact.     Motor exam: bulk and tone normal. Strength 5/5 in bilateral upper extremities: deltoids, biceps, triceps, wrist flexion/extension, finger abduction/adduction. Strength 5/5 in bilateral lower extremities: hip flexion/extension, thigh adduction/abduction, knee flexion/extension, dorsiflexion/plantarflexion, foot eversion/inversion.    Reflexes: 2+ in bilateral upper extremities: biceps and brachiaradialis, 2+ in bilateral lower extremities: patellar and achilles  Plantar reflex: normal  Tinoco's/Clonus: negative    Sensory exam: light touch intact    Gait exam: normal  Romberg: negative  Coordination: normal     Tremor: none    Labs and Imaging     Labs: reviewed  A1C 7.1      MRI brain 3/2023 personally reviewed = remote infarct in R  cerebellar hemisphere    CTA H&N 3/2023 without large vessel or significant vessel stenosis    Assessment and Plan     Problem List Items Addressed This Visit          Neuro    Cognitive impairment - Primary    Hx of arterial ischemic stroke    Relevant Medications    aspirin (ECOTRIN) 81 MG EC tablet    clopidogreL (PLAVIX) 75 mg tablet       Continued cognitive impairment post CVA with multifactorial component including CLAUDIA and insomnia. MRI shows remote R cerebellar infarct likely 2/2 to small vessel disease, has uncontrolled DMII. On aspirin, plavix DAPT, high intensity statin.    Follow-up: PRN. Psych, PCP, cardiology

## 2023-03-22 ENCOUNTER — OFFICE VISIT (OUTPATIENT)
Dept: NEUROLOGY | Facility: CLINIC | Age: 53
End: 2023-03-22
Payer: COMMERCIAL

## 2023-03-22 VITALS
HEIGHT: 69 IN | DIASTOLIC BLOOD PRESSURE: 70 MMHG | BODY MASS INDEX: 27.79 KG/M2 | HEART RATE: 113 BPM | WEIGHT: 187.63 LBS | SYSTOLIC BLOOD PRESSURE: 120 MMHG | OXYGEN SATURATION: 98 %

## 2023-03-22 DIAGNOSIS — G31.84 MILD COGNITIVE IMPAIRMENT OF UNCERTAIN OR UNKNOWN ETIOLOGY: Primary | ICD-10-CM

## 2023-03-22 DIAGNOSIS — G25.0 BENIGN FAMILIAL TREMOR: ICD-10-CM

## 2023-03-22 DIAGNOSIS — E11.8 DIABETES MELLITUS TYPE 2 WITH COMPLICATIONS: ICD-10-CM

## 2023-03-22 DIAGNOSIS — F41.1 GENERALIZED ANXIETY DISORDER: ICD-10-CM

## 2023-03-22 DIAGNOSIS — Z86.73 HISTORY OF CEREBELLAR STROKE: ICD-10-CM

## 2023-03-22 DIAGNOSIS — G47.33 OBSTRUCTIVE SLEEP APNEA (ADULT) (PEDIATRIC): ICD-10-CM

## 2023-03-22 PROCEDURE — 3008F PR BODY MASS INDEX (BMI) DOCUMENTED: ICD-10-PCS | Mod: CPTII,S$GLB,, | Performed by: PHYSICIAN ASSISTANT

## 2023-03-22 PROCEDURE — 99999 PR PBB SHADOW E&M-EST. PATIENT-LVL IV: ICD-10-PCS | Mod: PBBFAC,,, | Performed by: PHYSICIAN ASSISTANT

## 2023-03-22 PROCEDURE — 4010F PR ACE/ARB THEARPY RXD/TAKEN: ICD-10-PCS | Mod: CPTII,S$GLB,, | Performed by: PHYSICIAN ASSISTANT

## 2023-03-22 PROCEDURE — 99999 PR PBB SHADOW E&M-EST. PATIENT-LVL IV: CPT | Mod: PBBFAC,,, | Performed by: PHYSICIAN ASSISTANT

## 2023-03-22 PROCEDURE — 3061F PR NEG MICROALBUMINURIA RESULT DOCUMENTED/REVIEW: ICD-10-PCS | Mod: CPTII,S$GLB,, | Performed by: PHYSICIAN ASSISTANT

## 2023-03-22 PROCEDURE — 99214 PR OFFICE/OUTPT VISIT, EST, LEVL IV, 30-39 MIN: ICD-10-PCS | Mod: S$GLB,,, | Performed by: PHYSICIAN ASSISTANT

## 2023-03-22 PROCEDURE — 1159F MED LIST DOCD IN RCRD: CPT | Mod: CPTII,S$GLB,, | Performed by: PHYSICIAN ASSISTANT

## 2023-03-22 PROCEDURE — 3051F PR MOST RECENT HEMOGLOBIN A1C LEVEL 7.0 - < 8.0%: ICD-10-PCS | Mod: CPTII,S$GLB,, | Performed by: PHYSICIAN ASSISTANT

## 2023-03-22 PROCEDURE — 99214 OFFICE O/P EST MOD 30 MIN: CPT | Mod: S$GLB,,, | Performed by: PHYSICIAN ASSISTANT

## 2023-03-22 PROCEDURE — 1160F PR REVIEW ALL MEDS BY PRESCRIBER/CLIN PHARMACIST DOCUMENTED: ICD-10-PCS | Mod: CPTII,S$GLB,, | Performed by: PHYSICIAN ASSISTANT

## 2023-03-22 PROCEDURE — 4010F ACE/ARB THERAPY RXD/TAKEN: CPT | Mod: CPTII,S$GLB,, | Performed by: PHYSICIAN ASSISTANT

## 2023-03-22 PROCEDURE — 3051F HG A1C>EQUAL 7.0%<8.0%: CPT | Mod: CPTII,S$GLB,, | Performed by: PHYSICIAN ASSISTANT

## 2023-03-22 PROCEDURE — 3078F PR MOST RECENT DIASTOLIC BLOOD PRESSURE < 80 MM HG: ICD-10-PCS | Mod: CPTII,S$GLB,, | Performed by: PHYSICIAN ASSISTANT

## 2023-03-22 PROCEDURE — 3078F DIAST BP <80 MM HG: CPT | Mod: CPTII,S$GLB,, | Performed by: PHYSICIAN ASSISTANT

## 2023-03-22 PROCEDURE — 3008F BODY MASS INDEX DOCD: CPT | Mod: CPTII,S$GLB,, | Performed by: PHYSICIAN ASSISTANT

## 2023-03-22 PROCEDURE — 3061F NEG MICROALBUMINURIA REV: CPT | Mod: CPTII,S$GLB,, | Performed by: PHYSICIAN ASSISTANT

## 2023-03-22 PROCEDURE — 1159F PR MEDICATION LIST DOCUMENTED IN MEDICAL RECORD: ICD-10-PCS | Mod: CPTII,S$GLB,, | Performed by: PHYSICIAN ASSISTANT

## 2023-03-22 PROCEDURE — 3074F SYST BP LT 130 MM HG: CPT | Mod: CPTII,S$GLB,, | Performed by: PHYSICIAN ASSISTANT

## 2023-03-22 PROCEDURE — 3074F PR MOST RECENT SYSTOLIC BLOOD PRESSURE < 130 MM HG: ICD-10-PCS | Mod: CPTII,S$GLB,, | Performed by: PHYSICIAN ASSISTANT

## 2023-03-22 PROCEDURE — 3066F NEPHROPATHY DOC TX: CPT | Mod: CPTII,S$GLB,, | Performed by: PHYSICIAN ASSISTANT

## 2023-03-22 PROCEDURE — 3066F PR DOCUMENTATION OF TREATMENT FOR NEPHROPATHY: ICD-10-PCS | Mod: CPTII,S$GLB,, | Performed by: PHYSICIAN ASSISTANT

## 2023-03-22 PROCEDURE — 1160F RVW MEDS BY RX/DR IN RCRD: CPT | Mod: CPTII,S$GLB,, | Performed by: PHYSICIAN ASSISTANT

## 2023-03-22 RX ORDER — METOPROLOL SUCCINATE 100 MG/1
100 TABLET, EXTENDED RELEASE ORAL DAILY
COMMUNITY
Start: 2023-03-20

## 2023-03-22 RX ORDER — TICAGRELOR 90 MG/1
90 TABLET ORAL 2 TIMES DAILY
COMMUNITY
Start: 2023-03-20 | End: 2024-01-03

## 2023-03-22 NOTE — PROGRESS NOTES
Subjective:       Patient ID: Abel Hackett is a 52 y.o. male.    Chief Complaint: Neurologic Problem (MRI follow up.)      CARINA Hackett is a 52 y.o. male here for follow up visit.he is followed for cognitive changes. He had neuropsychological evaluation. Results were inconclusive. Recommended risk factor control, ie blood glucose control, CPAP compliance. Diagnosed with JEANNINE in 2018. Has auto CPAP, but he never really used it. He has started to use it regularly since the recommendation.    He was noncompliant with diabetes treatment for years. Only recently compliant. He has vascular complications including cerebellar infarct, remote, and CAD. He has several coronary stents.     He was referred to vascular neurology concerning 50% stenosis in left vertebral artery on CTA. He was seen by general neurologist who did order repeat CTA. He was told no change in imaging. He did not have a good experience with this doctor and made appt today to discuss imaging further.    He complains today of worsening cognitive issues. He is forgetful daily. He is now having difficulty speaking at times, word finding difficulty. He is driving without issue, but he does not drive alone in car. He has trouble concentrating. 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    Also, he has developed a right hand tremor over the past month or so. His father had a bad tremor. He feels he may have noticed it before, but it has significantly amplified. Handwriting has changed some. He has difficulty with fine motor tasks. He is right handed.    DM control is improved with Ozempic. He is now compliant with healthcare. He is also seeing psychiatry for anxiety. Therapy recommended by neuropsych, but psychiatrist is adjusting meds for now. Patient fees he is doing well on Zoloft. He does not feel anxious or depressed presently.    Neuropsych eval inconclusive. Suspect improved control of risk factors for vascular  dementia and compliance with CPAP will be helpful.     He continues with daily memory problems.  Examples of memory problems noted include: He repeats himself often, he forgets to put things away, forget to brush his teeth. Was driving to his son-in-law's and passed up his street and then had to turn around. 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  Seems to happen almost daily.   Impairment in ADLS such as doing bills, cooking, doing laundry, dressing self? No  Wife now does the bills as he was forgetting to pay things. Cooking with CO and Smoker detector. He does his own ALDs but has forgotten clothes in the washer. he walks away from stove with burner on twice. He was using an excavator outside, and he forgot the bucket was up. He ran it into the house.    09/23/2022  Neuropsychological assessment:  Mr. Abel Hackett is an 52 y.o., male with his THUY and pertinent medical history including hyperlipidemia, poorly controlled type 2 diabetes (improving with treatment with PCP), untreated moderate to severe obstructive sleep apnea (per sleep study records), and anxiety who was referred for a neuropsychological evaluation in the setting of memory loss for the past 6 months brought to his attention by his coworkers. MRI of the brain done with Dr. Vila at [facility] in July 2022 was read as showing a small focus of encephalomalacia in the right inferior cerebellum without any atrophy mentioned, whereas a CT of the head from July 2022 was read as showing mild cortical atrophy without acute intracranial abnormality. MRA of the head and neck was read as normal. He has reportedly made errors in several IADLs (missing several medication doses each week, has left the stove on, missed one appointment, has gotten lost one time while driving, missed a few meetings  at work). He reports increased stress and irritability as a result of his thinking difficulties.      As stated below, scores on stand-alone and embedded performance validity measures were variable, with several falling below cutoffs, suggesting reduced effort and/or engagement in the testing process. As such, the current results could underestimate the patient's current functioning and results are therefore interpreted with caution. Any average range or higher scores will  be considered the minimum of the patient's capability. Any low average range or lower scores will not be interpreted, as they may not represent true cognitive impairment.      Compared to high average range premorbid estimates (based on both demographic information and a word reading test), results of the current evaluation reveal intact/at expectation visuospatial skills as well as some measures of language functioning (naming, verbal reasoning), and some measures of memory, simple attention, and executive functioning. Temporal orientation was largely intact (off by one day when asked the day of the month). He was a strong historian during the clinical interview.      Overall, while I cannot comment on the type or severity of cognitive impairments the patient may be experiencing with any real confidence, I am concerned that his vascular risk factors were/are impacting his cognition (namely, hyperlipidemia, poorly controlled type 2 diabetes, untreated moderate to severe obstructive sleep apnea), with neuroimaging revealing of a small vessel CVA, which his neurologist posits was likely due to poorly controlled DM2.      While psychological screening questionnaires were unrevealing of clinically significant symptoms of depression and anxiety, he reported during the clinical interview that he has been feeling an increase in both stress and irritability since these cognitive changes were brought to his attention. He also has a history of anxiety and  was just recently started on Effexor, without any real benefit noticed as of yet.      Close management of vascular risk factors is strongly recommended. He is to be commended for the changes he has already made in terms of his exercise habits and compliance with medications.   He is encouraged to reach back out to sleep medicine, as records make it seem as if he should be using a CPAP machine (whereas he stated that he was told he did not need this machine). Untreated sleep apnea can have both acute and chronic impact on cognition.   Encouraged to consider attending some talk therapy/counseling sessions and/or focus on stress management techniques.   Encouraged to participate in brain health behaviors. A list is included in the after visit summary.   A list of cognitive tips and strategies is also included included in the after visit summary.  We can monitor his cognition over time and repeat this evaluation down the road should he notice continued cognitive difficulty despite optimized management of other health factors. Caution should be applied if using this evaluation for comparison      Patient had MRI and MRA and EEG at [facility] since CT head showed some atrophy. [place] [facility] [place] who also ordered Neuropsychological testing which he would prefer to do off [facility]'s campus. MRI showed a cerebellar abnormality- see below. He has since had CTA of head and neck and repeat MRI brain. See below.     EEG was normal     Mood is described as normal and sometimes frustrated. Taking Zoloft now for anxiety per Dr. Pena.     The patient does not have any delusions, hallucinations, paranoia,  falls, or tremors. No known prior stroke symptoms. There is not a prior history of head trauma. There is a Family History of memory disorders in his parents who are both - both had dementia and  in their 70s. Mom had vascular dementia and dad had AD.  The patient is still driving without accidents or  incident       Review of Systems   Constitutional:  Positive for activity change and unexpected weight change (significant weight loss with Ozempic). Negative for appetite change and fever.   HENT:  Negative for sore throat.    Eyes:  Negative for visual disturbance.   Respiratory:  Positive for chest tightness (CAD). Negative for cough and shortness of breath.    Cardiovascular:  Negative for chest pain.   Gastrointestinal:  Negative for nausea and vomiting.   Endocrine: Negative for cold intolerance and heat intolerance.   Genitourinary:  Negative for difficulty urinating.   Musculoskeletal:  Negative for arthralgias, back pain and neck pain.   Skin:  Negative for rash.   Allergic/Immunologic: Negative for food allergies.   Neurological:  Positive for numbness (feet without pain). Negative for dizziness, tremors, seizures, speech difficulty, weakness and headaches.   Hematological:  Does not bruise/bleed easily.   Psychiatric/Behavioral:  Negative for agitation, decreased concentration and sleep disturbance. The patient is nervous/anxious.         Short term memory problems daily     Objective:    CT head  COMPARISON:  CT 11/25/2016.     FINDINGS:  There is no acute hemorrhage or infarction.  There is mild cortical atrophy.  There is a normal pattern of gray-white matter differentiation.  Small focus of chronic encephalomalacia involving the right cerebellum.     No extra-axial fluid collections.  Ventricles are normal in size, shape and configuration.  The basal cisterns are patent.     The imaged paranasal sinuses and ethmoid air cells are well aerated.     The mastoid air cells and middle ears are normally pneumatized.     Impression:     Mild cortical atrophy without acute intracranial abnormality.        Electronically signed by: Jimbo Garcia  Date:                                            07/11/2022      MRI Brain  COMPARISON:  CT and CT angiogram 03/14/2023     FINDINGS:  MRI of the brain demonstrates  no diffusion restriction to suggest acute infarct.  There are two small remote infarcts in the right cerebellar hemisphere.  Punctate area of increased FLAIR signal in the left posterior frontal white matter is nonspecific, possibly related to a prior microvascular injury.  Ventricles are normal in size without hydrocephalus.  There is no evidence of recent hemorrhage.  Small calcification or remote microhemorrhages noted on the susceptibility scan in the posterior right cerebellum.     There is no hydrocephalus, mass effect, midline shift, or edema.  Vascular flow voids appear intact.     Impression:     Two small areas of remote infarction in the right cerebellar hemisphere.  No acute infarct or other acute abnormality is seen.        Electronically signed by: Juan De Paz MD  Date:                                            03/14/2023  Time:                                           17:13           Exam Ended: 03         CTA head and neck  Narrative & Impression  EXAMINATION:  CTA HEAD AND NECK (XPD)     CLINICAL HISTORY:  Stroke/TIA, determine embolic source;Other cerebral infarction     TECHNIQUE:  Non contrast low dose axial images were obtained through the head.  CT angiogram was performed from the level of the mann to the top of the head following the IV administration of 100mL of Omnipaque 350.   Sagittal and coronal reconstructions and maximum intensity projection reconstructions were performed. Arterial stenosis percentages are based on NASCET measurement criteria.     COMPARISON:  CT head 07/11/2022     FINDINGS:  Intracranial Compartment:     Ventricles and sulci are normal in size for age without evidence of hydrocephalus. No extra-axial blood or fluid collections.     The brain parenchyma has a small remote infarct in the inferior aspect of the right cerebellum measuring about 1 cm in diameter.  No parenchymal mass, hemorrhage, edema, or acute major vascular distribution infarct.      Skull/Extracranial Contents (limited evaluation): No fracture. Mastoid air cells and paranasal sinuses are essentially clear.     Non-Vascular Structures of the Neck/Thoracic Inlet (limited evaluation): Normal.     Aorta: Normal 3 vessel arch.     Extracranial carotid circulation: No hemodynamically significant stenosis, aneurysmal dilatation, or dissection.  There is minimal plaque at the origin of the right internal carotid artery in the neck.     Extracranial vertebral circulation: The vertebral arteries are codominant.  There is a stenosis are than 50% at the origin of the left vertebral artery.  No other stenoses are seen in the posterior circulation.     Intracranial Arteries: No focal high-grade stenosis, occlusion, or aneurysm.     Venous structures (limited evaluation): Normal.     Impression:     Left vertebral artery origin stenosis.     Small remote infarct in the right inferior cerebellum, unchanged from prior study.     No acute infarct or acute large vessel occlusion identified.        Electronically signed by: Juan De Paz MD  Date:                                            03/14/2023  Time:                                           16:15           Exam Ended: 03/14/23 13:20 Last Resulted: 03/14/23 16:15              Neurologic Exam     Mental Status   Oriented to person, place, and time.     Cranial Nerves     CN III, IV, VI   Pupils are equal, round, and reactive to light.    Gait, Coordination, and Reflexes     Gait  Gait: normal    Reflexes   Right brachioradialis: 2+  Left brachioradialis: 2+  Right biceps: 2+  Left biceps: 2+  Right triceps: 2+  Left triceps: 2+  Right patellar: 0  Left patellar: 0  Right achilles: 0  Left achilles: 0  Physical Exam  Vitals and nursing note reviewed.   Constitutional:       General: He is not in acute distress.     Appearance: Normal appearance. He is normal weight.   HENT:      Head: Normocephalic and atraumatic.      Nose: Nose normal.      Mouth/Throat:       Mouth: Mucous membranes are moist.      Pharynx: Oropharynx is clear.   Eyes:      Extraocular Movements: Extraocular movements intact.      Conjunctiva/sclera: Conjunctivae normal.      Pupils: Pupils are equal, round, and reactive to light.   Cardiovascular:      Rate and Rhythm: Normal rate and regular rhythm.      Pulses: Normal pulses.   Pulmonary:      Effort: Pulmonary effort is normal. No respiratory distress.   Abdominal:      General: There is no distension.      Palpations: Abdomen is soft.      Tenderness: There is no abdominal tenderness.   Genitourinary:     Comments: Deferred    Musculoskeletal:         General: No swelling or tenderness. Normal range of motion.      Cervical back: Normal range of motion and neck supple. No tenderness.   Skin:     General: Skin is warm and dry.   Neurological:      General: No focal deficit present.      Mental Status: He is alert and oriented to person, place, and time. Mental status is at baseline.      Cranial Nerves: No cranial nerve deficit or dysarthria.      Motor: Tremor (mild intention tremor right hand with very minimal tremor left) present. No weakness.      Coordination: Coordination is intact.      Gait: Gait is intact.      Deep Tendon Reflexes:      Reflex Scores:       Tricep reflexes are 2+ on the right side and 2+ on the left side.       Bicep reflexes are 2+ on the right side and 2+ on the left side.       Brachioradialis reflexes are 2+ on the right side and 2+ on the left side.       Patellar reflexes are 0 on the right side and 0 on the left side.       Achilles reflexes are 0 on the right side and 0 on the left side.     Comments: Full fund of knowledge  Speech is fluent   Psychiatric:         Mood and Affect: Mood normal.         Behavior: Behavior normal.         Thought Content: Thought content normal.         Judgment: Judgment normal.       Assessment:       1. Mild cognitive impairment of uncertain or unknown etiology    2. Benign  familial tremor    3. Diabetes mellitus type 2 with complications    4. Obstructive sleep apnea (adult) (pediatric)    5. Generalized anxiety disorder    6. History of cerebellar stroke          Plan:   Poor short term memory  Daily issues. I do not recommend returning to work now.  Neuropsych eval inconclusive. Recommend repeat at one year from initial. Will place referral.  Recommend tight control of risk factors for vascular dementia including compliance with CPAP. (Now compliant)  A1C is trending down with Ozempic.  Compliance with DM treatment improved.  Complains of word finding difficutly as well. No new meds.   Patient has CLAUDIA but he does feel this is currently controlled.   He does not feel it is leading to concentration issues, memory or speech issues.    History of Cerebellar stroke right  LEFT Vertebral artery stenosis, likely less than 50 %. Will clarify with radiologist since typo error on report.  Stenosis a concern for patient and would like clarity.   Patient and his wife reassured that updated imaging not significantly changed from the summer of 2022.  Found incidentally. Never had symptoms.  Now on Brilinta 90 mg daily, off Plavix.  Has coronary stents.  Continue risk factor modification including lipid control, glucose control, HTN control and increase exercise    Tremor  New or worsening.  Father had tremor  No treatment recommended at this time.  Observation.    JEANNINE (obstructive sleep apnea)  Now compliant    Type 2 diabetes mellitus with complications  He is under endocrinology now. Improving.      Discussed risks and benefits of potential treatment options at length as well as potential side effects of medication changes. Medications were NOT changed. Please refer to medication reconciliation report for details. Medication compliance discussed. Instructed to call clinic if concerned or to ED if emergency.    MARIA A Morales

## 2023-03-28 ENCOUNTER — PATIENT MESSAGE (OUTPATIENT)
Dept: NEUROLOGY | Facility: CLINIC | Age: 53
End: 2023-03-28
Payer: COMMERCIAL

## 2023-04-19 ENCOUNTER — PATIENT MESSAGE (OUTPATIENT)
Dept: NEUROLOGY | Facility: CLINIC | Age: 53
End: 2023-04-19
Payer: COMMERCIAL

## 2023-04-26 NOTE — TELEPHONE ENCOUNTER
Notified patient via [employer] message.updated [place] document faxed [phone], confirmation received.

## 2023-04-27 ENCOUNTER — PATIENT MESSAGE (OUTPATIENT)
Dept: NEUROLOGY | Facility: CLINIC | Age: 53
End: 2023-04-27
Payer: COMMERCIAL

## 2023-05-28 ENCOUNTER — PATIENT MESSAGE (OUTPATIENT)
Dept: NEUROLOGY | Facility: CLINIC | Age: 53
End: 2023-05-28
Payer: COMMERCIAL

## 2023-06-15 ENCOUNTER — OFFICE VISIT (OUTPATIENT)
Dept: NEUROLOGY | Facility: CLINIC | Age: 53
End: 2023-06-15
Payer: COMMERCIAL

## 2023-06-15 ENCOUNTER — LAB VISIT (OUTPATIENT)
Dept: LAB | Facility: HOSPITAL | Age: 53
End: 2023-06-15
Attending: PSYCHIATRY & NEUROLOGY
Payer: COMMERCIAL

## 2023-06-15 VITALS
DIASTOLIC BLOOD PRESSURE: 72 MMHG | BODY MASS INDEX: 27.92 KG/M2 | HEART RATE: 108 BPM | RESPIRATION RATE: 16 BRPM | HEIGHT: 69 IN | SYSTOLIC BLOOD PRESSURE: 114 MMHG | WEIGHT: 188.5 LBS

## 2023-06-15 DIAGNOSIS — R51.9 WORST HEADACHE OF LIFE: ICD-10-CM

## 2023-06-15 DIAGNOSIS — G25.0 BENIGN FAMILIAL TREMOR: ICD-10-CM

## 2023-06-15 DIAGNOSIS — E11.8 DIABETES MELLITUS TYPE 2 WITH COMPLICATIONS: ICD-10-CM

## 2023-06-15 DIAGNOSIS — E78.5 DYSLIPIDEMIA: ICD-10-CM

## 2023-06-15 DIAGNOSIS — I65.02 ASYMPTOMATIC STENOSIS OF LEFT VERTEBRAL ARTERY: ICD-10-CM

## 2023-06-15 DIAGNOSIS — G47.33 OBSTRUCTIVE SLEEP APNEA (ADULT) (PEDIATRIC): ICD-10-CM

## 2023-06-15 DIAGNOSIS — I63.89 OTHER CEREBRAL INFARCTION: ICD-10-CM

## 2023-06-15 DIAGNOSIS — F39 MOOD DISORDER: ICD-10-CM

## 2023-06-15 DIAGNOSIS — I10 PRIMARY HYPERTENSION: ICD-10-CM

## 2023-06-15 DIAGNOSIS — R41.89 COGNITIVE IMPAIRMENT: ICD-10-CM

## 2023-06-15 DIAGNOSIS — R41.89 COGNITIVE IMPAIRMENT: Primary | ICD-10-CM

## 2023-06-15 LAB
CREAT SERPL-MCNC: 1.2 MG/DL (ref 0.5–1.4)
ERYTHROCYTE [SEDIMENTATION RATE] IN BLOOD BY WESTERGREN METHOD: 18 MM/HR (ref 0–10)
EST. GFR  (NO RACE VARIABLE): >60 ML/MIN/1.73 M^2

## 2023-06-15 PROCEDURE — 99215 OFFICE O/P EST HI 40 MIN: CPT | Mod: S$GLB,,, | Performed by: PSYCHIATRY & NEUROLOGY

## 2023-06-15 PROCEDURE — 4010F ACE/ARB THERAPY RXD/TAKEN: CPT | Mod: CPTII,S$GLB,, | Performed by: PSYCHIATRY & NEUROLOGY

## 2023-06-15 PROCEDURE — 3061F NEG MICROALBUMINURIA REV: CPT | Mod: CPTII,S$GLB,, | Performed by: PSYCHIATRY & NEUROLOGY

## 2023-06-15 PROCEDURE — 1159F PR MEDICATION LIST DOCUMENTED IN MEDICAL RECORD: ICD-10-PCS | Mod: CPTII,S$GLB,, | Performed by: PSYCHIATRY & NEUROLOGY

## 2023-06-15 PROCEDURE — 3066F PR DOCUMENTATION OF TREATMENT FOR NEPHROPATHY: ICD-10-PCS | Mod: CPTII,S$GLB,, | Performed by: PSYCHIATRY & NEUROLOGY

## 2023-06-15 PROCEDURE — 99215 PR OFFICE/OUTPT VISIT, EST, LEVL V, 40-54 MIN: ICD-10-PCS | Mod: S$GLB,,, | Performed by: PSYCHIATRY & NEUROLOGY

## 2023-06-15 PROCEDURE — 1159F MED LIST DOCD IN RCRD: CPT | Mod: CPTII,S$GLB,, | Performed by: PSYCHIATRY & NEUROLOGY

## 2023-06-15 PROCEDURE — 85651 RBC SED RATE NONAUTOMATED: CPT | Performed by: PSYCHIATRY & NEUROLOGY

## 2023-06-15 PROCEDURE — 3078F PR MOST RECENT DIASTOLIC BLOOD PRESSURE < 80 MM HG: ICD-10-PCS | Mod: CPTII,S$GLB,, | Performed by: PSYCHIATRY & NEUROLOGY

## 2023-06-15 PROCEDURE — 3061F PR NEG MICROALBUMINURIA RESULT DOCUMENTED/REVIEW: ICD-10-PCS | Mod: CPTII,S$GLB,, | Performed by: PSYCHIATRY & NEUROLOGY

## 2023-06-15 PROCEDURE — 3008F PR BODY MASS INDEX (BMI) DOCUMENTED: ICD-10-PCS | Mod: CPTII,S$GLB,, | Performed by: PSYCHIATRY & NEUROLOGY

## 2023-06-15 PROCEDURE — 1160F PR REVIEW ALL MEDS BY PRESCRIBER/CLIN PHARMACIST DOCUMENTED: ICD-10-PCS | Mod: CPTII,S$GLB,, | Performed by: PSYCHIATRY & NEUROLOGY

## 2023-06-15 PROCEDURE — 3074F SYST BP LT 130 MM HG: CPT | Mod: CPTII,S$GLB,, | Performed by: PSYCHIATRY & NEUROLOGY

## 2023-06-15 PROCEDURE — 3051F HG A1C>EQUAL 7.0%<8.0%: CPT | Mod: CPTII,S$GLB,, | Performed by: PSYCHIATRY & NEUROLOGY

## 2023-06-15 PROCEDURE — 99999 PR PBB SHADOW E&M-EST. PATIENT-LVL IV: CPT | Mod: PBBFAC,,, | Performed by: PSYCHIATRY & NEUROLOGY

## 2023-06-15 PROCEDURE — 3051F PR MOST RECENT HEMOGLOBIN A1C LEVEL 7.0 - < 8.0%: ICD-10-PCS | Mod: CPTII,S$GLB,, | Performed by: PSYCHIATRY & NEUROLOGY

## 2023-06-15 PROCEDURE — 3066F NEPHROPATHY DOC TX: CPT | Mod: CPTII,S$GLB,, | Performed by: PSYCHIATRY & NEUROLOGY

## 2023-06-15 PROCEDURE — 99999 PR PBB SHADOW E&M-EST. PATIENT-LVL IV: ICD-10-PCS | Mod: PBBFAC,,, | Performed by: PSYCHIATRY & NEUROLOGY

## 2023-06-15 PROCEDURE — 82565 ASSAY OF CREATININE: CPT | Performed by: PSYCHIATRY & NEUROLOGY

## 2023-06-15 PROCEDURE — 3078F DIAST BP <80 MM HG: CPT | Mod: CPTII,S$GLB,, | Performed by: PSYCHIATRY & NEUROLOGY

## 2023-06-15 PROCEDURE — 3074F PR MOST RECENT SYSTOLIC BLOOD PRESSURE < 130 MM HG: ICD-10-PCS | Mod: CPTII,S$GLB,, | Performed by: PSYCHIATRY & NEUROLOGY

## 2023-06-15 PROCEDURE — 3008F BODY MASS INDEX DOCD: CPT | Mod: CPTII,S$GLB,, | Performed by: PSYCHIATRY & NEUROLOGY

## 2023-06-15 PROCEDURE — 4010F PR ACE/ARB THEARPY RXD/TAKEN: ICD-10-PCS | Mod: CPTII,S$GLB,, | Performed by: PSYCHIATRY & NEUROLOGY

## 2023-06-15 PROCEDURE — 36415 COLL VENOUS BLD VENIPUNCTURE: CPT | Performed by: PSYCHIATRY & NEUROLOGY

## 2023-06-15 PROCEDURE — 1160F RVW MEDS BY RX/DR IN RCRD: CPT | Mod: CPTII,S$GLB,, | Performed by: PSYCHIATRY & NEUROLOGY

## 2023-06-15 RX ORDER — ATORVASTATIN CALCIUM 80 MG/1
80 TABLET, FILM COATED ORAL DAILY
COMMUNITY
Start: 2023-04-28

## 2023-06-15 RX ORDER — ASPIRIN 81 MG/1
81 TABLET ORAL DAILY
COMMUNITY
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
## 2023-06-26 ENCOUNTER — HOSPITAL ENCOUNTER (OUTPATIENT)
Dept: RADIOLOGY | Facility: HOSPITAL | Age: 53
Discharge: HOME | End: 2023-06-26
Attending: PSYCHIATRY & NEUROLOGY
Payer: COMMERCIAL

## 2023-06-26 DIAGNOSIS — R41.89 COGNITIVE IMPAIRMENT: ICD-10-CM

## 2023-06-26 DIAGNOSIS — I63.89 OTHER CEREBRAL INFARCTION: ICD-10-CM

## 2023-06-26 PROCEDURE — 70551 MRI BRAIN WITHOUT CONTRAST: ICD-10-PCS | Mod: 26,,, | Performed by: RADIOLOGY

## 2023-06-26 PROCEDURE — 70551 MRI BRAIN STEM W/O DYE: CPT | Mod: TC

## 2023-06-26 PROCEDURE — 70551 MRI BRAIN STEM W/O DYE: CPT | Mod: 26,,, | Performed by: RADIOLOGY

## 2023-06-26 PROCEDURE — 70496 CT ANGIOGRAPHY HEAD: CPT | Mod: 26,,, | Performed by: RADIOLOGY

## 2023-06-26 PROCEDURE — 25500020 PHARM REV CODE 255: Performed by: PSYCHIATRY & NEUROLOGY

## 2023-06-26 PROCEDURE — 70496 CT ANGIOGRAPHY HEAD: CPT | Mod: TC

## 2023-06-26 PROCEDURE — 70496 CTA HEAD: ICD-10-PCS | Mod: 26,,, | Performed by: RADIOLOGY

## 2023-06-26 RX ADMIN — IOHEXOL 75 ML: 350 INJECTION, SOLUTION INTRAVENOUS at 02:06

## 2023-06-27 ENCOUNTER — OFFICE VISIT (OUTPATIENT)
Dept: PSYCHIATRY | Facility: CLINIC | Age: 53
End: 2023-06-27
Payer: COMMERCIAL

## 2023-06-27 DIAGNOSIS — Z65.8 PSYCHOSOCIAL STRESSORS: ICD-10-CM

## 2023-06-27 DIAGNOSIS — R51.9 SEVERE HEADACHE: Primary | ICD-10-CM

## 2023-06-27 DIAGNOSIS — F41.1 GENERALIZED ANXIETY DISORDER: ICD-10-CM

## 2023-06-27 DIAGNOSIS — R41.89 COGNITIVE IMPAIRMENT: Primary | ICD-10-CM

## 2023-06-27 PROCEDURE — 3066F PR DOCUMENTATION OF TREATMENT FOR NEPHROPATHY: ICD-10-PCS | Mod: CPTII,95,, | Performed by: STUDENT IN AN ORGANIZED HEALTH CARE EDUCATION/TRAINING PROGRAM

## 2023-06-27 PROCEDURE — 1160F PR REVIEW ALL MEDS BY PRESCRIBER/CLIN PHARMACIST DOCUMENTED: ICD-10-PCS | Mod: CPTII,95,, | Performed by: STUDENT IN AN ORGANIZED HEALTH CARE EDUCATION/TRAINING PROGRAM

## 2023-06-27 PROCEDURE — 3051F HG A1C>EQUAL 7.0%<8.0%: CPT | Mod: CPTII,95,, | Performed by: STUDENT IN AN ORGANIZED HEALTH CARE EDUCATION/TRAINING PROGRAM

## 2023-06-27 PROCEDURE — 1159F PR MEDICATION LIST DOCUMENTED IN MEDICAL RECORD: ICD-10-PCS | Mod: CPTII,95,, | Performed by: STUDENT IN AN ORGANIZED HEALTH CARE EDUCATION/TRAINING PROGRAM

## 2023-06-27 PROCEDURE — 99213 OFFICE O/P EST LOW 20 MIN: CPT | Mod: 95,,, | Performed by: STUDENT IN AN ORGANIZED HEALTH CARE EDUCATION/TRAINING PROGRAM

## 2023-06-27 PROCEDURE — 3061F PR NEG MICROALBUMINURIA RESULT DOCUMENTED/REVIEW: ICD-10-PCS | Mod: CPTII,95,, | Performed by: STUDENT IN AN ORGANIZED HEALTH CARE EDUCATION/TRAINING PROGRAM

## 2023-06-27 PROCEDURE — 3061F NEG MICROALBUMINURIA REV: CPT | Mod: CPTII,95,, | Performed by: STUDENT IN AN ORGANIZED HEALTH CARE EDUCATION/TRAINING PROGRAM

## 2023-06-27 PROCEDURE — 3051F PR MOST RECENT HEMOGLOBIN A1C LEVEL 7.0 - < 8.0%: ICD-10-PCS | Mod: CPTII,95,, | Performed by: STUDENT IN AN ORGANIZED HEALTH CARE EDUCATION/TRAINING PROGRAM

## 2023-06-27 PROCEDURE — 1159F MED LIST DOCD IN RCRD: CPT | Mod: CPTII,95,, | Performed by: STUDENT IN AN ORGANIZED HEALTH CARE EDUCATION/TRAINING PROGRAM

## 2023-06-27 PROCEDURE — 4010F ACE/ARB THERAPY RXD/TAKEN: CPT | Mod: CPTII,95,, | Performed by: STUDENT IN AN ORGANIZED HEALTH CARE EDUCATION/TRAINING PROGRAM

## 2023-06-27 PROCEDURE — 3066F NEPHROPATHY DOC TX: CPT | Mod: CPTII,95,, | Performed by: STUDENT IN AN ORGANIZED HEALTH CARE EDUCATION/TRAINING PROGRAM

## 2023-06-27 PROCEDURE — 4010F PR ACE/ARB THEARPY RXD/TAKEN: ICD-10-PCS | Mod: CPTII,95,, | Performed by: STUDENT IN AN ORGANIZED HEALTH CARE EDUCATION/TRAINING PROGRAM

## 2023-06-27 PROCEDURE — 1160F RVW MEDS BY RX/DR IN RCRD: CPT | Mod: CPTII,95,, | Performed by: STUDENT IN AN ORGANIZED HEALTH CARE EDUCATION/TRAINING PROGRAM

## 2023-06-27 PROCEDURE — 99213 PR OFFICE/OUTPT VISIT, EST, LEVL III, 20-29 MIN: ICD-10-PCS | Mod: 95,,, | Performed by: STUDENT IN AN ORGANIZED HEALTH CARE EDUCATION/TRAINING PROGRAM

## 2023-06-27 RX ORDER — ESZOPICLONE 3 MG/1
3 TABLET, FILM COATED ORAL NIGHTLY PRN
Qty: 30 TABLET | Refills: 3 | Status: SHIPPED | OUTPATIENT
Start: 2023-06-27 | End: 2023-12-07

## 2023-06-27 RX ORDER — SERTRALINE HYDROCHLORIDE 100 MG/1
100 TABLET, FILM COATED ORAL DAILY
Qty: 90 TABLET | Refills: 1 | Status: SHIPPED | OUTPATIENT
Start: 2023-06-27 | End: 2023-09-27 | Stop reason: SDUPTHER

## 2023-06-27 NOTE — PROGRESS NOTES
"    06/27/2023  8:40 AM  Abel Hackett  [id number]    Outpatient Psychiatry Follow-Up Visit (MD/NP)    6/27/2023    Clinical Status of Patient:  Outpatient (Ambulatory)    Chief Complaint:  Abel Hackett is a 53 y.o. male who presents today for follow-up of anxiety.  Met with patient.      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 He is sleeping well with PRN lunesta, takes rarely.    Stressors are stable.    Reports having issues with concentration and memory.       Psychiatric Review Of Systems - Is patient experiencing or having changes in:  Depressed mood: no  sleep: improved, takes lunesta rarely  appetite: no  energy/anergy: no  interest/pleasure/anhedonia: no  anxiety/panic: less  guilty/hopelessness/worthlessness: no  concentration: yes  S.I.B.s/risky behavior: no  Irritability: no  Substance abuse: no  Racing thoughts: no  Impulsive behaviors: no  Paranoia: no  AVH: no      Medical Review of Systems   Review of Systems   Constitutional:  Negative for chills and fever.   HENT:  Negative for hearing loss.    Eyes:  Negative for blurred vision.   Respiratory:  Negative for shortness of breath.    Cardiovascular:  Negative for chest pain and palpitations.   Gastrointestinal:  Negative for constipation, diarrhea and nausea.   Genitourinary:  Negative for dysuria.   Musculoskeletal:  Negative for back pain and neck pain.   Skin:  Negative for rash.   Neurological:  Negative for dizziness and headaches.   Endo/Heme/Allergies:  Negative for environmental allergies.       Past Medical, Family and Social History: The patient's past medical, family and social history have been reviewed and updated as appropriate within the electronic medical record - see encounter notes.    Social " History     Socioeconomic History    Marital status:    Tobacco Use    Smoking status: Never    Smokeless tobacco: Never   Substance and Sexual Activity    Alcohol use: Not Currently     Comment: occassional    Drug use: No    Sexual activity: Yes     Partners: Female     Social Determinants of Health     Financial Resource Strain: Low Risk     Difficulty of Paying Living Expenses: Not very hard   Food Insecurity: No Food Insecurity    Worried About Running Out of Food in the Last Year: Never true    Ran Out of Food in the Last Year: Never true   Transportation Needs: No Transportation Needs    Lack of Transportation (Medical): No    Lack of Transportation (Non-Medical): No   Physical Activity: Insufficiently Active    Days of Exercise per Week: 3 days    Minutes of Exercise per Session: 30 min   Stress: Stress Concern Present    Feeling of Stress : Rather much   Social Connections: Unknown    Frequency of Communication with Friends and Family: Once a week    Frequency of Social Gatherings with Friends and Family: Once a week    Active Member of Clubs or Organizations: No    Attends Club or Organization Meetings: Never    Marital Status:    Housing Stability: Unknown    Unable to Pay for Housing in the Last Year: No    Unstable Housing in the Last Year: No         Compliance: yes    Side effects: None    Risk Parameters:  Patient reports no suicidal ideation  Patient reports no homicidal ideation  Patient reports no self-injurious behavior  Patient reports no violent behavior      Exam (detailed: at least 9 elements; comprehensive: all 15 elements)     Vitals:    Vitals could not be obtained 2/2 virtual visit.       Weight/BMI could not be obtained 2/2 virtual visit.          CONSTITUTIONAL  General Appearance: unremarkable, age appropriate    MUSCULOSKELETAL  Muscle Strength and Tone:no tremor, no tic  Abnormal Involuntary Movements: No  Gait and Station: non-ataxic    PSYCHIATRIC   Level of  Consciousness: awake and alert   Orientation: person, place, and situation  Grooming: Casually dressed and Well groomed  Psychomotor Behavior: normal, cooperative  Speech: normal tone, normal rate, normal pitch, normal volume  Language: grossly intact  Mood: fine  Affect: Consistent with mood  Thought Process: linear, logical  Associations: intact   Thought Content: DENIES suicidal ideation and DENIES homicidal ideation  Perceptions: denies hallucinations  Memory: Able to recall past events, Remote intact, and Recent intact  Attention:Attends to interview without distraction  Fund of Knowledge: Aware of current events and Vocabulary appropriate   Estimate if Intelligence:  Average based on work/education history, vocabulary and mental status exam  Insight: has awareness of illness  Judgment: behavior is adequate to circumstances        Assessment and Diagnosis   Status/Progress: Based on the examination today, the patient's problem(s) is/are improved.  New problems have not been presented today.   Co-morbidities are complicating management of the primary condition.        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      CLAUDIA  - continue zololf 100 mg PO qd  - consider psychotherapy  - pt counseled       Insomnia due to another mental disorder  - reports compliance with CPAP  - continue lunesta 3 mg PO qhs PRN (reviewed , no concerning findings)  - pt counseled        Psychosocial stressors  - consider psychotherapy  - pt counseled        Cognitive issues  - f/u with neurology/neuropsych            - Instructed patient to keep all scheduled appointments, take medications as prescribed and abstain from substance abuse. Instructed to call 911 or present to ER for emergency including SI or HI.    -  Discussed diagnosis, risks and benefits of proposed treatment above vs alternative treatments vs no treatment, and potential side effects of these treatments. Discussed the inherent unpredictability of treatment. The patient expresses understanding of the above and displays the capacity to agree with this treatment given said understanding. Patient also agrees that, currently, the benefits outweigh the risks and would like to pursue this treatment at this time.     - Any medications being used off-label were discussed with the patient inclusive of the evidence base for the use of the medications and consent was obtained for the off-label use of the medication.     The patient location is: [facility]    Visit type: audiovisual    Face to Face time with patient: 7  12 minutes of total time spent on the encounter, which includes face to face time and non-face to face time preparing to see the patient (eg, review of tests), Obtaining and/or reviewing separately obtained history, Documenting clinical information in the electronic or other health record, Independently interpreting results (not separately reported) and communicating results to the patient/family/caregiver, or Care coordination (not separately reported).     Each patient to whom he or she provides medical services by telemedicine is:  (1) informed of the relationship between the physician and patient and the respective role of any other health care provider with respect to management of the patient; and (2) notified that he or she may decline to receive medical services by telemedicine and may withdraw from such care at any time.        Froylan Marie III, MD    Return to Clinic: 3 months

## 2023-07-19 ENCOUNTER — PATIENT MESSAGE (OUTPATIENT)
Dept: ADMINISTRATIVE | Facility: OTHER | Age: 53
End: 2023-07-19
Payer: COMMERCIAL

## 2023-07-26 ENCOUNTER — PATIENT OUTREACH (OUTPATIENT)
Dept: ADMINISTRATIVE | Facility: HOSPITAL | Age: 53
End: 2023-07-26
Payer: COMMERCIAL

## 2023-08-10 ENCOUNTER — PATIENT MESSAGE (OUTPATIENT)
Dept: NEUROLOGY | Facility: CLINIC | Age: 53
End: 2023-08-10
Payer: COMMERCIAL

## 2023-08-18 ENCOUNTER — PATIENT MESSAGE (OUTPATIENT)
Dept: FAMILY MEDICINE | Facility: CLINIC | Age: 53
End: 2023-08-18
Payer: COMMERCIAL

## 2023-08-18 DIAGNOSIS — E11.69 TYPE 2 DIABETES MELLITUS WITH OTHER SPECIFIED COMPLICATION, WITHOUT LONG-TERM CURRENT USE OF INSULIN: Primary | ICD-10-CM

## 2023-09-08 LAB
LEFT EYE DM RETINOPATHY: POSITIVE
RIGHT EYE DM RETINOPATHY: POSITIVE

## 2023-09-12 ENCOUNTER — PATIENT OUTREACH (OUTPATIENT)
Dept: ADMINISTRATIVE | Facility: HOSPITAL | Age: 53
End: 2023-09-12
Payer: COMMERCIAL

## 2023-09-12 LAB
CHOLEST SERPL-MSCNC: 135 MG/DL (ref 0–200)
HDLC SERPL-MCNC: 49 MG/DL (ref 35–70)
LDLC SERPL CALC-MCNC: 63 MG/DL (ref 0–160)
TRIGL SERPL-MCNC: 129 MG/DL (ref 40–160)
VLDLC SERPL-MCNC: 23 MG/DL

## 2023-09-12 NOTE — PROGRESS NOTES
Chart reviewed, immunization record updated.  No new results noted on [facility] or [facility] web site.  Care Everywhere updated.   Patient care coordination note  Upcoming PCP visit updated.  Next PCP visit 9/22/2023.  Received external Diabetic Eye Exam collected/completed on 9/8/2023, updated to .  Dr. Palmer Valera added to patient care team.  Uploaded Lipid Panel collected on 3/6/2023, updated to .

## 2023-09-14 ENCOUNTER — LAB VISIT (OUTPATIENT)
Dept: LAB | Facility: HOSPITAL | Age: 53
End: 2023-09-14
Attending: FAMILY MEDICINE
Payer: COMMERCIAL

## 2023-09-14 DIAGNOSIS — E11.311 TYPE 2 DIABETES MELLITUS WITH RETINOPATHY AND MACULAR EDEMA, WITHOUT LONG-TERM CURRENT USE OF INSULIN, UNSPECIFIED LATERALITY, UNSPECIFIED RETINOPATHY SEVERITY: ICD-10-CM

## 2023-09-14 LAB
ALBUMIN/CREAT UR: 8.1 UG/MG (ref 0–30)
CREAT UR-MCNC: 86.7 MG/DL (ref 23–375)
ESTIMATED AVG GLUCOSE: 180 MG/DL (ref 68–131)
HBA1C MFR BLD: 7.9 % (ref 4–5.6)
MICROALBUMIN UR DL<=1MG/L-MCNC: 7 UG/ML

## 2023-09-14 PROCEDURE — 83036 HEMOGLOBIN GLYCOSYLATED A1C: CPT | Performed by: FAMILY MEDICINE

## 2023-09-14 PROCEDURE — 36415 COLL VENOUS BLD VENIPUNCTURE: CPT | Performed by: FAMILY MEDICINE

## 2023-09-14 PROCEDURE — 82570 ASSAY OF URINE CREATININE: CPT | Performed by: FAMILY MEDICINE
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
## 2023-09-20 PROBLEM — G31.84 MILD COGNITIVE IMPAIRMENT OF UNCERTAIN OR UNKNOWN ETIOLOGY: Status: ACTIVE | Noted: 2023-09-20

## 2023-09-21 ENCOUNTER — OFFICE VISIT (OUTPATIENT)
Dept: NEUROLOGY | Facility: CLINIC | Age: 53
End: 2023-09-21
Payer: COMMERCIAL

## 2023-09-21 DIAGNOSIS — G47.33 OBSTRUCTIVE SLEEP APNEA (ADULT) (PEDIATRIC): ICD-10-CM

## 2023-09-21 DIAGNOSIS — G47.00 INSOMNIA, UNSPECIFIED TYPE: ICD-10-CM

## 2023-09-21 DIAGNOSIS — F41.1 GENERALIZED ANXIETY DISORDER: ICD-10-CM

## 2023-09-21 DIAGNOSIS — Z65.8 PSYCHOSOCIAL STRESSORS: ICD-10-CM

## 2023-09-21 DIAGNOSIS — Z86.73 HX OF ARTERIAL ISCHEMIC STROKE: ICD-10-CM

## 2023-09-21 DIAGNOSIS — G31.84 MILD COGNITIVE IMPAIRMENT OF UNCERTAIN OR UNKNOWN ETIOLOGY: Primary | ICD-10-CM

## 2023-09-21 PROCEDURE — 99499 NO LOS: ICD-10-PCS | Mod: 95,,, | Performed by: CLINICAL NEUROPSYCHOLOGIST

## 2023-09-21 PROCEDURE — 99499 UNLISTED E&M SERVICE: CPT | Mod: 95,,, | Performed by: CLINICAL NEUROPSYCHOLOGIST

## 2023-09-21 PROCEDURE — 96116 NUBHVL XM PHYS/QHP 1ST HR: CPT | Mod: 95,,, | Performed by: CLINICAL NEUROPSYCHOLOGIST

## 2023-09-21 PROCEDURE — 96116 PR NEUROBEHAVIORAL STATUS EXAM BY PSYCH/PHYS: ICD-10-PCS | Mod: 95,,, | Performed by: CLINICAL NEUROPSYCHOLOGIST

## 2023-09-22 ENCOUNTER — OFFICE VISIT (OUTPATIENT)
Dept: FAMILY MEDICINE | Facility: CLINIC | Age: 53
End: 2023-09-22
Payer: COMMERCIAL

## 2023-09-22 VITALS
HEART RATE: 100 BPM | HEIGHT: 69 IN | BODY MASS INDEX: 28.49 KG/M2 | WEIGHT: 192.38 LBS | DIASTOLIC BLOOD PRESSURE: 74 MMHG | SYSTOLIC BLOOD PRESSURE: 112 MMHG | RESPIRATION RATE: 16 BRPM

## 2023-09-22 DIAGNOSIS — Z86.73 HX OF ARTERIAL ISCHEMIC STROKE: ICD-10-CM

## 2023-09-22 DIAGNOSIS — R41.89 COGNITIVE IMPAIRMENT: ICD-10-CM

## 2023-09-22 DIAGNOSIS — I25.10 CORONARY ARTERY DISEASE, UNSPECIFIED VESSEL OR LESION TYPE, UNSPECIFIED WHETHER ANGINA PRESENT, UNSPECIFIED WHETHER NATIVE OR TRANSPLANTED HEART: ICD-10-CM

## 2023-09-22 DIAGNOSIS — E78.5 HYPERLIPIDEMIA, UNSPECIFIED HYPERLIPIDEMIA TYPE: ICD-10-CM

## 2023-09-22 DIAGNOSIS — G47.33 OBSTRUCTIVE SLEEP APNEA (ADULT) (PEDIATRIC): ICD-10-CM

## 2023-09-22 DIAGNOSIS — E11.8 DIABETES MELLITUS TYPE 2 WITH COMPLICATIONS: ICD-10-CM

## 2023-09-22 DIAGNOSIS — G47.00 INSOMNIA, UNSPECIFIED TYPE: ICD-10-CM

## 2023-09-22 DIAGNOSIS — R07.89 ATYPICAL CHEST PAIN: ICD-10-CM

## 2023-09-22 DIAGNOSIS — I20.0 UNSTABLE ANGINA: ICD-10-CM

## 2023-09-22 DIAGNOSIS — E11.9 TYPE 2 DIABETES MELLITUS TREATED WITH INSULIN: Primary | ICD-10-CM

## 2023-09-22 DIAGNOSIS — Z79.4 TYPE 2 DIABETES MELLITUS TREATED WITH INSULIN: Primary | ICD-10-CM

## 2023-09-22 DIAGNOSIS — F33.42 RECURRENT MAJOR DEPRESSIVE DISORDER, IN FULL REMISSION: ICD-10-CM

## 2023-09-22 PROCEDURE — 3074F PR MOST RECENT SYSTOLIC BLOOD PRESSURE < 130 MM HG: ICD-10-PCS | Mod: CPTII,S$GLB,, | Performed by: FAMILY MEDICINE

## 2023-09-22 PROCEDURE — 3066F NEPHROPATHY DOC TX: CPT | Mod: CPTII,S$GLB,, | Performed by: FAMILY MEDICINE

## 2023-09-22 PROCEDURE — 1159F MED LIST DOCD IN RCRD: CPT | Mod: CPTII,S$GLB,, | Performed by: FAMILY MEDICINE

## 2023-09-22 PROCEDURE — 3061F PR NEG MICROALBUMINURIA RESULT DOCUMENTED/REVIEW: ICD-10-PCS | Mod: CPTII,S$GLB,, | Performed by: FAMILY MEDICINE

## 2023-09-22 PROCEDURE — 3008F BODY MASS INDEX DOCD: CPT | Mod: CPTII,S$GLB,, | Performed by: FAMILY MEDICINE

## 2023-09-22 PROCEDURE — 90686 FLU VACCINE (QUAD) GREATER THAN OR EQUAL TO 3YO PRESERVATIVE FREE IM: ICD-10-PCS | Mod: S$GLB,,, | Performed by: FAMILY MEDICINE

## 2023-09-22 PROCEDURE — 4010F ACE/ARB THERAPY RXD/TAKEN: CPT | Mod: CPTII,S$GLB,, | Performed by: FAMILY MEDICINE

## 2023-09-22 PROCEDURE — 90471 FLU VACCINE (QUAD) GREATER THAN OR EQUAL TO 3YO PRESERVATIVE FREE IM: ICD-10-PCS | Mod: S$GLB,,, | Performed by: FAMILY MEDICINE

## 2023-09-22 PROCEDURE — 99214 PR OFFICE/OUTPT VISIT, EST, LEVL IV, 30-39 MIN: ICD-10-PCS | Mod: 25,S$GLB,, | Performed by: FAMILY MEDICINE

## 2023-09-22 PROCEDURE — 3078F DIAST BP <80 MM HG: CPT | Mod: CPTII,S$GLB,, | Performed by: FAMILY MEDICINE

## 2023-09-22 PROCEDURE — 3074F SYST BP LT 130 MM HG: CPT | Mod: CPTII,S$GLB,, | Performed by: FAMILY MEDICINE

## 2023-09-22 PROCEDURE — 1159F PR MEDICATION LIST DOCUMENTED IN MEDICAL RECORD: ICD-10-PCS | Mod: CPTII,S$GLB,, | Performed by: FAMILY MEDICINE

## 2023-09-22 PROCEDURE — 90686 IIV4 VACC NO PRSV 0.5 ML IM: CPT | Mod: S$GLB,,, | Performed by: FAMILY MEDICINE

## 2023-09-22 PROCEDURE — 90471 IMMUNIZATION ADMIN: CPT | Mod: S$GLB,,, | Performed by: FAMILY MEDICINE

## 2023-09-22 PROCEDURE — 3008F PR BODY MASS INDEX (BMI) DOCUMENTED: ICD-10-PCS | Mod: CPTII,S$GLB,, | Performed by: FAMILY MEDICINE

## 2023-09-22 PROCEDURE — 3066F PR DOCUMENTATION OF TREATMENT FOR NEPHROPATHY: ICD-10-PCS | Mod: CPTII,S$GLB,, | Performed by: FAMILY MEDICINE

## 2023-09-22 PROCEDURE — 3061F NEG MICROALBUMINURIA REV: CPT | Mod: CPTII,S$GLB,, | Performed by: FAMILY MEDICINE

## 2023-09-22 PROCEDURE — 99999 PR PBB SHADOW E&M-EST. PATIENT-LVL IV: CPT | Mod: PBBFAC,,, | Performed by: FAMILY MEDICINE

## 2023-09-22 PROCEDURE — 4010F PR ACE/ARB THEARPY RXD/TAKEN: ICD-10-PCS | Mod: CPTII,S$GLB,, | Performed by: FAMILY MEDICINE

## 2023-09-22 PROCEDURE — 99999 PR PBB SHADOW E&M-EST. PATIENT-LVL IV: ICD-10-PCS | Mod: PBBFAC,,, | Performed by: FAMILY MEDICINE

## 2023-09-22 PROCEDURE — 3078F PR MOST RECENT DIASTOLIC BLOOD PRESSURE < 80 MM HG: ICD-10-PCS | Mod: CPTII,S$GLB,, | Performed by: FAMILY MEDICINE

## 2023-09-22 PROCEDURE — 99214 OFFICE O/P EST MOD 30 MIN: CPT | Mod: 25,S$GLB,, | Performed by: FAMILY MEDICINE

## 2023-09-22 PROCEDURE — 3051F PR MOST RECENT HEMOGLOBIN A1C LEVEL 7.0 - < 8.0%: ICD-10-PCS | Mod: CPTII,S$GLB,, | Performed by: FAMILY MEDICINE

## 2023-09-22 PROCEDURE — 3051F HG A1C>EQUAL 7.0%<8.0%: CPT | Mod: CPTII,S$GLB,, | Performed by: FAMILY MEDICINE

## 2023-09-22 RX ORDER — SEMAGLUTIDE 2.68 MG/ML
2 INJECTION, SOLUTION SUBCUTANEOUS
Qty: 3 ML | Refills: 11 | Status: SHIPPED | OUTPATIENT
Start: 2023-09-22 | End: 2024-01-25 | Stop reason: ALTCHOICE

## 2023-09-22 RX ORDER — NEBIVOLOL 10 MG/1
10 TABLET ORAL
COMMUNITY
Start: 2023-07-19 | End: 2023-09-27 | Stop reason: ALTCHOICE

## 2023-09-22 NOTE — PROGRESS NOTES
Subjective:       Patient ID: Abel Hackett is a 53 y.o. male.    Chief Complaint: Follow-up (6 month follow up)    HPI  53 year old male with known cad and plan for angio on 10/8 secondary to continued unstable angina. He sees cardiology and was asked to start repatha, but insurance isn't covering. He is doing well with current dm regimen and remains off of insulin with a1c still at goal. He is on both metoprolol and bystolic but not on an ace/arb and doesn't have an explanation as to why. Doing okay in general. Swimming for exercising. Still seeing psyche. No more headaches.    PMH, PSH, ALLERGIES, SH, FH reviewed in nurse's notes above  Medications reconciled in the nurse's notes      Review of Systems   Constitutional:  Negative for chills and fever.   HENT:  Negative for congestion, ear pain, postnasal drip, rhinorrhea, sore throat and trouble swallowing.    Eyes:  Negative for redness and itching.   Respiratory:  Positive for chest tightness. Negative for cough, shortness of breath and wheezing.    Cardiovascular:  Positive for chest pain. Negative for palpitations.   Gastrointestinal:  Negative for abdominal pain, diarrhea, nausea and vomiting.   Genitourinary:  Negative for dysuria and frequency.   Skin:  Negative for rash.   Neurological:  Negative for weakness and headaches.       Objective:      Physical Exam  Vitals and nursing note reviewed.   Constitutional:       General: He is not in acute distress.     Appearance: He is well-developed.   HENT:      Head: Normocephalic and atraumatic.   Eyes:      Conjunctiva/sclera: Conjunctivae normal.      Pupils: Pupils are equal, round, and reactive to light.   Neck:      Thyroid: No thyromegaly.   Cardiovascular:      Rate and Rhythm: Normal rate and regular rhythm.      Pulses:           Dorsalis pedis pulses are 2+ on the right side and 2+ on the left side.        Posterior tibial pulses are 2+ on the right side and 2+ on the left side.      Heart sounds:  Normal heart sounds.   Pulmonary:      Effort: Pulmonary effort is normal. No respiratory distress.      Breath sounds: Normal breath sounds. No wheezing.   Abdominal:      General: Bowel sounds are normal.      Palpations: Abdomen is soft.      Tenderness: There is no abdominal tenderness.   Musculoskeletal:         General: Normal range of motion.      Cervical back: Normal range of motion and neck supple.   Feet:      Right foot:      Protective Sensation: 5 sites tested.  5 sites sensed.      Skin integrity: Skin integrity normal.      Left foot:      Protective Sensation: 5 sites tested.  5 sites sensed.      Skin integrity: Skin integrity normal.   Lymphadenopathy:      Cervical: No cervical adenopathy.   Skin:     General: Skin is warm and dry.      Findings: No rash.   Neurological:      Mental Status: He is alert and oriented to person, place, and time.   Psychiatric:         Behavior: Behavior normal.          Assessment/Plan:       Problem List Items Addressed This Visit          Neuro    Cognitive impairment    Hx of arterial ischemic stroke       Psychiatric    Recurrent major depressive disorder, in full remission       Cardiac/Vascular    Hyperlipidemia    Overview     Overview:   ICD-10 Transition            Endocrine    Diabetes mellitus type 2 with complications    Relevant Medications    semaglutide (OZEMPIC) 2 mg/dose (8 mg/3 mL) PnIj    RESOLVED: Type 2 diabetes mellitus treated with insulin - Primary    Relevant Medications    semaglutide (OZEMPIC) 2 mg/dose (8 mg/3 mL) PnIj       Other    Insomnia    Atypical chest pain    Obstructive sleep apnea (adult) (pediatric)     Other Visit Diagnoses       Coronary artery disease, unspecified vessel or lesion type, unspecified whether angina present, unspecified whether native or transplanted heart        Unstable angina        Relevant Medications    nebivoloL (BYSTOLIC) 10 MG Tab        Has not had any improvement with ranexa and therefore  stopped.    On 2 bb - will discuss with cards.    No ace/arb/entresto.    On brilinta and aspirin as well as high dose statin.    Discussed shingles vacc and given flu vacc today.    Increase ozempic    RTC if condition acutely worsens or any other concerns, otherwise RTC as scheduled

## 2023-09-27 ENCOUNTER — OFFICE VISIT (OUTPATIENT)
Dept: PSYCHIATRY | Facility: CLINIC | Age: 53
End: 2023-09-27
Payer: COMMERCIAL

## 2023-09-27 VITALS
RESPIRATION RATE: 17 BRPM | HEART RATE: 97 BPM | DIASTOLIC BLOOD PRESSURE: 74 MMHG | OXYGEN SATURATION: 97 % | BODY MASS INDEX: 28.48 KG/M2 | HEIGHT: 69 IN | SYSTOLIC BLOOD PRESSURE: 114 MMHG | WEIGHT: 192.31 LBS

## 2023-09-27 DIAGNOSIS — F41.1 GENERALIZED ANXIETY DISORDER: Primary | ICD-10-CM

## 2023-09-27 DIAGNOSIS — G47.00 INSOMNIA, UNSPECIFIED TYPE: ICD-10-CM

## 2023-09-27 DIAGNOSIS — Z65.8 PSYCHOSOCIAL STRESSORS: ICD-10-CM

## 2023-09-27 PROCEDURE — 1159F MED LIST DOCD IN RCRD: CPT | Mod: CPTII,S$GLB,, | Performed by: STUDENT IN AN ORGANIZED HEALTH CARE EDUCATION/TRAINING PROGRAM

## 2023-09-27 PROCEDURE — 99214 PR OFFICE/OUTPT VISIT, EST, LEVL IV, 30-39 MIN: ICD-10-PCS | Mod: S$GLB,,, | Performed by: STUDENT IN AN ORGANIZED HEALTH CARE EDUCATION/TRAINING PROGRAM

## 2023-09-27 PROCEDURE — 3061F NEG MICROALBUMINURIA REV: CPT | Mod: CPTII,S$GLB,, | Performed by: STUDENT IN AN ORGANIZED HEALTH CARE EDUCATION/TRAINING PROGRAM

## 2023-09-27 PROCEDURE — 99999 PR PBB SHADOW E&M-EST. PATIENT-LVL III: ICD-10-PCS | Mod: PBBFAC,,, | Performed by: STUDENT IN AN ORGANIZED HEALTH CARE EDUCATION/TRAINING PROGRAM

## 2023-09-27 PROCEDURE — 3078F PR MOST RECENT DIASTOLIC BLOOD PRESSURE < 80 MM HG: ICD-10-PCS | Mod: CPTII,S$GLB,, | Performed by: STUDENT IN AN ORGANIZED HEALTH CARE EDUCATION/TRAINING PROGRAM

## 2023-09-27 PROCEDURE — 99999 PR PBB SHADOW E&M-EST. PATIENT-LVL III: CPT | Mod: PBBFAC,,, | Performed by: STUDENT IN AN ORGANIZED HEALTH CARE EDUCATION/TRAINING PROGRAM

## 2023-09-27 PROCEDURE — 4010F ACE/ARB THERAPY RXD/TAKEN: CPT | Mod: CPTII,S$GLB,, | Performed by: STUDENT IN AN ORGANIZED HEALTH CARE EDUCATION/TRAINING PROGRAM

## 2023-09-27 PROCEDURE — 1159F PR MEDICATION LIST DOCUMENTED IN MEDICAL RECORD: ICD-10-PCS | Mod: CPTII,S$GLB,, | Performed by: STUDENT IN AN ORGANIZED HEALTH CARE EDUCATION/TRAINING PROGRAM

## 2023-09-27 PROCEDURE — 3066F PR DOCUMENTATION OF TREATMENT FOR NEPHROPATHY: ICD-10-PCS | Mod: CPTII,S$GLB,, | Performed by: STUDENT IN AN ORGANIZED HEALTH CARE EDUCATION/TRAINING PROGRAM

## 2023-09-27 PROCEDURE — 3051F PR MOST RECENT HEMOGLOBIN A1C LEVEL 7.0 - < 8.0%: ICD-10-PCS | Mod: CPTII,S$GLB,, | Performed by: STUDENT IN AN ORGANIZED HEALTH CARE EDUCATION/TRAINING PROGRAM

## 2023-09-27 PROCEDURE — 3066F NEPHROPATHY DOC TX: CPT | Mod: CPTII,S$GLB,, | Performed by: STUDENT IN AN ORGANIZED HEALTH CARE EDUCATION/TRAINING PROGRAM

## 2023-09-27 PROCEDURE — 3008F BODY MASS INDEX DOCD: CPT | Mod: CPTII,S$GLB,, | Performed by: STUDENT IN AN ORGANIZED HEALTH CARE EDUCATION/TRAINING PROGRAM

## 2023-09-27 PROCEDURE — 3051F HG A1C>EQUAL 7.0%<8.0%: CPT | Mod: CPTII,S$GLB,, | Performed by: STUDENT IN AN ORGANIZED HEALTH CARE EDUCATION/TRAINING PROGRAM

## 2023-09-27 PROCEDURE — 3078F DIAST BP <80 MM HG: CPT | Mod: CPTII,S$GLB,, | Performed by: STUDENT IN AN ORGANIZED HEALTH CARE EDUCATION/TRAINING PROGRAM

## 2023-09-27 PROCEDURE — 1160F PR REVIEW ALL MEDS BY PRESCRIBER/CLIN PHARMACIST DOCUMENTED: ICD-10-PCS | Mod: CPTII,S$GLB,, | Performed by: STUDENT IN AN ORGANIZED HEALTH CARE EDUCATION/TRAINING PROGRAM

## 2023-09-27 PROCEDURE — 1160F RVW MEDS BY RX/DR IN RCRD: CPT | Mod: CPTII,S$GLB,, | Performed by: STUDENT IN AN ORGANIZED HEALTH CARE EDUCATION/TRAINING PROGRAM

## 2023-09-27 PROCEDURE — 3061F PR NEG MICROALBUMINURIA RESULT DOCUMENTED/REVIEW: ICD-10-PCS | Mod: CPTII,S$GLB,, | Performed by: STUDENT IN AN ORGANIZED HEALTH CARE EDUCATION/TRAINING PROGRAM

## 2023-09-27 PROCEDURE — 3008F PR BODY MASS INDEX (BMI) DOCUMENTED: ICD-10-PCS | Mod: CPTII,S$GLB,, | Performed by: STUDENT IN AN ORGANIZED HEALTH CARE EDUCATION/TRAINING PROGRAM

## 2023-09-27 PROCEDURE — 3074F PR MOST RECENT SYSTOLIC BLOOD PRESSURE < 130 MM HG: ICD-10-PCS | Mod: CPTII,S$GLB,, | Performed by: STUDENT IN AN ORGANIZED HEALTH CARE EDUCATION/TRAINING PROGRAM

## 2023-09-27 PROCEDURE — 3074F SYST BP LT 130 MM HG: CPT | Mod: CPTII,S$GLB,, | Performed by: STUDENT IN AN ORGANIZED HEALTH CARE EDUCATION/TRAINING PROGRAM

## 2023-09-27 PROCEDURE — 4010F PR ACE/ARB THEARPY RXD/TAKEN: ICD-10-PCS | Mod: CPTII,S$GLB,, | Performed by: STUDENT IN AN ORGANIZED HEALTH CARE EDUCATION/TRAINING PROGRAM

## 2023-09-27 PROCEDURE — 99214 OFFICE O/P EST MOD 30 MIN: CPT | Mod: S$GLB,,, | Performed by: STUDENT IN AN ORGANIZED HEALTH CARE EDUCATION/TRAINING PROGRAM

## 2023-09-27 RX ORDER — SERTRALINE HYDROCHLORIDE 100 MG/1
100 TABLET, FILM COATED ORAL DAILY
Qty: 90 TABLET | Refills: 1 | Status: SHIPPED | OUTPATIENT
Start: 2023-09-27 | End: 2023-10-23

## 2023-09-27 NOTE — PROGRESS NOTES
"    09/27/2023  8:40 AM  Abel Hackett  [id number]    Outpatient Psychiatry Follow-Up Visit (MD/NP)    9/27/2023    Clinical Status of Patient:  Outpatient (Ambulatory)    Chief Complaint:  Abel Hackett is a 53 y.o. male who presents today for follow-up of anxiety.  Met with patient.      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 No recent issues with anxiety, "it's non existent." Continues to have issues with memory, "I forget why I walk into a room... my wife and daughter notice."     No major stressors currently that are ongoing.      Psychiatric Review Of Systems - Is patient experiencing or having changes in:  Depressed mood: no  sleep: improved, "pretty good, 8 or 9 hours" takes lunesta occassionally  appetite: no  energy/anergy: no  interest/pleasure/anhedonia: no  anxiety/panic: less  guilty/hopelessness/worthlessness: no  concentration: yes  S.I.B.s/risky behavior: no  Irritability: no  Substance abuse: no  Racing thoughts: no  Impulsive behaviors: no  Paranoia: no  AVH: no      Medical Review of Systems   Review of Systems   Constitutional:  Negative for chills and fever.   HENT:  Negative for hearing loss.    Eyes:  Negative for blurred vision.   Respiratory:  Negative for shortness of breath.    Cardiovascular:  Negative for chest pain and palpitations.   Gastrointestinal:  Negative for constipation, diarrhea and nausea.   Genitourinary:  Negative for dysuria.   Musculoskeletal:  Negative for back pain and neck pain.   Skin:  Negative for rash.   Neurological:  Negative for dizziness and headaches.   Endo/Heme/Allergies:  Negative for environmental allergies.         Past Medical, Family and Social History: The patient's past medical, family and social history have been reviewed and updated as appropriate within the electronic " medical record - see encounter notes.    Social History     Socioeconomic History    Marital status:    Tobacco Use    Smoking status: Never    Smokeless tobacco: Never   Substance and Sexual Activity    Alcohol use: Not Currently     Comment: occassional    Drug use: No    Sexual activity: Yes     Partners: Female     Social Determinants of Health     Financial Resource Strain: Low Risk  (9/18/2023)    Overall Financial Resource Strain (CARDIA)     Difficulty of Paying Living Expenses: Not hard at all   Food Insecurity: No Food Insecurity (9/18/2023)    Hunger Vital Sign     Worried About Running Out of Food in the Last Year: Never true     Ran Out of Food in the Last Year: Never true   Transportation Needs: No Transportation Needs (9/18/2023)    PRAPARE - Transportation     Lack of Transportation (Medical): No     Lack of Transportation (Non-Medical): No   Physical Activity: Inactive (9/18/2023)    Exercise Vital Sign     Days of Exercise per Week: 0 days     Minutes of Exercise per Session: 0 min   Stress: No Stress Concern Present (9/18/2023)    [employer] - Occupational Stress Questionnaire     Feeling of Stress : Not at all   Social Connections: Unknown (9/18/2023)    Social Connection and Isolation Panel [NHANES]     Frequency of Communication with Friends and Family: Patient refused     Frequency of Social Gatherings with Friends and Family: Patient refused     Active Member of Clubs or Organizations: No     Attends Club or Organization Meetings: Never     Marital Status:    Housing Stability: Unknown (9/18/2023)    Housing Stability Vital Sign     Unable to Pay for Housing in the Last Year: No     Unstable Housing in the Last Year: No         Compliance: yes    Side effects: None    Risk Parameters:  Patient reports no suicidal ideation  Patient reports no homicidal ideation  Patient reports no self-injurious behavior  Patient reports no violent behavior      Exam  "(detailed: at least 9 elements; comprehensive: all 15 elements)     Vitals:    09/27/23 0934   BP: 114/74   Pulse: 97   Resp: 17         CONSTITUTIONAL  General Appearance: unremarkable, age appropriate    MUSCULOSKELETAL  Muscle Strength and Tone:no tremor, no tic  Abnormal Involuntary Movements: No  Gait and Station: non-ataxic    PSYCHIATRIC   Level of Consciousness: awake and alert   Orientation: person, place, and situation  Grooming: Casually dressed and Well groomed  Psychomotor Behavior: normal, cooperative  Speech: normal tone, normal rate, normal pitch, normal volume  Language: grossly intact  Mood: "ok"  Affect: Consistent with mood  Thought Process: linear, logical  Associations: intact   Thought Content: DENIES suicidal ideation and DENIES homicidal ideation  Perceptions: denies hallucinations  Memory: Able to recall past events, Remote intact, and Recent intact  Attention:Attends to interview without distraction  Fund of Knowledge: Aware of current events and Vocabulary appropriate   Estimate if Intelligence:  Average based on work/education history, vocabulary and mental status exam  Insight: has awareness of illness  Judgment: behavior is adequate to circumstances        Assessment and Diagnosis   Status/Progress: Based on the examination today, the patient's problem(s) is/are improved.  New problems have not been presented today.   Co-morbidities are complicating management of the primary condition.        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      CLAUDIA  - continue zololf 100 mg PO qd  - consider psychotherapy  - pt counseled       Insomnia due to another mental disorder  - reports compliance with CPAP  - continue lunesta 3 mg PO qhs PRN (reviewed , no concerning findings)  - " pt counseled        Psychosocial stressors  - consider psychotherapy  - pt counseled        Cognitive issues  - f/u with neurology/neuropsych            - Instructed patient to keep all scheduled appointments, take medications as prescribed and abstain from substance abuse. Instructed to call 911 or present to ER for emergency including SI or HI.    - Discussed diagnosis, risks and benefits of proposed treatment above vs alternative treatments vs no treatment, and potential side effects of these treatments. Discussed the inherent unpredictability of treatment. The patient expresses understanding of the above and displays the capacity to agree with this treatment given said understanding. Patient also agrees that, currently, the benefits outweigh the risks and would like to pursue this treatment at this time.     - Any medications being used off-label were discussed with the patient inclusive of the evidence base for the use of the medications and consent was obtained for the off-label use of the medication.     Froylan Marie III, MD    Return to Clinic:  6 m

## 2023-10-05 ENCOUNTER — OFFICE VISIT (OUTPATIENT)
Dept: NEUROLOGY | Facility: CLINIC | Age: 53
End: 2023-10-05
Payer: COMMERCIAL

## 2023-10-05 DIAGNOSIS — G31.84 MILD COGNITIVE IMPAIRMENT OF UNCERTAIN OR UNKNOWN ETIOLOGY: Primary | ICD-10-CM

## 2023-10-05 DIAGNOSIS — F41.1 GENERALIZED ANXIETY DISORDER: ICD-10-CM

## 2023-10-05 PROCEDURE — 96132 PR NEUROPSYCHOLOGIC TEST EVAL SVCS, 1ST HR: ICD-10-PCS | Mod: S$GLB,,, | Performed by: CLINICAL NEUROPSYCHOLOGIST

## 2023-10-05 PROCEDURE — 96138 PR PSYCH/NEUROPSYCH TEST ADMIN/SCORING, BY TECH, 2+ TESTS, 1ST 30 MIN: ICD-10-PCS | Mod: S$GLB,,, | Performed by: CLINICAL NEUROPSYCHOLOGIST

## 2023-10-05 PROCEDURE — 99999 PR PBB SHADOW E&M-EST. PATIENT-LVL I: ICD-10-PCS | Mod: PBBFAC,,, | Performed by: CLINICAL NEUROPSYCHOLOGIST

## 2023-10-05 PROCEDURE — 96132 NRPSYC TST EVAL PHYS/QHP 1ST: CPT | Mod: S$GLB,,, | Performed by: CLINICAL NEUROPSYCHOLOGIST

## 2023-10-05 PROCEDURE — 96139 PSYCL/NRPSYC TST TECH EA: CPT | Mod: S$GLB,,, | Performed by: CLINICAL NEUROPSYCHOLOGIST

## 2023-10-05 PROCEDURE — 96138 PSYCL/NRPSYC TECH 1ST: CPT | Mod: S$GLB,,, | Performed by: CLINICAL NEUROPSYCHOLOGIST

## 2023-10-05 PROCEDURE — 96139 PR PSYCH/NEUROPSYCH TEST ADMIN/SCORING, BY TECH, 2+ TESTS, EA ADDTL 30 MIN: ICD-10-PCS | Mod: S$GLB,,, | Performed by: CLINICAL NEUROPSYCHOLOGIST

## 2023-10-05 PROCEDURE — 99999 PR PBB SHADOW E&M-EST. PATIENT-LVL I: CPT | Mod: PBBFAC,,, | Performed by: CLINICAL NEUROPSYCHOLOGIST

## 2023-10-05 PROCEDURE — 99499 NO LOS: ICD-10-PCS | Mod: S$GLB,,, | Performed by: CLINICAL NEUROPSYCHOLOGIST

## 2023-10-05 PROCEDURE — 96133 NRPSYC TST EVAL PHYS/QHP EA: CPT | Mod: S$GLB,,, | Performed by: CLINICAL NEUROPSYCHOLOGIST

## 2023-10-05 PROCEDURE — 99499 UNLISTED E&M SERVICE: CPT | Mod: S$GLB,,, | Performed by: CLINICAL NEUROPSYCHOLOGIST

## 2023-10-05 PROCEDURE — 96133 PR NEUROPSYCHOLOGIC TEST EVAL SVCS, EA ADDTL HR: ICD-10-PCS | Mod: S$GLB,,, | Performed by: CLINICAL NEUROPSYCHOLOGIST

## 2023-10-10 ENCOUNTER — PATIENT MESSAGE (OUTPATIENT)
Dept: PSYCHIATRY | Facility: CLINIC | Age: 53
End: 2023-10-10
Payer: COMMERCIAL

## 2023-10-13 RX ORDER — GABAPENTIN 100 MG/1
100 CAPSULE ORAL 3 TIMES DAILY
Qty: 90 CAPSULE | Refills: 3 | Status: SHIPPED | OUTPATIENT
Start: 2023-10-13 | End: 2024-02-15

## 2023-10-13 NOTE — PROGRESS NOTES
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 pace.         Scores on stand-alone and embedded performance validity measures were variable, with several falling below cutoffs, suggesting reduced effort and/or engagement in the testing process. As such, the current results could underestimates the patient's current functioning and results are therefore interpreted with caution. Any average range or higher scores will  be considered the minimum of the patient's capability. Any low average range or lower scores will not be interpreted, as they may not represent true cognitive impairment.     TEST RESULTS    Evaluation  Evaluation     Raw Score Type of Standardized Score Standardized Score Percentile/CP Descriptor Standardized Score   MSVT IR 75 - - - - 90   MSVT DR 70 - - - - 85   MSVT Cons 65 - - - - 85   MSVT  - - - - 90   MSVT FR 70 - - - - 75   ACS LM II Rec 19 - - - - 24   ACS RDS 8 - - - - 6   CVLT-II FC 14 - - - - 11   PREMORBID FUNCTIONING Raw Score Type of Standardized Score Standardized Score Percentile/CP Descriptor Standardized Score   TOPF simple dem. eFSIQ -  87 High Average 117   TOPF pred. eFSIQ - SS 99 47 Average 103   TOPF simple + pred. eFSIQ -  73 Average 111   LANGUAGE FUNCTIONING Raw Score Type of  Standardized Score Standardized Score Percentile/CP Descriptor Standardized Score   WAIS-IV Similarities 27 ss 10 50 Average 11   TOPF Word Reading 39 SS 97 42 Average 102   NAB Naming 31 Tscore 52 58 Average 52   FAS 21 Tscore 26 1 Exceptionally Low  29   Animal Naming 12 Tscore 26 1 Exceptionally Low  26   VISUOSPATIAL FUNCTIONING Raw Score Type of Standardized Score Standardized Score Percentile/CP Descriptor Standardized Score   WAIS-IV Matrix Reasoning 13 ss 8 25 Average 11   RCFT Copy 27 - - <1 Exceptionally Low >16%ile   RCFT Time to Copy 258 - - >16 WNL >16%ile   BVMT-R Copy 8 - - - - 11   LEARNING & MEMORY Raw Score Type of Standardized Score Standardized Score Percentile/CP Descriptor Standardized Score   CVLT-II          Trials 1-5 (T-Score) 43 Tscore 49 46 Average 43   List A Trial 1 7 zscore 0 50 Average -0.5   List A Trial 5 10 zscore -0.5 31 Average -1   List B 3 zscore -1.5 7 Below Average -0.5   SDFR 10 zscore 0.5 69 Average -1   SDCR 12 zscore 0.5 69 Average -1.5   LDFR 10 zscore 0 50 Average -1   LDCR 13 zscore 1 84 High Average -1.5   Semantic Clustering 0.3 zscore 0 50 Average -0.5   Learning [place] 0.7 zscore -1 16 Low Average -0.5   Repetitions 8 zscore 1 84 High Average -1   Intrusions 4 zscore 0 50 Average 1   Recognition Hits 14 zscore 0 50 Average -3.5   False Positives 8 zscore 1.5 93 Above Average 1   Discriminability 1.8 zscore -1 16 Low Average -2   WMS-IV          Auditory Immediate (additional score) -  94 34 Average 83   Auditory Delayed (additional score) -  95 37 Average 89   Auditory Memory -  94 34 Average 85   WMS-IV Subtests          LM I 22 ss 9 37 Average 6   LM II 16 ss 8 25 Average 8   LM Recognition 19 - - 3-9 Below Average -   (CVLT-II Trials 1-5) 49  9 37 Average 8   (CVLT-II Long Delay) 0  10 50 Average 8   BVMT-R          IR (6, 7, 10) 23 Tscore 48 42 Average 38   DR 3 Tscore 20 0.1 Exceptionally Low  29   Discrimination Index 3 - - 1-2 Exceptionally Low  1-2%ile    ATTENTION/WORKING MEMORY Raw Score Type of Standardized Score Standardized Score Percentile/CP Descriptor Standardized Score   WAIS-IV WMI - SS 77 6 Below Average 97   WAIS-IV Digit Span 21 ss 7 16 Low Average 9         DS Forward 8 ss 7 16 Low Average 7         DS Backward 6 ss 7 16 Low Average 9         DS Sequence 7 ss 8 25 Average 11         Longest Digit Forward 5 - - - - -         Longest Digit Backward 4 - - - - -         Longest Digit Sequence 5 - - - - -   WAIS-IV Arithmetic 8 ss 5 5 Below Average 10   MENTAL PROCESSING SPEED Raw Score Type of Standardized Score Standardized Score Percentile/CP Descriptor Standardized Score   SMDT Written 44 zscore -0.98 16 Low Average -1.21   SMDT Oral 41 zscore -2.00 2 Below Average -1.65   TMT A  56 Tscore 25 1 Exceptionally Low  34   TMT A errors 2 - - - - 0 (raw)   EXECUTIVE FUNCTIONING Raw Score Type of Standardized Score Standardized Score Percentile/CP Descriptor Standardized Score   TMT B 109 Tscore 30 2 Below Average 39   TMT B errors 2 - - - - 1 (raw)   WCST-128          Total Correct 60   - - -   Total Errors 68 SS 66 1 Exceptionally Low  73   Perseverative Resp. 54 SS 67 1 Exceptionally Low  82   Perseverative Err. 43 SS 68 2 Below Average 80   Nonperseverative Err. 25 SS 74 4 Below Average 70   Concept. Level Response 26 SS 62 0.5 Exceptionally Low  75   Categories Completed 0 - - <1 Exceptionally Low -   FMS 1 - - >16 WNL -   Learning to Learn - - - - - -   WAIS-IV Similarities 27 ss 10 50 Average 11   WAIS-IV Matrix Reasoning 13 ss 8 25 Average 11   FRONTOMOTOR  Raw Score Type of Standardized Score Standardized Score Percentile/CP Descriptor Standardized Score   GPT DH (Right) 117 Tscore 25 0.5 Exceptionally Low  29   GPD NDH 97 Tscore 35 7 Below Average 30   MOOD & PERSONALITY Raw Score Type of Standardized Score Standardized Score Percentile/CP Descriptor Standardized Score   BDI-2 2 - - - Minimal 6 (raw)   CLAUDIA-7 0 - - - WNL 3 (raw)   ss = scaled score  (mean = 10, SD = 3); SS = standard score (mean = 100, SD = 15); Tscore mean = 50, SD = 10; zscore (mean = 0.00, SD = 1)        BILLING  Code Description Minutes Units   06765 Psychiatric Interview 0    96620 Nubhvl xm phys/qhp 1st hr 0    63531 Nubhvl xm phy/qhp ea addl hr 0    85365 Psycl tst eval phys/qhp 1st 0    13652 Psycl tst eval phys/qhp ea 0    12700 Nrpsyc tst eval phys/qhp 1st 60 1   94729 Nrpsyc tst eval phys/qhp ea 157 3     Referral review/test selection 30      Tech consult/test review/modifications 10      Patient limitation management 0      Patient behavior management 0      Patient symptom monitoring 0      Record Review/Integration/Report Generation 127      Face-to-Face interpretive Feedback 50    00080 Psycl/nrpsyc tst phy/qhp 1st 0    71235 Psycl/nrpsyc tst phy/qhp ea 0    65718 Psycl/nrpsyc tech 1st 30 1   94717 Psycl/nrpsyc tst tech ea 195 6

## 2023-10-17 ENCOUNTER — PATIENT MESSAGE (OUTPATIENT)
Dept: FAMILY MEDICINE | Facility: CLINIC | Age: 53
End: 2023-10-17
Payer: COMMERCIAL

## 2023-10-19 ENCOUNTER — PATIENT MESSAGE (OUTPATIENT)
Dept: NEUROLOGY | Facility: CLINIC | Age: 53
End: 2023-10-19
Payer: COMMERCIAL

## 2023-10-19 NOTE — PATIENT INSTRUCTIONS
MANAGING VASCULAR RISK FACTORS  A personal history of disorders that affect the cardiovascular system (e.g., hypertension, high cholesterol, diabetes, heart disease) can have a negative impact on brain functioning especially over many years. Therefore, it is very important for this patient to maintain good control over his/her risk factors. The following is recommended:  Take all medications as prescribed and follow-up with recommendations above.  Get regular physical exercise to the extent that it is possible. Family may need to structure this into their loved one's day or week and develop a transportation plan.  Eat a well-balanced diet and following the MIND diet (see handout) has been shown to be most brain protective.   Check your blood pressure, cholesterol levels, blood sugar, and others as appropriate.    PRACTICE GOOD COGNITIVE HYGIENE  Engage in regular exercise, which increases alertness and arousal and can improve attention and focus.  Consider lower impact exercises, such as yoga or light walking. Try to exercise for at least 150 minutes per week  Get a good night's sleep, as this can enhance alertness and cognition.  Eat healthy foods and balanced meals. It is notable that research indicates certain nutrients may aid in brain function, such as B vitamins (especially B6, B12, and folic acid), antioxidants (such as vitamins C and E, and beta carotene), and Omega-3 fatty acids. Here are some common tips for diet (Adopted from Penelope et al, [place], 2018):  Eat primarily plant-based foods, such as fruits and vegetables, whole grains, legumes   (beans) and nuts.  Limit refined carbohydrates (white pasta, bread, rice).  Replace butter with healthy fats such as olive oil.  Use herbs and spices instead of salt to flavor foods.  Limit red meat and processed meats to no more than a few times a month.  Avoid sugary sodas, bakery goods, and sweets.  Eat fish and poultry at least twice a week.  Keep your brain  active. Find activities to stay mentally active, such as reading, games (cards, checkers), puzzles (crosswords, Sudoku, jig saw), crafts (models, woodworking), gardening, or participating in activities in the community.  Stay socially engaged. Continue staying active with your family and friends.    RESOURCE BOOKS & POD CASTS  Consider purchasing the following books on brain health:   High-Octane Brain: 5 Science-Based Steps to Sharpen Your Memory and Reduce Your Risk of Alzheimer's by Mari Rodriges, PhD, ABPP-CN.   Keep Your Wits About You: The Science of Brain Maintenance As You Age by Alka Aaron, PhD.   The Brain Health Book: Using the Power of Neuroscience to Improve Your Life by Khris Woodard, PhD, ABPP-CN.     Consider listening to Brain Beat, a pod cast series by the National Academy of Neuropsychology Foundation featuring discussions with experts on brain health and functioning.     BRAIN TRAINING APPS  · BrainHQ (https://www.Tenrox.Duxter/) - BrainHQ has more than two dozen brain-training exercises organized into six categories: Attention, Brain Speed, Memory, People Skills, Intelligence, and Navigation. It allows you to fit brain exercises into your busy life, and access brain training on most internet-connected devices. Plus, each exercise continuously adapts to your unique performance. So you train at the right level for you.     · Mind Games (https://www.mindgames.com/) - Mind Games is a great collection of games based in part on principles derived from cognitive tasks to help you practice different mental skills. This includes a handful of free games. Additionally, there are a number of trial games included that can be played 3 times. All games include your score history and a graph of your progress. Using some principles of standardized testing, your scores are also converted to a standard scale so that you can see where you need work and excel. The training center does the work for you by picking  the perfect mix of exercises to keep you engaged.     · Elevate (https://mana.bo.com/) - Elevate was selected by [employer] as Fernando of the Year! Elevate is a brain training program designed to improve focus, speaking abilities, processing speed, memory, math skills, and more. Each person is provided with a personalized training program that adjusts over time to maximize results. Theoretically, the more you train with Elevate, the more you'll improve critical cognitive skills that are proven to boost productivity, earning power, and self-confidence. Users who train at least 3 times per week have reported dramatic gains and increased confidence. In-fernando purchases.     · Peak (https://www.Ripple TV.net/) - Reach Peak performance with over 40 unique games, each one developed by neuroscientists and game experts to challenge your cognitive skills and push you further. Use , the  for your brain, to find the right workout for you at the right time. Choose from 's best recommendations to push your skills to the max. Or take contextual workouts like Coffee Break if you're short on time.  will help you track your progress using in-depth insights and keep you going when you need it most. Play for free or upgrade to Pro and get the best brain training experience available. In-fernando purchases.     OTHER SUGGESTIONS  Games and Apps like Sudoku; Crossword Puzzles; Word Find; Memory Games; Logic Games; Solitaire; and Hidden Object Mysteries. Find some you like and play them. It will exercise many of the skills necessary to improve function.    COGNITIVE TIPS AND STRATEGIES  The following tips and strategies are provided to help assist in daily activities:      Attention: Remember that inattention and lack of focus are major culprits to forgetting information so be sure and practice paying attention for adequate learning of information. If you rely on passive attention to remembering something (e.g., yeah,  uh-huh approach), you'll find you cannot recall it later. I recommend the following to improve attention, which may aid in later recall:  1. Reduce distractions in the area as much as possible  2. Look at the person as they are speaking to you.   3. Paraphrase as they are speaking  4. Write down important pieces of information   5. Ask them to repeat if you zone out.  6. Have them simplify and reduce information that you need to attend to during conversation.  7. Have visual cues to remind you if you need to do something later.     Processing Speed:  1. Using multiple modalities (e.g., listening, writing notes, asking questions, recording) to learn new information is likely to allow additional time for processing, thus improving memory for the material.   2. Allowing sufficient time to complete tasks will reduce frustration and help to ensure completion.     Executive Functionin. Don't attempt to multi-task.  Separate tasks so that each can be completed one at a time  2. Consider using a calendar/day planner, as that may be effective to help you plan and stay on track.  Color-coding specific tasks by importance may add additional benefit to your planner  3. Break down large projects into smaller tasks and write down the steps to completing the task.  Taking notes while reading can help with recall.     Storing Information: Use the below strategies to help you further enhance how information is stored  1. Rehearse - Immediately after seeing/hearing something, try to recall it.  Wait a few minutes, then check again.  Gradually lengthen the intervals between rehearsals.  2. Repetition of learned material is critical to ensure storage of information to be learned. Self-test at home to ensure learning.  3. Write down important information to improve your attention and focus and to have something to look back on when you need to recall it.  4. Make sure the person doesn't rattle off, but presents in a clear,  logical, and unhurried manner.      Recalling Information:  1. Jog your memory - Lose something?  Think back to when you last had it.  What did you do next?  And after that?  Mentally walk yourself through each activity that followed.  Prodding your memory this way may enable you to recall the location of the missing item.  2. Use a cue - Symbolic reminders (the proverbial string around the finger) are helpful.  So too are memos, timers, calendar notes, etc.--keep them in visible, appropriate place  3. Get organized - Have fixed locations for all important papers, key phone numbers, medications, keys, wallet, glasses, tools, etc.  4. Develop routines - Routines can anchor memories so they do not drift away.       Word Finding:   Not being able to find a word when you need it is a common and very annoying problem. It is not strictly a memory issue, but more a filing and retrieval issue. You know the word or name you want, but it cannot be found in your brain file. It is like having all the files in your file cabinet emptied on the floor. Every piece of information is there but finding it can be a challenge.   One strategy that may help is working with a speech pathologist/therapist to learn techniques to decrease word finding problems. Some of those strategies/techniques include the following:  Use circumlocutions. Describe the object you're trying to name instead of naming it.  Recite you're A, B, Cs. Go through the alphabet to see if a letter triggers finding the word.  Picture it. Try to visualize the spelling or writing of the word.  Relax. The harder you try to force yourself to come up with the word, the more frustrated you get and the worse you function.   Write it down. In situations where using correct words is critical, such as communicating on the radio while flying, write down the key words so that they are in front of you if needed.  Use word association. For words or names you continually misfile  and can't find, try to come up with a word association, something that reminds you of the word or name.  Write a script. Try scripting something important that you want to say. Either write it out or practice it beforehand, so that you get it right.  Play word games. Doing crossword puzzles and playing word finding games may help your brain become more efficient at filing and retrieving words.

## 2023-10-21 ENCOUNTER — HOSPITAL ENCOUNTER (EMERGENCY)
Facility: HOSPITAL | Age: 53
Discharge: HOME | End: 2023-10-21
Attending: SURGERY
Payer: COMMERCIAL

## 2023-10-21 VITALS
HEIGHT: 69 IN | BODY MASS INDEX: 28.6 KG/M2 | OXYGEN SATURATION: 97 % | HEART RATE: 97 BPM | TEMPERATURE: 98 F | WEIGHT: 193.13 LBS | SYSTOLIC BLOOD PRESSURE: 126 MMHG | RESPIRATION RATE: 15 BRPM | DIASTOLIC BLOOD PRESSURE: 75 MMHG

## 2023-10-21 DIAGNOSIS — R07.9 CHEST PAIN: ICD-10-CM

## 2023-10-21 DIAGNOSIS — I20.0 UNSTABLE ANGINA: Primary | ICD-10-CM

## 2023-10-21 DIAGNOSIS — R07.89 CHEST TIGHTNESS: ICD-10-CM

## 2023-10-21 LAB
ALBUMIN SERPL BCP-MCNC: 4.3 G/DL (ref 3.5–5.2)
ALP SERPL-CCNC: 62 U/L (ref 55–135)
ALT SERPL W/O P-5'-P-CCNC: 35 U/L (ref 10–44)
ANION GAP SERPL CALC-SCNC: 16 MMOL/L (ref 8–16)
APTT PPP: 22.9 SEC (ref 21–32)
AST SERPL-CCNC: 25 U/L (ref 10–40)
BASOPHILS # BLD AUTO: 0.02 K/UL (ref 0–0.2)
BASOPHILS NFR BLD: 0.3 % (ref 0–1.9)
BILIRUB SERPL-MCNC: 0.7 MG/DL (ref 0.1–1)
BNP SERPL-MCNC: <10 PG/ML (ref 0–99)
BUN SERPL-MCNC: 31 MG/DL (ref 6–20)
CALCIUM SERPL-MCNC: 11.7 MG/DL (ref 8.7–10.5)
CHLORIDE SERPL-SCNC: 100 MMOL/L (ref 95–110)
CK SERPL-CCNC: 38 U/L (ref 20–200)
CO2 SERPL-SCNC: 24 MMOL/L (ref 23–29)
CREAT SERPL-MCNC: 1.1 MG/DL (ref 0.5–1.4)
DIFFERENTIAL METHOD: NORMAL
EOSINOPHIL # BLD AUTO: 0.1 K/UL (ref 0–0.5)
EOSINOPHIL NFR BLD: 0.8 % (ref 0–8)
ERYTHROCYTE [DISTWIDTH] IN BLOOD BY AUTOMATED COUNT: 12.7 % (ref 11.5–14.5)
EST. GFR  (NO RACE VARIABLE): >60 ML/MIN/1.73 M^2
GLUCOSE SERPL-MCNC: 144 MG/DL (ref 70–110)
HCT VFR BLD AUTO: 45.1 % (ref 40–54)
HGB BLD-MCNC: 14.6 G/DL (ref 14–18)
IMM GRANULOCYTES # BLD AUTO: 0.02 K/UL (ref 0–0.04)
IMM GRANULOCYTES NFR BLD AUTO: 0.3 % (ref 0–0.5)
INR PPP: 1 (ref 0.8–1.2)
LYMPHOCYTES # BLD AUTO: 1.2 K/UL (ref 1–4.8)
LYMPHOCYTES NFR BLD: 18.6 % (ref 18–48)
MCH RBC QN AUTO: 28 PG (ref 27–31)
MCHC RBC AUTO-ENTMCNC: 32.4 G/DL (ref 32–36)
MCV RBC AUTO: 87 FL (ref 82–98)
MONOCYTES # BLD AUTO: 0.6 K/UL (ref 0.3–1)
MONOCYTES NFR BLD: 9.8 % (ref 4–15)
NEUTROPHILS # BLD AUTO: 4.5 K/UL (ref 1.8–7.7)
NEUTROPHILS NFR BLD: 70.2 % (ref 38–73)
NRBC BLD-RTO: 0 /100 WBC
PLATELET # BLD AUTO: 209 K/UL (ref 150–450)
PMV BLD AUTO: 9.9 FL (ref 9.2–12.9)
POTASSIUM SERPL-SCNC: 4.4 MMOL/L (ref 3.5–5.1)
PROT SERPL-MCNC: 7.8 G/DL (ref 6–8.4)
PROTHROMBIN TIME: 10.9 SEC (ref 9–12.5)
RBC # BLD AUTO: 5.21 M/UL (ref 4.6–6.2)
SODIUM SERPL-SCNC: 140 MMOL/L (ref 136–145)
TROPONIN I SERPL DL<=0.01 NG/ML-MCNC: 0.01 NG/ML (ref 0–0.03)
TROPONIN I SERPL DL<=0.01 NG/ML-MCNC: 0.02 NG/ML (ref 0–0.03)
WBC # BLD AUTO: 6.35 K/UL (ref 3.9–12.7)

## 2023-10-21 PROCEDURE — 85730 THROMBOPLASTIN TIME PARTIAL: CPT | Performed by: SURGERY

## 2023-10-21 PROCEDURE — 84484 ASSAY OF TROPONIN QUANT: CPT | Performed by: SURGERY

## 2023-10-21 PROCEDURE — 85610 PROTHROMBIN TIME: CPT | Performed by: SURGERY

## 2023-10-21 PROCEDURE — 85025 COMPLETE CBC W/AUTO DIFF WBC: CPT | Performed by: SURGERY

## 2023-10-21 PROCEDURE — 93010 ELECTROCARDIOGRAM REPORT: CPT | Mod: ,,, | Performed by: INTERNAL MEDICINE

## 2023-10-21 PROCEDURE — 93010 EKG 12-LEAD: ICD-10-PCS | Mod: ,,, | Performed by: INTERNAL MEDICINE

## 2023-10-21 PROCEDURE — 93005 ELECTROCARDIOGRAM TRACING: CPT

## 2023-10-21 PROCEDURE — 96374 THER/PROPH/DIAG INJ IV PUSH: CPT

## 2023-10-21 PROCEDURE — 82550 ASSAY OF CK (CPK): CPT | Performed by: SURGERY

## 2023-10-21 PROCEDURE — 99285 EMERGENCY DEPT VISIT HI MDM: CPT | Mod: 25

## 2023-10-21 PROCEDURE — 80053 COMPREHEN METABOLIC PANEL: CPT | Performed by: SURGERY

## 2023-10-21 PROCEDURE — 63600175 PHARM REV CODE 636 W HCPCS: Performed by: SURGERY

## 2023-10-21 PROCEDURE — 83880 ASSAY OF NATRIURETIC PEPTIDE: CPT | Performed by: SURGERY

## 2023-10-21 PROCEDURE — 99900035 HC TECH TIME PER 15 MIN (STAT)

## 2023-10-21 PROCEDURE — 25000003 PHARM REV CODE 250: Performed by: SURGERY

## 2023-10-21 PROCEDURE — 36415 COLL VENOUS BLD VENIPUNCTURE: CPT | Performed by: SURGERY

## 2023-10-21 RX ORDER — ONDANSETRON 2 MG/ML
4 INJECTION INTRAMUSCULAR; INTRAVENOUS
Status: COMPLETED | OUTPATIENT
Start: 2023-10-21 | End: 2023-10-21

## 2023-10-21 RX ORDER — ASPIRIN 325 MG
325 TABLET ORAL
Status: COMPLETED | OUTPATIENT
Start: 2023-10-21 | End: 2023-10-21

## 2023-10-21 RX ORDER — PANTOPRAZOLE SODIUM 40 MG/1
40 TABLET, DELAYED RELEASE ORAL
Status: COMPLETED | OUTPATIENT
Start: 2023-10-21 | End: 2023-10-21

## 2023-10-21 RX ORDER — ATORVASTATIN CALCIUM 40 MG/1
40 TABLET, FILM COATED ORAL
Status: COMPLETED | OUTPATIENT
Start: 2023-10-21 | End: 2023-10-21

## 2023-10-21 RX ADMIN — ATORVASTATIN CALCIUM 40 MG: 40 TABLET, FILM COATED ORAL at 09:10

## 2023-10-21 RX ADMIN — PANTOPRAZOLE SODIUM 40 MG: 40 TABLET, DELAYED RELEASE ORAL at 09:10

## 2023-10-21 RX ADMIN — ASPIRIN 325 MG ORAL TABLET 325 MG: 325 PILL ORAL at 07:10

## 2023-10-21 RX ADMIN — ONDANSETRON 4 MG: 2 INJECTION INTRAMUSCULAR; INTRAVENOUS at 08:10

## 2023-10-22 NOTE — ED TRIAGE NOTES
53 y.o. male presents to ER ED 06/ED 06   Chief Complaint   Patient presents with    Chest Pain     Pt arrives to the ed with c/o chest pain tightness onset 4pm. Hx of stents. 4/10 pain. Hx of cardiac stents.

## 2023-10-22 NOTE — ED PROVIDER NOTES
"Encounter Date: 10/21/2023       History     Chief Complaint   Patient presents with    Chest Pain     Pt arrives to the ed with c/o chest pain tightness onset 4pm. Hx of stents. 4/10 pain. Hx of cardiac stents.     Abel Hackett is a 53 y.o. male with PMH of DM type 2 who presents to the ED for evaluation of chest pain.  Patient reports acute onset of chest pain approximately 4 hours PTA.  Patient was sitting at home when he developed a tightness in the center of his chest.  Pain is currently rated 4/10 in severity.  Pain is nonradiating.  He denies associated shortness breath, palpitations, or feelings of syncope.  He denies nausea or diaphoresis.  He does report that he took 1 nitro PTA that did not relieve his pain.   Wife reports that patient recently had an angiogram on Tuesday at [facility] and he was told that is has 80% blockages "everywhere;" and that some of his stents are also occluded. He is scheduled to see Cardiology at [facility] for possible surgical intervention on Tues. He currently takes ASA and brillinta     The history is provided by the patient.     Review of patient's allergies indicates:  No Known Allergies  Past Medical History:   Diagnosis Date    Depression     Diabetes mellitus     Diabetes mellitus, type 2     History of right coronary artery stent placement     done at [facility]     Past Surgical History:   Procedure Laterality Date    COLONOSCOPY N/A 8/2/2022    Procedure: COLONOSCOPY;  Surgeon: Keira Wheat MD;  Location: [facility];  Service: Endoscopy;  Laterality: N/A;    REFRACTIVE SURGERY  4/13/2016    VASECTOMY       Family History   Problem Relation Age of Onset    Heart disease Mother     Hypertension Mother     Diabetes Mother     Heart attack Mother     Heart disease Father     Hypertension Father     Diabetes Father     Heart attack Father     Heart attack Maternal Uncle     Heart attack Maternal Grandfather      Social History     Tobacco Use    Smoking status: Never    Smokeless " tobacco: Never   Substance Use Topics    Alcohol use: Not Currently     Comment: occassional    Drug use: No     Review of Systems   Constitutional:  Negative for activity change, fatigue and fever.   HENT:  Negative for congestion, rhinorrhea and sore throat.    Respiratory:  Negative for cough, chest tightness and shortness of breath.    Cardiovascular:  Positive for chest pain.   Gastrointestinal:  Negative for abdominal pain, diarrhea, nausea and vomiting.   Genitourinary:  Negative for dysuria.   Musculoskeletal:  Negative for back pain.   Neurological:  Negative for dizziness and weakness.       Physical Exam     Initial Vitals [10/21/23 1914]   BP Pulse Resp Temp SpO2   130/74 96 20 98.4 °F (36.9 °C) 97 %      MAP       --         Physical Exam    Nursing note and vitals reviewed.  Constitutional: He appears well-developed and well-nourished.   HENT:   Head: Normocephalic and atraumatic.   Eyes: Conjunctivae and EOM are normal. Pupils are equal, round, and reactive to light.   Neck: Neck supple.   Cardiovascular:  Normal rate, regular rhythm, normal heart sounds and intact distal pulses.           Pulmonary/Chest: Breath sounds normal.   Abdominal: Abdomen is soft. Bowel sounds are normal.   Musculoskeletal:         General: Normal range of motion.      Cervical back: Neck supple.     Neurological: He is alert and oriented to person, place, and time. He has normal strength.   Skin: Skin is warm and dry.   Psychiatric: He has a normal mood and affect. His behavior is normal. Judgment and thought content normal.         ED Course   Procedures  Labs Reviewed   COMPREHENSIVE METABOLIC PANEL - Abnormal; Notable for the following components:       Result Value    Glucose 144 (*)     BUN 31 (*)     Calcium 11.7 (*)     All other components within normal limits   PROTIME-INR   APTT   B-TYPE NATRIURETIC PEPTIDE   CK   TROPONIN I   CBC W/ AUTO DIFFERENTIAL          Imaging Results              X-Ray Chest 1 View (Final  result)  Result time 10/21/23 19:48:54      Final result by Emory Lopez MD (10/21/23 19:48:54)                   Impression:      No acute process.      Electronically signed by: Emory Lopez MD  Date:    10/21/2023  Time:    19:48               Narrative:    EXAMINATION:  XR CHEST 1 VIEW    CLINICAL HISTORY:  CP;    TECHNIQUE:  Single frontal view of the chest was performed.    COMPARISON:  07/18/2022.    FINDINGS:  The trachea is unremarkable.  The cardiomediastinal silhouette is within normal limits.  The hilar structures are unremarkable.  There is no evidence of free air beneath the hemidiaphragms.  There are no pleural effusions.  There is no evidence of a pneumothorax.  There is no evidence of pneumomediastinum.  No airspace opacity is present.  There are degenerative changes in the osseous structures.                                       Medications   aspirin tablet 325 mg (325 mg Oral Given 10/21/23 1934)   ondansetron injection 4 mg (4 mg Intravenous Given 10/21/23 2003)   pantoprazole EC tablet 40 mg (40 mg Oral Given 10/21/23 2116)   atorvastatin tablet 40 mg (40 mg Oral Given 10/21/23 2116)     Medical Decision Making  52 yo male with chest tightness with significant cardiac hx including 6 stents  Recent angio showing multiple blockages at [facility] last week per history  He was referred to [facility] by [facility] for surgical intervention ASAP  Patient was having active chest pain tonight, diaphoretic earlier this evening    Problems Addressed:  Chest pain: complicated acute illness or injury  Chest tightness: complicated acute illness or injury  Unstable angina: complicated acute illness or injury    Amount and/or Complexity of Data Reviewed  Labs: ordered. Decision-making details documented in ED Course.  Radiology: ordered and independent interpretation performed.  ECG/medicine tests: ordered and independent interpretation performed.    ED Management & Risk of Complications, Morbidity,  Mortality:  Stable EKG with a (-) troponin with significant chest pain this evening PTA  Lipitor, aspirin, nitroglycerin, Protonix given patient on Brilinta daily per HX  Patient with unstable angina with severe vessel disease based on recent angina  Will reach out to [facility] system; needs transfer for immediate evaluation for CP    Critical Care ED Physician Time (minutes):  -- Performed by: Navi Bragg M.D.  -- Date/Time: 9:45 PM 10/21/2023   -- Direct Patient Care (Face Time): 15  -- Additional History from Records or Taking Additional History: 15  -- Ordering, Reviewing, and Interpreting Diagnostic Studies: 15  -- Total Time in Documentation: 15  -- Consultation with Other Physicians: 15  -- Consultation with Family Related to Condition: 15  -- Total Critical Care Time: 75  -- Critical care was necessary to treat Unstable angina  -- Critical care was time spent personally by me on the following activities:   -- discussions with consultants regarding treatment plan today  -- development of treatment plan with patient & their family  -- examination of patient, ordering and performing treatments   -- review of radiographic studies, re-evaluation of pt's condition       Clinical Impression:   Final diagnoses:  [R07.89] Chest tightness  [R07.9] Chest pain  [I20.0] Unstable angina (Primary)        ED Disposition Condition    Transfer to Another Facility Navi Wilkes MD  10/21/23 7197

## 2023-10-23 ENCOUNTER — OFFICE VISIT (OUTPATIENT)
Dept: NEUROLOGY | Facility: CLINIC | Age: 53
End: 2023-10-23
Payer: COMMERCIAL

## 2023-10-23 VITALS
WEIGHT: 191.13 LBS | HEART RATE: 110 BPM | SYSTOLIC BLOOD PRESSURE: 104 MMHG | DIASTOLIC BLOOD PRESSURE: 74 MMHG | BODY MASS INDEX: 28.31 KG/M2 | HEIGHT: 69 IN | RESPIRATION RATE: 16 BRPM

## 2023-10-23 DIAGNOSIS — E11.8 DIABETES MELLITUS TYPE 2 WITH COMPLICATIONS: ICD-10-CM

## 2023-10-23 DIAGNOSIS — E78.5 DYSLIPIDEMIA: ICD-10-CM

## 2023-10-23 DIAGNOSIS — G31.84 MILD COGNITIVE IMPAIRMENT OF UNCERTAIN OR UNKNOWN ETIOLOGY: Primary | ICD-10-CM

## 2023-10-23 DIAGNOSIS — R51.9 NONINTRACTABLE EPISODIC HEADACHE, UNSPECIFIED HEADACHE TYPE: ICD-10-CM

## 2023-10-23 DIAGNOSIS — I10 PRIMARY HYPERTENSION: ICD-10-CM

## 2023-10-23 DIAGNOSIS — F39 MOOD DISORDER: ICD-10-CM

## 2023-10-23 DIAGNOSIS — G25.0 BENIGN FAMILIAL TREMOR: ICD-10-CM

## 2023-10-23 DIAGNOSIS — I69.30 LATE EFFECT OF STROKE: ICD-10-CM

## 2023-10-23 DIAGNOSIS — G47.33 OBSTRUCTIVE SLEEP APNEA (ADULT) (PEDIATRIC): ICD-10-CM

## 2023-10-23 DIAGNOSIS — G31.84 MCI (MILD COGNITIVE IMPAIRMENT): Primary | ICD-10-CM

## 2023-10-23 PROCEDURE — 4010F ACE/ARB THERAPY RXD/TAKEN: CPT | Mod: CPTII,S$GLB,, | Performed by: PSYCHIATRY & NEUROLOGY

## 2023-10-23 PROCEDURE — 1160F PR REVIEW ALL MEDS BY PRESCRIBER/CLIN PHARMACIST DOCUMENTED: ICD-10-PCS | Mod: CPTII,S$GLB,, | Performed by: PSYCHIATRY & NEUROLOGY

## 2023-10-23 PROCEDURE — 3008F BODY MASS INDEX DOCD: CPT | Mod: CPTII,S$GLB,, | Performed by: PSYCHIATRY & NEUROLOGY

## 2023-10-23 PROCEDURE — 3078F PR MOST RECENT DIASTOLIC BLOOD PRESSURE < 80 MM HG: ICD-10-PCS | Mod: CPTII,S$GLB,, | Performed by: PSYCHIATRY & NEUROLOGY

## 2023-10-23 PROCEDURE — 3061F PR NEG MICROALBUMINURIA RESULT DOCUMENTED/REVIEW: ICD-10-PCS | Mod: CPTII,S$GLB,, | Performed by: PSYCHIATRY & NEUROLOGY

## 2023-10-23 PROCEDURE — 3051F HG A1C>EQUAL 7.0%<8.0%: CPT | Mod: CPTII,S$GLB,, | Performed by: PSYCHIATRY & NEUROLOGY

## 2023-10-23 PROCEDURE — 3066F PR DOCUMENTATION OF TREATMENT FOR NEPHROPATHY: ICD-10-PCS | Mod: CPTII,S$GLB,, | Performed by: PSYCHIATRY & NEUROLOGY

## 2023-10-23 PROCEDURE — 1160F RVW MEDS BY RX/DR IN RCRD: CPT | Mod: CPTII,S$GLB,, | Performed by: PSYCHIATRY & NEUROLOGY

## 2023-10-23 PROCEDURE — 3051F PR MOST RECENT HEMOGLOBIN A1C LEVEL 7.0 - < 8.0%: ICD-10-PCS | Mod: CPTII,S$GLB,, | Performed by: PSYCHIATRY & NEUROLOGY

## 2023-10-23 PROCEDURE — 99999 PR PBB SHADOW E&M-EST. PATIENT-LVL IV: ICD-10-PCS | Mod: PBBFAC,,, | Performed by: PSYCHIATRY & NEUROLOGY

## 2023-10-23 PROCEDURE — 99499 UNLISTED E&M SERVICE: CPT | Mod: 95,,, | Performed by: CLINICAL NEUROPSYCHOLOGIST

## 2023-10-23 PROCEDURE — 3074F SYST BP LT 130 MM HG: CPT | Mod: CPTII,S$GLB,, | Performed by: PSYCHIATRY & NEUROLOGY

## 2023-10-23 PROCEDURE — 99999 PR PBB SHADOW E&M-EST. PATIENT-LVL IV: CPT | Mod: PBBFAC,,, | Performed by: PSYCHIATRY & NEUROLOGY

## 2023-10-23 PROCEDURE — 99499 NO LOS: ICD-10-PCS | Mod: 95,,, | Performed by: CLINICAL NEUROPSYCHOLOGIST

## 2023-10-23 PROCEDURE — 3008F PR BODY MASS INDEX (BMI) DOCUMENTED: ICD-10-PCS | Mod: CPTII,S$GLB,, | Performed by: PSYCHIATRY & NEUROLOGY

## 2023-10-23 PROCEDURE — 1159F PR MEDICATION LIST DOCUMENTED IN MEDICAL RECORD: ICD-10-PCS | Mod: CPTII,S$GLB,, | Performed by: PSYCHIATRY & NEUROLOGY

## 2023-10-23 PROCEDURE — 4010F PR ACE/ARB THEARPY RXD/TAKEN: ICD-10-PCS | Mod: CPTII,S$GLB,, | Performed by: PSYCHIATRY & NEUROLOGY

## 2023-10-23 PROCEDURE — 3061F NEG MICROALBUMINURIA REV: CPT | Mod: CPTII,S$GLB,, | Performed by: PSYCHIATRY & NEUROLOGY

## 2023-10-23 PROCEDURE — 3078F DIAST BP <80 MM HG: CPT | Mod: CPTII,S$GLB,, | Performed by: PSYCHIATRY & NEUROLOGY

## 2023-10-23 PROCEDURE — 3066F NEPHROPATHY DOC TX: CPT | Mod: CPTII,S$GLB,, | Performed by: PSYCHIATRY & NEUROLOGY

## 2023-10-23 PROCEDURE — 99215 OFFICE O/P EST HI 40 MIN: CPT | Mod: S$GLB,,, | Performed by: PSYCHIATRY & NEUROLOGY

## 2023-10-23 PROCEDURE — 99215 PR OFFICE/OUTPT VISIT, EST, LEVL V, 40-54 MIN: ICD-10-PCS | Mod: S$GLB,,, | Performed by: PSYCHIATRY & NEUROLOGY

## 2023-10-23 PROCEDURE — 3074F PR MOST RECENT SYSTOLIC BLOOD PRESSURE < 130 MM HG: ICD-10-PCS | Mod: CPTII,S$GLB,, | Performed by: PSYCHIATRY & NEUROLOGY

## 2023-10-23 PROCEDURE — 1159F MED LIST DOCD IN RCRD: CPT | Mod: CPTII,S$GLB,, | Performed by: PSYCHIATRY & NEUROLOGY

## 2023-10-23 RX ORDER — SERTRALINE HYDROCHLORIDE 100 MG/1
100 TABLET, FILM COATED ORAL
Qty: 90 TABLET | Refills: 1 | Status: SHIPPED | OUTPATIENT
Start: 2023-10-23 | End: 2023-11-13 | Stop reason: SDUPTHER

## 2023-10-23 NOTE — PROGRESS NOTES
NEUROPSYCHOLOGICAL EVALUATION FEEDBACK    TELEMEDICINE DETAILS:   The patient location is: [place]  The chief complaint leading to consultation is: feedback regarding neuropsychological test results  Visit type: Virtual visit with synchronous audio and video  Total time spent with patient: 45 minutes  Each patient to whom he or she provides medical services by telemedicine is: (1) informed of the relationship between the physician and patient and the respective role of any other health care provider with respect to management of the patient; and (2) notified that he or she may decline to receive medical services by telemedicine and may withdraw from such care at any time.  Notes: See below.    Abel Hackett attended a feedback session today.  We discussed the results of the neuropsychological evaluation and I gave time to discuss questions and concerns. For full evaluation details, please see the note from this provider dated 10/5/2023. A copy of the report was provided via [employer].     Problem List Items Addressed This Visit          Neuro    Mild cognitive impairment of uncertain or unknown etiology - Primary         Nunu Steiner, PhD  Licensed Clinical Neuropsychologist  [facility] - Department of Neurology     PET Imaging Questionnaire    Are you a Diabetic? Recent Blood Sugar level? No    Are you anemic? Bone Marrow Stimulation Meds? No    Have you had a CT Scan, if so when & where was your last one? Yes -     Have you had a PET Scan, if so when & where was your last one? No    Chemotherapy or currently on Chemotherapy? No    Radiation therapy? No    Surgical History:   Past Surgical History:   Procedure Laterality Date    AORTIC VALVE REPLACEMENT      CORONARY ARTERY BYPASS GRAFT          Have you been fasting for at least 6 hours? Yes    Is there any chance you may be pregnant or breastfeeding? No    Assay: 15.4 MCi@:13.50   Injection Site:rt ac     Residual: 3.09 mCi@: 13.52   Technologist: Liliana Appiah Injected:12.31mCi

## 2023-10-23 NOTE — PROGRESS NOTES
HPI: bAel Hackett is a 53 y.o. male with a history of memory problems      Here for follow up    Headaches continue infrequently. This occurs rarely (one in the past several weeks).  Not always severe and can respond to aleeve.  Nurtec tried twice without relief.     Left eye vision difficulty continues    Eye exam showed DM2 related changes    Memory is worsening    Switches words a great deal    Continues daily    He had updated neuropsychological testing last month as noted below    Tremor ongoing with tedious activities    No hallucinations     CPAP compliant     ADLs: Family helps with bills and med admin    Remains off of work/ on long term disability     Driving without accidents or incident.     He is suffering CAD stent re-stenosis and pending further recommendations with his cardiologist    He is seeing Dr Marie for mood disorder     Fasting labs are followed by PCP      Review of Systems   Constitutional:  Negative for fever.   HENT:  Negative for nosebleeds.    Eyes:  Negative for double vision.   Respiratory:  Negative for hemoptysis.    Cardiovascular:  Negative for leg swelling.   Gastrointestinal:  Negative for blood in stool.   Genitourinary:  Negative for hematuria.   Musculoskeletal:  Negative for falls.   Skin:  Negative for rash.   Neurological:  Positive for tremors.   Psychiatric/Behavioral:  Positive for memory loss.          I have reviewed all of this patient's past medical and surgical histories as well as family and social histories and active allergies and medications as documented in the electronic medical record.        Exam:  Gen Appearance, well developed/nourished in no apparent distress  CV: 2+ distal pulses with no edema or swelling  Neuro:  MS: Awake, alert, Sustains attention. Recent/remote memory intact to gross verbal testing, Language is full to spontaneous speech/comprehension. Fund of Knowledge is full  CN: Optic discs are flat with normal vasculature, PERRL,  Extraoccular movements and visual fields are full. Normal facial sensation and strength, Hearing symmetric, Tongue and Palate are midline and strong. Shoulder Shrug symmetric and strong.  Motor: Normal bulk, tone, no abnormal movements at rest, mild tremor with hands out. 5/5 strength bilateral upper/lower extremities with 1+ reflexes and no clonus  Sensory: symmetric to  temp, and vibration. Romberg mildly positive, but improved  Cerebellar: Finger-nose,Heal-shin, Rapid alternating movements intact  Gait: Normal stance, no ataxia    Imagin2022 CT head:   Mild cortical atrophy without acute intracranial abnormality.    2022 MRI brain: Small focus of encephalomalacia right inferior cerebellum  MRA head and neck normal    2022 EEG: normal    2022 Holter monitor (done by PCP for palpitations complaint): Normal    3/2023 CTA head and neck: The left vertebral artery has approximately  60% diameter origin stenosis that extends 7 mm in length from the origin from the subclavian artery.  No other significant stenosis in the posterior circulation.    2023 MRI brain: Two small areas of remote infarction are seen in the right cerebellar hemisphere.  No new infarction is seen.     Stable focus of FLAIR signal abnormality in the left posterior frontal white matter, unchanged from prior study likely related to chronic microvascular ischemic disease.    2023 CTA head and neck: Remote lacunar-type infarctions in the right cerebellar hemisphere.  No new parenchymal hypoattenuation is seen to suggest an acute infarction and there is no intracranial hemorrhage.  No abnormal parenchymal enhancement.     No intracranial arterial abnormalities.    Labs:  RPR, TSH, B12, CBC normal. CMP: elevated glucose consistent with his known poorly controlled DM2      2023 A1C 7.1, down from 8.5 eight months prior    2023 ESR normal    2023 Neuropsychological testing: Unfortunately, scores on stand-alone and embedded  "performance validity measures were again variable, with several falling below cutoffs, suggesting reduced effort and/or engagement in the testing process. As such, the current results could underestimate his functioning and results are interpreted cautiously. With this in mind, he continues to display relatively intact visuospatial constructional skills and verbal memory. Greater variablity is seen in working memory, executive functioning, and processing speed compared to his previous evaluation. Visual memory, verbal fluency (no split), and fine motor dexerity are again significantly reduced, with a lateralization pattern seen on fine motor dexterity testing (right hand/left brain worse than left hand/right brain). He made errors on temporal orientation questions. His affect appeared flatter during this evaluation and he reported minimal depressive and anxious symptoms on psychological screening questionnaires.        Overall, if his vascular health has indeed worsened, a cognitive cerebellar affective syndrome is likely to explain his current cognitive profile. If not, then our concern for the possiblity of an emerging neurodegenerative condition moves higher on the differential. His prescriptions of gabapentin and Lunesta may be contributing to some cognitive trouble. He appears to be on the cusp of receiving a diagnosis of dementia given some of the errors reported in his daily functioning and should continue to be monitored closely. The following recommendations are offered:     Assessment/Plan: Abel Hackett is a 53 y.o. male with memory loss  I recommend:     1. Noting mild cortical atrophy by CT head 7/2022    2. 7/2022 MRI brain (Done with Dr. Vila at [facility] [place]): Small focus of encephalomalacia right inferior cerebellum without atrophy mentioned  -Likely a small vessel CVA due to poorly controlled DM2 (was noncompliant)  -MRA head and neck normal at that time  3/2023 CTA head and neck:  "left vertebral artery " "has approximately  60% diameter origin stenosis that extends 7 mm in length from the origin from the subclavian artery." Treatment for this is anti-platelets, statin, DM2 control  -Now on anti-HTN meds and poorly controlled DM2 is improving with treatment with PCP. Goal less than 7. Continue anti-platelets and statin for CVA prevention      3. Note: Patient is seeing cardiology for CAD ([facility]IS)    4. His mom had  vascular dementia and dad had AD.   -EEG at [facility] unremarkable 2022    5. 9/2022  Neuropsychological testing inconclusive but noting concern "that his vascular risk factors were/are impacting his cognition"  -He did consult with vascular neurology who noted "remote R cerebellar infarct likely 2/2 to small vessel disease"   -Currently on Brilinta due to CAD with stent and also statin for DLD  -Repeat Neuropsychological testing in 9/2023: validity measure issues, but some decline in testing, noting. "Overall, if his vascular health has indeed worsened, a cognitive cerebellar affective syndrome is likely to explain his current cognitive profile. If not, then our concern for the possiblity of an emerging neurodegenerative condition moves higher on the differential. His prescriptions of gabapentin and Lunesta may be contributing to some cognitive trouble. He appears to be on the cusp of receiving a diagnosis of dementia given some of the errors reported in his daily functioning and should continue to be monitored closely. The following recommendations are offered:   -Recommend tight control of risk factors for vascular dementia including compliance with CPAP. (Now compliant)  -Also seeing psychiatry  for mood  -No driving restrictions but he is off of work currently due to symptoms. Advised he remain off given his daily memory loss"    -Given the above, I will refer to Dr Espinosa in memory disorders clinic for further evaluation  -noting his significant CAD, worsening currently / pending cardiology  surgery " evaluation  -Speech therapy referral as recommended by neuropsychiatry     6. Tremor, Like Benign essential tremor  -Father had tremor  -No treatment recommended at this time.      7. Worst headache of life May 2023  -Has had blurred in the left eye since prior stroke, which worsened and was thought to be DM2 related by opthalmology   -Pain resolved/ has episodic headaches now  -Update MRI brain and CTA head 2023 Reassuring. ESR not severely elevated for DM2 history  -Nurtec PRN to continue/ headaches are not frequent and variable. Avoid triptans and NSAIDs given CAD and CVA history          RTC 6 months. Patient is advised to remain off of work    Total time spent on DOS including chart review and patient counselin minutes

## 2023-10-25 ENCOUNTER — TELEPHONE (OUTPATIENT)
Dept: NEUROLOGY | Facility: CLINIC | Age: 53
End: 2023-10-25
Payer: COMMERCIAL

## 2023-10-25 NOTE — TELEPHONE ENCOUNTER
----- Message from Haylee Pascal sent at 10/23/2023  8:32 PM CDT -----  Regarding: FW: Ambulatory referral/consult to Neurology    ----- Message -----  From: Bharath Mcdowell  Sent: 10/23/2023   2:17 PM CDT  To: Francisca [facility]AM[facility] Staff  Subject: Ambulatory referral/consult to Neurology         Good afternoon,    Patient is requesting a callback ASAP today to schedule soonest available appointment regarding referral/consult to Neurology. Please give the patient a callback at [phone].     Dx: MCI (mild cognitive impairment      Thank you,    ADRIANA Mcdowell

## 2023-10-26 NOTE — TELEPHONE ENCOUNTER
Dr Espinosa told me I can schedule pt  Called pt to sched   Spoke w/ him   Scheduled appt  Pt verbalized understanding and had no further concerns or questions.

## 2023-10-27 ENCOUNTER — TELEPHONE (OUTPATIENT)
Dept: CARDIOLOGY | Facility: CLINIC | Age: 53
End: 2023-10-27
Payer: COMMERCIAL

## 2023-10-27 NOTE — TELEPHONE ENCOUNTER
Called patient to make appointment for him to see Dr. Degroot for his coronary artery blockages. Patient was referred by Dr. Bragg. Patient stated that he has decided he will have bypass surgery at [facility] next week.

## 2023-11-07 ENCOUNTER — PATIENT MESSAGE (OUTPATIENT)
Dept: FAMILY MEDICINE | Facility: CLINIC | Age: 53
End: 2023-11-07
Payer: COMMERCIAL

## 2023-11-08 ENCOUNTER — TELEPHONE (OUTPATIENT)
Dept: PHARMACY | Facility: CLINIC | Age: 53
End: 2023-11-08
Payer: COMMERCIAL

## 2023-11-08 NOTE — TELEPHONE ENCOUNTER
Assessed patient medication adherence for population health /[facility] medication adherence project

## 2023-11-09 ENCOUNTER — PATIENT MESSAGE (OUTPATIENT)
Dept: PSYCHIATRY | Facility: CLINIC | Age: 53
End: 2023-11-09
Payer: COMMERCIAL

## 2023-11-13 RX ORDER — SERTRALINE HYDROCHLORIDE 100 MG/1
150 TABLET, FILM COATED ORAL DAILY
Qty: 45 TABLET | Refills: 2 | Status: SHIPPED | OUTPATIENT
Start: 2023-11-13 | End: 2024-02-01 | Stop reason: SDUPTHER

## 2023-11-16 ENCOUNTER — TELEPHONE (OUTPATIENT)
Dept: FAMILY MEDICINE | Facility: CLINIC | Age: 53
End: 2023-11-16
Payer: COMMERCIAL

## 2023-11-16 RX ORDER — VALSARTAN 40 MG/1
40 TABLET ORAL DAILY
Qty: 90 TABLET | Refills: 3
Start: 2023-11-16 | End: 2024-01-03

## 2023-11-28 NOTE — TELEPHONE ENCOUNTER
No care due was identified.  [facility] Embedded Care Due Messages. Reference number: [id number].   11/28/2023 4:07:24 PM CST

## 2023-11-28 NOTE — TELEPHONE ENCOUNTER
----- Message from Scott Boss sent at 2023  3:39 PM CST -----  Contact: Fax - [facility]  Abel Hackett  MRN: [id number]  : 1970  PCP: Keira Wheat  Home Phone      [phone]  Work Phone      Not on file.  Mobile          [phone]  Home Phone      Not on file.  Mobile          Not on file.      MESSAGE:   Pt requesting refill or new Rx.   Is this a refill or new RX:  refill  RX name and strength: Synjardy XR  mg  Last office visit: 23  Is this a 30-day or 90-day RX:  90 day  Pharmacy name and location:  [facility]   Comments:      Phone:  fax    PCP: Mary Alice

## 2023-11-29 RX ORDER — EMPAGLIFLOZIN, METFORMIN HYDROCHLORIDE 25; 1000 MG/1; MG/1
1 TABLET, EXTENDED RELEASE ORAL DAILY
Qty: 90 TABLET | Refills: 1 | Status: SHIPPED | OUTPATIENT
Start: 2023-11-29

## 2023-12-06 ENCOUNTER — PATIENT MESSAGE (OUTPATIENT)
Dept: FAMILY MEDICINE | Facility: CLINIC | Age: 53
End: 2023-12-06

## 2023-12-07 ENCOUNTER — OFFICE VISIT (OUTPATIENT)
Dept: FAMILY MEDICINE | Facility: CLINIC | Age: 53
End: 2023-12-07
Payer: COMMERCIAL

## 2023-12-07 ENCOUNTER — APPOINTMENT (OUTPATIENT)
Dept: RADIOLOGY | Facility: CLINIC | Age: 53
End: 2023-12-07
Attending: FAMILY MEDICINE
Payer: COMMERCIAL

## 2023-12-07 ENCOUNTER — CLINICAL SUPPORT (OUTPATIENT)
Dept: FAMILY MEDICINE | Facility: CLINIC | Age: 53
End: 2023-12-07
Payer: COMMERCIAL

## 2023-12-07 VITALS
RESPIRATION RATE: 16 BRPM | WEIGHT: 188.5 LBS | SYSTOLIC BLOOD PRESSURE: 100 MMHG | BODY MASS INDEX: 27.92 KG/M2 | HEIGHT: 69 IN | HEART RATE: 104 BPM | DIASTOLIC BLOOD PRESSURE: 72 MMHG

## 2023-12-07 DIAGNOSIS — E11.311 TYPE 2 DIABETES MELLITUS WITH RETINOPATHY AND MACULAR EDEMA, WITHOUT LONG-TERM CURRENT USE OF INSULIN, UNSPECIFIED LATERALITY, UNSPECIFIED RETINOPATHY SEVERITY: ICD-10-CM

## 2023-12-07 DIAGNOSIS — D64.9 ANEMIA, UNSPECIFIED TYPE: ICD-10-CM

## 2023-12-07 DIAGNOSIS — I31.39 PERICARDIAL EFFUSION: ICD-10-CM

## 2023-12-07 DIAGNOSIS — R57.0 CARDIOGENIC SHOCK: ICD-10-CM

## 2023-12-07 DIAGNOSIS — R74.8 ELEVATED LIVER ENZYMES: ICD-10-CM

## 2023-12-07 DIAGNOSIS — G47.33 OSA (OBSTRUCTIVE SLEEP APNEA): Primary | ICD-10-CM

## 2023-12-07 DIAGNOSIS — G47.00 INSOMNIA, UNSPECIFIED TYPE: ICD-10-CM

## 2023-12-07 DIAGNOSIS — G47.33 OSA (OBSTRUCTIVE SLEEP APNEA): ICD-10-CM

## 2023-12-07 LAB
ALBUMIN SERPL BCP-MCNC: 4 G/DL (ref 3.5–5.2)
ALBUMIN/CREAT UR: 5.7 UG/MG (ref 0–30)
ALP SERPL-CCNC: 94 U/L (ref 55–135)
ALT SERPL W/O P-5'-P-CCNC: 36 U/L (ref 10–44)
ANION GAP SERPL CALC-SCNC: 9 MMOL/L (ref 8–16)
AST SERPL-CCNC: 25 U/L (ref 10–40)
BASOPHILS # BLD AUTO: 0.04 K/UL (ref 0–0.2)
BASOPHILS NFR BLD: 0.7 % (ref 0–1.9)
BILIRUB SERPL-MCNC: 0.5 MG/DL (ref 0.1–1)
BUN SERPL-MCNC: 23 MG/DL (ref 6–20)
CALCIUM SERPL-MCNC: 10.2 MG/DL (ref 8.7–10.5)
CHLORIDE SERPL-SCNC: 107 MMOL/L (ref 95–110)
CO2 SERPL-SCNC: 25 MMOL/L (ref 23–29)
CREAT SERPL-MCNC: 1.2 MG/DL (ref 0.5–1.4)
CREAT UR-MCNC: 104.7 MG/DL (ref 23–375)
DIFFERENTIAL METHOD: ABNORMAL
EOSINOPHIL # BLD AUTO: 0.5 K/UL (ref 0–0.5)
EOSINOPHIL NFR BLD: 8.6 % (ref 0–8)
ERYTHROCYTE [DISTWIDTH] IN BLOOD BY AUTOMATED COUNT: 14.1 % (ref 11.5–14.5)
EST. GFR  (NO RACE VARIABLE): >60 ML/MIN/1.73 M^2
GLUCOSE SERPL-MCNC: 154 MG/DL (ref 70–110)
HCT VFR BLD AUTO: 44.2 % (ref 40–54)
HGB BLD-MCNC: 13.5 G/DL (ref 14–18)
IMM GRANULOCYTES # BLD AUTO: 0.02 K/UL (ref 0–0.04)
IMM GRANULOCYTES NFR BLD AUTO: 0.3 % (ref 0–0.5)
LYMPHOCYTES # BLD AUTO: 1.2 K/UL (ref 1–4.8)
LYMPHOCYTES NFR BLD: 21.2 % (ref 18–48)
MCH RBC QN AUTO: 27.2 PG (ref 27–31)
MCHC RBC AUTO-ENTMCNC: 30.5 G/DL (ref 32–36)
MCV RBC AUTO: 89 FL (ref 82–98)
MICROALBUMIN UR DL<=1MG/L-MCNC: 6 UG/ML
MONOCYTES # BLD AUTO: 0.5 K/UL (ref 0.3–1)
MONOCYTES NFR BLD: 8.6 % (ref 4–15)
NEUTROPHILS # BLD AUTO: 3.5 K/UL (ref 1.8–7.7)
NEUTROPHILS NFR BLD: 60.6 % (ref 38–73)
NRBC BLD-RTO: 0 /100 WBC
PLATELET # BLD AUTO: 226 K/UL (ref 150–450)
PMV BLD AUTO: 10.7 FL (ref 9.2–12.9)
POTASSIUM SERPL-SCNC: 4.5 MMOL/L (ref 3.5–5.1)
PROT SERPL-MCNC: 7.5 G/DL (ref 6–8.4)
RBC # BLD AUTO: 4.96 M/UL (ref 4.6–6.2)
SODIUM SERPL-SCNC: 141 MMOL/L (ref 136–145)
WBC # BLD AUTO: 5.84 K/UL (ref 3.9–12.7)

## 2023-12-07 PROCEDURE — 3066F PR DOCUMENTATION OF TREATMENT FOR NEPHROPATHY: ICD-10-PCS | Mod: CPTII,S$GLB,, | Performed by: FAMILY MEDICINE

## 2023-12-07 PROCEDURE — 80053 COMPREHEN METABOLIC PANEL: CPT | Performed by: FAMILY MEDICINE

## 2023-12-07 PROCEDURE — 3074F SYST BP LT 130 MM HG: CPT | Mod: CPTII,S$GLB,, | Performed by: FAMILY MEDICINE

## 2023-12-07 PROCEDURE — 99214 PR OFFICE/OUTPT VISIT, EST, LEVL IV, 30-39 MIN: ICD-10-PCS | Mod: S$GLB,,, | Performed by: FAMILY MEDICINE

## 2023-12-07 PROCEDURE — 4010F PR ACE/ARB THEARPY RXD/TAKEN: ICD-10-PCS | Mod: CPTII,S$GLB,, | Performed by: FAMILY MEDICINE

## 2023-12-07 PROCEDURE — 3061F PR NEG MICROALBUMINURIA RESULT DOCUMENTED/REVIEW: ICD-10-PCS | Mod: CPTII,S$GLB,, | Performed by: FAMILY MEDICINE

## 2023-12-07 PROCEDURE — 99214 OFFICE O/P EST MOD 30 MIN: CPT | Mod: S$GLB,,, | Performed by: FAMILY MEDICINE

## 2023-12-07 PROCEDURE — 3051F HG A1C>EQUAL 7.0%<8.0%: CPT | Mod: CPTII,S$GLB,, | Performed by: FAMILY MEDICINE

## 2023-12-07 PROCEDURE — 85025 COMPLETE CBC W/AUTO DIFF WBC: CPT | Performed by: FAMILY MEDICINE

## 2023-12-07 PROCEDURE — 36415 PR COLLECTION VENOUS BLOOD,VENIPUNCTURE: ICD-10-PCS | Mod: S$GLB,,, | Performed by: FAMILY MEDICINE

## 2023-12-07 PROCEDURE — 3078F PR MOST RECENT DIASTOLIC BLOOD PRESSURE < 80 MM HG: ICD-10-PCS | Mod: CPTII,S$GLB,, | Performed by: FAMILY MEDICINE

## 2023-12-07 PROCEDURE — 3051F PR MOST RECENT HEMOGLOBIN A1C LEVEL 7.0 - < 8.0%: ICD-10-PCS | Mod: CPTII,S$GLB,, | Performed by: FAMILY MEDICINE

## 2023-12-07 PROCEDURE — 3061F NEG MICROALBUMINURIA REV: CPT | Mod: CPTII,S$GLB,, | Performed by: FAMILY MEDICINE

## 2023-12-07 PROCEDURE — 3066F NEPHROPATHY DOC TX: CPT | Mod: CPTII,S$GLB,, | Performed by: FAMILY MEDICINE

## 2023-12-07 PROCEDURE — 71045 X-RAY EXAM CHEST 1 VIEW: CPT | Mod: 26,,, | Performed by: RADIOLOGY

## 2023-12-07 PROCEDURE — 99999 PR PBB SHADOW E&M-EST. PATIENT-LVL IV: CPT | Mod: PBBFAC,,, | Performed by: FAMILY MEDICINE

## 2023-12-07 PROCEDURE — 3078F DIAST BP <80 MM HG: CPT | Mod: CPTII,S$GLB,, | Performed by: FAMILY MEDICINE

## 2023-12-07 PROCEDURE — 71045 XR CHEST 1 VIEW: ICD-10-PCS | Mod: 26,,, | Performed by: RADIOLOGY

## 2023-12-07 PROCEDURE — 99999 PR PBB SHADOW E&M-EST. PATIENT-LVL IV: ICD-10-PCS | Mod: PBBFAC,,, | Performed by: FAMILY MEDICINE

## 2023-12-07 PROCEDURE — 36415 COLL VENOUS BLD VENIPUNCTURE: CPT | Mod: S$GLB,,, | Performed by: FAMILY MEDICINE

## 2023-12-07 PROCEDURE — 82043 UR ALBUMIN QUANTITATIVE: CPT | Performed by: FAMILY MEDICINE

## 2023-12-07 PROCEDURE — 71045 X-RAY EXAM CHEST 1 VIEW: CPT | Mod: TC,PO

## 2023-12-07 PROCEDURE — 3074F PR MOST RECENT SYSTOLIC BLOOD PRESSURE < 130 MM HG: ICD-10-PCS | Mod: CPTII,S$GLB,, | Performed by: FAMILY MEDICINE

## 2023-12-07 PROCEDURE — 1159F MED LIST DOCD IN RCRD: CPT | Mod: CPTII,S$GLB,, | Performed by: FAMILY MEDICINE

## 2023-12-07 PROCEDURE — 4010F ACE/ARB THERAPY RXD/TAKEN: CPT | Mod: CPTII,S$GLB,, | Performed by: FAMILY MEDICINE

## 2023-12-07 PROCEDURE — 1159F PR MEDICATION LIST DOCUMENTED IN MEDICAL RECORD: ICD-10-PCS | Mod: CPTII,S$GLB,, | Performed by: FAMILY MEDICINE

## 2023-12-07 RX ORDER — SUVOREXANT 20 MG/1
20 TABLET, FILM COATED ORAL NIGHTLY PRN
Qty: 30 TABLET | Refills: 1 | Status: SHIPPED | OUTPATIENT
Start: 2023-12-07 | End: 2024-01-03

## 2023-12-07 RX ORDER — TEMAZEPAM 15 MG/1
15 CAPSULE ORAL NIGHTLY PRN
Qty: 30 CAPSULE | Refills: 0 | Status: SHIPPED | OUTPATIENT
Start: 2023-12-07 | End: 2024-01-03

## 2023-12-07 NOTE — PROGRESS NOTES
Answers submitted by the patient for this visit:  Cough Questionnaire (Submitted on 12/7/2023)  Chief Complaint: Cough  Chronicity: recurrent  Onset: 1 to 4 weeks ago  Progression since onset: gradually improving  Frequency: every few hours  Cough characteristics: non-productive  chest pain: No  chills: No  ear congestion: No  ear pain: No  fever: No  headaches: No  heartburn: No  hemoptysis: No  myalgias: No  nasal congestion: No  postnasal drip: No  rash: No  rhinorrhea: No  shortness of breath: No  sore throat: No  sweats: No  weight loss: No  wheezing: No  Aggravated by: nothing  asthma: No  bronchiectasis: No  bronchitis: No  COPD: No  emphysema: No  environmental allergies: No  pneumonia: No  Treatments tried: nothing  Improvement on treatment: no relief

## 2023-12-08 ENCOUNTER — PATIENT MESSAGE (OUTPATIENT)
Dept: OTHER | Facility: OTHER | Age: 53
End: 2023-12-08
Payer: COMMERCIAL

## 2023-12-13 ENCOUNTER — TELEPHONE (OUTPATIENT)
Dept: FAMILY MEDICINE | Facility: CLINIC | Age: 53
End: 2023-12-13
Payer: COMMERCIAL

## 2023-12-13 NOTE — TELEPHONE ENCOUNTER
----- Message from Babak Rider sent at 2023 10:47 AM CST -----  Contact: Velmalauren Hackett  MRN: [id number]  : 1970  PCP: Keira Wheat  Home Phone      [phone]  Work Phone      Not on file.  Mobile          [phone]  Home Phone      Not on file.  Mobile          Not on file.      MESSAGE:     Velma bonilla [facility] called in regards to pt needing chart notes for pts CPAP supplies.         Please advise  Velma  [phone]

## 2023-12-13 NOTE — TELEPHONE ENCOUNTER
Contacted [facility]y [facility] back and they state that they have received the LOV notes. No longer needing to fax.

## 2023-12-14 ENCOUNTER — PATIENT MESSAGE (OUTPATIENT)
Dept: FAMILY MEDICINE | Facility: CLINIC | Age: 53
End: 2023-12-14
Payer: COMMERCIAL

## 2023-12-28 LAB
LEFT EYE DM RETINOPATHY: POSITIVE
RIGHT EYE DM RETINOPATHY: POSITIVE

## 2024-01-01 ENCOUNTER — OUTPATIENT CASE MANAGEMENT (OUTPATIENT)
Dept: NEUROLOGY | Facility: CLINIC | Age: 54
End: 2024-01-01
Payer: COMMERCIAL

## 2024-01-01 DIAGNOSIS — R41.89 COGNITIVE AND BEHAVIORAL CHANGES: Primary | ICD-10-CM

## 2024-01-01 DIAGNOSIS — R46.89 COGNITIVE AND BEHAVIORAL CHANGES: Primary | ICD-10-CM

## 2024-01-01 PROCEDURE — 99358 PROLONG SERVICE W/O CONTACT: CPT | Mod: S$GLB,,, | Performed by: PSYCHIATRY & NEUROLOGY

## 2024-01-01 NOTE — PROGRESS NOTES
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 significant CAD, worsening currently / pending cardiology surgery evaluation. -Speech therapy referral as recommended by neuropsychiatry.     Current Presentation  Recent/Interim History:  In early July 2022, a 52-year-old male with long-standing diabetes and well-controlled hypertension, but no smoking history, visited his primary care provider with complaints of worsening memory issues. He reported misplacing things and forgetting his destination while driving. He experienced a 'kaleidoscope' sensation in his vision and occasional palpitations. His mother had significant vascular dementia. He had recently switched from Soliqua to Ozempic for diabetes management, with fasting sugars under 140. In August 2022, the patient saw a neurologist due to memory problems noticed by his wife and coworkers six months prior. He reported daily forgetfulness, including missing meetings and forgetting tasks. He managed his daily living activities but forgot to complete tasks like paying bills. An MRI revealed a cerebellar abnormality, and a CT scan showed mild cortical atrophy. His family had a history of dementia. The neurologist recommended neuropsychological testing and continued management of vascular risk factors. In September 2022, a neuropsychologist assessed the patient. He had difficulty with directions, word-finding, and parking. He forgot deadlines at " work and repeated statements. The MRI indicated a small vessel CVA due to poorly controlled diabetes. The patient was unaware of his moderate obstructive sleep apnea diagnosis. Neuropsychological evaluation suggested a need for better control of vascular risk factors. By late September 2022, further neuropsychological evaluation showed the patient's intact visuospatial skills and some language and memory functions. He exhibited increased stress and irritability due to cognitive difficulties. The evaluation underscored the impact of vascular risk factors on his cognition. The patient was advised to manage his sleep apnea and consider talk therapy. In November 2022, a follow-up with the neurologist revealed inconclusive neuropsychological results. The patient had vascular complications requiring stent placement. MRI showed remote lacunar infarcts. His diabetes control improved with Ozempic. He continued to experience daily memory problems and was advised to manage vascular risk factors and sleep apnea. In mid-June 2023, the patient's memory problems were worsening. He used a razor as a toothbrush and had a severe headache with blurred vision. An MRI and CTA revealed cerebral abnormalities. His mood was stable, and diabetes control was improving. The neurologist recommended tight control of vascular dementia risk factors and compliance with CPAP. By late September 2023, the primary care provider noted the patient's stable diabetes management and ongoing treatment for CAD and anxiety. He remained active with swimming and had no further headaches. In late October 2023, a neurologist follow-up showed infrequent headaches and worsening memory. 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 The patient was on the cusp of a dementia diagnosis. He was referred to a memory disorders clinic for further evaluation and advised to tightly control  vascular risk factors and maintain CPAP compliance.  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          Neuroimaging:    MRI brain/head without contrast on 6/26/2023  Formal interpretation by Radiology:  Two small areas of remote infarction are seen in the right cerebellar hemisphere. No new infarction is seen. Stable focus of FLAIR signal abnormality in the left posterior frontal white matter, unchanged from prior study likely related to chronic microvascular ischemic disease.  Independently reviewed radiological imaging by Juan Long MD. MPH. Behavioral Neurologist  T1: Mild generalized cortical atrophy posterior>frontal, dorsal>ventral, lateral>medial without a clear degenerative patterning. Intact corpus callosum with normal volume ratio. intact midbrain and brainstem normal volume and ratio. Intact hippocampal structures, volume ratio  T2/FLAIR: scattered subcortical microvascular disease. Age infarcts in the bilateral cerebellum. Right greater than left. Otherwise stable. Mild parahippocampal stippling mild anterior periventricular capping.  DWI/ADC: No Significant DWI hyperintensities/hypointensities. No ADC correlation.  SWI/GRE: No Significant hypointensities to suggest cortical/subcortical hemosiderin deposition.  Impression: : Mild generalized cortical atrophy posterior>frontal, dorsal>ventral, lateral>medial without a clear degenerative patterning. age indeterminate right cerebellar infarct with mild microvascular disease     Working Diagnosis/Differential  VCID     Sincerely,  Juan Espinosa MD. MPH.    Brain Health and Cognitive Disorders Program  [facility]

## 2024-01-01 NOTE — PROGRESS NOTES
[facility]  Brain Health and Cognitive Disorders Program     PATIENT: Abel Hackett  VISIT DATE: 2024  MRN: [id number]  PRIMARY PROVIDER: Keira hWeat MD  : 1970    [unfilled]     Past Medical History:   Diagnosis Date    Depression     Diabetes mellitus     Diabetes mellitus, type 2     History of right coronary artery stent placement     done at [facility]       Past Surgical History:   Procedure Laterality Date    ANGIOGRAPHY N/A 10/2023    COLONOSCOPY N/A 2022    Procedure: COLONOSCOPY;  Surgeon: Keira Wheat MD;  Location: [facility];  Service: Endoscopy;  Laterality: N/A;    REFRACTIVE SURGERY  2016    VASECTOMY         Family History   Problem Relation Age of Onset    Heart disease Mother     Hypertension Mother     Diabetes Mother     Heart attack Mother     Heart disease Father     Hypertension Father     Diabetes Father     Heart attack Father     Heart attack Maternal Uncle     Heart attack Maternal Grandfather        Social History     Socioeconomic History    Marital status:    Tobacco Use    Smoking status: Never    Smokeless tobacco: Never   Substance and Sexual Activity    Alcohol use: Not Currently     Comment: occassional    Drug use: No    Sexual activity: Yes     Partners: Female     Social Determinants of Health     Financial Resource Strain: Low Risk  (2023)    Overall Financial Resource Strain (CARDIA)     Difficulty of Paying Living Expenses: Not hard at all   Food Insecurity: No Food Insecurity (2023)    Hunger Vital Sign     Worried About Running Out of Food in the Last Year: Never true     Ran Out of Food in the Last Year: Never true   Transportation Needs: No Transportation Needs (2023)    PRAPARE - Transportation     Lack of Transportation (Medical): No     Lack of Transportation (Non-Medical): No   Physical Activity: Insufficiently Active (2023)    Exercise Vital Sign     Days of Exercise per Week: 3 days     Minutes of Exercise  per Session: 30 min   Stress: Stress Concern Present (12/7/2023)    [employer] - Occupational Stress Questionnaire     Feeling of Stress : To some extent   Social Connections: Unknown (12/7/2023)    Social Connection and Isolation Panel [NHANES]     Frequency of Communication with Friends and Family: Patient declined     Frequency of Social Gatherings with Friends and Family: Patient declined     Active Member of Clubs or Organizations: Patient declined     Attends Club or Organization Meetings: Patient declined     Marital Status:    Housing Stability: Low Risk  (12/7/2023)    Housing Stability Vital Sign     Unable to Pay for Housing in the Last Year: No     Number of Places Lived in the Last Year: 1     Unstable Housing in the Last Year: No       Medication:     Current Outpatient Medications on File Prior to Visit   Medication Sig Dispense Refill    aspirin (ECOTRIN) 81 MG EC tablet Take 81 mg by mouth once daily.      atorvastatin (LIPITOR) 80 MG tablet Take 80 mg by mouth once daily.      empagliflozin-metformin (SYNJARDY XR) 25-1,000 mg TBph Take 1 tablet by mouth Daily. 90 tablet 1    gabapentin (NEURONTIN) 100 MG capsule Take 1 capsule (100 mg total) by mouth 3 (three) times daily. (Patient taking differently: Take 300 mg by mouth every evening.) 90 capsule 3    isosorbide mononitrate (IMDUR) 30 MG 24 hr tablet Take 30 mg by mouth once daily.      metoprolol succinate (TOPROL-XL) 100 MG 24 hr tablet Take 100 mg by mouth once daily.      pantoprazole (PROTONIX) 40 MG tablet Take 1 tablet (40 mg total) by mouth once daily. 30 tablet 0    semaglutide (OZEMPIC) 2 mg/dose (8 mg/3 mL) PnIj Inject 2 mg into the skin every 7 days. 3 mL 11    sertraline (ZOLOFT) 100 MG tablet Take 1.5 tablets (150 mg total) by mouth once daily. 45 tablet 2    nitroGLYCERIN (NITROSTAT) 0.4 MG SL tablet PLEASE SEE ATTACHED FOR DETAILED DIRECTIONS      rimegepant 75 mg odt Take 1 tablet (75 mg  total) by mouth daily as needed for Migraine. Place ODT tablet on the tongue; alternatively the ODT tablet may be placed under the tongue (Patient not taking: Reported on 10/23/2023) 8 tablet 11    [DISCONTINUED] ALPRAZolam (XANAX) 1 MG tablet Take 1 mg by mouth daily as needed.      [DISCONTINUED] amiodarone (PACERONE) 400 MG tablet Take by mouth.      [DISCONTINUED] apixaban (ELIQUIS) 5 mg Tab Take 1 tablet (5 mg total) by mouth 2 (two) times daily. (Patient not taking: Reported on 1/3/2024)      [DISCONTINUED] BRILINTA 90 mg tablet Take 90 mg by mouth 2 (two) times daily.      [DISCONTINUED] diltiaZEM (CARDIZEM CD) 180 MG 24 hr capsule Take 180 mg by mouth 2 (two) times daily.      [DISCONTINUED] HYDROcodone-acetaminophen (NORCO) 7.5-325 mg per tablet Take by mouth.      [DISCONTINUED] suvorexant (BELSOMRA) 20 mg Tab Take 20 mg by mouth nightly as needed. (Patient not taking: Reported on 1/3/2024) 30 tablet 1    [DISCONTINUED] temazepam (RESTORIL) 15 mg Cap Take 1 capsule (15 mg total) by mouth nightly as needed. (Patient not taking: Reported on 1/3/2024) 30 capsule 0    [DISCONTINUED] valsartan (DIOVAN) 40 MG tablet Take 1 tablet (40 mg total) by mouth once daily. (Patient not taking: Reported on 1/3/2024) 90 tablet 3     No current facility-administered medications on file prior to visit.        Review of patient's allergies indicates:  No Known Allergies    Medications Reconciliation:   I have reconciled the patient's home medications and discharge medications with the patient/family. I have updated all changes.  Refer to After-Visit Medication List.    Objective:  Vital Signs:  Vitals:    01/03/24 1414   BP: 117/79   Pulse: 102     Wt Readings from Last 3 Encounters:   01/03/24 1414 85.7 kg (188 lb 15 oz)   12/07/23 1248 85.5 kg (188 lb 7.9 oz)   10/23/23 1056 86.7 kg (191 lb 2.2 oz)     Body mass index is 27.9 kg/m².     [unfilled]    Scheduled Follow-up :  Future Appointments   Date Time Provider Department  [place]   1/3/2024  4:50 PM LAB, APPOINTMENT [place] [facility]   1/4/2024  2:00 PM Froylan Marie III, MD [facility] PSYCH [facility]   3/22/2024  8:15 AM Keira Wheat MD [facility] MED [place]   4/23/2024 10:45 AM Ethan Cavanaugh MD [facility] NEURO [place] [facility]       After Visit Medication List :     Medication List            Accurate as of January 3, 2024  4:49 PM. If you have any questions, ask your nurse or doctor.                CHANGE how you take these medications      gabapentin 100 MG capsule  Commonly known as: NEURONTIN  Take 1 capsule (100 mg total) by mouth 3 (three) times daily.  What changed:   how much to take  when to take this            CONTINUE taking these medications      aspirin 81 MG EC tablet  Commonly known as: ECOTRIN     atorvastatin 80 MG tablet  Commonly known as: LIPITOR     isosorbide mononitrate 30 MG 24 hr tablet  Commonly known as: IMDUR     metoprolol succinate 100 MG 24 hr tablet  Commonly known as: TOPROL-XL     nitroGLYCERIN 0.4 MG SL tablet  Commonly known as: NITROSTAT     OZEMPIC 2 mg/dose (8 mg/3 mL) Pnij  Generic drug: semaglutide  Inject 2 mg into the skin every 7 days.     pantoprazole 40 MG tablet  Commonly known as: PROTONIX  Take 1 tablet (40 mg total) by mouth once daily.     rimegepant 75 mg odt  Take 1 tablet (75 mg total) by mouth daily as needed for Migraine. Place ODT tablet on the tongue; alternatively the ODT tablet may be placed under the tongue     sertraline 100 MG tablet  Commonly known as: ZOLOFT  Take 1.5 tablets (150 mg total) by mouth once daily.     SYNJARDY XR 25-1,000 mg Tbph  Generic drug: empagliflozin-metformin  Take 1 tablet by mouth Daily.              Signing Physician:  Juan Espinosa MD    Billing:    [unfilled]

## 2024-01-03 ENCOUNTER — LAB VISIT (OUTPATIENT)
Dept: LAB | Facility: HOSPITAL | Age: 54
End: 2024-01-03
Attending: PSYCHIATRY & NEUROLOGY
Payer: COMMERCIAL

## 2024-01-03 ENCOUNTER — TELEPHONE (OUTPATIENT)
Dept: NEUROLOGY | Facility: CLINIC | Age: 54
End: 2024-01-03
Payer: COMMERCIAL

## 2024-01-03 ENCOUNTER — OFFICE VISIT (OUTPATIENT)
Dept: NEUROLOGY | Facility: CLINIC | Age: 54
End: 2024-01-03
Payer: COMMERCIAL

## 2024-01-03 VITALS
HEIGHT: 69 IN | SYSTOLIC BLOOD PRESSURE: 117 MMHG | WEIGHT: 188.94 LBS | DIASTOLIC BLOOD PRESSURE: 79 MMHG | HEART RATE: 102 BPM | BODY MASS INDEX: 27.98 KG/M2

## 2024-01-03 DIAGNOSIS — Z83.3: ICD-10-CM

## 2024-01-03 DIAGNOSIS — G25.3 MYOCLONUS: ICD-10-CM

## 2024-01-03 DIAGNOSIS — G31.84 MCI (MILD COGNITIVE IMPAIRMENT): Primary | ICD-10-CM

## 2024-01-03 DIAGNOSIS — T88.7XXA MEDICATION SIDE EFFECT: ICD-10-CM

## 2024-01-03 DIAGNOSIS — G47.33 OSA (OBSTRUCTIVE SLEEP APNEA): ICD-10-CM

## 2024-01-03 DIAGNOSIS — D68.9 COAGULOPATHY: ICD-10-CM

## 2024-01-03 DIAGNOSIS — E11.8 DIABETES MELLITUS TYPE 2 WITH COMPLICATIONS: ICD-10-CM

## 2024-01-03 DIAGNOSIS — I69.319 COGNITIVE DEFICIT AS LATE EFFECT OF CEREBROVASCULAR ACCIDENT (CVA): ICD-10-CM

## 2024-01-03 DIAGNOSIS — E08.49 DIABETES MELLITUS DUE TO UNDERLYING CONDITION WITH OTHER NEUROLOGIC COMPLICATION, WITH LONG-TERM CURRENT USE OF INSULIN: ICD-10-CM

## 2024-01-03 DIAGNOSIS — G31.84 MCI (MILD COGNITIVE IMPAIRMENT): ICD-10-CM

## 2024-01-03 DIAGNOSIS — G25.3 POST HYPOXIC MYOCLONUS: ICD-10-CM

## 2024-01-03 DIAGNOSIS — Z79.4 DIABETES MELLITUS DUE TO UNDERLYING CONDITION WITH OTHER NEUROLOGIC COMPLICATION, WITH LONG-TERM CURRENT USE OF INSULIN: ICD-10-CM

## 2024-01-03 DIAGNOSIS — R41.3 OTHER AMNESIA: ICD-10-CM

## 2024-01-03 LAB
ALBUMIN SERPL BCP-MCNC: 4.4 G/DL (ref 3.5–5.2)
ALP SERPL-CCNC: 107 U/L (ref 55–135)
ALT SERPL W/O P-5'-P-CCNC: 42 U/L (ref 10–44)
ANION GAP SERPL CALC-SCNC: 14 MMOL/L (ref 8–16)
AST SERPL-CCNC: 30 U/L (ref 10–40)
BASOPHILS # BLD AUTO: 0.03 K/UL (ref 0–0.2)
BASOPHILS NFR BLD: 0.4 % (ref 0–1.9)
BILIRUB SERPL-MCNC: 0.5 MG/DL (ref 0.1–1)
BUN SERPL-MCNC: 33 MG/DL (ref 6–20)
CALCIUM SERPL-MCNC: 10.8 MG/DL (ref 8.7–10.5)
CHLORIDE SERPL-SCNC: 103 MMOL/L (ref 95–110)
CO2 SERPL-SCNC: 22 MMOL/L (ref 23–29)
CREAT SERPL-MCNC: 0.9 MG/DL (ref 0.5–1.4)
DIFFERENTIAL METHOD BLD: ABNORMAL
EOSINOPHIL # BLD AUTO: 0.5 K/UL (ref 0–0.5)
EOSINOPHIL NFR BLD: 6.9 % (ref 0–8)
ERYTHROCYTE [DISTWIDTH] IN BLOOD BY AUTOMATED COUNT: 13.3 % (ref 11.5–14.5)
EST. GFR  (NO RACE VARIABLE): >60 ML/MIN/1.73 M^2
FOLATE SERPL-MCNC: 6.8 NG/ML (ref 4–24)
GLUCOSE SERPL-MCNC: 140 MG/DL (ref 70–110)
HCT VFR BLD AUTO: 48.6 % (ref 40–54)
HGB BLD-MCNC: 15.7 G/DL (ref 14–18)
IGG SERPL-MCNC: 1027 MG/DL (ref 650–1600)
IMM GRANULOCYTES # BLD AUTO: 0.02 K/UL (ref 0–0.04)
IMM GRANULOCYTES NFR BLD AUTO: 0.3 % (ref 0–0.5)
LYMPHOCYTES # BLD AUTO: 1.2 K/UL (ref 1–4.8)
LYMPHOCYTES NFR BLD: 17.6 % (ref 18–48)
MAGNESIUM SERPL-MCNC: 2 MG/DL (ref 1.6–2.6)
MCH RBC QN AUTO: 28.1 PG (ref 27–31)
MCHC RBC AUTO-ENTMCNC: 32.3 G/DL (ref 32–36)
MCV RBC AUTO: 87 FL (ref 82–98)
MONOCYTES # BLD AUTO: 0.5 K/UL (ref 0.3–1)
MONOCYTES NFR BLD: 7.8 % (ref 4–15)
NEUTROPHILS # BLD AUTO: 4.7 K/UL (ref 1.8–7.7)
NEUTROPHILS NFR BLD: 67 % (ref 38–73)
NRBC BLD-RTO: 0 /100 WBC
PLATELET # BLD AUTO: 250 K/UL (ref 150–450)
PMV BLD AUTO: 10.6 FL (ref 9.2–12.9)
POTASSIUM SERPL-SCNC: 4.1 MMOL/L (ref 3.5–5.1)
PROT SERPL-MCNC: 8.3 G/DL (ref 6–8.4)
RBC # BLD AUTO: 5.59 M/UL (ref 4.6–6.2)
SODIUM SERPL-SCNC: 139 MMOL/L (ref 136–145)
T4 SERPL-MCNC: 9 UG/DL (ref 4.5–11.5)
TSH SERPL DL<=0.005 MIU/L-ACNC: 1.71 UIU/ML (ref 0.4–4)
WBC # BLD AUTO: 6.95 K/UL (ref 3.9–12.7)

## 2024-01-03 PROCEDURE — 3074F SYST BP LT 130 MM HG: CPT | Mod: CPTII,S$GLB,, | Performed by: PSYCHIATRY & NEUROLOGY

## 2024-01-03 PROCEDURE — 84436 ASSAY OF TOTAL THYROXINE: CPT | Performed by: PSYCHIATRY & NEUROLOGY

## 2024-01-03 PROCEDURE — 99999 PR PBB SHADOW E&M-EST. PATIENT-LVL IV: CPT | Mod: PBBFAC,,, | Performed by: PSYCHIATRY & NEUROLOGY

## 2024-01-03 PROCEDURE — 82607 VITAMIN B-12: CPT | Performed by: PSYCHIATRY & NEUROLOGY

## 2024-01-03 PROCEDURE — 85025 COMPLETE CBC W/AUTO DIFF WBC: CPT | Performed by: PSYCHIATRY & NEUROLOGY

## 2024-01-03 PROCEDURE — 0346U AD DETECT, BETA-AMYLOID 42/40 RATIO: CPT | Performed by: PSYCHIATRY & NEUROLOGY

## 2024-01-03 PROCEDURE — 0361U NEURFLMNT LT CHN DIG IA QUAN: CPT | Performed by: PSYCHIATRY & NEUROLOGY

## 2024-01-03 PROCEDURE — 96116 NUBHVL XM PHYS/QHP 1ST HR: CPT | Mod: 59,S$GLB,, | Performed by: PSYCHIATRY & NEUROLOGY

## 2024-01-03 PROCEDURE — 86780 TREPONEMA PALLIDUM: CPT | Performed by: PSYCHIATRY & NEUROLOGY

## 2024-01-03 PROCEDURE — 82746 ASSAY OF FOLIC ACID SERUM: CPT | Performed by: PSYCHIATRY & NEUROLOGY

## 2024-01-03 PROCEDURE — 80053 COMPREHEN METABOLIC PANEL: CPT | Performed by: PSYCHIATRY & NEUROLOGY

## 2024-01-03 PROCEDURE — 84425 ASSAY OF VITAMIN B-1: CPT | Performed by: PSYCHIATRY & NEUROLOGY

## 2024-01-03 PROCEDURE — 84443 ASSAY THYROID STIM HORMONE: CPT | Performed by: PSYCHIATRY & NEUROLOGY

## 2024-01-03 PROCEDURE — 3078F DIAST BP <80 MM HG: CPT | Mod: CPTII,S$GLB,, | Performed by: PSYCHIATRY & NEUROLOGY

## 2024-01-03 PROCEDURE — 83921 ORGANIC ACID SINGLE QUANT: CPT | Performed by: PSYCHIATRY & NEUROLOGY

## 2024-01-03 PROCEDURE — 96132 NRPSYC TST EVAL PHYS/QHP 1ST: CPT | Mod: 59,S$GLB,, | Performed by: PSYCHIATRY & NEUROLOGY

## 2024-01-03 PROCEDURE — 99215 OFFICE O/P EST HI 40 MIN: CPT | Mod: S$GLB,,, | Performed by: PSYCHIATRY & NEUROLOGY

## 2024-01-03 PROCEDURE — 82784 ASSAY IGA/IGD/IGG/IGM EACH: CPT | Performed by: PSYCHIATRY & NEUROLOGY

## 2024-01-03 PROCEDURE — 3008F BODY MASS INDEX DOCD: CPT | Mod: CPTII,S$GLB,, | Performed by: PSYCHIATRY & NEUROLOGY

## 2024-01-03 PROCEDURE — 83520 IMMUNOASSAY QUANT NOS NONAB: CPT | Performed by: PSYCHIATRY & NEUROLOGY

## 2024-01-03 PROCEDURE — 1159F MED LIST DOCD IN RCRD: CPT | Mod: CPTII,S$GLB,, | Performed by: PSYCHIATRY & NEUROLOGY

## 2024-01-03 PROCEDURE — 83735 ASSAY OF MAGNESIUM: CPT | Performed by: PSYCHIATRY & NEUROLOGY

## 2024-01-03 PROCEDURE — 82542 COL CHROMOTOGRAPHY QUAL/QUAN: CPT | Performed by: PSYCHIATRY & NEUROLOGY

## 2024-01-03 PROCEDURE — 99417 PROLNG OP E/M EACH 15 MIN: CPT | Mod: S$GLB,,, | Performed by: PSYCHIATRY & NEUROLOGY

## 2024-01-03 RX ORDER — ALPRAZOLAM 1 MG/1
1 TABLET ORAL DAILY PRN
COMMUNITY
Start: 2023-11-09 | End: 2024-01-03

## 2024-01-03 RX ORDER — HYDROCODONE BITARTRATE AND ACETAMINOPHEN 7.5; 325 MG/1; MG/1
TABLET ORAL
COMMUNITY
Start: 2023-11-07 | End: 2024-01-03

## 2024-01-03 RX ORDER — AMIODARONE HYDROCHLORIDE 400 MG/1
TABLET ORAL
COMMUNITY
Start: 2023-11-07 | End: 2024-01-03

## 2024-01-03 NOTE — PROGRESS NOTES
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 cardiology surgery evaluation. -Speech therapy referral as recommended by neuropsychiatry.     Current Presentation  Recent/Interim History:  The patient, a 52-year-old male with a history of early-onset diabetes diagnosed in his 20s, presents with his wife as the primary historian. His diabetes, which appears to be familial but not classified as Type 1, has been a consistent health concern. The patient has faced challenges with medication adherence, particularly insulin, due to concerns about weight gain and the burden of managing multiple medications. In 2020, the patient was laid off from his job and subsequently began working at [employer] in 2021, which involved a demanding three-hour daily commute. During this time, he experienced increased daytime fatigue and difficulty keeping up with work responsibilities, leading to a general decline in health. In December 2021, he contracted COVID-19, resulting in post-COVID fatigue consistent with long COVID. He also reported new onset visual disturbances described as kaleidoscopic sensations in his vision, with occasional palpitations, which later became associated with migraine headaches. These headaches were diagnosed in the last 6-12 months and were effectively managed with Nurtec. In June 2022, the patient started " Ozempic for diabetes and weight loss control. Subsequently, he reported worsening subjective cognitive impairment. his family members noticed increased forgetfulness and disorientation, while coworkers observed typos and incoherent conversations. In July 2022, he sought medical attention for these memory issues. An MRI revealed cerebellar abnormalities, and a CT scan showed mild cortical atrophy. His family history included significant vascular dementia. Neuropsychological assessments indicated a need for better control of vascular risk factors. The patient was unaware of his moderate obstructive sleep apnea diagnosis. Further evaluation in late September 2022 showed intact visuospatial skills with some impairment in language and memory functions. Stress and irritability due to cognitive difficulties were noted. In November 2022, vascular complications led to the placement of stents, and MRI showed remote lacunar infarcts. Despite improved diabetes control with Ozempic, he continued to experience memory problems. In mid-2023, his memory problems worsened, with incidents like using a razor as a toothbrush. MRI and CTA revealed cerebral abnormalities. By late September 2023, his diabetes was well-managed, but cognitive issues persisted. The patient's condition deteriorated further with the thrombosis of previously placed stents, leading to cardiac ischemia and tamponade requiring emergency bypass surgery and stent replacement. He has been on disability since September 2022 due to these complications. He is currently withdrawn and clearly depressed. 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 The patient's cognitive impairment, dating back to 2021, could be related to chronic stress, early-onset diabetes, and the initiation of Ozempic. His condition is likely compounded by mood disorders. Given the complexity of his early-onset diabetes with a hereditary factor, it is recommended to consult with his primary doctor about adjusting the Ozempic dosage and to follow up with Endocrinology for closer monitoring. Further evaluation and management by a multidisciplinary team, including a neurologist, endocrinologist, and psychiatrist, are crucial to address the various aspects of his health concerns.  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     Review of cognitive, visuospatial, motor, sensory, and behavioral systems:     Memory:   The patient's memory has worsened in the past few years.  He does repeat statements or asks the same question repeatedly.  He does have difficulty remembering recent important conversations.  He does have difficulty remembering recent events.  He does not forget information within minutes.  His recent retrograde memory is intact.  His remote memory is intact.  Attention:   The patient's attention and concentration are impaired.  He does not have attentional fluctuations.  He does not have difficulty maintaining selective attention.  He does become easily distracted.  He does have difficulty with divided attention.  Executive:   The patient's cognitive processing speed is slower.  He does have difficulty with working memory.  He does misplace personal items (e.g., keys, cell phone, wallet) more frequently.  He does have difficulty keeping track of his medications.  He does have difficulty with planning/organizing/completing multistep tasks.  He does have difficulty with executive attention.  He does have difficulty with flexible thinking.  He does not have difficulty with response  inhibition.  He denies new impulsivity or rash/careless actions.  His judgment is intact.  Language:   The patient's speech is affected.  He does not forget people's names more frequently.  He does have word-finding difficulties.  His speech is fluent and non-effortful.  His speech is grammatically intact.  He does make inaccurate word substitutions.  He does not have difficulty reading.  He does not appear to have impaired comprehension.  Visuospatial:   The patient does not have new visuospatial difficulty.  He does not become confused or disoriented in *new*, unfamiliar places.  He does not have trouble with navigation.  He does not get lost in familiar places.  He does not have visuospatial disorientation.  He does not have difficulty recognizing objects or faces.  He denies problems with driving or parking.  Motor/Coordination:   The patient does not have difficulty with walking.  He does feel imbalanced.  He denies having fallen.  He does not appear to have new muscle weakness.  He does have difficulty buttoning shirts, operating zippers, or manipulating tools/utensils.  His handwriting has not become micrographic.  He does have a resting tremor.  He does report having new involuntary movements and/or muscle jerking. Comment: nocturnal jerking - worse since 2023  He does not have swallowing difficulty.  He denies new muscle cramps and twitching.  Sensory:   The patient denies new numbness, tingling, paresthesias, or pain.  The patient reports new loss of vision, blurry vision, and/or double vision.  The patient reports a recent loss of hearing and/or worsening tinnitus.  The patient denies anosmia.  Sleep:   The patient reports difficulty sleeping.  The patient does have difficulty going to sleep.  The patient reports difficulty staying asleep and/or frequently awakening at night.  The patient does snore and/or have witnessed apneas while sleeping.  When he wakes up in the morning, he does not feel  well-rested.  He denies dream-enactment behavior.  He has reported symptoms suggestive of restless leg syndrome.  Behavior:   The patient's personality has changed.  He does not have symptoms of disinhibition and social inappropriateness.  He does not have symptoms to suggest a loss of manners or decorum.  He does not appear apathetic or has decreased motivation.  He does not appear to have a change in inertia.  The patient's emotional expression has changed.  He does have emotional blunting or lability.  He does not have symptoms of irritability and mood lability.  He does not have symptoms of agitation, aggression, or violent outbursts.  His insight into his health and situation is intact.  His personal hygiene is intact.  He is not exhibiting a diminished response to other people's needs and feelings.  He is not exhibiting a diminished social interest, interrelatedness, or personal warmth.  He denies restlessness.  He denies new and/or worsening simple repetitive behaviors.  His speech has not become simplified or become repetitive/stereotyped.  He denies new/worsening complex repetitive/ritualistic compulsions and behaviors.  He does not have symptoms of hyper-religiosity or dogmatism.  His interests/pleasures have not become restrictive, simplified, interrupting, or repetitive.  He denies a change of self-stimulating behavior.  He denies any changes in eating behavior.  He denies increased consumption of food or substances.  He denies oral exploration or consumption of inedible objects.  Psychiatric:   He does not feel depressed.  He is not exhibiting symptoms of social withdrawal/indifference.  He does have anxiety.  He does not exhibit cycling behavior.  He does not exhibit hyperactive behavior.  He is not exhibited symptoms of paranoia.  He does not have delusions.  He does not have hallucinations.  He does not have a history of sensitivity to neuroleptic/psychotropic medications.  Medical Review of Systems:    The patient does not have constipation.  The patient does not have urinary incontinence.  The patient denies orthostatic lightheadedness.  The patient's weight is unstable. Comment: [id number] 40lbs  Functional status:  Difficulty performing the following Instrumental ADLs:  Housekeeping: No  Food Preparation: No  Shopping: No  Ability to Handle Finances: Yes  Transportation/Driving: No  Household Appliances/Stove: No  Laundry: No  Difficulty performing the following Basic ADLs:  Dressing: No  Bathing: No  Toileting: No  Personal hygiene and grooming: No  Feeding: No  Care Management:  Patient/Family Safety Concerns:  Medication Adherence: No  Home Safety: No  Wandered: No  Firearms: No  Fall Risk: No  Home Alone: No        Past Medical History:   Diagnosis Date    Depression     Diabetes mellitus     Diabetes mellitus, type 2     History of right coronary artery stent placement     done at [facility]       Past Surgical History:   Procedure Laterality Date    ANGIOGRAPHY N/A 10/2023    COLONOSCOPY N/A 08/02/2022    Procedure: COLONOSCOPY;  Surgeon: Keira Wheat MD;  Location: [facility];  Service: Endoscopy;  Laterality: N/A;    REFRACTIVE SURGERY  04/13/2016    VASECTOMY         Family History   Problem Relation Age of Onset    Heart disease Mother     Hypertension Mother     Diabetes Mother     Heart attack Mother     Heart disease Father     Hypertension Father     Diabetes Father     Heart attack Father     Heart attack Maternal Uncle     Heart attack Maternal Grandfather        Social History     Socioeconomic History    Marital status:    Tobacco Use    Smoking status: Never    Smokeless tobacco: Never   Substance and Sexual Activity    Alcohol use: Not Currently     Comment: occassional    Drug use: No    Sexual activity: Yes     Partners: Female     Social Determinants of Health     Financial Resource Strain: Low Risk  (12/7/2023)    Overall Financial Resource Strain (CARDIA)     Difficulty of Paying  Living Expenses: Not hard at all   Food Insecurity: No Food Insecurity (12/7/2023)    Hunger Vital Sign     Worried About Running Out of Food in the Last Year: Never true     Ran Out of Food in the Last Year: Never true   Transportation Needs: No Transportation Needs (12/7/2023)    PRAPARE - Transportation     Lack of Transportation (Medical): No     Lack of Transportation (Non-Medical): No   Physical Activity: Insufficiently Active (12/7/2023)    Exercise Vital Sign     Days of Exercise per Week: 3 days     Minutes of Exercise per Session: 30 min   Stress: Stress Concern Present (12/7/2023)    [place] [employer] - Occupational Stress Questionnaire     Feeling of Stress : To some extent   Social Connections: Unknown (12/7/2023)    Social Connection and Isolation Panel [NHANES]     Frequency of Communication with Friends and Family: Patient declined     Frequency of Social Gatherings with Friends and Family: Patient declined     Active Member of Clubs or Organizations: Patient declined     Attends Club or Organization Meetings: Patient declined     Marital Status:    Housing Stability: Low Risk  (12/7/2023)    Housing Stability Vital Sign     Unable to Pay for Housing in the Last Year: No     Number of Places Lived in the Last Year: 1     Unstable Housing in the Last Year: No       Medication:     Current Outpatient Medications on File Prior to Visit   Medication Sig Dispense Refill    aspirin (ECOTRIN) 81 MG EC tablet Take 81 mg by mouth once daily.      atorvastatin (LIPITOR) 80 MG tablet Take 80 mg by mouth once daily.      empagliflozin-metformin (SYNJARDY XR) 25-1,000 mg TBph Take 1 tablet by mouth Daily. 90 tablet 1    gabapentin (NEURONTIN) 100 MG capsule Take 1 capsule (100 mg total) by mouth 3 (three) times daily. (Patient taking differently: Take 300 mg by mouth every evening.) 90 capsule 3    isosorbide mononitrate (IMDUR) 30 MG 24 hr tablet Take 30 mg by mouth once daily.       metoprolol succinate (TOPROL-XL) 100 MG 24 hr tablet Take 100 mg by mouth once daily.      pantoprazole (PROTONIX) 40 MG tablet Take 1 tablet (40 mg total) by mouth once daily. 30 tablet 0    semaglutide (OZEMPIC) 2 mg/dose (8 mg/3 mL) PnIj Inject 2 mg into the skin every 7 days. 3 mL 11    sertraline (ZOLOFT) 100 MG tablet Take 1.5 tablets (150 mg total) by mouth once daily. 45 tablet 2    nitroGLYCERIN (NITROSTAT) 0.4 MG SL tablet PLEASE SEE ATTACHED FOR DETAILED DIRECTIONS      rimegepant 75 mg odt Take 1 tablet (75 mg total) by mouth daily as needed for Migraine. Place ODT tablet on the tongue; alternatively the ODT tablet may be placed under the tongue (Patient not taking: Reported on 10/23/2023) 8 tablet 11    [DISCONTINUED] ALPRAZolam (XANAX) 1 MG tablet Take 1 mg by mouth daily as needed.      [DISCONTINUED] amiodarone (PACERONE) 400 MG tablet Take by mouth.      [DISCONTINUED] apixaban (ELIQUIS) 5 mg Tab Take 1 tablet (5 mg total) by mouth 2 (two) times daily. (Patient not taking: Reported on 1/3/2024)      [DISCONTINUED] BRILINTA 90 mg tablet Take 90 mg by mouth 2 (two) times daily.      [DISCONTINUED] diltiaZEM (CARDIZEM CD) 180 MG 24 hr capsule Take 180 mg by mouth 2 (two) times daily.      [DISCONTINUED] HYDROcodone-acetaminophen (NORCO) 7.5-325 mg per tablet Take by mouth.      [DISCONTINUED] suvorexant (BELSOMRA) 20 mg Tab Take 20 mg by mouth nightly as needed. (Patient not taking: Reported on 1/3/2024) 30 tablet 1    [DISCONTINUED] temazepam (RESTORIL) 15 mg Cap Take 1 capsule (15 mg total) by mouth nightly as needed. (Patient not taking: Reported on 1/3/2024) 30 capsule 0    [DISCONTINUED] valsartan (DIOVAN) 40 MG tablet Take 1 tablet (40 mg total) by mouth once daily. (Patient not taking: Reported on 1/3/2024) 90 tablet 3     No current facility-administered medications on file prior to visit.        Review of patient's allergies indicates:  No Known Allergies    Medications Reconciliation:   I  have reconciled the patient's home medications and discharge medications with the patient/family. I have updated all changes.  Refer to After-Visit Medication List.    Objective:  Vital Signs:  Vitals:    01/03/24 1414   BP: 117/79   Pulse: 102     Wt Readings from Last 3 Encounters:   01/03/24 1414 85.7 kg (188 lb 15 oz)   12/07/23 1248 85.5 kg (188 lb 7.9 oz)   10/23/23 1056 86.7 kg (191 lb 2.2 oz)     Body mass index is 27.9 kg/m².           Neurological examination:  Mental Status:   His appearance is normal (hygiene is appropriate; attire is proper and clean).  Throughout the interview, he behaved abnormally and was not cooperative. Comment: poor eye contact;  His behavior is appropriate to the clinical context without impropriety or improper language/conduct.  His behavior was not characterized by episodes of sudden uncontrollable and inappropriate laughing or crying.  The patient's energy level is abnormal. Comment: Hypoactive;  His orientation is normal; Spatial 5/5 (location, the floor of building, city, county, state) and temporal 5/5 (month, day, year, EGNO) dimensions are accurate.  His attention/concentration is impaired.  He can complete three-step commands.  His fund of knowledge was appropriate for age, culture, and level of education.  His thought process is not logical or goal-oriented. Comment: bradyphrenia;  He demonstrated impaired insight based on actions, awareness of his illness, plans for the future.  He demonstrated good judgment based on actions and plans for the future.  He has no evidence of hallucinations (auditory, visual, olfactory).  He has no evidence of delusions (paranoid, grandiose, bizarre).  Cranial Nerves:   His pupils were normal.  His visual fields were full to confrontation in all quadrants.  His ocular pursuit in the horizontal and vertical plane was complete.  His saccadic initiation, velocity, and amplitude are normal.  His facial strength was normal.  His facial  expression was symmetric and appropriate to the context.  His hearing was diminished.  His oropharynx and soft palate appeared abnormal. Comment: mallampati 3;  His tongue showed no evidence of scalloping.  He tongue movement with normal.  He had no significant evidence of anterocollis or retrocollis.  Speech/Language:   The patient's speech was fluent, non-effortful, and his rate was appropriate to the context.  He has no articulation (segmental features) errors.  The patient's speech is not dysarthric.  The patient's speech was without evidence of anomia.  He makes no phonological loop errors.  He can comprehend commands that cross the midline (e.g., with your left thumb, touch your right ear).  He can comprehend syntactically complex sentences.  Motor:   The patient's bilateral upper extremity muscle bulk is appropriate.  The patient's upper extremity muscle tone is increased.  The patient's bilateral upper extremity muscle tone does not suggest spasticity.  The patient's bilateral upper extremity muscle tone does not suggest rigidity/cogwheeling.  There is evidence of paratonia. Comment: B/L, R>L, Mild; Muscle tone is increased and there is evidence of paratonia.  There was no dystonia observed on examination.  Assessment of motor strength was symmetric and at minimal anti-gravity.  There is no pronator or downward drift.  There is myoclonus observed in The patient's bilateral upper and lower extremities. Comment: myoclonus; myoclonus  There are no fasciculations observed in The patient's bilateral upper and lower extremities.  Coordination:   He has no bilateral upper extremity limb dysmetria or past pointing on finger-nose-finger bilaterally.  He has no limb dysdiadochokinesia of the upper extremity on the pronation/supination test and screwing in a light bulb or lower extremity during tapping ball of each foot bilaterally.  He has a visible tremor. Comment: B/L;  He has a kinetic tremor.  He has a postural  tremor. Comment: B/L, R>L, Mild;  He has no resting tremor bilaterally.  He has no evidence of interhemispheric motor control deficits.  He has no motor overflow bilaterally.  The patient's bilateral upper extremity coordination with finger tapping, pronation/supination, and the open-close fist showed no slowing, no hypometria, and no dysrhythmia inconsistent with bradykinesia.  The patient's bilateral upper extremity coordination with finger tapping, pronation/supination, and the open-close fist showed slowing.  The patient's bilateral upper extremity coordination with finger tapping, pronation/supination, and the open-close fist showed hypometria.  The patient's bilateral upper extremity coordination with finger tapping, pronation/supination, and the open-close fist showed dysrhythmia.  Higher Cortical Function:   The patient showed no evidence of simultanagnosia ([place] hierarchical letters).  The patient showed no evidence of visuospatial constructional dysfunction.  The patient showed no evidence of apraxia.  Sensory:   His cortical sensory assessment demonstrated no neglect bilaterally.  His sensation was diminished to light touch, and vibratory sense. Comment: in the bilateral upper and lower extremities in a length-dependant pattern.; in the bilateral upper and lower extremities in a length-dependant pattern.  His sensation was impaired to temperature/pinprick. Comment: in the bilateral upper and lower extremities. ; in the bilateral upper and lower extremities.  Reflexes:   Reflexes were abnormal. Comment: decreased throughout;  Gait:   He has normal posture sitting unaided.  He is unable to rise from a chair and sit back down without using their arms.  His gait was abnormal.  His gait initiation/inhibition was normal.  His stance while walking is abnormal.  His gait speed was abnormal (70-80 F 1.13 m/s M 1.26 m/s, >80 F 0.94 m/sec, M 0.97 m/sec). Comment: slow;  When attempting to walk abnormally (heels,  tiptoes, tandem), he makes no errors.  Neuropsychological Evaluation Summary:  Prior Neurocognitive/Neurobehavioral Evaluation(s)  No Prior Testing Available  Neurocognitive/Neurobehavioral Evaluation completed on 2024-01-03    Memory    Registration-3 3/3 Within Normal Limits.   Recall-3 2/3 Impairment: Moderate.   Recall-5 2/5 Impairment: Significant.   Registration-5 3/0     Executive    Three-step command 3/3 Within Normal Limits.   Trials-1 1/1 Within Normal Limits.   WORLD Backward 5/5 Within Normal Limits.   Digit Span - 2 2/2 Within Normal Limits.   Serial Sevens 1/3 Impairment: Significant.   Fluency 0/1 Impairment: Significant.   Digit Span Backwards 3 Impairment: -1.8 STDs below the average score based on age and education.   Lexical Fluency - F 9 Impairment: -1.9 STDs below the average score based on age and education.   Lexical Fluency - A 8 Impairment: -2.1 STDs below the average score based on age and education.   Lexical Fluency - S 11 Within Normal Limits.   Semantic Fluency - Animals 16 Impairment: -1.9 STDs below the average score based on age and education.   Visuospatial    Intersecting Pentagons 1/1 Within Normal Limits.   3D Cube Copy 0/1 Impairment: Significant.   Clock Draw 3/3 Within Normal Limits.   [place] Copy 14/17 Impairment: Mild to Normal.   Overlapping Images - Update 12/12 Within Normal Limits.   Picture Synthesis 3/3 Within Normal Limits.   Attention    Orientation-10 9/10 Impairment: Mild to Normal.   Orientation-6 5/6 Impairment: Mild to Normal.   Alternating Sequence 1/1 Within Normal Limits.   Digit Span Forwards 6 Within Normal Limits.   Language    Repetition-1 1/1 Within Normal Limits.   Naming-2 2/2 Within Normal Limits.   Following written command 1/1 Within Normal Limits.   Writing a complete sentence 1/1 Within Normal Limits.   Naming-3 3/3 Within Normal Limits.   Repetition-2 2/2 Within Normal Limits.   Abstraction 2/2 Within Normal Limits.   15-Item BNT 13/15 Within  Normal Limits.   Repetition of Phrases 5/5 Within Normal Limits.   Verbal Agility 6/6 Within Normal Limits.   Neurocognitive Focused Evaluation Aggregate Score(s)    MMSE 28/30 MMSE Score suggestive of normal to questionable cognitive impairment.   MOCA 22/30 MOCA Score suggestive of mild cognitive impairment.   Neuropsychiatric/Behavioral Focused Evaluation Assessment    BEHAV5+ 1/6 See ROS section for a full description   Laboratories:     Lab Date Value [Reference]   Coagulopathy Screening           aPTT 2022, Jul-07    25.3 (L) (E) [26.2 - 37.2 Seconds]      D-Dimer 2022, Jul-18    <0.19 [<0.50 mg/L FEU]      Protime 2022, Jul-18    17.2 (H) (E) [9.8 - 13.3 Seconds]      Myopathy/Myalgia   CPK 2022, Jul-18    63      Metabolic Screening   Hemoglobin A1C External 07/07/2022  9.2 (H) [4.0 - 6.0 %]      TSH 2022, Jul-07    1.249 [0.400 - 4.000 uIU/mL]      HDL 2022, Jul-07    47 (E) [35 - 70 mg/dL]      Triglycerides 07/07/2022  139 (E) [40 - 160 mg/dL]      Vitamin B-12 2022, Jul-07    708 [210 - 950 pg/mL]      Autoimmune/Paraneoplastic Screening   Sed Rate 2022, Jul-18    18 (H) [0 - 10 mm/Hr]      Neuroendocrine/Electrolyte Screening   Creatinine 07/07/2022  1.2 [0.5 - 1.4 mg/dL]      Infectious Disease/Immunocompromised Screening   RPR 2022, Jul-07    Non-reactive           Neuroimaging:    MRI brain/head without contrast on 6/26/2023  Formal interpretation by Radiology:  Two small areas of remote infarction are seen in the right cerebellar hemisphere. No new infarction is seen. Stable focus of FLAIR signal abnormality in the left posterior frontal white matter, unchanged from prior study likely related to chronic microvascular ischemic disease.  Independently reviewed radiological imaging by Juan Long MD. MPH. Behavioral Neurologist  T1: Mild generalized cortical atrophy posterior>frontal, dorsal>ventral, lateral>medial without a clear degenerative patterning. Intact corpus callosum with normal  volume ratio. intact midbrain and brainstem normal volume and ratio. Intact hippocampal structures, volume ratio  T2/FLAIR: scattered subcortical microvascular disease. Age infarcts in the bilateral cerebellum. Right greater than left. Otherwise stable. Mild parahippocampal stippling mild anterior periventricular capping.  DWI/ADC: No Significant DWI hyperintensities/hypointensities. No ADC correlation.  SWI/GRE: No Significant hypointensities to suggest cortical/subcortical hemosiderin deposition.  Impression: : Mild generalized cortical atrophy posterior>frontal, dorsal>ventral, lateral>medial without a clear degenerative patterning. age indeterminate right cerebellar infarct with mild microvascular disease     Procedures:    Electrocardiogram on 10/21/2023  Formal interpretation:  Vent. Rate : 099 BPM Atrial Rate : 099 BPM P-R Int : 172 ms QRS Dur : 080 ms QT Int : 328 ms P-R-T Axes : 050 105 015 degrees QTc Int : 420 ms Normal sinus rhythm Rightward axis Borderline Abnormal ECG  Independently reviewed Electrocardiogram by Juan Long MD. MPH. Behavioral Neurologist  Impression: : Received ECG has no evidence of sinus node disease. HR (>=50-60). Prolonged PA interval (>0.22 s). Broad QRS complex (> 0.12 s).     Clinical Summary:     The patient is a 53-year-old right-handed male with a relevant past medical history of CLAUDIA, DM2 onset 30s, CVD, CAD, MD, HLD, HTN, JEANNINE, who presents reporting a 2-year history of step-wise progressive neurocognitive impairment.       The clinical history is suggestive of:  Memory Impairment: STM encoding impairment, LTM encoding-retrieval impairment  Attention Impairment: Attention, Sustained attention, Shifting attention  Executive Impairment: Energization, Working Memory  Language Impairment: Language Dysfunction, Semantic Dysfunction  Motor/Coordination Impairment: Sensory motor integration, Central pattern generators dysfunction, Cortical-Striatal Loop Dysfunction  Sensory  Impairment: Sensory Deprivation  Behavior Impairment: Response Inhibition, Emotional Regulation  Psychiatric Impairment: Signal-Noise Dysregulation  Medical Review of Systems Impairment: Limbic Dysfunction  iADL Impairment: Lois Instrumental Activities of Daily Living Scale  The neurological examination is significant for:  Executive Impairment: thought disorder  Movement Disorder (Gait): strength (difficulty rising), abnormal features (stance, speed)  Movement Disorder (Hyperkinetic): myoclonus, tremor (kinetic, postural)  Movement Disorder (Hypokinetic): paratonia (tone)  Pyramidal Impairment: abnormal reflexes  Sensory Dysfunction: hearing (diminished), peripheral (A? 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  Moderate Visuospatial Impairment: visuospatial construction.  Mild Executive Impairment: He scored >1.5 standard deviations below the norm on at least one measure. He had difficulty with lexical fluency, working memory.  Very Mild Attention Impairment: orientation.  MMSE 28/30: MMSE Score suggestive of normal to questionable cognitive impairment.  MOCA 22/30: MOCA Score suggestive of mild cognitive impairment.  BEHAV5+ 1/6: See ROS section for a full description  The neurologically relevant imaging is significant for  MRI brain/head without contrast (6/26/2023): Mild generalized cortical atrophy posterior>frontal, dorsal>ventral, lateral>medial without a clear degenerative patterning. 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     The patient's  clinical presentation meets the criteria for Mild Cognitive Impairment (MCI-ADRC) (Jose MS, et al. 2011 Alzheimer's & Dementia).     Concern regarding an intraindividual change in cognition  Impairment in one or more cognitive domains  Preservation of independence in functional abilities.  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  Fluctuating, stepwise progression of cognitive deficits.  The early presence of gait disturbance (small-step gait or marche a petits pas, or magnetic, apraxic-ataxic or parkinsonian gait), unsteadiness, and frequent, unprovoked falls.  Early urinary frequency, urgency, and other urinary symptoms not explained by urologic disease  Pseudobulbar palsy  Personality and mood changes, abulia, depression, emotional incontinence, or other subcortical deficits including psychomotor retardation and abnormal executive function.     At present, all neurodegenerative diseases can only be diagnosed with 100% certainty through a brain autopsy. The suspected neuropathology underlying the patient's neurocognitive impairment is most likely primarily due to Vascular Contributions to Cognitive Impairment and Dementia.  There are no plasma protein biomarkers available on record.  There are no CSF protein biomarkers available on record.  There are no dermatological protein biomarkers available on record.  There is no relevant genetic biomarkers available on record.     The patient's clinical history demonstrates a stepwise progression of executive predominant mild cognitive impairment. This progression can be traced back to 2021, beginning with increased fatigue attributed to work-related stressors in the context of uncontrolled diabetes. The subsequent development of symptoms consistent with long COVID further exacerbated the situation. The initiation of Ozempic treatment in June  2022 marked a phase of relative symptom stability, although this was disrupted by a progressive decline following a cardiac tamponade and the subsequent emergency bypass in November 2023.The clinical presentation is indicative of a stepwise progressive cognitive impairment, influenced by multiple vascular insults to the central nervous system (CNS). These insults include, but are not limited to, complications arising from diabetes and potential post-hypoxic myoclonus. The presence of early-onset diabetes as an underlying condition remains uncertain and should be further investigated to understand its role in the patient's cognitive decline.Given the complexity of the patient's medical history, a multidisciplinary approach involving cardiology, neurology, and endocrinology may be warranted. This approach would ensure comprehensive management of the patient's vascular risk factors, diabetes, and cognitive symptoms. Additionally, further investigations, including detailed neurocognitive testing and imaging studies, may be necessary to fully characterize the nature and extent of the patient's cognitive impairment and to guide appropriate therapeutic interventions.     The observations made above, were discussed with the patient and their supporting historian(s) (wife). We have discussed the additional diagnostic(s) and/or managenent below.     Care Management Plan:    #Diagnostic Screening for reversible forms of neurocognitive disorders  We recommend screening for reversible causes of neurocognitive impairment with plasma laboratories  We have ordered plasma CBC, CMP, Vitamins (B1, B9, B12), Mg, RPR, MMA, TSH, T4, Nfl  We recommend screening for anatomical CNS lesions/neurodegenerative patterns  We have ordered an MRI brain without contrast - Dementia Protocol  #Diagnostic Screening for measurable forms of neurodegenerative pathology.  We have discussed opportunities for biomarker testing (CSF Miranda biomarkers, IDEAs  Amyloid-PET, Syn-One skin biopsy).  #Optimize Neurocognitive Impairment and Quality  We have discussed the MIND Diet and other lifestyle behavior that may help maintain brain health.  We have provided written/digital reading material  #Optimize Behavioral Management and Quality.  No indication for memantine at this time  #Optimize Sleep Hygiene and Quality  We discussed and recommended additional diagnostic/management of sleep disorder to optimize brain health and longevity.  refer to sleep medicine specialist for symptoms consistent with sleep apnea  #Optimize Movement Disorder and Quality.  Next appointment will likely start Keppra  EEG routine for post hypoxic myoclonus  #Optimize Complex Medical Disorder and Quality.  Refer to Endocrinology for evaluation early-onset diabetes in his 30s with a family history of multiple relatives with early onset diabetes with multiple medical complications - consider alternatives to Ozempic due to stepwise progressive decline since starting in mid 2022  Refer to hematology for 2nd opinion regarding atypical clotting disorder, patient had 6 drug-eluting cardiac stents clot over within 12 months  #Behavioral/Environmental Strategies  We recommend engaging in activities that stimulate cognitively and socially while avoiding excessive stimulation and fatigue in overwhelmingly complex situations.  We recommend integrating routine and schedule into your daily life. https://www.alzheimersproject.org/news/the-importance-of-routine-and-familiarity-to-persons-with-dementia/  #Health Maintenance/Lifestyle Advice  We have discussed the value in aggressively controlling vascular risk factors like hypertension, hyperlipidemia, and Diabetes SBP<130, LDL<100, A1C<7.0.  We discussed the need to optimize lifestyle choices including a heart-healthy diet (e.g., Mediterranean or DASH), increased cardiovascular exercise (goal 150 minutes of moderate-intensity per week), and stay cognitively and  "socially active.  We recommend the MIND diet, a combination of two healthy diets: the Mediterranean diet and the DASH (Dietary Approaches to Stop Hypertension) diet, and includes a variety of brain-friendly foods to optimize cognitive health and longevity.  #Support  We all need support sometimes. Get easy access to local resources, community programs, and services. https://www.communityresourcefinder.org/  Learn more about Cognitive Impairment in [place]: https://www.alz.org/professionals/public-health/state-overview/[place]  #Safety  The Alzheimer's Association administers the nationwide "Safe Return" program with identification bracelets, necklaces, or clothing tags and 24-hour assistance. More information is available online at https://www.alz.org/help-support/caregiving/safety/medicalert-with-24-7-wandering-support  #Follow up:  Follow-up in 4 weeks (Jan 2024).    Thank you for allowing us to participate in the care of your patient. Please do not hesitate to contact us with any questions or concerns.     It was a pleasure seeing The patient and we look forward to seeing them at their follow-up visit.     This note is dictated on M*Modal Fluency Direct word recognition program. There are word recognition mistakes that are occasionally missed on review.       Scheduled Follow-up :  Future Appointments   Date Time Provider Department Center   1/3/2024  4:50 PM LAB, APPOINTMENT [facility] LAB VNP [facility]   1/4/2024  2:00 PM Froylan Marie III, MD [facility] PSYCH [facility]   3/22/2024  8:15 AM Keira Wheat MD [facility] MED [place]   4/23/2024 10:45 AM Ethan Cavanaugh MD [facility] NEURO [facility]       After Visit Medication List :     Medication List            Accurate as of January 3, 2024  4:49 PM. If you have any questions, ask your nurse or doctor.                CHANGE how you take these medications      gabapentin 100 MG capsule  Commonly known as: NEURONTIN  Take 1 capsule (100 mg " total) by mouth 3 (three) times daily.  What changed:   how much to take  when to take this            CONTINUE taking these medications      aspirin 81 MG EC tablet  Commonly known as: ECOTRIN     atorvastatin 80 MG tablet  Commonly known as: LIPITOR     isosorbide mononitrate 30 MG 24 hr tablet  Commonly known as: IMDUR     metoprolol succinate 100 MG 24 hr tablet  Commonly known as: TOPROL-XL     nitroGLYCERIN 0.4 MG SL tablet  Commonly known as: NITROSTAT     OZEMPIC 2 mg/dose (8 mg/3 mL) Pnij  Generic drug: semaglutide  Inject 2 mg into the skin every 7 days.     pantoprazole 40 MG tablet  Commonly known as: PROTONIX  Take 1 tablet (40 mg total) by mouth once daily.     rimegepant 75 mg odt  Take 1 tablet (75 mg total) by mouth daily as needed for Migraine. Place ODT tablet on the tongue; alternatively the ODT tablet may be placed under the tongue     sertraline 100 MG tablet  Commonly known as: ZOLOFT  Take 1.5 tablets (150 mg total) by mouth once daily.     SYNJARDY XR 25-1,000 mg Tbph  Generic drug: empagliflozin-metformin  Take 1 tablet by mouth Daily.              Signing Physician:  Juan Espinosa MD    Billing:        -----------------------------------------------------------------------------  I spent a total of 120 minutes (time-in: 14:45 PM; time-out: 16:45 PM) on 2024-01-03, in person face-to-face with the patient and caregiver(s), >50% of that time was spent counseling regarding the symptoms, treatment plan, risks, therapeutic options, lifestyle modifications, and/or safety issues for the diagnoses above.  10/14 Review of Systems completed and is negative except as stated above in HPI (Systems reviewed: Const, Eyes, ENT, Resp, CV, GI, , MSK, Skin, Neuro)  I reviewed previous labs for a total of 5 minutes on 2024-01-03. This is directly related to the face-to-face encounter. Review of previous labs was performed all negative except as stated above in HPI  I reviewed previous diagnostic testing for  a total of 5 minutes on 2024-01-03. This is directly related to the face-to-face encounter. A review of previous diagnostic testing was performed was noted to be within normal limits except as is stated above in HPI  I performed a neurobehavioral status examination that included a clinical assessment of thinking, reasoning, and judgment to ensure a comprehensive approach in managing the complex and evolving needs of the patient's neurocognitive condition. Please see above HPI and ROS for full details. This exam was performed on 2024-01-03 and included 15 minutes spent on direct face-to-face clinical observation and interview with the patient and 18 minutes spent interpreting test results and preparing the report. The total time of 33 minutes spent on the neurobehavioral status examination is not included in the time spent on evaluation and management coding.  I conducted a comprehensive neuropsychological evaluation on 2024-01-03 in response to reported concerns of a discernible deviation from the patient's previously estimated cognitive functioning levels. The goal of this evaluation was to gauge these changes and their impact on the patient's daily life. This assessment involved administering a series of standardized neurocognitive tests, which are critical in objectively measuring various cognitive functions such as memory, attention, executive functions, visuospatial skills, and language. These tests were carefully chosen based on the patient's presenting symptoms and medical history to ensure an accurate assessment of their current cognitive functioning. Informed consent was duly obtained from the patient prior to administering these tests. The face-to-face administration of these tests took 13 minutes of direct interaction with the patient. Additionally, I spent 21 minutes on interpreting the standardized test results within the broader context of the patient's overall clinical presentation. Developing a  tailored treatment plan involved integrating these findings with the patient's medical history, symptomatology, and other available data to develop a comprehensive understanding of their neuropsychological status. Feedback was provided to the patient and their caregiver, discussing the implications of the test findings and outlining recommended next steps. The total duration of this neuropsychological evaluation was 34 minutes. It is important to note that the time spent on this evaluation is distinctly separate from any evaluation and management services provided on the same day. The detailed findings, interpretations, and recommendations from this assessment are thoroughly documented in the neuropsychological assessment report above. This comprehensive cognitive assessment is essential for establishing a clear cognitive baseline, accurately diagnosing the patient's condition, facilitating targeted interventions and personalized care plans, and providing a basis for ongoing monitoring and adjustment of the care plan.  Total Billing time spent on encounter/documentation for this patient's evaluation and management, not including the neurobehavioral status examination and neuropsychological evaluation: 102 minutes.

## 2024-01-03 NOTE — TELEPHONE ENCOUNTER
Pt need MRI and EEG, preferable in [place]  I searched [facility] and there were no avail appts  Routing to Sharif Zepeda to advise if any in [facility], and if not, why are no appts showing up for main

## 2024-01-04 ENCOUNTER — SOCIAL WORK (OUTPATIENT)
Dept: NEUROLOGY | Facility: CLINIC | Age: 54
End: 2024-01-04
Payer: COMMERCIAL
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
## 2024-01-04 NOTE — TELEPHONE ENCOUNTER
If you need to schedule at the [place] location you need to contact that location. We do not schedule for them as they have contract EEG tech's who perform on certain days.     Zuleika

## 2024-01-04 NOTE — TELEPHONE ENCOUNTER
Called [facility]   They gave me this info for Neurologics who does the EEGs:   neurologics phone [phone] fax [phone]  They said to fax over order along w/ med list and insurance and they will call to schedule    I sent fax       Messaged pt and selected option to be notified if not read by Monday

## 2024-01-05 LAB
NEUROFILAMENT LIGHT CHAIN, PLASMA: 14.9 PG/ML
TREPONEMA PALLIDUM IGG+IGM AB [PRESENCE] IN SERUM OR PLASMA BY IMMUNOASSAY: NONREACTIVE
VIT B12 SERPL-MCNC: 400 NG/L (ref 180–914)

## 2024-01-07 LAB
ABETA 42/40 RATIO: 0.17
AL BETA40 PLAS-MCNC: 273 PG/ML
AL BETA42 PLAS-MCNC: 45 PG/ML

## 2024-01-08 LAB — METHYLMALONATE SERPL-SCNC: 0.18 UMOL/L

## 2024-01-09 ENCOUNTER — TELEPHONE (OUTPATIENT)
Dept: INTERNAL MEDICINE | Facility: CLINIC | Age: 54
End: 2024-01-09
Payer: COMMERCIAL

## 2024-01-09 LAB
METHYLMALONATE SERPL-SCNC: 0.16 NMOL/ML
VIT B1 BLD-MCNC: 102 UG/L (ref 38–122)

## 2024-01-09 NOTE — TELEPHONE ENCOUNTER
----- Message from Beena Chen sent at 2024  1:10 PM CST -----  Contact: [place]  Abel Hackett  MRN: [id number]  : 1970  PCP: Keira Wheat  Home Phone      [phone]  Work Phone      Not on file.  Mobile          [phone]  Home Phone      Not on file.  Mobile          Not on file.      MESSAGE:   [facility] needs a copy of sleepy study        Fax [phone]

## 2024-01-10 LAB — PHOSPHO-TAU (181P): 0.59 PG/ML (ref 0–0.95)

## 2024-01-13 LAB
2-KETOBUTYRIC ACID: 9.3 NMOL/ML
2ME3OH-BUTYRATE SERPL-SCNC: 1.4 NMOL/ML
2OXO3ME-VALERATE SERPL-SCNC: 50.4 NMOL/ML
2OXOISOCAPROATE SERPL-SCNC: 57.4 NMOL/ML
2OXOISOVALERATE SERPL-SCNC: 15 NMOL/ML
3ME-GLUTACONATE SERPL-SCNC: 1.6 NMOL/ML
3OH-ISOVALERATE SERPL-SCNC: 1 NMOL/ML
3OH-PROPIONATE PLAS-SCNC: 4.9 NMOL/ML
A-KETOGLUT SERPL-SCNC: 11.8 NMOL/ML
A-OH-BUTYR SERPL-SCNC: 155.2 NMOL/ML
ACONITATE SERPL-SCNC: 2.3 NMOL/ML
B-OH-BUTYR SERPL-SCNC: 462.8 NMOL/ML
B12 DEF, 2-METHYLCITRIC ACID: 0.7 NMOL/ML
FUMARATE SERPL-SCNC: 3 NMOL/ML
LACTATE SERPL-SCNC: 2120.9 NMOL/ML
MALATE SERPL-SCNC: 8.9 NMOL/ML
MITOCHONDRIAL METABOLITES INTERPRETATION: ABNORMAL
PROVIDER SIGNING NAME: ABNORMAL
PYRUVATE SERPL-SCNC: 92.1 NMOL/ML
SUCCINATE SERPL-SCNC: 4.2 NMOL/ML

## 2024-01-15 PROBLEM — I48.0 PAF (PAROXYSMAL ATRIAL FIBRILLATION): Status: ACTIVE | Noted: 2024-01-15

## 2024-01-15 NOTE — PROGRESS NOTES
"PATIENT: Abel Hackett  MRN: [id number]  DATE: 1/17/2024    Diagnosis:   1. Hypercoagulable state    2. PAF (paroxysmal atrial fibrillation)    3. Coagulopathy    4. Diabetes mellitus type 2 with complications    5. Hypercalcemia    6. Advance care planning      Chief Complaint: hypercoagulable state    Oncologic History:      Oncologic History     Oncologic Treatment     Pathology       Subjective:    History of Present Illness: Mr. Hackett is a 53 y.o. male who presents for evaluation and management of possible hypercoagulable state. He is referred by Dr. Espinosa.    Information per Dr. Espinosa's note dated 1/3/24:  "refer to hematology for 2nd opinion regarding atypical clotting disorder, patient had 6 drug-eluting cardiac stents clot over within 12 months."  - he had cardiac stents placed in 2022. Within 6-9 months, the stents had clotted upon repeat cardiac catheterization.  - he underwent quadruple bypass in November 2023. He had cardiac tamponade follow discharge, which he was told was related to being on Brilinta, aspirin, and plavix. He is now only taking aspirin.    - today, he is doing well. He denies shortness of breath, chest pain, nausea, vomiting, diarrhea, constipation.  - he denies history of autoimmune disease.      Past medical, surgical, family, and social histories have been reviewed and updated below.    Past Medical History:   Past Medical History:   Diagnosis Date    Depression     Diabetes mellitus     Diabetes mellitus, type 2     History of right coronary artery stent placement     done at [facility]       Past Surgical History:   Past Surgical History:   Procedure Laterality Date    ANGIOGRAPHY N/A 10/2023    COLONOSCOPY N/A 08/02/2022    Procedure: COLONOSCOPY;  Surgeon: Keira Wheat MD;  Location: [facility];  Service: Endoscopy;  Laterality: N/A;    REFRACTIVE SURGERY  04/13/2016    VASECTOMY         Family History:   Family History   Problem Relation Age of Onset    Heart disease Mother     " Hypertension Mother     Diabetes Mother     Heart attack Mother     Heart disease Father     Hypertension Father     Diabetes Father     Heart attack Father     Heart attack Maternal Uncle     Heart attack Maternal Grandfather        Social History:  reports that he has never smoked. He has never used smokeless tobacco. He reports that he does not currently use alcohol. He reports that he does not use drugs.    Allergies:  Review of patient's allergies indicates:  No Known Allergies    Medications:  Current Outpatient Medications   Medication Sig Dispense Refill    aspirin (ECOTRIN) 81 MG EC tablet Take 81 mg by mouth once daily.      atorvastatin (LIPITOR) 80 MG tablet Take 80 mg by mouth once daily.      empagliflozin-metformin (SYNJARDY XR) 25-1,000 mg TBph Take 1 tablet by mouth Daily. 90 tablet 1    gabapentin (NEURONTIN) 100 MG capsule Take 1 capsule (100 mg total) by mouth 3 (three) times daily. (Patient taking differently: Take 300 mg by mouth every evening.) 90 capsule 3    isosorbide mononitrate (IMDUR) 30 MG 24 hr tablet Take 30 mg by mouth once daily.      metoprolol succinate (TOPROL-XL) 100 MG 24 hr tablet Take 100 mg by mouth once daily.      nitroGLYCERIN (NITROSTAT) 0.4 MG SL tablet PLEASE SEE ATTACHED FOR DETAILED DIRECTIONS      pantoprazole (PROTONIX) 40 MG tablet Take 1 tablet (40 mg total) by mouth once daily. 30 tablet 0    rimegepant 75 mg odt Take 1 tablet (75 mg total) by mouth daily as needed for Migraine. Place ODT tablet on the tongue; alternatively the ODT tablet may be placed under the tongue (Patient not taking: Reported on 10/23/2023) 8 tablet 11    semaglutide (OZEMPIC) 2 mg/dose (8 mg/3 mL) PnIj Inject 2 mg into the skin every 7 days. 3 mL 11    sertraline (ZOLOFT) 100 MG tablet Take 1.5 tablets (150 mg total) by mouth once daily. 45 tablet 2     No current facility-administered medications for this visit.       Review of Systems   Constitutional:  Negative for fatigue.   HENT:   Negative for sore throat.    Eyes:  Negative for visual disturbance.   Respiratory:  Negative for shortness of breath.    Cardiovascular:  Negative for chest pain.   Gastrointestinal:  Negative for abdominal pain.   Genitourinary:  Negative for dysuria.   Musculoskeletal:  Negative for back pain.   Skin:  Negative for rash.   Neurological:  Negative for headaches.   Hematological:  Negative for adenopathy.   Psychiatric/Behavioral:  The patient is not nervous/anxious.        ECOG Performance Status:   ECOG SCORE    0 - Fully active-able to carry on all pre-disease performance without restriction         Objective:      Vitals:   Vitals:    01/17/24 1324   BP: 102/63   BP Location: Right arm   Patient Position: Sitting   BP Method: Large (Automatic)   Pulse: 94   Resp: 16   SpO2: 97%   Weight: 87 kg (191 lb 12.8 oz)     BMI: Body mass index is 28.32 kg/m².    Physical Exam  Vitals and nursing note reviewed.   Constitutional:       Appearance: He is well-developed.   HENT:      Head: Normocephalic and atraumatic.   Eyes:      Pupils: Pupils are equal, round, and reactive to light.   Cardiovascular:      Rate and Rhythm: Normal rate and regular rhythm.   Pulmonary:      Effort: Pulmonary effort is normal.      Breath sounds: Normal breath sounds.   Abdominal:      General: Bowel sounds are normal.      Palpations: Abdomen is soft.   Musculoskeletal:         General: Normal range of motion.      Cervical back: Normal range of motion and neck supple.   Skin:     General: Skin is warm and dry.   Neurological:      Mental Status: He is alert and oriented to person, place, and time.   Psychiatric:         Behavior: Behavior normal.         Thought Content: Thought content normal.         Judgment: Judgment normal.         Laboratory Data:  Labs have been reviewed.    Lab Results   Component Value Date    WBC 6.95 01/03/2024    HGB 15.7 01/03/2024    HCT 48.6 01/03/2024    MCV 87 01/03/2024     01/03/2024  "          Imaging:        Assessment:       1. Hypercoagulable state    2. PAF (paroxysmal atrial fibrillation)    3. Coagulopathy    4. Diabetes mellitus type 2 with complications    5. Hypercalcemia    6. Advance care planning           Plan:     Hypercoagulable state  - I have reviewed his chart  Information per Dr. Espinosa's note dated 1/3/24:  "refer to hematology for 2nd opinion regarding atypical clotting disorder, patient had 6 drug-eluting cardiac stents clot over within 12 months."  - I will perform a broad workup for possible hypercoagulable state.  - I will contact him with results of testing. If needed, I will schedule a follow-up appointment.    2. Atrial fibrillation  - no acute issues  - he states he only had that during post-operative period from his quadruple bypass.    3. Hypercalcemia  - check PTH    4. Advance Care Planning     Date: 01/17/2024    Power of   I initiated the process of voluntary advance care planning today and explained the importance of this process to the patient.  I introduced the concept of advance directives to the patient, as well. Then the patient received detailed information about the importance of designating a Health Care Power of  (HCPOA). He was also instructed to communicate with this person about their wishes for future healthcare, should he become sick and lose decision-making capacity. The patient has not previously appointed a HCPOA. After our discussion, the patient has decided to complete a HCPOA and has appointed his  wife Ghada Hackett ([phone]) and daughter Tory Hackett ([phone]) . I spent a total time of 16 minutes discussing this issue with the patient.          - I will contact him with results of testing. If needed, I will schedule a follow-up appointment.    Navi Cottrell M.D.  Hematology/Oncology  [facility] - [address], [address]  [place] [address]  Phone: [phone]  Fax: [phone]

## 2024-01-17 ENCOUNTER — LAB VISIT (OUTPATIENT)
Dept: LAB | Facility: HOSPITAL | Age: 54
End: 2024-01-17
Payer: COMMERCIAL

## 2024-01-17 ENCOUNTER — TELEPHONE (OUTPATIENT)
Dept: FAMILY MEDICINE | Facility: CLINIC | Age: 54
End: 2024-01-17
Payer: COMMERCIAL

## 2024-01-17 ENCOUNTER — OFFICE VISIT (OUTPATIENT)
Dept: HEMATOLOGY/ONCOLOGY | Facility: CLINIC | Age: 54
End: 2024-01-17
Payer: COMMERCIAL

## 2024-01-17 VITALS
BODY MASS INDEX: 28.32 KG/M2 | SYSTOLIC BLOOD PRESSURE: 102 MMHG | DIASTOLIC BLOOD PRESSURE: 63 MMHG | WEIGHT: 191.81 LBS | HEART RATE: 94 BPM | OXYGEN SATURATION: 97 % | RESPIRATION RATE: 16 BRPM

## 2024-01-17 DIAGNOSIS — D68.9 COAGULOPATHY: ICD-10-CM

## 2024-01-17 DIAGNOSIS — Z71.89 ADVANCE CARE PLANNING: ICD-10-CM

## 2024-01-17 DIAGNOSIS — E11.8 DIABETES MELLITUS TYPE 2 WITH COMPLICATIONS: ICD-10-CM

## 2024-01-17 DIAGNOSIS — I48.0 PAF (PAROXYSMAL ATRIAL FIBRILLATION): ICD-10-CM

## 2024-01-17 DIAGNOSIS — D68.59 HYPERCOAGULABLE STATE: Primary | ICD-10-CM

## 2024-01-17 DIAGNOSIS — E83.52 HYPERCALCEMIA: ICD-10-CM

## 2024-01-17 DIAGNOSIS — D68.59 HYPERCOAGULABLE STATE: ICD-10-CM

## 2024-01-17 LAB — PTH-INTACT SERPL-MCNC: 41.1 PG/ML (ref 9–77)

## 2024-01-17 PROCEDURE — 81240 F2 GENE: CPT | Performed by: INTERNAL MEDICINE

## 2024-01-17 PROCEDURE — 81219 CALR GENE COM VARIANTS: CPT | Performed by: INTERNAL MEDICINE

## 2024-01-17 PROCEDURE — 3008F BODY MASS INDEX DOCD: CPT | Mod: CPTII,S$GLB,, | Performed by: INTERNAL MEDICINE

## 2024-01-17 PROCEDURE — 81270 JAK2 GENE: CPT | Performed by: INTERNAL MEDICINE

## 2024-01-17 PROCEDURE — 99999 PR PBB SHADOW E&M-EST. PATIENT-LVL IV: CPT | Mod: PBBFAC,,, | Performed by: INTERNAL MEDICINE

## 2024-01-17 PROCEDURE — 3078F DIAST BP <80 MM HG: CPT | Mod: CPTII,S$GLB,, | Performed by: INTERNAL MEDICINE

## 2024-01-17 PROCEDURE — 81339 MPL GENE SEQ ALYS EXON 10: CPT | Performed by: INTERNAL MEDICINE

## 2024-01-17 PROCEDURE — 85303 CLOT INHIBIT PROT C ACTIVITY: CPT | Performed by: INTERNAL MEDICINE

## 2024-01-17 PROCEDURE — 88185 FLOWCYTOMETRY/TC ADD-ON: CPT | Mod: 91 | Performed by: INTERNAL MEDICINE

## 2024-01-17 PROCEDURE — 83970 ASSAY OF PARATHORMONE: CPT | Performed by: INTERNAL MEDICINE

## 2024-01-17 PROCEDURE — 99497 ADVNCD CARE PLAN 30 MIN: CPT | Mod: S$GLB,,, | Performed by: INTERNAL MEDICINE

## 2024-01-17 PROCEDURE — 85306 CLOT INHIBIT PROT S FREE: CPT | Performed by: INTERNAL MEDICINE

## 2024-01-17 PROCEDURE — 86147 CARDIOLIPIN ANTIBODY EA IG: CPT | Performed by: INTERNAL MEDICINE

## 2024-01-17 PROCEDURE — 85613 RUSSELL VIPER VENOM DILUTED: CPT | Performed by: INTERNAL MEDICINE

## 2024-01-17 PROCEDURE — 1159F MED LIST DOCD IN RCRD: CPT | Mod: CPTII,S$GLB,, | Performed by: INTERNAL MEDICINE

## 2024-01-17 PROCEDURE — 81241 F5 GENE: CPT | Performed by: INTERNAL MEDICINE

## 2024-01-17 PROCEDURE — 85300 ANTITHROMBIN III ACTIVITY: CPT | Performed by: INTERNAL MEDICINE

## 2024-01-17 PROCEDURE — 99204 OFFICE O/P NEW MOD 45 MIN: CPT | Mod: S$GLB,,, | Performed by: INTERNAL MEDICINE

## 2024-01-17 PROCEDURE — 88184 FLOWCYTOMETRY/ TC 1 MARKER: CPT | Performed by: INTERNAL MEDICINE

## 2024-01-17 PROCEDURE — 36415 COLL VENOUS BLD VENIPUNCTURE: CPT | Performed by: INTERNAL MEDICINE

## 2024-01-17 PROCEDURE — 3074F SYST BP LT 130 MM HG: CPT | Mod: CPTII,S$GLB,, | Performed by: INTERNAL MEDICINE

## 2024-01-17 PROCEDURE — 86146 BETA-2 GLYCOPROTEIN ANTIBODY: CPT | Performed by: INTERNAL MEDICINE

## 2024-01-17 PROCEDURE — 1160F RVW MEDS BY RX/DR IN RCRD: CPT | Mod: CPTII,S$GLB,, | Performed by: INTERNAL MEDICINE

## 2024-01-17 NOTE — TELEPHONE ENCOUNTER
----- Message from Babak Rider sent at 2024  1:43 PM CST -----  Contact: Marietta Hackett  MRN: [id number]  : 1970  PCP: Keira Wheat  Home Phone      [phone]  Work Phone      Not on file.  Mobile          [phone]  Home Phone      Not on file.  Mobile          Not on file.      MESSAGE:     [facility] in [facility] to pt wanting to know if we received the fax they sent over for pt for CPAP supplies.          Please advise  Marietta  [phone]     DISPLAY PLAN FREE TEXT

## 2024-01-18 ENCOUNTER — TELEPHONE (OUTPATIENT)
Dept: NEUROLOGY | Facility: CLINIC | Age: 54
End: 2024-01-18
Payer: COMMERCIAL

## 2024-01-18 ENCOUNTER — HOSPITAL ENCOUNTER (OUTPATIENT)
Dept: NEUROLOGY | Facility: HOSPITAL | Age: 54
Discharge: HOME | End: 2024-01-18
Attending: PSYCHIATRY & NEUROLOGY
Payer: COMMERCIAL

## 2024-01-18 ENCOUNTER — PATIENT OUTREACH (OUTPATIENT)
Dept: ADMINISTRATIVE | Facility: HOSPITAL | Age: 54
End: 2024-01-18
Payer: COMMERCIAL

## 2024-01-18 ENCOUNTER — HOSPITAL ENCOUNTER (OUTPATIENT)
Dept: RADIOLOGY | Facility: HOSPITAL | Age: 54
Discharge: HOME | End: 2024-01-18
Attending: PSYCHIATRY & NEUROLOGY
Payer: COMMERCIAL

## 2024-01-18 DIAGNOSIS — G25.3 MYOCLONUS: ICD-10-CM

## 2024-01-18 DIAGNOSIS — G31.84 MCI (MILD COGNITIVE IMPAIRMENT) WITH MEMORY LOSS: Primary | ICD-10-CM

## 2024-01-18 DIAGNOSIS — R41.3 OTHER AMNESIA: ICD-10-CM

## 2024-01-18 LAB — AT III ACT/NOR PPP CHRO: 118 % (ref 83–118)

## 2024-01-18 PROCEDURE — 70551 MRI BRAIN STEM W/O DYE: CPT | Mod: TC

## 2024-01-18 PROCEDURE — 70551 MRI BRAIN STEM W/O DYE: CPT | Mod: 26,,, | Performed by: RADIOLOGY

## 2024-01-18 PROCEDURE — 95816 EEG AWAKE AND DROWSY: CPT

## 2024-01-18 NOTE — TELEPHONE ENCOUNTER
----- Message from Juan Espinosa MD sent at 1/18/2024 12:55 PM CST -----  Hi,  Please schedule the patient for a video follow-up to discuss test results and care management.  Juan Fregoso

## 2024-01-18 NOTE — PROGRESS NOTES
Chart reviewed, immunization record updated.  No new results noted on [facility] web site.  Uploaded Lipid Panel collected on 9/12/2023 from [facility], updated to .  Care Everywhere updated.   Patient care coordination note  Upcoming PCP visit updated.  Next PCP visit 3/22/2024.  Received external Diabetic Eye Exam collected/completed on 12/28/2023, updated to .

## 2024-01-18 NOTE — TELEPHONE ENCOUNTER
Called pt and LVM to sched f/u   Called pt's wife as second attempt  LVM  Sent msg as third attempt

## 2024-01-19 PROBLEM — G31.84 MCI (MILD COGNITIVE IMPAIRMENT) WITH MEMORY LOSS: Status: ACTIVE | Noted: 2024-01-19

## 2024-01-19 PROBLEM — G25.3 MYOCLONUS: Status: ACTIVE | Noted: 2024-01-19

## 2024-01-19 LAB
APTT IMM NP PPP: ABNORMAL SEC (ref 32–48)
APTT P HEP NEUT PPP: ABNORMAL SEC (ref 32–48)
CONFIRM APTT STACLOT: ABNORMAL
DRVVT SCREEN TO CONFIRM RATIO: ABNORMAL RATIO
LA 3 SCREEN W REFLEX-IMP: ABNORMAL
LA APTT+DRVVT+PT W REFLEX PPP: ABNORMAL
MIXING DRVVT: ABNORMAL SEC (ref 33–44)
PROT C ACT/NOR PPP CHRO: 141 % (ref 70–150)
PROT S ACT/NOR PPP: 149 % (ref 65–150)
PROTHROMBIN TIME: 12.9 SEC (ref 12–15.5)
REPTILASE TIME: ABNORMAL SEC
SCREEN APTT: 31 SEC (ref 32–48)
SCREEN DRVVT: 35 SEC (ref 33–44)
THROMBIN TIME: ABNORMAL SEC (ref 14.7–19.5)

## 2024-01-20 LAB
FLOW CYTOMETRY SPECIALIST REVIEW: NORMAL
PNH GRANULOCYTES: 0 % (ref 0–0.01)
PNH MONOCYTES: 0 % (ref 0–0.05)
PNH RBC-COMPLETE AG LOSS: 0 % (ref 0–0.01)
PNH RBC-PARTIAL AG LOSS: 0 % (ref 0–0.99)

## 2024-01-22 LAB
B2 GLYCOPROT1 IGA SER QL: 4.6 U/ML
B2 GLYCOPROT1 IGG SER QL: 12 U/ML
B2 GLYCOPROT1 IGM SER QL: <2.4 U/ML
CARDIOLIPIN IGG SER IA-ACNC: <9.4 GPL (ref 0–14.99)
CARDIOLIPIN IGM SER IA-ACNC: <9.4 MPL (ref 0–12.49)
F2 C.20210G>A GENO BLD/T: NEGATIVE
F5 P.R506Q BLD/T QL: NEGATIVE

## 2024-01-25 LAB
MPNR  FINAL DIAGNOSIS: NORMAL
MPNR  SPECIMEN TYPE: NORMAL
MPNR RESULT: NORMAL

## 2024-01-26 ENCOUNTER — PATIENT MESSAGE (OUTPATIENT)
Dept: HEMATOLOGY/ONCOLOGY | Facility: CLINIC | Age: 54
End: 2024-01-26
Payer: COMMERCIAL

## 2024-01-26 DIAGNOSIS — D68.59 HYPERCOAGULABLE STATE: Primary | ICD-10-CM

## 2024-02-01 ENCOUNTER — OFFICE VISIT (OUTPATIENT)
Dept: PSYCHIATRY | Facility: CLINIC | Age: 54
End: 2024-02-01
Payer: COMMERCIAL

## 2024-02-01 DIAGNOSIS — F41.1 GENERALIZED ANXIETY DISORDER: Primary | ICD-10-CM

## 2024-02-01 DIAGNOSIS — G47.00 INSOMNIA, UNSPECIFIED TYPE: ICD-10-CM

## 2024-02-01 DIAGNOSIS — Z65.8 PSYCHOSOCIAL STRESSORS: ICD-10-CM

## 2024-02-01 PROCEDURE — 90833 PSYTX W PT W E/M 30 MIN: CPT | Mod: 95,,, | Performed by: STUDENT IN AN ORGANIZED HEALTH CARE EDUCATION/TRAINING PROGRAM

## 2024-02-01 PROCEDURE — 3051F HG A1C>EQUAL 7.0%<8.0%: CPT | Mod: CPTII,95,, | Performed by: STUDENT IN AN ORGANIZED HEALTH CARE EDUCATION/TRAINING PROGRAM

## 2024-02-01 PROCEDURE — 1160F RVW MEDS BY RX/DR IN RCRD: CPT | Mod: CPTII,95,, | Performed by: STUDENT IN AN ORGANIZED HEALTH CARE EDUCATION/TRAINING PROGRAM

## 2024-02-01 PROCEDURE — 99214 OFFICE O/P EST MOD 30 MIN: CPT | Mod: 95,,, | Performed by: STUDENT IN AN ORGANIZED HEALTH CARE EDUCATION/TRAINING PROGRAM

## 2024-02-01 PROCEDURE — 1159F MED LIST DOCD IN RCRD: CPT | Mod: CPTII,95,, | Performed by: STUDENT IN AN ORGANIZED HEALTH CARE EDUCATION/TRAINING PROGRAM

## 2024-02-01 RX ORDER — SERTRALINE HYDROCHLORIDE 100 MG/1
200 TABLET, FILM COATED ORAL DAILY
Qty: 60 TABLET | Refills: 2 | Status: SHIPPED | OUTPATIENT
Start: 2024-02-01 | End: 2024-03-20 | Stop reason: SDUPTHER

## 2024-02-01 NOTE — PROGRESS NOTES
"    2024  8:40 AM  Abel Hackett  [id number]    Outpatient Psychiatry Follow-Up Visit (MD/NP)    2024    Clinical Status of Patient:  Outpatient (Ambulatory)    Chief Complaint:  Abel Hackett is a 53 y.o. male who presents today for follow-up of anxiety.  Met with patient.      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 ever since then when I hear ambulances, I get anxious, I'm having difficulty going in my bedroom because that's where it happened, there's smells that remind me of the hospital, I get real anxious, when I remove myself from the situation, I can calm down, I sometimes have to sleep in the guest bedroom."      Psychiatric Review Of Systems - Is patient experiencing or having changes in:  Depressed mood: no  sleep: improved, lunesta and gabapentin effective  appetite: no  energy/anergy: no  interest/pleasure/anhedonia: no  anxiety/panic: yes, 3x/week  guilty/hopelessness/worthlessness: no  concentration: no  S.I.B.s/risky behavior: no  Irritability: no  Substance abuse: no  Racing thoughts: no  Impulsive behaviors: no  Paranoia: no  AVH: no        Psychotherapy:  Target symptoms: anxiety , adjustment  Why chosen therapy is appropriate versus another modality: relevant to diagnosis  Outcome monitoring methods: self-report  Therapeutic intervention type: supportive psychotherapy  Topics discussed/themes: stress related to medical comorbidities, building skills sets for symptom management, symptom recognition  The patient's response to the intervention is accepting. The patient's progress toward treatment goals is good.   Duration of intervention: 17 minutes.        Medical Review of Systems   Review of Systems   Constitutional:  Negative for chills and fever.   HENT:  Negative for hearing " loss.    Eyes:  Negative for blurred vision.   Respiratory:  Negative for shortness of breath.    Cardiovascular:  Negative for chest pain and palpitations.   Gastrointestinal:  Negative for constipation, diarrhea and nausea.   Genitourinary:  Negative for dysuria.   Musculoskeletal:  Negative for back pain and neck pain.   Skin:  Negative for rash.   Neurological:  Negative for dizziness and headaches.   Endo/Heme/Allergies:  Negative for environmental allergies.       Past Medical, Family and Social History: The patient's past medical, family and social history have been reviewed and updated as appropriate within the electronic medical record - see encounter notes.      Social History     Socioeconomic History    Marital status:    Tobacco Use    Smoking status: Never    Smokeless tobacco: Never   Substance and Sexual Activity    Alcohol use: Not Currently     Comment: occassional    Drug use: No    Sexual activity: Yes     Partners: Female     Social Determinants of Health     Financial Resource Strain: Low Risk  (12/7/2023)    Overall Financial Resource Strain (CARDIA)     Difficulty of Paying Living Expenses: Not hard at all   Food Insecurity: No Food Insecurity (12/7/2023)    Hunger Vital Sign     Worried About Running Out of Food in the Last Year: Never true     Ran Out of Food in the Last Year: Never true   Transportation Needs: No Transportation Needs (12/7/2023)    PRAPARE - Transportation     Lack of Transportation (Medical): No     Lack of Transportation (Non-Medical): No   Physical Activity: Insufficiently Active (12/7/2023)    Exercise Vital Sign     Days of Exercise per Week: 3 days     Minutes of Exercise per Session: 30 min   Stress: Stress Concern Present (12/7/2023)    [employer] - Occupational Stress Questionnaire     Feeling of Stress : To some extent   Social Connections: Unknown (12/7/2023)    Social Connection and Isolation Panel [NHANES]     Frequency of  "Communication with Friends and Family: Patient declined     Frequency of Social Gatherings with Friends and Family: Patient declined     Active Member of Clubs or Organizations: Patient declined     Attends Club or Organization Meetings: Patient declined     Marital Status:    Housing Stability: Low Risk  (12/7/2023)    Housing Stability Vital Sign     Unable to Pay for Housing in the Last Year: No     Number of Places Lived in the Last Year: 1     Unstable Housing in the Last Year: No         Compliance: yes    Side effects: None    Risk Parameters:  Patient reports no suicidal ideation  Patient reports no homicidal ideation  Patient reports no self-injurious behavior  Patient reports no violent behavior      Exam (detailed: at least 9 elements; comprehensive: all 15 elements)     Vitals could not be obtained 2/2 virtual visit        CONSTITUTIONAL  General Appearance: unremarkable, age appropriate    MUSCULOSKELETAL  Muscle Strength and Tone:no tremor, no tic  Abnormal Involuntary Movements: No  Gait and Station: non-ataxic    PSYCHIATRIC   Level of Consciousness: awake and alert   Orientation: person, place, and situation  Grooming: Casually dressed and Well groomed  Psychomotor Behavior: normal, cooperative  Speech: normal tone, normal rate, normal pitch, normal volume  Language: grossly intact  Mood: "alright"  Affect: Consistent with mood  Thought Process: linear, logical  Associations: intact   Thought Content: DENIES suicidal ideation and DENIES homicidal ideation  Perceptions: denies hallucinations  Memory: Able to recall past events, Remote intact, and Recent intact  Attention:Attends to interview without distraction  Fund of Knowledge: Aware of current events and Vocabulary appropriate   Estimate if Intelligence:  Average based on work/education history, vocabulary and mental status exam  Insight: has awareness of illness  Judgment: behavior is adequate to circumstances        Assessment and " Diagnosis   Status/Progress: Based on the examination today, the patient's problem(s) is/are adequately but not ideally controlled.  New problems have not been presented today.   Co-morbidities are complicating management of the primary condition.        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 Instructed to call 911 or present to ER for emergency including SI or HI.    - Discussed diagnosis, risks and benefits of proposed treatment above vs alternative treatments vs no treatment, and potential side effects of these treatments. Discussed the inherent unpredictability of treatment. The patient expresses understanding of the above and displays the capacity to agree with this treatment given said understanding. Patient also agrees that, currently, the benefits outweigh the risks and would like to pursue this treatment at this time.     - Any medications being used off-label were discussed with the patient inclusive of the evidence base for the use of the medications and consent was obtained for the off-label use of the medication.     Froylan Marie III, MD    Return to Clinic:  1-2 m

## 2024-02-05 ENCOUNTER — TELEPHONE (OUTPATIENT)
Dept: PSYCHIATRY | Facility: CLINIC | Age: 54
End: 2024-02-05
Payer: COMMERCIAL

## 2024-02-05 NOTE — TELEPHONE ENCOUNTER
----- Message from Louisa Granados sent at 2024 12:07 PM CST -----  Contact: Patient  Abel Hackett  MRN: [id number]  : 1970  PCP: Keira Wheat  Home Phone      [phone]  Work Phone      Not on file.  Mobile          [phone]  Home Phone      Not on file.  Mobile          Not on file.      MESSAGE: Patient is requesting his last office visit records from     PHONE; [phone]

## 2024-02-05 NOTE — TELEPHONE ENCOUNTER
Patient contacted Medical Records Department, they are waiting on Dr Marie's approval for the records to be printed.

## 2024-02-15 RX ORDER — GABAPENTIN 100 MG/1
100 CAPSULE ORAL 3 TIMES DAILY
Qty: 90 CAPSULE | Refills: 0 | Status: SHIPPED | OUTPATIENT
Start: 2024-02-15 | End: 2024-03-20

## 2024-02-20 ENCOUNTER — PATIENT MESSAGE (OUTPATIENT)
Dept: NEUROLOGY | Facility: CLINIC | Age: 54
End: 2024-02-20

## 2024-02-20 ENCOUNTER — OFFICE VISIT (OUTPATIENT)
Dept: NEUROLOGY | Facility: CLINIC | Age: 54
End: 2024-02-20
Payer: COMMERCIAL

## 2024-02-20 DIAGNOSIS — G31.84 MCI (MILD COGNITIVE IMPAIRMENT): Primary | ICD-10-CM

## 2024-02-20 DIAGNOSIS — G25.3 POST HYPOXIC MYOCLONUS: ICD-10-CM

## 2024-02-20 PROBLEM — F01.A4 MILD VASCULAR DEMENTIA WITH ANXIETY: Status: ACTIVE | Noted: 2024-02-20

## 2024-02-20 PROBLEM — F01.A4 MILD VASCULAR DEMENTIA WITH ANXIETY: Status: RESOLVED | Noted: 2024-02-20 | Resolved: 2024-02-20

## 2024-02-20 PROCEDURE — 96116 NUBHVL XM PHYS/QHP 1ST HR: CPT | Mod: 95,59,, | Performed by: PSYCHIATRY & NEUROLOGY

## 2024-02-20 PROCEDURE — 99215 OFFICE O/P EST HI 40 MIN: CPT | Mod: 95,,, | Performed by: PSYCHIATRY & NEUROLOGY

## 2024-02-20 PROCEDURE — 1160F RVW MEDS BY RX/DR IN RCRD: CPT | Mod: CPTII,95,, | Performed by: PSYCHIATRY & NEUROLOGY

## 2024-02-20 PROCEDURE — 1159F MED LIST DOCD IN RCRD: CPT | Mod: CPTII,95,, | Performed by: PSYCHIATRY & NEUROLOGY

## 2024-02-20 PROCEDURE — 3051F HG A1C>EQUAL 7.0%<8.0%: CPT | Mod: CPTII,95,, | Performed by: PSYCHIATRY & NEUROLOGY

## 2024-02-20 NOTE — PROGRESS NOTES
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 Dr Espinosa in memory disorders clinic for further evaluation. -noting his significant CAD, worsening currently / pending cardiology surgery evaluation. -Speech therapy referral as recommended by neuropsychiatry.  [facility] - Juan Espinosa MD. Neurologist (01/03/2024)  Type: Chart Review. The patient, a 52-year-old male with a history of early-onset diabetes diagnosed in his 20s, presents with his wife as the primary historian. His diabetes, which appears to be familial but not classified as Type 1, has been a consistent health concern. The patient has faced challenges with medication adherence, particularly insulin, due to concerns about weight gain and the burden of managing multiple medications. In 2020, the patient was laid off from his job and subsequently began working at [employer] in 2021, which involved a demanding three-hour daily commute. During this time, he experienced increased daytime fatigue and difficulty keeping up with work responsibilities, leading to a general decline in health. In December 2021, he contracted COVID-19, resulting in post-COVID fatigue consistent with long COVID. He also reported new onset visual disturbances described as kaleidoscopic sensations in his vision, with occasional palpitations, which later became associated with migraine headaches. These headaches were diagnosed in the last 6-12 months and were effectively managed with " Nurtec. In June 2022, the patient started Ozempic for diabetes and weight loss control. Subsequently, he reported worsening subjective cognitive impairment. his family members noticed increased forgetfulness and disorientation, while coworkers observed typos and incoherent conversations. In July 2022, he sought medical attention for these memory issues. An MRI revealed cerebellar abnormalities, and a CT scan showed mild cortical atrophy. His family history included significant vascular dementia. Neuropsychological assessments indicated a need for better control of vascular risk factors. The patient was unaware of his moderate obstructive sleep apnea diagnosis. Further evaluation in late September 2022 showed intact visuospatial skills with some impairment in language and memory functions. Stress and irritability due to cognitive difficulties were noted. In November 2022, vascular complications led to the placement of stents, and MRI showed remote lacunar infarcts. Despite improved diabetes control with Ozempic, he continued to experience memory problems. In mid-2023, his memory problems worsened, with incidents like using a razor as a toothbrush. MRI and CTA revealed cerebral abnormalities. By late September 2023, his diabetes was well-managed, but cognitive issues persisted. The patient's condition deteriorated further with the thrombosis of previously placed stents, leading to cardiac ischemia and tamponade requiring emergency bypass surgery and stent replacement. He has been on disability since September 2022 due to these complications. He is currently withdrawn and clearly depressed. 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 The patient's cognitive impairment, dating back to 2021, could be related to chronic stress, early-onset diabetes, and the initiation of Ozempic. His condition is likely compounded by mood disorders. Given the complexity of his early-onset diabetes with a hereditary factor, it is recommended to consult with his primary doctor about adjusting the Ozempic dosage and to follow up with Endocrinology for closer monitoring. Further evaluation and management by a multidisciplinary team, including a neurologist, endocrinologist, and psychiatrist, are crucial to address the various aspects of his health concerns.     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 Recent serum laboratory tests revealed findings inconsistent with an early or prodromal Alzheimer's disease process; both A beta 42/40 and phospho-tau 181 levels were within normal limits. A mitochondrial profile indicated mildly elevated 2-Hydroxybutyric Acid, suggestive of ketosis, likely a result of ongoing diabetes medication management. Vitamin B12 levels were borderline low at 400. Overall, serum laboratory results were largely unremarkable. Additionally, an MRI of the brain showed no significant progression compared to the last MRI. Follow-up with a Hematology-Oncology specialist included a comprehensive panel to investigate other potential causes for a hypercoagulable state, revealing elevated lupus anticoagulant and beta 2 glycoprotein antibodies. 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 The patient is scheduled to complete an EEG, which is recommended before initiating antiepileptic therapy to determine the appropriate dosage. The patient has expressed concerns about their capacity  to return to work. While clinical symptoms are consistent with mild cognitive impairment without clear interference in daily activities, the patient does not appear to be performing at a level suitable for return to work. It was agreed that the patient would likely be best served by continuing on long-term disability. However, should a more formal assessment be required for long-term disability paperwork, a formal evaluation by neuropsychology will be necessary. Following the EEG, we will discuss the initiation of medications for the post-hypoxic myoclonus observed during examination. This plan aims to address the patient's neurological symptoms comprehensively while considering their concerns and the impact on their daily life and work capacity.  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  Post hypoxic myoclonus     Review of cognitive, visuospatial, motor, sensory, and behavioral systems:     Memory:   The patient's memory has worsened in the past few years.  He does repeat statements or asks the same question repeatedly.  He does have difficulty remembering recent important conversations.  He does have difficulty remembering recent events.  He does not forget information within minutes.  His recent retrograde memory is intact.  His remote memory is intact.  Attention:   The patient's attention and concentration are impaired.  He does not have attentional fluctuations.  He does not have difficulty maintaining selective attention.  He does become easily distracted.  He does have difficulty with divided attention.  Executive:   The patient's cognitive processing speed is slower.  He does have difficulty with working memory.  He does misplace personal items (e.g., keys, cell phone, wallet) more frequently.  He does have difficulty keeping track of his medications.  He does have difficulty with planning/organizing/completing multistep tasks.  He does have  difficulty with executive attention.  He does have difficulty with flexible thinking.  He does not have difficulty with response inhibition.  He denies new impulsivity or rash/careless actions.  His judgment is intact.  Language:   The patient's speech is affected.  He does not forget people's names more frequently.  He does have word-finding difficulties.  His speech is fluent and non-effortful.  His speech is grammatically intact.  He does make inaccurate word substitutions.  He does not have difficulty reading.  He does not appear to have impaired comprehension.  Visuospatial:   The patient does not have new visuospatial difficulty.  He does not become confused or disoriented in *new*, unfamiliar places.  He does not have trouble with navigation.  He does not get lost in familiar places.  He does not have visuospatial disorientation.  He does not have difficulty recognizing objects or faces.  He denies problems with driving or parking.  Motor/Coordination:   The patient does not have difficulty with walking.  He does feel imbalanced.  He denies having fallen.  He does not appear to have new muscle weakness.  He does have difficulty buttoning shirts, operating zippers, or manipulating tools/utensils.  His handwriting has not become micrographic.  He does have a resting tremor.  He does report having new involuntary movements and/or muscle jerking. Comment: nocturnal jerking - worse since 2023  He does not have swallowing difficulty.  He denies new muscle cramps and twitching.  Sensory:   The patient denies new numbness, tingling, paresthesias, or pain.  The patient reports new loss of vision, blurry vision, and/or double vision.  The patient reports a recent loss of hearing and/or worsening tinnitus.  The patient denies anosmia.  Sleep:   The patient reports difficulty sleeping.  The patient does have difficulty going to sleep.  The patient reports difficulty staying asleep and/or frequently awakening at  night.  The patient does snore and/or have witnessed apneas while sleeping.  When he wakes up in the morning, he does not feel well-rested.  He denies dream-enactment behavior.  He has reported symptoms suggestive of restless leg syndrome.  Behavior:   The patient's personality has changed.  He does not have symptoms of disinhibition and social inappropriateness.  He does not have symptoms to suggest a loss of manners or decorum.  He does not appear apathetic or has decreased motivation.  He does not appear to have a change in inertia.  The patient's emotional expression has changed.  He does have emotional blunting or lability.  He does not have symptoms of irritability and mood lability.  He does not have symptoms of agitation, aggression, or violent outbursts.  His insight into his health and situation is intact.  His personal hygiene is intact.  He is not exhibiting a diminished response to other people's needs and feelings.  He is not exhibiting a diminished social interest, interrelatedness, or personal warmth.  He denies restlessness.  He denies new and/or worsening simple repetitive behaviors.  His speech has not become simplified or become repetitive/stereotyped.  He denies new/worsening complex repetitive/ritualistic compulsions and behaviors.  He does not have symptoms of hyper-religiosity or dogmatism.  His interests/pleasures have not become restrictive, simplified, interrupting, or repetitive.  He denies a change of self-stimulating behavior.  He denies any changes in eating behavior.  He denies increased consumption of food or substances.  He denies oral exploration or consumption of inedible objects.  Psychiatric:   He does not feel depressed.  He is not exhibiting symptoms of social withdrawal/indifference.  He does have anxiety.  He does not exhibit cycling behavior.  He does not exhibit hyperactive behavior.  He is not exhibited symptoms of paranoia.  He does not have delusions.  He does not have  hallucinations.  He does not have a history of sensitivity to neuroleptic/psychotropic medications.  Medical Review of Systems:   The patient does not have constipation.  The patient does not have urinary incontinence.  The patient denies orthostatic lightheadedness.  The patient's weight is unstable. Comment: 1905-1533 40lbs  Functional status:  Difficulty performing the following Instrumental ADLs:  Housekeeping: No  Food Preparation: No  Shopping: No  Ability to Handle Finances: Yes  Transportation/Driving: No  Household Appliances/Stove: No  Laundry: No  Difficulty performing the following Basic ADLs:  Dressing: No  Bathing: No  Toileting: No  Personal hygiene and grooming: No  Feeding: No  Care Management:  Patient/Family Safety Concerns:  Medication Adherence: No  Home Safety: No  Wandered: No  Firearms: No  Fall Risk: No  Home Alone: No        Past Medical History:   Diagnosis Date    Depression     Diabetes mellitus     Diabetes mellitus, type 2     History of right coronary artery stent placement     done at [facility]       Past Surgical History:   Procedure Laterality Date    ANGIOGRAPHY N/A 10/2023    COLONOSCOPY N/A 08/02/2022    Procedure: COLONOSCOPY;  Surgeon: Keira Wheat MD;  Location: [facility];  Service: Endoscopy;  Laterality: N/A;    REFRACTIVE SURGERY  04/13/2016    VASECTOMY         Family History   Problem Relation Age of Onset    Heart disease Mother     Hypertension Mother     Diabetes Mother     Heart attack Mother     Heart disease Father     Hypertension Father     Diabetes Father     Heart attack Father     Heart attack Maternal Uncle     Heart attack Maternal Grandfather        Social History     Socioeconomic History    Marital status:    Tobacco Use    Smoking status: Never    Smokeless tobacco: Never   Substance and Sexual Activity    Alcohol use: Not Currently     Comment: occassional    Drug use: No    Sexual activity: Yes     Partners: Female     Social Determinants of  Health     Financial Resource Strain: Low Risk  (12/7/2023)    Overall Financial Resource Strain (CARDIA)     Difficulty of Paying Living Expenses: Not hard at all   Food Insecurity: No Food Insecurity (12/7/2023)    Hunger Vital Sign     Worried About Running Out of Food in the Last Year: Never true     Ran Out of Food in the Last Year: Never true   Transportation Needs: No Transportation Needs (12/7/2023)    PRAPARE - Transportation     Lack of Transportation (Medical): No     Lack of Transportation (Non-Medical): No   Physical Activity: Insufficiently Active (12/7/2023)    Exercise Vital Sign     Days of Exercise per Week: 3 days     Minutes of Exercise per Session: 30 min   Stress: Stress Concern Present (12/7/2023)    [place] citizen [place] of Occupational [employer] - Occupational Stress Questionnaire     Feeling of Stress : To some extent   Social Connections: Unknown (12/7/2023)    Social Connection and Isolation Panel [NHANES]     Frequency of Communication with Friends and Family: Patient declined     Frequency of Social Gatherings with Friends and Family: Patient declined     Active Member of Clubs or Organizations: Patient declined     Attends Club or Organization Meetings: Patient declined     Marital Status:    Housing Stability: Low Risk  (12/7/2023)    Housing Stability Vital Sign     Unable to Pay for Housing in the Last Year: No     Number of Places Lived in the Last Year: 1     Unstable Housing in the Last Year: No       Medication:     Current Outpatient Medications on File Prior to Visit   Medication Sig Dispense Refill    aspirin (ECOTRIN) 81 MG EC tablet Take 81 mg by mouth once daily.      atorvastatin (LIPITOR) 80 MG tablet Take 80 mg by mouth once daily.      empagliflozin-metformin (SYNJARDY XR) 25-1,000 mg TBph Take 1 tablet by mouth Daily. 90 tablet 1    gabapentin (NEURONTIN) 100 MG capsule TAKE 1 CAPSULE BY MOUTH THREE TIMES DAILY 90 capsule 0    isosorbide mononitrate (IMDUR) 30 MG 24  hr tablet Take 30 mg by mouth once daily.      metoprolol succinate (TOPROL-XL) 100 MG 24 hr tablet Take 100 mg by mouth once daily.      nitroGLYCERIN (NITROSTAT) 0.4 MG SL tablet PLEASE SEE ATTACHED FOR DETAILED DIRECTIONS      pantoprazole (PROTONIX) 40 MG tablet Take 1 tablet (40 mg total) by mouth once daily. 30 tablet 0    rimegepant 75 mg odt Take 1 tablet (75 mg total) by mouth daily as needed for Migraine. Place ODT tablet on the tongue; alternatively the ODT tablet may be placed under the tongue 8 tablet 11    sertraline (ZOLOFT) 100 MG tablet Take 2 tablets (200 mg total) by mouth once daily. 60 tablet 2    tirzepatide 7.5 mg/0.5 mL PnIj Inject 7.5 mg into the skin every 7 days. 4 Pen 5     No current facility-administered medications on file prior to visit.        Review of patient's allergies indicates:  No Known Allergies    Medications Reconciliation:   I have reconciled the patient's home medications and discharge medications with the patient/family. I have updated all changes.  Refer to After-Visit Medication List.    Objective:  Vital Signs:  There were no vitals filed for this visit.  Wt Readings from Last 3 Encounters:   01/17/24 1324 87 kg (191 lb 12.8 oz)   01/03/24 1414 85.7 kg (188 lb 15 oz)   12/07/23 1248 85.5 kg (188 lb 7.9 oz)     There is no height or weight on file to calculate BMI.           Neurological examination:  Mental Status:   His appearance is normal (hygiene is appropriate; attire is proper and clean).  Throughout the interview, he behaved abnormally and was not cooperative. Comment: poor eye contact;  His behavior is appropriate to the clinical context without impropriety or improper language/conduct.  His behavior was not characterized by episodes of sudden uncontrollable and inappropriate laughing or crying.  The patient's energy level is abnormal. Comment: Hypoactive;  His orientation is normal; Spatial 5/5 (location, the floor of building, city, county, state) and  temporal 5/5 (month, day, year, [place]) dimensions are accurate.  His attention/concentration is impaired.  He can complete three-step commands.  His fund of knowledge was appropriate for age, culture, and level of education.  His thought process is not logical or goal-oriented. Comment: bradyphrenia;  He demonstrated impaired insight based on actions, awareness of his illness, plans for the future.  He demonstrated good judgment based on actions and plans for the future.  He has no evidence of hallucinations (auditory, visual, olfactory).  He has no evidence of delusions (paranoid, grandiose, bizarre).  Cranial Nerves:   His pupils were normal.  His visual fields were full to confrontation in all quadrants.  His ocular pursuit in the horizontal and vertical plane was complete.  His saccadic initiation, velocity, and amplitude are normal.  His facial strength was normal.  His facial expression was symmetric and appropriate to the context.  His hearing was diminished.  His oropharynx and soft palate appeared abnormal. Comment: mallampati 3;  His tongue showed no evidence of scalloping.  He tongue movement with normal.  He had no significant evidence of anterocollis or retrocollis.  Speech/Language:   The patient's speech was fluent, non-effortful, and his rate was appropriate to the context.  He has no articulation (segmental features) errors.  The patient's speech is not dysarthric.  The patient's speech was without evidence of anomia.  He makes no phonological loop errors.  He can comprehend commands that cross the midline (e.g., with your left thumb, touch your right ear).  He can comprehend syntactically complex sentences.  Motor:   The patient's bilateral upper extremity muscle bulk is appropriate.  The patient's upper extremity muscle tone is increased.  The patient's bilateral upper extremity muscle tone does not suggest spasticity.  The patient's bilateral upper extremity muscle tone does not suggest  rigidity/cogwheeling.  There is evidence of paratonia. Comment: B/L, R>L, Mild; Muscle tone is increased and there is evidence of paratonia.  There was no dystonia observed on examination.  Assessment of motor strength was symmetric and at minimal anti-gravity.  There is no pronator or downward drift.  There is myoclonus observed in The patient's bilateral upper and lower extremities. Comment: myoclonus; myoclonus  There are no fasciculations observed in The patient's bilateral upper and lower extremities.  Coordination:   He has no bilateral upper extremity limb dysmetria or past pointing on finger-nose-finger bilaterally.  He has no limb dysdiadochokinesia of the upper extremity on the pronation/supination test and screwing in a light bulb or lower extremity during tapping ball of each foot bilaterally.  He has a visible tremor. Comment: B/L;  He has a kinetic tremor.  He has a postural tremor. Comment: B/L, R>L, Mild;  He has no resting tremor bilaterally.  He has no evidence of interhemispheric motor control deficits.  He has no motor overflow bilaterally.  The patient's bilateral upper extremity coordination with finger tapping, pronation/supination, and the open-close fist showed no slowing, no hypometria, and no dysrhythmia inconsistent with bradykinesia.  The patient's bilateral upper extremity coordination with finger tapping, pronation/supination, and the open-close fist showed slowing.  The patient's bilateral upper extremity coordination with finger tapping, pronation/supination, and the open-close fist showed hypometria.  The patient's bilateral upper extremity coordination with finger tapping, pronation/supination, and the open-close fist showed dysrhythmia.  Higher Cortical Function:   The patient showed no evidence of simultanagnosia (Navon hierarchical letters).  The patient showed no evidence of visuospatial constructional dysfunction.  The patient showed no evidence of apraxia.  Sensory:   His  cortical sensory assessment demonstrated no neglect bilaterally.  His sensation was diminished to light touch, and vibratory sense. Comment: in the bilateral upper and lower extremities in a length-dependant pattern.; in the bilateral upper and lower extremities in a length-dependant pattern.  His sensation was impaired to temperature/pinprick. Comment: in the bilateral upper and lower extremities. ; in the bilateral upper and lower extremities.  Reflexes:   Reflexes were abnormal. Comment: decreased throughout;  Gait:   He has normal posture sitting unaided.  He is unable to rise from a chair and sit back down without using their arms.  His gait was abnormal.  His gait initiation/inhibition was normal.  His stance while walking is abnormal.  His gait speed was abnormal (70-80 F 1.13 m/s M 1.26 m/s, >80 F 0.94 m/sec, M 0.97 m/sec). Comment: slow;  When attempting to walk abnormally (heels, tiptoes, tandem), he makes no errors.  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  Moderate Visuospatial Impairment: visuospatial construction.  Mild Executive Impairment: He scored >1.5 standard deviations below the norm on at least one measure. He had difficulty with lexical fluency, working memory.  Very Mild Attention Impairment: orientation.  MMSE 28/30: MMSE Score suggestive of normal to questionable cognitive impairment.  MOCA 22/30: MOCA Score suggestive of mild cognitive impairment.  Neurocognitive/Neurobehavioral Evaluation completed on 2024-02-20    Neuropsychiatric/Behavioral Focused Evaluation Assessment    BEHAV5+ 1/6 See ROS section for a full description   Laboratories:     Lab Date Value [Reference]   Coagulopathy Screening           Antithrombin III 2023, Nov-17    118 [83 - 118 %]      aPTT 2022, Jul-07    25.3 (L) (E) [26.2 -  37.2 Seconds]      D-Dimer 2022, Jul-18    <0.19 [<0.50 mg/L FEU]      PLATELET NEUTRALIZATION APTT 2023, Nov-17    Not Applicable      Protein C Activity 2023, Nov-17    141 [70 - 150 %]      Protein S Activity 2023, Nov-17    149 [65 - 150 %]      Protime 2022, Jul-18    17.2 (H) (E) [9.8 - 13.3 Seconds]      PTT Heparin Neutralized 2023, Nov-17    Not Applicable [32 - 48 sec]      PTT-LA Mix 2023, Nov-17    Not Applicable [32 - 48 sec]      Reptilase Time 2023, Nov-17    Not Applicable [<=21.9 sec]      Thrombin Time 2023, Nov-17    Not Applicable [14.7 - 19.5 sec]      Autoimmune/Paraneoplastic Screening   APA Isotype IgM 2023, Nov-17    <9.40 [0.00 - 12.49 MPL]      Beta-2 Glyco 1 IgA 2023, Nov-17    4.6 [<7.0 U/mL]      Beta-2 Glyco 1 IgG 2023, Nov-17    12.0 (H) [<7.0 U/mL]      Beta-2 Glyco 1 IgM 2023, Nov-17    <2.4 [<7.0 U/mL]      dRVVT Confirm 2023, Nov-17    Not Applicable [Negative ratio]      dRVVT Incubated 1:1 Mix 2023, Nov-17    Not Applicable [33 - 44 sec]      DRVVT Screen 2023, Nov-17    35 [33 - 44 sec]      PT Lupus Anticoagulant 2023, Nov-17    12.9 [12.0 - 15.5 sec]      PTT Lupus Anticoagulant 2023, Nov-17    31 (L) [32 - 48 sec]      Sed Rate 2022, Jul-18    18 (H) [0 - 10 mm/Hr]      Myopathy/Myalgia   CPK 2022, Jul-18    63      Metabolic Screening   Hemoglobin A1C External 07/07/2022  7.2 (H) [4.0 - 5.6 %]      TSH 2022, Jul-07    1.249 [0.400 - 4.000 uIU/mL]      Glucose 2023, Nov-17    154 (H) [70 - 110 mg/dL]      Albumin 2023, Nov-17    4.0 [3.5 - 5.2 g/dL]      ALT 2023, Nov-17    36 [10 - 44 U/L]      AST 2023, Nov-17    25 [10 - 40 U/L]      BILIRUBIN TOTAL 2023, Nov-17    0.5 [0.1 - 1.0 mg/dL]      PROTEIN TOTAL 2023, Nov-17    7.5 [6.0 - 8.4 g/dL]      HDL 2022, Jul-07    47 (E) [35 - 70 mg/dL]      Triglycerides 07/07/2022  139 (E) [40 - 160 mg/dL]      Vitamin B-12 2022, Jul-07    708 [210 - 950 pg/mL]      Neuroendocrine/Electrolyte Screening   PTH 2023, Nov-17    41.1 [9.0  - 77.0 pg/mL]      BUN 2023, Nov-17    23 (H) [6 - 20 mg/dL]      Chloride 2023, Nov-17    107 [95 - 110 mmol/L]      Creatinine 2023, Nov-17    1.2 [0.5 - 1.4 mg/dL]      Potassium 2023, Nov-17    4.5 [3.5 - 5.1 mmol/L]      Sodium 2023, Nov-17    141 [136 - 145 mmol/L]      Standard Hematology Screen   Hematocrit 2023, Nov-17    44.2 [40.0 - 54.0 %]      Hemoglobin 2023, Nov-17    13.5 (L) [14.0 - 18.0 g/dL]      MCV 07/07/2022  89 [82 - 98 fL]      Infectious Disease/Immunocompromised Screening   RPR 2022, Jul-07    Non-reactive           Neuroimaging:    MRI brain/head without contrast on 1/18/2024  Formal interpretation by Radiology:  Age-appropriate generalized cerebral volume loss with mild chronic microvascular ischemic disease with a remote lacunar-type infarction in the right cerebellar hemisphere. There is no evidence for an acute infarction or intracranial hemorrhage.  Independently reviewed radiological imaging by Juan Long MD. MPH. Behavioral Neurologist  T1: Unchanged since early 2023 - Mild generalized cortical atrophy posterior>frontal, dorsal>ventral, lateral>medial without a clear degenerative patterning. Intact corpus callosum with normal volume ratio. intact midbrain and brainstem normal volume and ratio. Intact hippocampal structures, volume ratio  T2/FLAIR: Unchanged since early 2023- scattered subcortical microvascular disease. Age infarcts in the bilateral cerebellum. Right greater than left. Otherwise stable. Mild parahippocampal stippling mild anterior periventricular capping.  DWI/ADC: No Significant DWI hyperintensities/hypointensities. No ADC correlation.  SWI/GRE: No Significant hypointensities to suggest cortical/subcortical hemosiderin deposition.  Impression: : Unchanged since early 2023- Mild generalized cortical atrophy posterior>frontal, dorsal>ventral, lateral>medial without a clear degenerative patterning. age indeterminate right cerebellar infarct with mild  microvascular disease    MRI brain/head without contrast on 6/26/2023  Formal interpretation by Radiology:  Two small areas of remote infarction are seen in the right cerebellar hemisphere. No new infarction is seen. Stable focus of FLAIR signal abnormality in the left posterior frontal white matter, unchanged from prior study likely related to chronic microvascular ischemic disease.  Independently reviewed radiological imaging by Juan Long MD. MPH. Behavioral Neurologist  T1: Mild generalized cortical atrophy posterior>frontal, dorsal>ventral, lateral>medial without a clear degenerative patterning. Intact corpus callosum with normal volume ratio. intact midbrain and brainstem normal volume and ratio. Intact hippocampal structures, volume ratio  T2/FLAIR: scattered subcortical microvascular disease. Age infarcts in the bilateral cerebellum. Right greater than left. Otherwise stable. Mild parahippocampal stippling mild anterior periventricular capping.  DWI/ADC: No Significant DWI hyperintensities/hypointensities. No ADC correlation.  SWI/GRE: No Significant hypointensities to suggest cortical/subcortical hemosiderin deposition.  Impression: : Mild generalized cortical atrophy posterior>frontal, dorsal>ventral, lateral>medial without a clear degenerative patterning. age indeterminate right cerebellar infarct with mild microvascular disease     Procedures:    Electrocardiogram on 10/21/2023  Formal interpretation:  Vent. Rate : 099 BPM Atrial Rate : 099 BPM P-R Int : 172 ms QRS Dur : 080 ms QT Int : 328 ms P-R-T Axes : 050 105 015 degrees QTc Int : 420 ms Normal sinus rhythm Rightward axis Borderline Abnormal ECG  Independently reviewed Electrocardiogram by Juan Long MD. MPH. Behavioral Neurologist  Impression: : Received ECG has no evidence of sinus node disease. HR (>=50-60). Prolonged AZ interval (>0.22 s). Broad QRS complex (> 0.12 s).     Clinical Summary:     The patient is a 53-year-old  right-handed male with a relevant past medical history of CLAUDIA, DM2 onset 30s, CVD, CAD, MD, HLD, HTN, JEANNINE, who presents reporting a 2-year history of step-wise progressive neurocognitive impairment.       The clinical history is suggestive of:  Memory Impairment: STM encoding impairment, LTM encoding-retrieval impairment  Attention Impairment: Attention, Sustained attention, Shifting attention  Executive Impairment: Energization, Working Memory  Language Impairment: Language Dysfunction, Semantic Dysfunction  Motor/Coordination Impairment: Sensory motor integration, Central pattern generators dysfunction, Cortical-Striatal Loop Dysfunction  Sensory Impairment: Sensory Deprivation  Behavior Impairment: Response Inhibition, Emotional Regulation  Psychiatric Impairment: Signal-Noise Dysregulation  Medical Review of Systems Impairment: Limbic Dysfunction  iADL Impairment: Lois Instrumental Activities of Daily Living Scale  The neurological examination is significant for:  Executive Impairment: thought disorder  Movement Disorder (Gait): strength (difficulty rising), abnormal features (stance, speed)  Movement Disorder (Hyperkinetic): myoclonus, tremor (kinetic, postural)  Movement Disorder (Hypokinetic): paratonia (tone)  Pyramidal Impairment: abnormal reflexes  Sensory Dysfunction: hearing (diminished), peripheral (A? 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  MOCA 22/30: MOCA Score suggestive of mild cognitive impairment.  BEHAV5+ 1/6: See ROS section for a full description  The neurologically relevant imaging is significant for  MRI brain/head without contrast (1/18/2024): Unchanged since early 2023- Mild generalized cortical atrophy posterior>frontal, dorsal>ventral, lateral>medial without a clear degenerative  patterning. 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     The patient's clinical presentation meets the criteria for Mild Cognitive Impairment (MCI-ADRC) (Jose MS, et al. 2011 Alzheimer's & Dementia).     Concern regarding an intraindividual change in cognition  Impairment in one or more cognitive domains  Preservation of independence in functional abilities.  Not demented     The patient's clinical syndrome is best described as Vascular contributions to cognitive impairment (Shubham et al., Neurology 1993).  Fluctuating, stepwise progression of cognitive deficits.  Personality and mood changes, abulia, depression, emotional incontinence, or other subcortical deficits including psychomotor retardation and abnormal executive function.     At present, all neurodegenerative diseases can only be diagnosed with 100% certainty through a brain autopsy. The suspected neuropathology underlying the patient's neurocognitive impairment is most likely primarily due to Vascular Contributions to Cognitive Impairment and Dementia.  There are no plasma protein biomarkers available on record.  There are no CSF protein biomarkers available on record.  There are no dermatological protein biomarkers available on record.  There is no relevant genetic biomarkers available on record.     The patient's clinical history highlights a stepwise progression of executive predominant mild cognitive  impairment, tracing back to 2021. The initial presentation of increased fatigue was linked to work-related stress and compounded by uncontrolled diabetes. This was further exacerbated by symptoms consistent with long COVID, leading to a worsening of the patient's condition. The commencement of Ozempic treatment in June 2022 marked a period of relative symptom stabilization, which was later disrupted by a significant decline following a cardiac tamponade and emergency bypass surgery in November 2023.This pattern of cognitive decline is indicative of stepwise progressive cognitive impairment, primarily influenced by multiple vascular insults to the central nervous system (CNS). These include complications stemming from diabetes and the potential onset of post-hypoxic myoclonus. The role of early-onset diabetes as an underlying contributor to the patient's cognitive decline is significant and necessitates further exploration to fully understand its impact.Regarding the patient's concerns about returning to work, the clinical presentation aligns with a diagnosis of mild cognitive impairment. Despite this, the patient appears to be performing at a level that would likely preclude the maintenance of their previous functional status in a work setting. It is recommended to continue with the patient's long-term disability arrangement. 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 A comprehensive management plan should address these underlying factors, alongside ongoing support for the patient's long-term disability. This approach aims to provide the patient with the necessary medical and supportive care tailored to their specific  needs and the complexities of their condition.     The observations made above, were discussed with the patient and their supporting historian(s) (family). We have discussed the additional diagnostic(s) and/or managenent below.     Care Management Plan:    #Optimize Neurocognitive Impairment and Quality  We have discussed the MIND Diet and other lifestyle behavior that may help maintain brain health.  We have provided written/digital reading material  No indication for donepezil at this time may consider a later date however patient is not interested in symptomatic medications  #Optimize Behavioral Management and Quality.  No indication for memantine at this time  continue Zoloft 100 mg daily  #Optimize Sleep Hygiene and Quality  We discussed and recommended additional diagnostic/management of sleep disorder to optimize brain health and longevity.  f/u sleep medicine specialist for symptoms consistent with sleep apnea  #Optimize Movement Disorder and Quality.  Next appointment will likely start Keppra  EEG routine for post hypoxic myoclonus  #Optimize Complex Medical Disorder and Quality.  f/u Endocrinology for evaluation early-onset diabetes in his 30s with a family history of multiple relatives with early onset diabetes with multiple medical complications - consider alternatives to Ozempic due to stepwise progressive decline since starting in mid 2022  f/u hematology  #Behavioral/Environmental Strategies  We recommend engaging in activities that stimulate cognitively and socially while avoiding excessive stimulation and fatigue in overwhelmingly complex situations.  We recommend integrating routine and schedule into your daily life. https://www.alzheimersproject.org/news/the-importance-of-routine-and-familiarity-to-persons-with-dementia/  #Health Maintenance/Lifestyle Advice  We have discussed the value in aggressively controlling vascular risk factors like hypertension, hyperlipidemia, and Diabetes SBP<130,  "LDL<100, A1C<7.0.  We discussed the need to optimize lifestyle choices including a heart-healthy diet (e.g., Mediterranean or DASH), increased cardiovascular exercise (goal 150 minutes of moderate-intensity per week), and stay cognitively and socially active.  We recommend the MIND diet, a combination of two healthy diets: the Mediterranean diet and the DASH (Dietary Approaches to Stop Hypertension) diet, and includes a variety of brain-friendly foods to optimize cognitive health and longevity.  #Support  We all need support sometimes. Get easy access to local resources, community programs, and services. https://www.communityresourcefinder.org/  Learn more about Cognitive Impairment in [place]: https://www.alz.org/professionals/public-health/state-overview/[place]  #Safety  The Alzheimer's Association administers the nationwide "Safe Return" program with identification bracelets, necklaces, or clothing tags and 24-hour assistance. More information is available online at https://www.alz.org/help-support/caregiving/safety/medicalert-with-24-7-wandering-support  #Follow up:  Follow-up as needed.    Thank you for allowing us to participate in the care of your patient. Please do not hesitate to contact us with any questions or concerns.     It was a pleasure seeing The patient and we look forward to seeing them at their follow-up visit.     This note is dictated on M*Modal Fluency Direct word recognition program. There are word recognition mistakes that are occasionally missed on review.       Scheduled Follow-up :  Future Appointments   Date Time Provider Department Center   3/4/2024  8:30 AM Mj Vincent MD [facility] ENDOCR [place]   3/20/2024  9:30 AM Froylan Marie III, MD [facility] PSYCH [place] [facility]   3/22/2024  8:15 AM Keira Wheat MD [facility] MED [place]   3/26/2024  9:30 AM LAB, [facility] STA LAB [facility]   4/23/2024 10:45 AM Ethan Cavanaugh MD [facility] NEURO [place]       After " Visit Medication List :     Medication List            Accurate as of February 20, 2024  2:03 PM. If you have any questions, ask your nurse or doctor.                CONTINUE taking these medications      aspirin 81 MG EC tablet  Commonly known as: ECOTRIN     atorvastatin 80 MG tablet  Commonly known as: LIPITOR     gabapentin 100 MG capsule  Commonly known as: NEURONTIN  TAKE 1 CAPSULE BY MOUTH THREE TIMES DAILY     isosorbide mononitrate 30 MG 24 hr tablet  Commonly known as: IMDUR     metoprolol succinate 100 MG 24 hr tablet  Commonly known as: TOPROL-XL     nitroGLYCERIN 0.4 MG SL tablet  Commonly known as: NITROSTAT     pantoprazole 40 MG tablet  Commonly known as: PROTONIX  Take 1 tablet (40 mg total) by mouth once daily.     rimegepant 75 mg odt  Take 1 tablet (75 mg total) by mouth daily as needed for Migraine. Place ODT tablet on the tongue; alternatively the ODT tablet may be placed under the tongue     sertraline 100 MG tablet  Commonly known as: ZOLOFT  Take 2 tablets (200 mg total) by mouth once daily.     SYNJARDY XR 25-1,000 mg Tbph  Generic drug: empagliflozin-metformin  Take 1 tablet by mouth Daily.     tirzepatide 7.5 mg/0.5 mL Pnij  Inject 7.5 mg into the skin every 7 days.              Signing Physician:  Juan Espinosa MD    Billing:       -----------------------------------------------------------------------------  I performed this consultation using real-time Telehealth tools, including a live video connection between my location and the patient's location (their home within the [facility]). Prior to initiating the consultation, I obtained informed verbal consent to perform this consultation using Telehealth tools and answered all the questions about the Telehealth interaction. The participants understand that only a limited neurological exam and limited neuropsychological testing can be performed using Telehealth tools.  I spent a total of 45 minutes (time-in: 13:45 PM; time-out:  14:30 PM) on 2024-02-20, virtually face-to-face with the patient and caregiver(s), >50% of that time was spent counseling regarding the symptoms, treatment plan, risks, therapeutic options, lifestyle modifications, and/or safety issues for the diagnoses above.  10/14 Review of Systems completed and is negative except as stated above in HPI (Systems reviewed: Const, Eyes, ENT, Resp, CV, GI, , MSK, Skin, Neuro)  I reviewed previous labs for a total of 5 minutes on 2024-02-20. This is directly related to the face-to-face encounter. Review of previous labs was performed all negative except as stated above in HPI  I independently reviewed radiological imaging, specimen, and tracing for a total of 10 minutes on 2024-02-20. This is directly related to the face-to-face encounter. Independent review of radiological imaging, specimen, and tracing was noted to be within normal limits except as stated above in HPI  I reviewed the summation of records from outside physicians for a total of 10 minutes on 2024-02-20. Reviewed and summation of records from an outside physician was performed as summarized above in HPI  I performed a neurobehavioral status examination that included a clinical assessment of thinking, reasoning, and judgment to ensure a comprehensive approach in managing the complex and evolving needs of the patient's neurocognitive condition. Please see above HPI and ROS for full details. This exam was performed on 2024-02-20 and included 13 minutes spent on direct face-to-face clinical observation and interview with the patient and 22 minutes spent interpreting test results and preparing the report. The total time of 35 minutes spent on the neurobehavioral status examination is not included in the time spent on evaluation and management coding.  Total Billing time spent on encounter/documentation for this patient's evaluation and management, not including the neurobehavioral status examination: 57 minutes.

## 2024-02-22 RX ORDER — ACETAMINOPHEN, DIPHENHYDRAMINE HCL, PHENYLEPHRINE HCL 325; 25; 5 MG/1; MG/1; MG/1
TABLET ORAL
Qty: 30 TABLET | Refills: 3 | Status: SHIPPED | OUTPATIENT
Start: 2024-02-22 | End: 2024-04-19

## 2024-03-05 ENCOUNTER — OFFICE VISIT (OUTPATIENT)
Dept: NEUROLOGY | Facility: CLINIC | Age: 54
End: 2024-03-05
Payer: COMMERCIAL

## 2024-03-05 ENCOUNTER — TELEPHONE (OUTPATIENT)
Dept: NEUROLOGY | Facility: CLINIC | Age: 54
End: 2024-03-05
Payer: COMMERCIAL

## 2024-03-05 DIAGNOSIS — G47.52 RBD (REM BEHAVIORAL DISORDER): ICD-10-CM

## 2024-03-05 DIAGNOSIS — R44.3 HALLUCINATION: ICD-10-CM

## 2024-03-05 DIAGNOSIS — G31.84 MCI (MILD COGNITIVE IMPAIRMENT): Primary | ICD-10-CM

## 2024-03-05 DIAGNOSIS — G25.3 POST HYPOXIC MYOCLONUS: ICD-10-CM

## 2024-03-05 PROCEDURE — 1160F RVW MEDS BY RX/DR IN RCRD: CPT | Mod: CPTII,95,, | Performed by: PSYCHIATRY & NEUROLOGY

## 2024-03-05 PROCEDURE — 3051F HG A1C>EQUAL 7.0%<8.0%: CPT | Mod: CPTII,95,, | Performed by: PSYCHIATRY & NEUROLOGY

## 2024-03-05 PROCEDURE — 99358 PROLONG SERVICE W/O CONTACT: CPT | Mod: 95,,, | Performed by: PSYCHIATRY & NEUROLOGY

## 2024-03-05 PROCEDURE — 1159F MED LIST DOCD IN RCRD: CPT | Mod: CPTII,95,, | Performed by: PSYCHIATRY & NEUROLOGY

## 2024-03-05 RX ORDER — LEVETIRACETAM 250 MG/1
250 TABLET ORAL NIGHTLY
Qty: 30 TABLET | Refills: 11 | Status: ON HOLD | OUTPATIENT
Start: 2024-03-05 | End: 2025-03-05

## 2024-03-05 NOTE — PROGRESS NOTES
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 patient is a 53 y. O. Male with memory loss. I recommend:. 1. Noting mild cortical atrophy by CT head 7/2022. 2. 7/2022 MRI brain (Done with Dr. Vila at [facility]): Small focus of encephalomalacia right inferior cerebellum without atrophy mentioned. -Likely a small vessel CVA due to poorly controlled DM2 (was noncompliant). -MRA head and neck normal at that time. 3/2023 CTA head and neck: "left vertebral artery has approximately 60% diameter origin stenosis that extends 7 mm in length from the origin from the subclavian artery. " Treatment for this is anti-platelets, statin, DM2 control. -Now on anti-HTN meds and poorly controlled DM2 is improving with treatment with PCP. Goal less than 7. Continue anti-platelets and statin for CVA prevention. 3. Note: the patient is seeing cardiology for CAD ([facility]). 4. His mom had vascular dementia and dad had AD. -EEG at [facility] unremarkable 2022. 5. 9/2022 Neuropsychological testing inconclusive but noting concern "that his vascular risk factors were/are impacting his cognition". 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 -Currently on Brilinta due to CAD with stent and also statin for DLD. " "-Repeat Neuropsychological testing in 9/2023: validity measure issues, but some decline in testing, noting. "Overall, if his vascular health has indeed worsened, a cognitive cerebellar affective syndrome is likely to explain his current cognitive profile. If not, then our concern for the possiblity of an emerging neurodegenerative condition moves higher on the differential. His prescriptions of gabapentin and Lunesta may be contributing to some cognitive trouble. He appears to be on the cusp of receiving a diagnosis of dementia given some of the errors reported in his daily functioning and should continue to be monitored closely. The following recommendations are offered:. -Recommend tight control of risk factors for vascular dementia including compliance with CPAP. (Now compliant). -Also seeing psychiatry for mood. -No driving restrictions but he is off of work currently due to symptoms. Advised he remain off given his daily memory loss". -Given the above, I will refer to Dr Espinosa in memory disorders clinic for further evaluation. -noting his significant CAD, worsening currently / pending cardiology surgery evaluation. -Speech therapy referral as recommended by neuropsychiatry.  [facility] - Juan Espinosa MD. Neurologist (01/03/2024)  Type: Chart Review. The patient, a 52-year-old male with a history of early-onset diabetes diagnosed in his 20s, presents with his wife as the primary historian. His diabetes, which appears to be familial but not classified as Type 1, has been a consistent health concern. The patient has faced challenges with medication adherence, particularly insulin, due to concerns about weight gain and the burden of managing multiple medications. In 2020, the patient was laid off from his job and subsequently began working at [employer] in 2021, which involved a demanding three-hour daily commute. During this time, he experienced increased daytime fatigue and difficulty keeping up with " work responsibilities, leading to a general decline in health. In December 2021, he contracted COVID-19, resulting in post-COVID fatigue consistent with long COVID. He also reported new onset visual disturbances described as kaleidoscopic sensations in his vision, with occasional palpitations, which later became associated with migraine headaches. These headaches were diagnosed in the last 6-12 months and were effectively managed with Nurtec. In June 2022, the patient started Ozempic for diabetes and weight loss control. Subsequently, he reported worsening subjective cognitive impairment. his family members noticed increased forgetfulness and disorientation, while coworkers observed typos and incoherent conversations. In July 2022, he sought medical attention for these memory issues. An MRI revealed cerebellar abnormalities, and a CT scan showed mild cortical atrophy. His family history included significant vascular dementia. Neuropsychological assessments indicated a need for better control of vascular risk factors. The patient was unaware of his moderate obstructive sleep apnea diagnosis. Further evaluation in late September 2022 showed intact visuospatial skills with some impairment in language and memory functions. Stress and irritability due to cognitive difficulties were noted. In November 2022, vascular complications led to the placement of stents, and MRI showed remote lacunar infarcts. Despite improved diabetes control with Ozempic, he continued to experience memory problems. In mid-2023, his memory problems worsened, with incidents like using a razor as a toothbrush. MRI and CTA revealed cerebral abnormalities. By late September 2023, his diabetes was well-managed, but cognitive issues persisted. The patient's condition deteriorated further with the thrombosis of previously placed stents, leading to cardiac ischemia and tamponade requiring emergency bypass surgery and stent replacement. He has been on  disability since September 2022 due to these complications. He is currently withdrawn and clearly depressed. 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 The patient's cognitive impairment, dating back to 2021, could be related to chronic stress, early-onset diabetes, and the initiation of Ozempic. His condition is likely compounded by mood disorders. Given the complexity of his early-onset diabetes with a hereditary factor, it is recommended to consult with his primary doctor about adjusting the Ozempic dosage and to follow up with Endocrinology for closer monitoring. Further evaluation and management by a multidisciplinary team, including a neurologist, endocrinologist, and psychiatrist, are crucial to address the various aspects of his health concerns.     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 We have been awaiting the final interpretation of an EEG before initiating treatment with Keppra. The patient's his family has raised concerns regarding the possibility of Lewy body dementia, although there is no strong evidence supporting the presence of Lewy body disease at this time. However, we have discussed the option of confirmatory testing through a skin biopsy to address these concerns. The family has reported recent episodes of dong-nocturnal questionable hallucinations over the last few weeks. These episodes have been specifically noted around the transition periods of  sleep, suggesting a potential REM sleep behavior disorder or dong-nocturnal sleep disorder, rather than being indicative of a fully awake state. Despite these reports, there has been no observed evidence of significant fluctuating levels of arousal or strong indications of parkinsonism during examinations. Nonetheless, to alleviate the family's concerns and to thoroughly investigate, we will proceed with scheduling a skin biopsy to rule out any early signs of alpha-synuclein-mediated disease. Reviewing the patient's recent EEG, we found no evidence of epileptiform spikes, supporting the absence of epilepsy. However, the patient does exhibit mild myoclonus upon examination. In light of these findings, we have discussed the initiation of symptomatic medication and recommend starting with a very low dose of Keppra, 250 mg at bedtime (q. H. S. The family has agreed to this treatment plan. Additionally, it is worth noting that the patient continues to respond well to their current medication regimen, which includes Nurtec 75 mg for migraine prevention and Zoloft 100 mg for depression. These medications remain part of the patient's treatment strategy, contributing to the management of their symptoms and overall well-being.  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  Post hypoxic myoclonus  Questionable hallucinations     Review of cognitive, visuospatial, motor, sensory, and behavioral systems:     Memory:   The patient's memory has worsened in the past few years.  He does repeat statements or asks the same question repeatedly.  He does have difficulty remembering recent important conversations.  He does have difficulty remembering recent events.  He does not forget information within minutes.  His recent retrograde memory is intact.  His remote memory is intact.  Attention:   The patient's attention and concentration are impaired.  He does not have attentional  fluctuations.  He does not have difficulty maintaining selective attention.  He does become easily distracted.  He does have difficulty with divided attention.  Executive:   The patient's cognitive processing speed is slower.  He does have difficulty with working memory.  He does misplace personal items (e.g., keys, cell phone, wallet) more frequently.  He does have difficulty keeping track of his medications.  He does have difficulty with planning/organizing/completing multistep tasks.  He does have difficulty with executive attention.  He does have difficulty with flexible thinking.  He does not have difficulty with response inhibition.  He denies new impulsivity or rash/careless actions.  His judgment is intact.  Language:   The patient's speech is affected.  He does not forget people's names more frequently.  He does have word-finding difficulties.  His speech is fluent and non-effortful.  His speech is grammatically intact.  He does make inaccurate word substitutions.  He does not have difficulty reading.  He does not appear to have impaired comprehension.  Visuospatial:   The patient has new visuospatial problems.  He does not become confused or disoriented in *new*, unfamiliar places.  He does not have trouble with navigation.  He does not get lost in familiar places.  He does not have visuospatial disorientation.  He does not have difficulty recognizing objects or faces.  He denies problems with driving or parking.  Motor/Coordination:   The patient does have difficulty with walking.  He does feel imbalanced.  He denies having fallen.  He does not appear to have new muscle weakness.  He does have difficulty buttoning shirts, operating zippers, or manipulating tools/utensils.  His handwriting has not become micrographic.  He does have a resting tremor.  He does report having new involuntary movements and/or muscle jerking. Comment: nocturnal jerking - worse since 2023  He does not have swallowing  difficulty.  He denies new muscle cramps and twitching.  Sensory:   The patient denies new numbness, tingling, paresthesias, or pain.  The patient reports new loss of vision, blurry vision, and/or double vision.  The patient reports a recent loss of hearing and/or worsening tinnitus.  The patient denies anosmia.  Sleep:   The patient reports difficulty sleeping.  The patient does have difficulty going to sleep.  The patient reports difficulty staying asleep and/or frequently awakening at night.  The patient does snore and/or have witnessed apneas while sleeping.  When he wakes up in the morning, he does not feel well-rested.  He denies dream-enactment behavior.  He has reported symptoms suggestive of restless leg syndrome.  Behavior:   The patient's personality has changed.  He does not have symptoms of disinhibition and social inappropriateness.  He does not have symptoms to suggest a loss of manners or decorum.  He does not appear apathetic or has decreased motivation.  He does not appear to have a change in inertia.  The patient's emotional expression has changed.  He does have emotional blunting or lability.  He does not have symptoms of irritability and mood lability.  He does not have symptoms of agitation, aggression, or violent outbursts.  His insight into his health and situation is intact.  His personal hygiene is intact.  He is not exhibiting a diminished response to other people's needs and feelings.  He is not exhibiting a diminished social interest, interrelatedness, or personal warmth.  He denies restlessness.  He denies new and/or worsening simple repetitive behaviors.  His speech has not become simplified or become repetitive/stereotyped.  He denies new/worsening complex repetitive/ritualistic compulsions and behaviors.  He does not have symptoms of hyper-religiosity or dogmatism.  His interests/pleasures have not become restrictive, simplified, interrupting, or repetitive.  He denies a change of  self-stimulating behavior.  He denies any changes in eating behavior.  He denies increased consumption of food or substances.  He denies oral exploration or consumption of inedible objects.  Psychiatric:   He does not feel depressed.  He is not exhibiting symptoms of social withdrawal/indifference.  He does have anxiety.  He does not exhibit cycling behavior.  He does not exhibit hyperactive behavior.  He is not exhibited symptoms of paranoia.  He does not have delusions.  He does have hallucinations. Comment: possible hallucinations seeing ghosts upon awakening  He does not have a history of sensitivity to neuroleptic/psychotropic medications.  Medical Review of Systems:   The patient does not have constipation.  The patient does not have urinary incontinence.  The patient denies orthostatic lightheadedness.  The patient's weight is unstable. Comment: [id number] 40lbs  Functional status:  Difficulty performing the following Instrumental ADLs:  Housekeeping: No  Food Preparation: No  Shopping: No  Ability to Handle Finances: Yes  Transportation/Driving: No  Household Appliances/Stove: No  Laundry: No  Difficulty performing the following Basic ADLs:  Dressing: No  Bathing: No  Toileting: No  Personal hygiene and grooming: No  Feeding: No  Care Management:  Patient/Family Safety Concerns:  Medication Adherence: No  Home Safety: No  Wandered: No  Firearms: No  Fall Risk: No  Home Alone: No  Neuropsychological Evaluation Summary:  Prior Neurocognitive/Neurobehavioral Evaluation(s)  No Prior Testing Available  2024-01-03:  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  Moderate Visuospatial Impairment: visuospatial construction.  Mild Executive Impairment: He scored >1.5 standard deviations below the norm on at least one measure. He had difficulty with lexical fluency, working memory.  Very Mild Attention Impairment: orientation.  MMSE 28/30: MMSE Score suggestive of  normal to questionable cognitive impairment.  MOCA 22/30: MOCA Score suggestive of mild cognitive impairment.  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  MOCA 22/30: MOCA Score suggestive of mild cognitive impairment.  Neurocognitive/Neurobehavioral Evaluation completed on 2024-03-05    Neuropsychiatric/Behavioral Focused Evaluation Assessment    BEHAV5+ 2/6 See ROS section for a full description   Laboratories:     Lab Date Value [Reference]   Neurodegenerative Cerebrospinal Fluid Assessment           Abeta42 2024, Jan-03    45 [pg/mL]      Phospho-Tau (181P) 2024, Jan-03    0.59 [0.00 - 0.95 pg/mL]      Coagulopathy Screening   Antithrombin III 2023, Dec-07    118 [83 - 118 %]      aPTT 2022, Jul-07    25.3 (L) (E) [26.2 - 37.2 Seconds]      D-Dimer 2022, Jul-18    <0.19 [<0.50 mg/L FEU]      PLATELET NEUTRALIZATION APTT 2023, Dec-07    Not Applicable      Protein C Activity 2023, Dec-07    141 [70 - 150 %]      Protein S Activity 2023, Dec-07    149 [65 - 150 %]      Protime 2022, Jul-18    17.2 (H) (E) [9.8 - 13.3 Seconds]      PTT Heparin Neutralized 2023, Dec-07    Not Applicable [32 - 48 sec]      PTT-LA Mix 2023, Dec-07    Not Applicable [32 - 48 sec]      Reptilase Time 2023, Dec-07    Not Applicable [<=21.9 sec]      Thrombin Time 2023, Dec-07    Not Applicable [14.7 - 19.5 sec]      Autoimmune/Paraneoplastic Screening   APA Isotype IgM 2023, Dec-07    <9.40 [0.00 - 12.49 MPL]      Beta-2 Glyco 1 IgA 2023, Dec-07    4.6 [<7.0 U/mL]      Beta-2 Glyco 1 IgG 2023, Dec-07    12.0 (H) [<7.0 U/mL]      Beta-2 Glyco 1 IgM 2023, Dec-07    <2.4 [<7.0 U/mL]      dRVVT Confirm 2023, Dec-07    Not Applicable [Negative ratio]      dRVVT Incubated 1:1 Mix 2023, Dec-07    Not Applicable [33 - 44 sec]      DRVVT Screen 2023, Dec-07    35 [33 - 44 sec]      PT Lupus Anticoagulant 2023, Dec-07    12.9 [12.0 -  15.5 sec]      PTT Lupus Anticoagulant 2023, Dec-07    31 (L) [32 - 48 sec]      Sed Rate 2022, Jul-18    18 (H) [0 - 10 mm/Hr]      Myopathy/Myalgia   CPK 2022, Jul-18    63      Metabolic Screening   Hemoglobin A1C External 01/03/2024  7.2 (H) [4.0 - 5.6 %]      Methlymalonic Acid 01/03/2024  0.18 [<0.40 umol/L]      T4 Total 01/03/2024  9.0 [4.5 - 11.5 ug/dL]      TSH 07/07/2022  1.711 [0.400 - 4.000 uIU/mL]      Glucose 2024, Jan-03    140 (H) [70 - 110 mg/dL]      Albumin 2024, Jan-03    4.4 [3.5 - 5.2 g/dL]      ALT 2024, Jan-03    42 [10 - 44 U/L]      AST 2024, Jan-03    30 [10 - 40 U/L]      BILIRUBIN TOTAL 2024, Jan-03    0.5 [0.1 - 1.0 mg/dL]      PROTEIN TOTAL 2024, Jan-03    8.3 [6.0 - 8.4 g/dL]      HDL 2022, Jul-07    47 (E) [35 - 70 mg/dL]      Triglycerides 01/03/2024  139 (E) [40 - 160 mg/dL]      B12 Def. Methylmalonic Acid 01/03/2024  0.16 [<=0.40 nmol/mL]      Folate 01/03/2024  6.8 [4.0 - 24.0 ng/mL]      Thiamine 07/07/2022  102 [38 - 122 ug/L]      Vitamin B-12 2022, Jul-07    708 [210 - 950 pg/mL]      Cerebrospinal Fluid Assessment   IgG 01/03/2024  1027 [650 - 1600 mg/dL]      Neuroendocrine/Electrolyte Screening   Magnesium 2024, Jan-03    2.0 [1.6 - 2.6 mg/dL]      PTH 2023, Dec-07    41.1 [9.0 - 77.0 pg/mL]      BUN 2024, Jan-03    33 (H) [6 - 20 mg/dL]      Chloride 2024, Jan-03    103 [95 - 110 mmol/L]      Creatinine 2024, Jan-03    0.9 [0.5 - 1.4 mg/dL]      Potassium 2024, Jan-03    4.1 [3.5 - 5.1 mmol/L]      Sodium 2024, Jan-03    139 [136 - 145 mmol/L]      Neurodegenerative Serum Fluid Assessment   NEUROFILAMENT LIGHT CHAIN, PLASMA 2024, Jan-03    14.9 [<=20.8 pg/mL]      Standard Hematology Screen   Hematocrit 2024, Jan-03    48.6 [40.0 - 54.0 %]      Hemoglobin 2024, Jan-03    15.7 [14.0 - 18.0 g/dL]      MCV 07/07/2022  87 [82 - 98 fL]      Infectious Disease/Immunocompromised Screening   RPR 01/03/2024  Non-reactive      Syphilis Treponemal Ab 2024, Jan-03    Nonreactive  [Nonreactive ]           Neuroimaging:    MRI brain/head without contrast on 1/18/2024  Formal interpretation by Radiology:  Age-appropriate generalized cerebral volume loss with mild chronic microvascular ischemic disease with a remote lacunar-type infarction in the right cerebellar hemisphere. There is no evidence for an acute infarction or intracranial hemorrhage.  Independently reviewed radiological imaging by Juan Long MD. MPH. Behavioral Neurologist  T1: Mild generalized cortical atrophy posterior>frontal, dorsal>ventral, lateral>medial without a clear degenerative patterning.  T2/FLAIR: No Significant hyperintensities appreciated on MRI T2/FLAIR  DWI/ADC: No Significant DWI hyperintensities/hypointensities. No ADC correlation.  SWI/GRE: No Significant hypointensities to suggest cortical/subcortical hemosiderin deposition.  Impression: : Normal Brain Imaging. No significant cortical/subcortical atrophy. No significant T2/FLAIR hyperintensities, DWI/ADC/SWI abnormalities    MRI brain/head without contrast on 1/18/2024  Formal interpretation by Radiology:  Age-appropriate generalized cerebral volume loss with mild chronic microvascular ischemic disease with a remote lacunar-type infarction in the right cerebellar hemisphere. There is no evidence for an acute infarction or intracranial hemorrhage.  Independently reviewed radiological imaging by Juan Long MD. MPH. Behavioral Neurologist  T1: Unchanged since early 2023 - Mild generalized cortical atrophy posterior>frontal, dorsal>ventral, lateral>medial without a clear degenerative patterning. Intact corpus callosum with normal volume ratio. intact midbrain and brainstem normal volume and ratio. Intact hippocampal structures, volume ratio  T2/FLAIR: Unchanged since early 2023- scattered subcortical microvascular disease. Age infarcts in the bilateral cerebellum. Right greater than left. Otherwise stable. Mild parahippocampal stippling mild  anterior periventricular capping.  DWI/ADC: No Significant DWI hyperintensities/hypointensities. No ADC correlation.  SWI/GRE: No Significant hypointensities to suggest cortical/subcortical hemosiderin deposition.  Impression: : Unchanged since early 2023- Mild generalized cortical atrophy posterior>frontal, dorsal>ventral, lateral>medial without a clear degenerative patterning. age indeterminate right cerebellar infarct with mild microvascular disease    MRI brain/head without contrast on 6/26/2023  Formal interpretation by Radiology:  Two small areas of remote infarction are seen in the right cerebellar hemisphere. No new infarction is seen. Stable focus of FLAIR signal abnormality in the left posterior frontal white matter, unchanged from prior study likely related to chronic microvascular ischemic disease.  Independently reviewed radiological imaging by Juan Long MD. MPH. Behavioral Neurologist  T1: Mild generalized cortical atrophy posterior>frontal, dorsal>ventral, lateral>medial without a clear degenerative patterning. Intact corpus callosum with normal volume ratio. intact midbrain and brainstem normal volume and ratio. Intact hippocampal structures, volume ratio  T2/FLAIR: scattered subcortical microvascular disease. Age infarcts in the bilateral cerebellum. Right greater than left. Otherwise stable. Mild parahippocampal stippling mild anterior periventricular capping.  DWI/ADC: No Significant DWI hyperintensities/hypointensities. No ADC correlation.  SWI/GRE: No Significant hypointensities to suggest cortical/subcortical hemosiderin deposition.  Impression: : Mild generalized cortical atrophy posterior>frontal, dorsal>ventral, lateral>medial without a clear degenerative patterning. age indeterminate right cerebellar infarct with mild microvascular disease     Procedures:    Electroencephalogram on 1/18/2024  Formal interpretation:  During the maximally alert state, the posterior dominant rhythm  consisted of moderate irregular 9-10 Hz activity which was seen symmetrically over the occipital, parietal and posterior temporal regions. The PDR was reactive to eye opening and attenuated but returned with eye closure. No abnormal activity was noted with eye closure. A moderate amplitude somewhat irregular mid-range beta activity was present and had a symmetrical distribution over both hemispheres. Drowsiness and sleep were not recorded. No lateralized nor focal features were present and no spike or sharp waves were recorded.  Independently reviewed Electroencephalogram by Juan Long MD. MPH. Behavioral Neurologist  Impression: : No evidence of epileptic abnormalities    Electrocardiogram on 10/21/2023  Formal interpretation:  Vent. Rate : 099 BPM Atrial Rate : 099 BPM P-R Int : 172 ms QRS Dur : 080 ms QT Int : 328 ms P-R-T Axes : 050 105 015 degrees QTc Int : 420 ms Normal sinus rhythm Rightward axis Borderline Abnormal ECG  Independently reviewed Electrocardiogram by Juan Long MD. MPH. Behavioral Neurologist  Impression: : Received ECG has no evidence of sinus node disease. HR (>=50-60). Prolonged WV interval (>0.22 s). Broad QRS complex (> 0.12 s).    Electrocardiogram on 10/21/2023  Formal interpretation:  Vent. Rate : 099 BPM Atrial Rate : 099 BPM P-R Int : 172 ms QRS Dur : 080 ms QT Int : 328 ms P-R-T Axes : 050 105 015 degrees QTc Int : 420 ms Normal sinus rhythm Rightward axis Borderline Abnormal ECG  Independently reviewed Electrocardiogram by Juan Long MD. MPH. Behavioral Neurologist  Impression: : Received ECG has no evidence of sinus node disease. HR (>=50-60). Prolonged WV interval (>0.22 s). Broad QRS complex (> 0.12 s).     Clinical Summary:     The patient is a 53-year-old right-handed male with a relevant past medical history of CLAUDIA, DM2 onset 30s, CVD, CAD, MD, HLD, HTN, JEANNINE, who presents reporting a 2-year history of step-wise progressive neurocognitive impairment.        The clinical history is suggestive of:  Memory Impairment: STM encoding impairment, LTM encoding-retrieval impairment  Attention Impairment: Attention, Sustained attention, Shifting attention  Executive Impairment: Energization, Working Memory  Language Impairment: Language Dysfunction, Semantic Dysfunction  Motor/Coordination Impairment: Sensory motor integration, Central pattern generators dysfunction, Cortical-Striatal Loop Dysfunction  Sensory Impairment: Sensory Deprivation  Behavior Impairment: Response Inhibition, Emotional Regulation  Psychiatric Impairment: Signal-Noise Dysregulation, Hallucinations  Medical Review of Systems Impairment: Limbic Dysfunction  iADL Impairment: Lois Instrumental Activities of Daily Living Scale  The neurological examination is significant for:  Executive Impairment: thought disorder  Movement Disorder (Gait): strength (difficulty rising), abnormal features (stance, speed)  Movement Disorder (Hyperkinetic): myoclonus, tremor (kinetic, postural)  Movement Disorder (Hypokinetic): paratonia (tone)  Pyramidal Impairment: abnormal reflexes  Sensory Dysfunction: hearing (diminished), peripheral (A? 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  MOCA 22/30: MOCA Score suggestive of mild cognitive impairment.  BEHAV5+ 2/6: See ROS section for a full description  The neurologically relevant imaging is significant for  MRI brain/head without contrast (1/18/2024): Normal Brain Imaging. No significant cortical/subcortical atrophy. No significant T2/FLAIR hyperintensities, DWI/ADC/SWI abnormalities  MRI brain/head without contrast (1/18/2024): Unchanged since early 2023- Mild generalized cortical atrophy posterior>frontal, dorsal>ventral, lateral>medial without a clear degenerative patterning.  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     The patient's clinical presentation meets the criteria for Mild Cognitive Impairment (MCI-ADRC) (Jose MS, et al. 2011 Alzheimer's & Dementia).     Concern regarding an intraindividual change in cognition  Impairment in one or more cognitive domains  Preservation of independence in functional abilities.  Not demented     The patient's clinical syndrome is best described as Vascular contributions to cognitive impairment (Shubham et al., Neurology 1993).  Fluctuating, stepwise progression of cognitive deficits.  Personality and mood changes, abulia, depression, emotional incontinence, or other subcortical deficits including psychomotor retardation and abnormal executive function.     At present, all neurodegenerative diseases can only be diagnosed with 100% certainty through a brain autopsy. The suspected neuropathology underlying the patient's neurocognitive impairment is likely a mixture of pathologies (Lewy body disease/alpha-synucleinopathy, Vascular Contributions to Cognitive Impairment and Dementia).  Plasma Protein Biomarkers: [01/03/2024] Neurofilament Light Chain (Nfl): 14.9.  Cerebrospinal Fluid Protein Biomarkers: [01/03/2024] Abeta42: 45. Phospho-Tau (181P): 0.59.  There are no dermatological protein biomarkers available on record.  There is no relevant genetic biomarkers available on record.     The patient  exhibits a two-year history of stepwise progressive cognitive decline, primarily characterized by amnesia and impacting multiple cognitive domains, indicative of mild cognitive impairment. This progression appears to have been triggered by complications arising from cardiac stenting, hypoxic brain injury, and is now further complicated by the new onset of cortical myoclonus. Serum biomarkers do not align with the Alzheimer's disease process, suggesting an alternative pathology. 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 The family has raised new concerns regarding the emergence of a nocturnal sleep disorder and episodes of questionable hallucinations, expressing interest in pursuing additional biomarker testing to investigate the possibility of Lewy body disease. While the probability of Lewy body disease may be low based on current clinical evidence, we will proceed to facilitate this additional testing to address the family's concerns and to ensure a comprehensive diagnostic approach.This plan of action aims to manage the patient's symptoms effectively while exploring all potential underlying causes of their cognitive and neurological symptoms. By addressing both the myoclonus and the family's concerns regarding sleep disturbances and hallucinations, we hope to improve the patient's quality of life and provide clarity on the diagnosis, guiding future treatment strategies.     The observations made above, were discussed with the patient and their supporting historian(s) (wife). We have discussed the additional diagnostic(s) and/or managenent below.     Care Management Plan:    #Diagnostic Screening for measurable forms of neurodegenerative  pathology.  We have discussed opportunities for biomarker testing (CSF Miranda biomarkers, IDEAs Amyloid-PET, Syn-One skin biopsy).  We scheduled a skin biopsy for assessment of Syn-One alpha-synuclein related pathology  #Optimize Neurocognitive Impairment and Quality  We have discussed the MIND Diet and other lifestyle behavior that may help maintain brain health.  We have provided written/digital reading material  No indication for donepezil at this time may consider a later date however patient is not interested in symptomatic medications  #Optimize Behavioral Management and Quality.  No indication for memantine at this time  Continue Zoloft 100 mg daily  #Optimize Sleep Hygiene and Quality  We discussed and recommended additional diagnostic/management of sleep disorder to optimize brain health and longevity.  f/u sleep medicine specialist for symptoms consistent with sleep apnea  #Optimize Movement Disorder and Quality.  Start low-dose Keppra 250 mg q.h.s. 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  f/u Endocrinology for evaluation early-onset diabetes in his 30s with a family history of multiple relatives with early onset diabetes with multiple medical complications - consider alternatives to Ozempic due to stepwise progressive decline since starting in mid 2022  f/u hematology  #Behavioral/Environmental Strategies  We recommend engaging in activities that stimulate cognitively and socially while avoiding excessive stimulation and fatigue in overwhelmingly complex situations.  We recommend integrating routine and schedule into your daily life. https://www.alzheimersproject.org/news/the-importance-of-routine-and-familiarity-to-persons-with-dementia/  #Health Maintenance/Lifestyle Advice  We have discussed the value in aggressively controlling vascular risk factors like hypertension, hyperlipidemia, and Diabetes SBP<130, LDL<100, A1C<7.0.  We discussed the need to optimize  "lifestyle choices including a heart-healthy diet (e.g., Mediterranean or DASH), increased cardiovascular exercise (goal 150 minutes of moderate-intensity per week), and stay cognitively and socially active.  We recommend the MIND diet, a combination of two healthy diets: the Mediterranean diet and the DASH (Dietary Approaches to Stop Hypertension) diet, and includes a variety of brain-friendly foods to optimize cognitive health and longevity.  #Support  We all need support sometimes. Get easy access to local resources, community programs, and services. https://www.communityresourcefinder.org/  Learn more about Cognitive Impairment in [place]: https://www.alz.org/professionals/public-health/state-overview/[place]  #Safety  The Alzheimer's Association administers the nationwide "Safe Return" program with identification bracelets, necklaces, or clothing tags and 24-hour assistance. More information is available online at https://www.alz.org/help-support/caregiving/safety/medicalert-with-24-7-wandering-support  #Follow up:  Follow-up as needed.    Thank you for allowing us to participate in the care of your patient. Please do not hesitate to contact us with any questions or concerns.     It was a pleasure seeing The patient and we look forward to seeing them at their follow-up visit.     This note is dictated on M*Modal Fluency Direct word recognition program. There are word recognition mistakes that are occasionally missed on review. This service was not originating from a related E/M service provided within the previous 7 days nor will  to an E/M service or procedure within the next 24 hours or my soonest available appointment. Prevailing standard of care was able to be met in this audio-only visit.     "

## 2024-03-06 NOTE — TELEPHONE ENCOUNTER
Juan Espinosa MD Vo, Tommy, MA  Caller: Unspecified (Today, 12:11 PM)  Please schedule pt for skin biopsy

## 2024-03-15 ENCOUNTER — PROCEDURE VISIT (OUTPATIENT)
Dept: NEUROLOGY | Facility: CLINIC | Age: 54
End: 2024-03-15
Payer: COMMERCIAL

## 2024-03-15 VITALS
DIASTOLIC BLOOD PRESSURE: 72 MMHG | SYSTOLIC BLOOD PRESSURE: 104 MMHG | BODY MASS INDEX: 28.41 KG/M2 | HEART RATE: 101 BPM | WEIGHT: 191.81 LBS | HEIGHT: 69 IN

## 2024-03-15 DIAGNOSIS — G47.52 RBD (REM BEHAVIORAL DISORDER): Primary | ICD-10-CM

## 2024-03-15 DIAGNOSIS — G31.84 MCI (MILD COGNITIVE IMPAIRMENT): ICD-10-CM

## 2024-03-15 LAB
CHOLEST SERPL-MSCNC: 123 MG/DL (ref 0–200)
HDLC SERPL-MCNC: 48 MG/DL (ref 35–70)
LDLC SERPL CALC-MCNC: 46 MG/DL (ref 0–160)
TRIGL SERPL-MCNC: 175 MG/DL (ref 40–160)
VLDLC SERPL-MCNC: 29 MG/DL

## 2024-03-15 PROCEDURE — 11105 PUNCH BX SKIN EA SEP/ADDL: CPT | Mod: S$GLB,,, | Performed by: NURSE PRACTITIONER

## 2024-03-15 PROCEDURE — 11104 PUNCH BX SKIN SINGLE LESION: CPT | Mod: S$GLB,,, | Performed by: NURSE PRACTITIONER

## 2024-03-15 NOTE — PROCEDURES
PRE-OP DIAGNOSIS:  POST-OP DIAGNOSIS: Same   PROCEDURE: skin punch biopsy     Performing Physician: Seda Waters NP  Supervising Physician (if applicable): Juan Espinosa MD.     PROCEDURE:   _  Shave Biopsy  X  Punch Biopsy  _  Incisional Biopsy     DESCRIPTION:  - Punch Size: 0.5 cm  - Number of Punch Biopsies: 3  + CPT 20438 (punch biopsy, neck) 1st procedure  + CPT 73288 (punch biopsy, each additional lesion, thigh) 2nd procedure  + CPT 99572 (punch biopsy, each additional lesion, ankle) 3rd procedure     The area surrounding the skin lesion was prepared and draped in the  usual sterile manner. The lesion was removed in the usual manner by punch biopsy method noted above. Three full thickness cylindrical samples of the skin were removed from the upper back, lower thigh, and lower leg. The intent of the biopsy is to remove a sample of a cutaneous lesion for Syn-One diagnostic pathologic examination. Hemostasis was assured. The patient tolerated  the procedure well.     Closure:       _  Monsels for hemostasis                _  Suture   X Simple closure  _ None     Followup: The patient tolerated the procedure well without  complications.  Standard post-procedure care is explained and return  precautions are given.     Pathology/biopsy specimens were sent directly to [facility] for processing. Pathology was not sent via in-house [facility] laboratory. Pathology results will be returned within 4-6 weeks and scanned into [facility] Electronic Medical Record.

## 2024-03-18 ENCOUNTER — PATIENT MESSAGE (OUTPATIENT)
Dept: NEUROLOGY | Facility: CLINIC | Age: 54
End: 2024-03-18
Payer: COMMERCIAL

## 2024-03-20 ENCOUNTER — OFFICE VISIT (OUTPATIENT)
Dept: PSYCHIATRY | Facility: CLINIC | Age: 54
End: 2024-03-20
Payer: COMMERCIAL

## 2024-03-20 VITALS
WEIGHT: 189.88 LBS | BODY MASS INDEX: 28.12 KG/M2 | HEIGHT: 69 IN | SYSTOLIC BLOOD PRESSURE: 106 MMHG | OXYGEN SATURATION: 99 % | HEART RATE: 97 BPM | DIASTOLIC BLOOD PRESSURE: 78 MMHG | RESPIRATION RATE: 17 BRPM

## 2024-03-20 DIAGNOSIS — Z65.8 PSYCHOSOCIAL STRESSORS: ICD-10-CM

## 2024-03-20 DIAGNOSIS — F41.1 GENERALIZED ANXIETY DISORDER: Primary | ICD-10-CM

## 2024-03-20 DIAGNOSIS — G47.00 INSOMNIA, UNSPECIFIED TYPE: ICD-10-CM

## 2024-03-20 PROCEDURE — 3078F DIAST BP <80 MM HG: CPT | Mod: CPTII,S$GLB,, | Performed by: STUDENT IN AN ORGANIZED HEALTH CARE EDUCATION/TRAINING PROGRAM

## 2024-03-20 PROCEDURE — 3008F BODY MASS INDEX DOCD: CPT | Mod: CPTII,S$GLB,, | Performed by: STUDENT IN AN ORGANIZED HEALTH CARE EDUCATION/TRAINING PROGRAM

## 2024-03-20 PROCEDURE — 3051F HG A1C>EQUAL 7.0%<8.0%: CPT | Mod: CPTII,S$GLB,, | Performed by: STUDENT IN AN ORGANIZED HEALTH CARE EDUCATION/TRAINING PROGRAM

## 2024-03-20 PROCEDURE — 1159F MED LIST DOCD IN RCRD: CPT | Mod: CPTII,S$GLB,, | Performed by: STUDENT IN AN ORGANIZED HEALTH CARE EDUCATION/TRAINING PROGRAM

## 2024-03-20 PROCEDURE — 99214 OFFICE O/P EST MOD 30 MIN: CPT | Mod: S$GLB,,, | Performed by: STUDENT IN AN ORGANIZED HEALTH CARE EDUCATION/TRAINING PROGRAM

## 2024-03-20 PROCEDURE — 1160F RVW MEDS BY RX/DR IN RCRD: CPT | Mod: CPTII,S$GLB,, | Performed by: STUDENT IN AN ORGANIZED HEALTH CARE EDUCATION/TRAINING PROGRAM

## 2024-03-20 PROCEDURE — 3074F SYST BP LT 130 MM HG: CPT | Mod: CPTII,S$GLB,, | Performed by: STUDENT IN AN ORGANIZED HEALTH CARE EDUCATION/TRAINING PROGRAM

## 2024-03-20 PROCEDURE — 99999 PR PBB SHADOW E&M-EST. PATIENT-LVL III: CPT | Mod: PBBFAC,,, | Performed by: STUDENT IN AN ORGANIZED HEALTH CARE EDUCATION/TRAINING PROGRAM

## 2024-03-20 RX ORDER — SERTRALINE HYDROCHLORIDE 100 MG/1
200 TABLET, FILM COATED ORAL DAILY
Qty: 60 TABLET | Refills: 3 | Status: ON HOLD | OUTPATIENT
Start: 2024-03-20

## 2024-03-20 RX ORDER — GABAPENTIN 300 MG/1
300 CAPSULE ORAL NIGHTLY
Qty: 90 CAPSULE | Refills: 2 | Status: SHIPPED | OUTPATIENT
Start: 2024-03-20 | End: 2024-05-01 | Stop reason: SDUPTHER

## 2024-03-20 NOTE — PROGRESS NOTES
03/20/2024  8:40 AM  Abel Hackett  [id number]    Outpatient Psychiatry Follow-Up Visit (MD/NP)    3/20/2024    Clinical Status of Patient:  Outpatient (Ambulatory)    Chief Complaint:  Abel Hackett is a 53 y.o. male who presents today for follow-up of anxiety.  Met with patient.      Interval History and Content of Current Session:  Interim Events/Subjective Report/Content of Current Session:     CLAUDIA  Insomnia due to another mental disorder  Psychosocial stressors       Anxiety at times, about once a week. Irritability increased recently. Undergoing neurological workup for mild cognitive impairment, awaiting skin biopsy results. Pt reports anxiety improved since last visit.    He is exercising daily.        Psychiatric Review Of Systems - Is patient experiencing or having changes in:  Depressed mood: no  sleep: improved, gabapentin effective  appetite: no  energy/anergy: no  interest/pleasure/anhedonia: no  anxiety/panic: variable  guilty/hopelessness/worthlessness: no  concentration: no  S.I.B.s/risky behavior: no  Irritability: yes  Substance abuse: no  Racing thoughts: no  Impulsive behaviors: no  Paranoia: no  AVH: no              Medical Review of Systems   Review of Systems   Constitutional:  Negative for chills and fever.   HENT:  Negative for hearing loss.    Eyes:  Negative for blurred vision.   Respiratory:  Negative for shortness of breath.    Cardiovascular:  Negative for chest pain and palpitations.   Gastrointestinal:  Negative for constipation, diarrhea and nausea.   Genitourinary:  Negative for dysuria.   Musculoskeletal:  Negative for back pain and neck pain.   Skin:  Negative for rash.   Neurological:  Negative for dizziness and headaches.   Endo/Heme/Allergies:  Negative for environmental allergies.       Past Medical, Family and Social History: The patient's past medical, family and social history have been reviewed and updated as appropriate within the electronic medical record - see  encounter notes.      Social History     Socioeconomic History    Marital status:    Tobacco Use    Smoking status: Never    Smokeless tobacco: Never   Substance and Sexual Activity    Alcohol use: Not Currently     Comment: occassional    Drug use: No    Sexual activity: Yes     Partners: Female     Social Determinants of Health     Financial Resource Strain: Low Risk  (12/7/2023)    Overall Financial Resource Strain (CARDIA)     Difficulty of Paying Living Expenses: Not hard at all   Food Insecurity: No Food Insecurity (12/7/2023)    Hunger Vital Sign     Worried About Running Out of Food in the Last Year: Never true     Ran Out of Food in the Last Year: Never true   Transportation Needs: No Transportation Needs (12/7/2023)    PRAPARE - Transportation     Lack of Transportation (Medical): No     Lack of Transportation (Non-Medical): No   Physical Activity: Insufficiently Active (12/7/2023)    Exercise Vital Sign     Days of Exercise per Week: 3 days     Minutes of Exercise per Session: 30 min   Stress: Stress Concern Present (12/7/2023)    [place] [employer] - Occupational Stress Questionnaire     Feeling of Stress : To some extent   Social Connections: Unknown (12/7/2023)    Social Connection and Isolation Panel [NHANES]     Frequency of Communication with Friends and Family: Patient declined     Frequency of Social Gatherings with Friends and Family: Patient declined     Active Member of Clubs or Organizations: Patient declined     Attends Club or Organization Meetings: Patient declined     Marital Status:    Housing Stability: Low Risk  (12/7/2023)    Housing Stability Vital Sign     Unable to Pay for Housing in the Last Year: No     Number of Places Lived in the Last Year: 1     Unstable Housing in the Last Year: No         Compliance: yes    Side effects: None    Risk Parameters:  Patient reports no suicidal ideation  Patient reports no homicidal ideation  Patient reports  "no self-injurious behavior  Patient reports no violent behavior      Exam (detailed: at least 9 elements; comprehensive: all 15 elements)     Vitals:    03/20/24 0921   BP: 106/78   Pulse: 97   Resp: 17             CONSTITUTIONAL  General Appearance: unremarkable, age appropriate    MUSCULOSKELETAL  Muscle Strength and Tone:no tremor, no tic  Abnormal Involuntary Movements: No  Gait and Station: non-ataxic    PSYCHIATRIC   Level of Consciousness: awake and alert   Orientation: person, place, and situation  Grooming: Casually dressed and Well groomed  Psychomotor Behavior: normal, cooperative  Speech: normal tone, normal rate, normal pitch, normal volume  Language: grossly intact  Mood: "ok"  Affect: Consistent with mood  Thought Process: linear, logical  Associations: intact   Thought Content: DENIES suicidal ideation and DENIES homicidal ideation  Perceptions: denies hallucinations  Memory: Able to recall past events, Remote intact, and Recent intact  Attention:Attends to interview without distraction  Fund of Knowledge: Aware of current events and Vocabulary appropriate   Estimate if Intelligence:  Average based on work/education history, vocabulary and mental status exam  Insight: has awareness of illness  Judgment: behavior is adequate to circumstances        Assessment and Diagnosis   Status/Progress: Based on the examination today, the patient's problem(s) is/are improved.  New problems have not been presented today.   Co-morbidities are complicating management of the primary condition.        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 Instructed to call 911 or present to ER for emergency including SI or HI.    - Discussed diagnosis, risks and benefits of proposed treatment above vs alternative treatments vs no treatment, and potential side effects of these treatments. Discussed the inherent unpredictability of treatment. The patient expresses understanding of the above and displays the capacity to agree with this treatment given said understanding. Patient also agrees that, currently, the benefits outweigh the risks and would like to pursue this treatment at this time.     - Any medications being used off-label were discussed with the patient inclusive of the evidence base for the use of the medications and consent was obtained for the off-label use of the medication.     Froylan Marie III, MD    Return to Clinic:  3-4 m

## 2024-03-22 ENCOUNTER — OFFICE VISIT (OUTPATIENT)
Dept: FAMILY MEDICINE | Facility: CLINIC | Age: 54
End: 2024-03-22
Payer: COMMERCIAL

## 2024-03-22 VITALS
RESPIRATION RATE: 16 BRPM | SYSTOLIC BLOOD PRESSURE: 124 MMHG | HEIGHT: 69 IN | HEART RATE: 84 BPM | BODY MASS INDEX: 28.11 KG/M2 | DIASTOLIC BLOOD PRESSURE: 72 MMHG | WEIGHT: 189.81 LBS

## 2024-03-22 DIAGNOSIS — R10.13 EPIGASTRIC PAIN: ICD-10-CM

## 2024-03-22 DIAGNOSIS — E11.311 TYPE 2 DIABETES MELLITUS WITH RETINOPATHY AND MACULAR EDEMA, WITHOUT LONG-TERM CURRENT USE OF INSULIN, UNSPECIFIED LATERALITY, UNSPECIFIED RETINOPATHY SEVERITY: ICD-10-CM

## 2024-03-22 DIAGNOSIS — E11.8 DIABETES MELLITUS TYPE 2 WITH COMPLICATIONS: Primary | ICD-10-CM

## 2024-03-22 PROCEDURE — 1159F MED LIST DOCD IN RCRD: CPT | Mod: CPTII,S$GLB,, | Performed by: FAMILY MEDICINE

## 2024-03-22 PROCEDURE — 3078F DIAST BP <80 MM HG: CPT | Mod: CPTII,S$GLB,, | Performed by: FAMILY MEDICINE

## 2024-03-22 PROCEDURE — 3074F SYST BP LT 130 MM HG: CPT | Mod: CPTII,S$GLB,, | Performed by: FAMILY MEDICINE

## 2024-03-22 PROCEDURE — 99214 OFFICE O/P EST MOD 30 MIN: CPT | Mod: S$GLB,,, | Performed by: FAMILY MEDICINE

## 2024-03-22 PROCEDURE — 3008F BODY MASS INDEX DOCD: CPT | Mod: CPTII,S$GLB,, | Performed by: FAMILY MEDICINE

## 2024-03-22 PROCEDURE — 99999 PR PBB SHADOW E&M-EST. PATIENT-LVL III: CPT | Mod: PBBFAC,,, | Performed by: FAMILY MEDICINE

## 2024-03-22 PROCEDURE — 3051F HG A1C>EQUAL 7.0%<8.0%: CPT | Mod: CPTII,S$GLB,, | Performed by: FAMILY MEDICINE

## 2024-03-22 RX ORDER — TIRZEPATIDE 5 MG/.5ML
5 INJECTION, SOLUTION SUBCUTANEOUS
Qty: 4 PEN | Refills: 2 | Status: SHIPPED | OUTPATIENT
Start: 2024-03-22 | End: 2024-04-23 | Stop reason: DRUGHIGH

## 2024-03-22 NOTE — PROGRESS NOTES
Subjective:       Patient ID: Abel Hackett is a 53 y.o. male.    Chief Complaint: Follow-up (6 month follow up)    History of Present Illness  The patient presents for evaluation of multiple medical concerns.    He thinks he may be having some gallbladder problems. He has pain especially after he eats, nausea, and constantly has to go to the restroom. He denies any vomiting, belching, or black stools.    His blood sugars are okay. He denies any lows or high blood sugars.    His eyes have been good.    Supplemental Information  His neurologist wanted to know why his stents clogged up so fast. His hematologist told him that sometimes his 2-hydroxybutyrate acid test is a false positive. He had his cholesterol panel done by his cardiologist. His cardiologist wants him on Repatha, but his insurance will not pay for it. He is not on Plavix or Brilinta. He is on aspirin.    Objective:      Physical Exam  Vital Signs  Weight is 189 pounds. BMI is 28.      Physical Exam  Vitals and nursing note reviewed.   Constitutional:       General: He is not in acute distress.     Appearance: He is well-developed.   HENT:      Head: Normocephalic and atraumatic.   Eyes:      Conjunctiva/sclera: Conjunctivae normal.      Pupils: Pupils are equal, round, and reactive to light.   Neck:      Thyroid: No thyromegaly.   Cardiovascular:      Rate and Rhythm: Normal rate and regular rhythm.      Heart sounds: Normal heart sounds.      Comments: S2 split  Pulmonary:      Effort: Pulmonary effort is normal. No respiratory distress.      Breath sounds: Normal breath sounds. No wheezing.   Abdominal:      General: Bowel sounds are normal.      Palpations: Abdomen is soft.      Tenderness: There is no abdominal tenderness.   Musculoskeletal:         General: Normal range of motion.      Cervical back: Normal range of motion and neck supple.   Lymphadenopathy:      Cervical: No cervical adenopathy.   Skin:     General: Skin is warm and dry.       Findings: No rash.   Neurological:      Mental Status: He is alert and oriented to person, place, and time.   Psychiatric:         Behavior: Behavior normal.         Results  Laboratory Studies  Labs were reviewed with the patient.  Assessment:           1. Diabetes mellitus type 2 with complications    2. 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 Epigastric pain        Plan:     Assessment & Plan  1. Gallbladder pain.  Looking at his diabetes medicines often will cause issues, especially Mounjaro. I will order an ultrasound of his gallbladder to rule out stones.    2. Diabetes.  I will decrease his Mounjaro from 7.5 to 5.    3. Health maintenance.  His blood pressure looks good. His weight looks good.     Follow-up  I will reach out to Dr. Silverman from cardiology.    Abel was seen today for follow-up.    Diagnoses and all orders for this visit:    Diabetes mellitus type 2 with complications  -     tirzepatide (MOUNJARO) 5 mg/0.5 mL PnIj; Inject 5 mg into the skin every 7 days.    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 Verbal consent was obtained by the patient and accompanying visitor(s) for the recording of patient appointment to facilitate this note. I attest to having reviewed and edited the generated note for accuracy, though some syntax or spelling errors may persist. Please contact the author of this note for any clarification.

## 2024-03-26 ENCOUNTER — LAB VISIT (OUTPATIENT)
Dept: LAB | Facility: HOSPITAL | Age: 54
End: 2024-03-26
Attending: FAMILY MEDICINE
Payer: COMMERCIAL

## 2024-03-26 DIAGNOSIS — D68.59 HYPERCOAGULABLE STATE: ICD-10-CM

## 2024-03-26 PROCEDURE — 86147 CARDIOLIPIN ANTIBODY EA IG: CPT | Mod: 59 | Performed by: INTERNAL MEDICINE

## 2024-03-26 PROCEDURE — 86146 BETA-2 GLYCOPROTEIN ANTIBODY: CPT | Performed by: INTERNAL MEDICINE

## 2024-03-26 PROCEDURE — 85730 THROMBOPLASTIN TIME PARTIAL: CPT | Performed by: INTERNAL MEDICINE

## 2024-03-26 PROCEDURE — 85613 RUSSELL VIPER VENOM DILUTED: CPT | Performed by: INTERNAL MEDICINE

## 2024-03-26 PROCEDURE — 36415 COLL VENOUS BLD VENIPUNCTURE: CPT | Performed by: INTERNAL MEDICINE

## 2024-03-28 LAB
CONFIRM DRVVT STA-STACLOT: NORMAL S
DRVVT SCREEN TO CONFIRM RATIO: NORMAL {RATIO}
HEPARIN NT PPP QL: NORMAL
LA 3 SCREEN W REFLEX-IMP: NORMAL
LMW HEPARIN IND PLT AB SER QL: NORMAL
MIXING DRVVT/NORMAL: NORMAL %
NEUTRALIZED DRVVT SCREEN RATIO: NORMAL
PROTHROMBIN TIME: 13.5 S (ref 12–15.5)
SCREEN APTT/NORMAL: 0.8
SCREEN APTT/NORMAL: NORMAL
SCREEN DRVVT/NORMAL: 1.03 %
THROMBIN TIME: NORMAL S

## 2024-03-29 LAB
B2 GLYCOPROT1 IGA SER QL: 2.1 U/ML
B2 GLYCOPROT1 IGG SER QL: 9 U/ML
B2 GLYCOPROT1 IGM SER QL: <2.4 U/ML
CARDIOLIPIN IGG SER IA-ACNC: <9.4 GPL (ref 0–14.99)
CARDIOLIPIN IGM SER IA-ACNC: <9.4 MPL (ref 0–12.49)

## 2024-04-08 ENCOUNTER — HOSPITAL ENCOUNTER (OUTPATIENT)
Dept: RADIOLOGY | Facility: HOSPITAL | Age: 54
Discharge: HOME | End: 2024-04-08
Attending: FAMILY MEDICINE
Payer: COMMERCIAL

## 2024-04-08 DIAGNOSIS — R10.13 EPIGASTRIC PAIN: ICD-10-CM

## 2024-04-08 PROCEDURE — 76705 ECHO EXAM OF ABDOMEN: CPT | Mod: 26,,, | Performed by: RADIOLOGY

## 2024-04-08 PROCEDURE — 76705 ECHO EXAM OF ABDOMEN: CPT | Mod: TC

## 2024-04-09 ENCOUNTER — PATIENT MESSAGE (OUTPATIENT)
Dept: OTHER | Facility: OTHER | Age: 54
End: 2024-04-09
Payer: COMMERCIAL

## 2024-04-12 PROBLEM — Z95.1 HX OF CABG: Status: ACTIVE | Noted: 2024-04-12

## 2024-04-12 PROBLEM — I25.709 CORONARY ARTERY DISEASE INVOLVING CORONARY BYPASS GRAFT WITH ANGINA PECTORIS: Status: ACTIVE | Noted: 2024-04-12

## 2024-04-15 ENCOUNTER — PATIENT MESSAGE (OUTPATIENT)
Dept: FAMILY MEDICINE | Facility: CLINIC | Age: 54
End: 2024-04-15
Payer: COMMERCIAL

## 2024-04-15 DIAGNOSIS — R10.13 EPIGASTRIC PAIN: Primary | ICD-10-CM

## 2024-04-15 DIAGNOSIS — K80.42 CALCULUS OF BILE DUCT WITH ACUTE CHOLECYSTITIS WITHOUT OBSTRUCTION: ICD-10-CM

## 2024-04-23 ENCOUNTER — OFFICE VISIT (OUTPATIENT)
Dept: NEUROLOGY | Facility: CLINIC | Age: 54
End: 2024-04-23
Payer: COMMERCIAL

## 2024-04-23 ENCOUNTER — PATIENT MESSAGE (OUTPATIENT)
Dept: OTHER | Facility: OTHER | Age: 54
End: 2024-04-23
Payer: COMMERCIAL

## 2024-04-23 VITALS
WEIGHT: 185.19 LBS | BODY MASS INDEX: 27.43 KG/M2 | HEART RATE: 88 BPM | DIASTOLIC BLOOD PRESSURE: 76 MMHG | SYSTOLIC BLOOD PRESSURE: 118 MMHG | RESPIRATION RATE: 16 BRPM | HEIGHT: 69 IN

## 2024-04-23 DIAGNOSIS — E11.311 TYPE 2 DIABETES MELLITUS WITH RETINOPATHY AND MACULAR EDEMA, WITHOUT LONG-TERM CURRENT USE OF INSULIN, UNSPECIFIED LATERALITY, UNSPECIFIED RETINOPATHY SEVERITY: ICD-10-CM

## 2024-04-23 DIAGNOSIS — G25.3 POST HYPOXIC MYOCLONUS: ICD-10-CM

## 2024-04-23 DIAGNOSIS — G47.52 RBD (REM BEHAVIORAL DISORDER): ICD-10-CM

## 2024-04-23 DIAGNOSIS — G31.84 MCI (MILD COGNITIVE IMPAIRMENT): Primary | ICD-10-CM

## 2024-04-23 DIAGNOSIS — R44.3 HALLUCINATION: ICD-10-CM

## 2024-04-23 PROCEDURE — 3078F DIAST BP <80 MM HG: CPT | Mod: CPTII,S$GLB,, | Performed by: PSYCHIATRY & NEUROLOGY

## 2024-04-23 PROCEDURE — 3051F HG A1C>EQUAL 7.0%<8.0%: CPT | Mod: CPTII,S$GLB,, | Performed by: PSYCHIATRY & NEUROLOGY

## 2024-04-23 PROCEDURE — 1159F MED LIST DOCD IN RCRD: CPT | Mod: CPTII,S$GLB,, | Performed by: PSYCHIATRY & NEUROLOGY

## 2024-04-23 PROCEDURE — 3074F SYST BP LT 130 MM HG: CPT | Mod: CPTII,S$GLB,, | Performed by: PSYCHIATRY & NEUROLOGY

## 2024-04-23 PROCEDURE — 1160F RVW MEDS BY RX/DR IN RCRD: CPT | Mod: CPTII,S$GLB,, | Performed by: PSYCHIATRY & NEUROLOGY

## 2024-04-23 PROCEDURE — 99214 OFFICE O/P EST MOD 30 MIN: CPT | Mod: S$GLB,,, | Performed by: PSYCHIATRY & NEUROLOGY

## 2024-04-23 PROCEDURE — 3008F BODY MASS INDEX DOCD: CPT | Mod: CPTII,S$GLB,, | Performed by: PSYCHIATRY & NEUROLOGY

## 2024-04-23 PROCEDURE — 99999 PR PBB SHADOW E&M-EST. PATIENT-LVL V: CPT | Mod: PBBFAC,,, | Performed by: PSYCHIATRY & NEUROLOGY

## 2024-04-23 NOTE — PROGRESS NOTES
"    HPI: Abel Hackett is a 54 y.o. male with a history of memory problems      Here for  6 months follow up    He is seeing Dr Espinosa in the Memory disorders clinic    He is thought to have MCI - Vascular contributions to cognitive impairment believed to be etiology and possibly Mixed with other etiologies  Biomarkers were evaluated with Dr Espinosa noting " Serum biomarkers do not align with the Alzheimer's disease process, suggesting an alternative pathology. 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 The family has raised new concerns regarding the emergence of a nocturnal sleep disorder and episodes of questionable hallucinations, expressing interest in pursuing additional biomarker testing to investigate the possibility of Lewy body disease. While the probability of Lewy body disease may be low based on current clinical evidence, we will proceed to facilitate this additional testing to address the family's concerns and to ensure a comprehensive diagnostic approach.This plan of action aims to manage the patient's symptoms effectively while exploring all potential underlying causes of their cognitive and neurological symptoms. By addressing both the myoclonus and the family's concerns regarding sleep disturbances and hallucinations, we hope to improve the patient's quality of life and provide clarity on the diagnosis, guiding future treatment strategies. 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 Worse at night and he recognizing this is not real    CPAP compliant     ADLs: gets assistance with med admit with family    Remains off of work/ on long term disability     Driving without accidents or incident and no longer drives alone    Never got an appointment with speech therapy         He is s/p CABG in 11/2023 with complication of large pericardial effusion w/ tamponade physiology / required another admission    He is seeing Dr Marie for mood disorder     A1C managed by PCP    Fasting labs are followed by PCP      Review of Systems   Constitutional:  Negative for fever.   HENT:  Negative for nosebleeds.    Eyes:  Negative for double vision.   Respiratory:  Negative for hemoptysis.    Cardiovascular:  Negative for leg swelling.   Gastrointestinal:  Negative for blood in stool.   Genitourinary:  Negative for hematuria.   Musculoskeletal:  Negative for falls.   Skin:  Negative for rash.   Neurological:  Positive for tremors.   Psychiatric/Behavioral:  Positive for memory loss.          I have reviewed all of this patient's past medical and surgical histories as well as family and social histories and active allergies and medications as documented in the electronic medical record.        Exam:  Gen Appearance, well developed/nourished in no apparent distress  CV: 2+ distal pulses with no edema or swelling  Neuro:  MS: Awake, alert, Sustains attention. Recent/remote memory intact to gross verbal testing, Language is full to spontaneous speech/comprehension. Fund of Knowledge is full  CN: Optic discs are flat with normal vasculature, PERRL, Extraoccular movements and visual fields are full. Normal facial sensation and strength, Hearing symmetric, Tongue and Palate are midline and strong. Shoulder Shrug symmetric and strong.  Motor: Normal bulk, tone, no abnormal movements at rest, mild-moderate tremor with hands out. 5/5 strength bilateral upper/lower extremities with 1+ reflexes  and no clonus  Sensory: symmetric to  temp, and vibration. Romberg mildly positive  Cerebellar: Finger-nose,Heal-shin, Rapid alternating movements intact  Gait: Normal stance, no ataxia    Imagin2022 CT head:   Mild cortical atrophy without acute intracranial abnormality.    2022 MRI brain: Small focus of encephalomalacia right inferior cerebellum  MRA head and neck normal    2022 EEG: normal    2022 Holter monitor (done by PCP for palpitations complaint): Normal    3/2023 CTA head and neck: The left vertebral artery has approximately  60% diameter origin stenosis that extends 7 mm in length from the origin from the subclavian artery.  No other significant stenosis in the posterior circulation.    2023 MRI brain: Two small areas of remote infarction are seen in the right cerebellar hemisphere.  No new infarction is seen.     Stable focus of FLAIR signal abnormality in the left posterior frontal white matter, unchanged from prior study likely related to chronic microvascular ischemic disease.    2023 CTA head and neck: Remote lacunar-type infarctions in the right cerebellar hemisphere.  No new parenchymal hypoattenuation is seen to suggest an acute infarction and there is no intracranial hemorrhage.  No abnormal parenchymal enhancement.     No intracranial arterial abnormalities.    Labs:  RPR, TSH, B12, CBC normal. CMP: elevated glucose consistent with his known poorly controlled DM2      2023 A1C 7.1, down from 8.5 eight months prior      2023 ESR normal     7.2 A1C    2023 Neuropsychological testing: Unfortunately, scores on stand-alone and embedded performance validity measures were again variable, with several falling below cutoffs, suggesting reduced effort and/or engagement in the testing process. As such, the current results could underestimate his functioning and results are interpreted cautiously. With this in mind, he continues to display relatively intact visuospatial  "constructional skills and verbal memory. Greater variablity is seen in working memory, executive functioning, and processing speed compared to his previous evaluation. Visual memory, verbal fluency (no split), and fine motor dexerity are again significantly reduced, with a lateralization pattern seen on fine motor dexterity testing (right hand/left brain worse than left hand/right brain). He made errors on temporal orientation questions. His affect appeared flatter during this evaluation and he reported minimal depressive and anxious symptoms on psychological screening questionnaires.        Overall, if his vascular health has indeed worsened, a cognitive cerebellar affective syndrome is likely to explain his current cognitive profile. If not, then our concern for the possiblity of an emerging neurodegenerative condition moves higher on the differential. His prescriptions of gabapentin and Lunesta may be contributing to some cognitive trouble. He appears to be on the cusp of receiving a diagnosis of dementia given some of the errors reported in his daily functioning and should continue to be monitored closely. The following recommendations are offered:     Assessment/Plan: Abel Hackett is a 54 y.o. male with memory loss  I recommend:     1. Noting mild cortical atrophy by CT head 7/2022    2. 7/2022 MRI brain (Done with Dr. Vila at [facility]): Small focus of encephalomalacia right inferior cerebellum without atrophy mentioned  -Likely a small vessel CVA due to poorly controlled DM2 (was noncompliant)  -MRA head and neck normal at that time  3/2023 CTA head and neck:  "left vertebral artery has approximately  60% diameter origin stenosis that extends 7 mm in length from the origin from the subclavian artery." Treatment for this is anti-platelets, statin, DM2 control  -Now on anti-HTN meds and poorly controlled DM2 is ongoing. Goal less than 7. Refer to endocrinology per orders for help with better control. Continue " "anti-platelets and statin for CVA prevention. Cardiology added repatha for even better DLD control      3. Note: Patient is seeing cardiology for CAD (CIS) s/p CABG in 2023    4. His mom had  vascular dementia and dad had AD.   -EEG at [facility] unremarkable 2022    5. 9/2022  Neuropsychological testing inconclusive but noting concern "that his vascular risk factors were/are impacting his cognition"  -He did consult with vascular neurology who noted "remote R cerebellar infarct likely 2/2 to small vessel disease"   -Currently on Brilinta due to CAD with stent and also statin for DLD  -Repeat Neuropsychological testing in 9/2023: validity measure issues, but some decline in testing, noting. "Overall, if his vascular health has indeed worsened, a cognitive cerebellar affective syndrome is likely to explain his current cognitive profile. If not, then our concern for the possiblity of an emerging neurodegenerative condition moves higher on the differential. His prescriptions of gabapentin and Lunesta may be contributing to some cognitive trouble. He appears to be on the cusp of receiving a diagnosis of dementia given some of the errors reported in his daily functioning and should continue to be monitored closely. The following recommendations are offered:   -Recommend tight control of risk factors for vascular dementia including compliance with CPAP. (Now compliant)  -Also seeing psychiatry  for mood    -Now undergoing further work up with Dr Espinosa in memory disorders clinic for further evaluation  -noting his significant CAD, vascular MCI +/- Other mixed pathology and pending results skin bx with Dr Espinosa for Syn-One alpha-synuclein related pathology   -On keppra for myoclonus after presumed hypoxic event. Also has some REM behavioral disorder and having visual hallucinations with good insight  -Avoid driving at this time given hallucinations  -Speech therapy referral as recommended by neuropsychiatry      6. Tremor, Like " Benign essential tremor  -Father had tremor  -No treatment recommended at this time.Montior      7. Worst headache of life May 2023  -Has had blurred in the left eye since prior stroke, which worsened and was thought to be DM2 related by opthalmology   -Pain resolved/ has episodic headaches now  -Update MRI brain and CTA head 6/2023 Reassuring. ESR not severely elevated for DM2 history  -Nurtec PRN to continue/ headaches are not frequent and variable. Avoid triptans and NSAIDs given CAD and CVA history          RTC  1 year for local follow up. He has ongoing follow up with Dr Espinosa

## 2024-04-24 RX ORDER — EMPAGLIFLOZIN, METFORMIN HYDROCHLORIDE 25; 1000 MG/1; MG/1
1 TABLET, EXTENDED RELEASE ORAL DAILY
Qty: 90 TABLET | Refills: 1 | Status: ON HOLD | OUTPATIENT
Start: 2024-04-24

## 2024-04-24 NOTE — TELEPHONE ENCOUNTER
Care Due:                  Date            Visit Type   Department     Provider  --------------------------------------------------------------------------------                                EP -                              PRIMARY      [facility]  Last Visit: 03-      CARE ([facility])   MEDICINE       Keira Wheat  Next Visit: None Scheduled  None         None Found                                                            Last  Test          Frequency    Reason                     Performed    Due Date  --------------------------------------------------------------------------------    HBA1C.......  6 months...  empagliflozin-metformin,   01- 07-                             tirzepatide..............    [employer] Embedded Care Due Messages. Reference number: [id number].   4/24/2024 1:03:18 PM CDT

## 2024-04-28 ENCOUNTER — PATIENT MESSAGE (OUTPATIENT)
Dept: NEUROLOGY | Facility: CLINIC | Age: 54
End: 2024-04-28
Payer: COMMERCIAL

## 2024-04-29 ENCOUNTER — TELEPHONE (OUTPATIENT)
Dept: NEUROLOGY | Facility: CLINIC | Age: 54
End: 2024-04-29
Payer: COMMERCIAL

## 2024-04-30 ENCOUNTER — OFFICE VISIT (OUTPATIENT)
Dept: NEUROLOGY | Facility: CLINIC | Age: 54
End: 2024-04-30
Payer: COMMERCIAL

## 2024-04-30 ENCOUNTER — PATIENT MESSAGE (OUTPATIENT)
Dept: NEUROLOGY | Facility: CLINIC | Age: 54
End: 2024-04-30
Payer: COMMERCIAL

## 2024-04-30 ENCOUNTER — PATIENT MESSAGE (OUTPATIENT)
Dept: FAMILY MEDICINE | Facility: CLINIC | Age: 54
End: 2024-04-30
Payer: COMMERCIAL

## 2024-04-30 ENCOUNTER — TELEPHONE (OUTPATIENT)
Dept: NEUROLOGY | Facility: CLINIC | Age: 54
End: 2024-04-30
Payer: COMMERCIAL

## 2024-04-30 ENCOUNTER — PATIENT MESSAGE (OUTPATIENT)
Dept: NEUROLOGY | Facility: CLINIC | Age: 54
End: 2024-04-30

## 2024-04-30 ENCOUNTER — PATIENT MESSAGE (OUTPATIENT)
Dept: PSYCHIATRY | Facility: CLINIC | Age: 54
End: 2024-04-30
Payer: COMMERCIAL

## 2024-04-30 DIAGNOSIS — R26.81 GAIT INSTABILITY: ICD-10-CM

## 2024-04-30 DIAGNOSIS — G31.84 MCI (MILD COGNITIVE IMPAIRMENT): Primary | ICD-10-CM
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
## 2024-04-30 DIAGNOSIS — R44.3 HALLUCINATION: ICD-10-CM

## 2024-04-30 DIAGNOSIS — G20.C PARKINSONISM, UNSPECIFIED PARKINSONISM TYPE: ICD-10-CM

## 2024-04-30 DIAGNOSIS — G25.3 POST HYPOXIC MYOCLONUS: ICD-10-CM

## 2024-04-30 DIAGNOSIS — F51.01 PRIMARY INSOMNIA: ICD-10-CM

## 2024-04-30 DIAGNOSIS — G47.52 RBD (REM BEHAVIORAL DISORDER): ICD-10-CM

## 2024-04-30 DIAGNOSIS — R25.1 TREMOR: ICD-10-CM

## 2024-04-30 DIAGNOSIS — G43.709 CHRONIC MIGRAINE W/O AURA W/O STATUS MIGRAINOSUS, NOT INTRACTABLE: ICD-10-CM

## 2024-04-30 PROCEDURE — 96116 NUBHVL XM PHYS/QHP 1ST HR: CPT | Mod: 95,59,, | Performed by: PSYCHIATRY & NEUROLOGY

## 2024-04-30 PROCEDURE — 3051F HG A1C>EQUAL 7.0%<8.0%: CPT | Mod: CPTII,95,, | Performed by: PSYCHIATRY & NEUROLOGY

## 2024-04-30 PROCEDURE — 1160F RVW MEDS BY RX/DR IN RCRD: CPT | Mod: CPTII,95,, | Performed by: PSYCHIATRY & NEUROLOGY

## 2024-04-30 PROCEDURE — 99215 OFFICE O/P EST HI 40 MIN: CPT | Mod: 95,,, | Performed by: PSYCHIATRY & NEUROLOGY

## 2024-04-30 PROCEDURE — 1159F MED LIST DOCD IN RCRD: CPT | Mod: CPTII,95,, | Performed by: PSYCHIATRY & NEUROLOGY

## 2024-04-30 RX ORDER — SUVOREXANT 10 MG/1
1 TABLET, FILM COATED ORAL NIGHTLY
Qty: 30 TABLET | Refills: 3 | Status: ON HOLD | OUTPATIENT
Start: 2024-04-30

## 2024-04-30 RX ORDER — QUETIAPINE FUMARATE 25 MG/1
25 TABLET, FILM COATED ORAL NIGHTLY
Qty: 30 TABLET | Refills: 1 | Status: ON HOLD | OUTPATIENT
Start: 2024-04-30 | End: 2025-04-30

## 2024-04-30 NOTE — PROGRESS NOTES
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 offered:. -Recommend tight control of risk factors for vascular dementia including compliance with CPAP. (Now compliant). -Also seeing psychiatry for mood. -No driving restrictions but he is off of work currently due to symptoms. Advised he remain off given his daily memory loss". -Given the above, I will refer to Dr Espinosa in memory disorders clinic for further evaluation. -noting his significant CAD, worsening currently / pending cardiology surgery evaluation. -Speech therapy referral as recommended by neuropsychiatry.  [facility] - Juan Espinosa MD. Neurologist (01/03/2024)  Type: Chart Review. The patient, a 52-year-old male with a history of early-onset diabetes diagnosed in his 20s, presents with his wife as the primary historian. His diabetes, which appears to be familial but not classified as Type 1, has been a consistent health concern. The patient has faced challenges with medication adherence, particularly insulin, due to concerns about weight gain and the burden of managing multiple medications. In 2020, the patient was laid off from his job and subsequently began working at [employer] in 2021, which involved a demanding three-hour daily commute. During this time, he experienced increased daytime fatigue and difficulty keeping up with work responsibilities, leading to a general decline in health. " In December 2021, he contracted COVID-19, resulting in post-COVID fatigue consistent with long COVID. He also reported new onset visual disturbances described as kaleidoscopic sensations in his vision, with occasional palpitations, which later became associated with migraine headaches. These headaches were diagnosed in the last 6-12 months and were effectively managed with Nurtec. In June 2022, the patient started Ozempic for diabetes and weight loss control. Subsequently, he reported worsening subjective cognitive impairment. his family members noticed increased forgetfulness and disorientation, while coworkers observed typos and incoherent conversations. In July 2022, he sought medical attention for these memory issues. An MRI revealed cerebellar abnormalities, and a CT scan showed mild cortical atrophy. His family history included significant vascular dementia. Neuropsychological assessments indicated a need for better control of vascular risk factors. The patient was unaware of his moderate obstructive sleep apnea diagnosis. Further evaluation in late September 2022 showed intact visuospatial skills with some impairment in language and memory functions. Stress and irritability due to cognitive difficulties were noted. In November 2022, vascular complications led to the placement of stents, and MRI showed remote lacunar infarcts. Despite improved diabetes control with Ozempic, he continued to experience memory problems. In mid-2023, his memory problems worsened, with incidents like using a razor as a toothbrush. MRI and CTA revealed cerebral abnormalities. By late September 2023, his diabetes was well-managed, but cognitive issues persisted. The patient's condition deteriorated further with the thrombosis of previously placed stents, leading to cardiac ischemia and tamponade requiring emergency bypass surgery and stent replacement. He has been on disability since September 2022 due to these complications. He is  currently withdrawn and clearly depressed. 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 The patient's cognitive impairment, dating back to 2021, could be related to chronic stress, early-onset diabetes, and the initiation of Ozempic. His condition is likely compounded by mood disorders. Given the complexity of his early-onset diabetes with a hereditary factor, it is recommended to consult with his primary doctor about adjusting the Ozempic dosage and to follow up with Endocrinology for closer monitoring. Further evaluation and management by a multidisciplinary team, including a neurologist, endocrinologist, and psychiatrist, are crucial to address the various aspects of his health concerns.  [facility] - Juan Espionsa MD. Neurologist (03/05/2024)  Type: Chart Review. 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 We have been awaiting the final interpretation of an EEG before initiating treatment with Keppra. The patient's his family has raised concerns regarding the possibility of Lewy body dementia, although there is no strong evidence supporting the presence of Lewy body disease at this time. However, we have discussed the option of confirmatory testing through a skin biopsy to address these concerns. The family has reported recent episodes of dong-nocturnal questionable hallucinations over the last few weeks. These episodes have been specifically noted around the transition periods of sleep, suggesting a  potential REM sleep behavior disorder or dong-nocturnal sleep disorder, rather than being indicative of a fully awake state. Despite these reports, there has been no observed evidence of significant fluctuating levels of arousal or strong indications of parkinsonism during examinations. Nonetheless, to alleviate the family's concerns and to thoroughly investigate, we will proceed with scheduling a skin biopsy to rule out any early signs of alpha-synuclein-mediated disease. Reviewing the patient's recent EEG, we found no evidence of epileptiform spikes, supporting the absence of epilepsy. However, the patient does exhibit mild myoclonus upon examination. In light of these findings, we have discussed the initiation of symptomatic medication and recommend starting with a very low dose of Keppra, 250 mg at bedtime (q. H. S. The family has agreed to this treatment plan. Additionally, it is worth noting that the patient continues to respond well to their current medication regimen, which includes Nurtec 75 mg for migraine prevention and Zoloft 100 mg for depression. These medications remain part of the patient's treatment strategy, contributing to the management of their symptoms and overall well-being.     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 Initial examination showed no evidence of parkinsonism. However, the patient's neurological status has since declined, with worsening symptoms such as insomnia and REM sleep behavior disorder. Over the past three months, there has been an increase in Sukhdeep nocturnal hallucinations, transitioning from poorly formed to increasingly vivid, often involving his family members floating above the bed in the evening. his family members have also noted gait instability, leading to frequent tripping and at least one ground-level fall. Additionally, there has  been an increase in tremor, now more isometric and slightly kinetic, compared to the initial evaluation. The family reports a significant change in the patient's temperament over the last year, transitioning from high-strung and irritable to more laid-back. However, the clinical presentation remains consistent with a form of post-traumatic/post-hypoxic injury to the central nervous system, likely accelerating an early alpha-synuclein process. It's important to note that the patient's symptoms still fall within the range of mild cognitive impairment, and the presentation is not consistent with Lewy body dementia. The distal, single, isolated alpha-synuclein may be indicative of a prodrome risk for later-life dysautonomia. Nonetheless, the clinical presentation remains consistent with post-hypoxic neurological injury. During the discussion, symptomatic medications for cognitive impairment were explored, along with a review of the patient's ongoing medication therapy. Primary concerns at this time include worsening insomnia. To address this, it is recommended to initiate Belsomra. Given the increasing hallucinations and REM sleep behavior disorder, a gradual tapering of low-dose Seroquel over one month and subsequent discontinuation is advised. If hallucinations reemerge after one month of Belsomra, Seroquel may be restarted. Following stabilization of sleep, consideration will be given to L-dopa therapy for the movement disorder.  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  He does repeat statements or asks the same question repeatedly.  He does have difficulty remembering recent important conversations.  He does have difficulty  remembering recent events.  He does not forget information within minutes.  His recent retrograde memory is intact.  His remote memory is intact.  Attention:   The patient's attention and concentration are impaired.  He does not have attentional fluctuations.  He does not have difficulty maintaining selective attention.  He does become easily distracted.  He does have difficulty with divided attention.  Executive:   The patient's cognitive processing speed is slower.  He does have difficulty with working memory.  He does misplace personal items (e.g., keys, cell phone, wallet) more frequently.  He does have difficulty keeping track of his medications.  He does have difficulty with planning/organizing/completing multistep tasks.  He does have difficulty with executive attention.  He does have difficulty with flexible thinking.  He does not have difficulty with response inhibition.  He denies new impulsivity or rash/careless actions.  His judgment is intact.  Language:   The patient's speech is affected.  He does not forget people's names more frequently.  He does have word-finding difficulties.  His speech is fluent and non-effortful.  His speech is grammatically intact.  He does make inaccurate word substitutions.  He does not have difficulty reading.  He does not appear to have impaired comprehension.  Visuospatial:   The patient has new visuospatial problems.  He does not become confused or disoriented in *new*, unfamiliar places.  He does not have trouble with navigation.  He does not get lost in familiar places.  He does not have visuospatial disorientation.  He does not have difficulty recognizing objects or faces.  He denies problems with driving or parking.  Motor/Coordination:   The patient does have difficulty with walking.  He does feel imbalanced.  He denies having fallen.  He does not appear to have new muscle weakness.  He does have difficulty buttoning shirts, operating zippers, or manipulating  tools/utensils.  His handwriting has not become micrographic.  He does have a resting tremor.  He does report having new involuntary movements and/or muscle jerking. Comment: nocturnal jerking - worse since 2023  He does not have swallowing difficulty.  He denies new muscle cramps and twitching.  Sensory:   The patient denies new numbness, tingling, paresthesias, or pain.  The patient reports new loss of vision, blurry vision, and/or double vision.  The patient reports a recent loss of hearing and/or worsening tinnitus.  The patient denies anosmia.  Sleep:   The patient reports difficulty sleeping.  The patient does have difficulty going to sleep.  The patient reports difficulty staying asleep and/or frequently awakening at night.  The patient does snore and/or have witnessed apneas while sleeping.  When he wakes up in the morning, he does not feel well-rested.  He denies dream-enactment behavior.  He has reported symptoms suggestive of restless leg syndrome.  Behavior:   The patient's personality has changed.  He does not have symptoms of disinhibition and social inappropriateness.  He does not have symptoms to suggest a loss of manners or decorum.  He does not appear apathetic or has decreased motivation.  He does not appear to have a change in inertia.  The patient's emotional expression has changed.  He does have emotional blunting or lability.  He does not have symptoms of irritability and mood lability.  He does not have symptoms of agitation, aggression, or violent outbursts.  His insight into his health and situation is intact.  His personal hygiene is intact.  He is not exhibiting a diminished response to other people's needs and feelings.  He is not exhibiting a diminished social interest, interrelatedness, or personal warmth.  He denies restlessness.  He denies new and/or worsening simple repetitive behaviors.  His speech has not become simplified or become repetitive/stereotyped.  He denies new/worsening  complex repetitive/ritualistic compulsions and behaviors.  He does not have symptoms of hyper-religiosity or dogmatism.  His interests/pleasures have not become restrictive, simplified, interrupting, or repetitive.  He denies a change of self-stimulating behavior.  He denies any changes in eating behavior.  He denies increased consumption of food or substances.  He denies oral exploration or consumption of inedible objects.  Psychiatric:   He does not feel depressed.  He is not exhibiting symptoms of social withdrawal/indifference.  He does have anxiety.  He does not exhibit cycling behavior.  He does not exhibit hyperactive behavior.  He is not exhibited symptoms of paranoia.  He does not have delusions.  He does have hallucinations. Comment: possible hallucinations seeing ghosts upon awakening  He does not have a history of sensitivity to neuroleptic/psychotropic medications.  Medical Review of Systems:   The patient does not have constipation.  The patient does not have urinary incontinence.  The patient denies orthostatic lightheadedness.  The patient's weight is unstable. Comment: [id number] 40lbs  Functional status:  Difficulty performing the following Instrumental ADLs:  Housekeeping: No  Food Preparation: No  Shopping: No  Ability to Handle Finances: Yes  Transportation/Driving: No  Household Appliances/Stove: No  Laundry: No  Difficulty performing the following Basic ADLs:  Dressing: No  Bathing: No  Toileting: No  Personal hygiene and grooming: No  Feeding: No  Care Management:  Patient/Family Safety Concerns:  Medication Adherence: No  Home Safety: No  Wandered: No  Firearms: No  Fall Risk: No  Home Alone: No        Past Medical History:   Diagnosis Date    Depression     Diabetes mellitus     Diabetes mellitus, type 2     History of right coronary artery stent placement     done at [facility]       Past Surgical History:   Procedure Laterality Date    ANGIOGRAPHY N/A 10/2023    COLONOSCOPY N/A 08/02/2022     Procedure: COLONOSCOPY;  Surgeon: Keira Wheat MD;  Location: [facility];  Service: Endoscopy;  Laterality: N/A;    REFRACTIVE SURGERY  04/13/2016    VASECTOMY         Family History   Problem Relation Name Age of Onset    Heart disease Mother      Hypertension Mother      Diabetes Mother      Heart attack Mother      Heart disease Father      Hypertension Father      Diabetes Father      Heart attack Father      Heart attack Maternal Uncle      Heart attack Maternal Grandfather         Social History     Socioeconomic History    Marital status:    Tobacco Use    Smoking status: Never    Smokeless tobacco: Never   Substance and Sexual Activity    Alcohol use: Not Currently     Comment: occassional    Drug use: No    Sexual activity: Yes     Partners: Female     Social Determinants of Health     Financial Resource Strain: Low Risk  (3/20/2024)    Overall Financial Resource Strain (CARDIA)     Difficulty of Paying Living Expenses: Not hard at all   Food Insecurity: No Food Insecurity (3/20/2024)    Hunger Vital Sign     Worried About Running Out of Food in the Last Year: Never true     Ran Out of Food in the Last Year: Never true   Transportation Needs: No Transportation Needs (3/20/2024)    PRAPARE - Transportation     Lack of Transportation (Medical): No     Lack of Transportation (Non-Medical): No   Physical Activity: Sufficiently Active (3/20/2024)    Exercise Vital Sign     Days of Exercise per Week: 6 days     Minutes of Exercise per Session: 40 min   Stress: No Stress Concern Present (3/20/2024)    [employer] - Occupational Stress Questionnaire     Feeling of Stress : Only a little   Social Connections: Unknown (3/20/2024)    Social Connection and Isolation Panel [NHANES]     Frequency of Communication with Friends and Family: Three times a week     Frequency of Social Gatherings with Friends and Family: Once a week     Active Member of Clubs or Organizations: Patient  declined     Attends Club or Organization Meetings: Patient declined     Marital Status:    Housing Stability: Low Risk  (3/20/2024)    Housing Stability Vital Sign     Unable to Pay for Housing in the Last Year: No     Number of Places Lived in the Last Year: 1     Unstable Housing in the Last Year: No       Medication:     Current Outpatient Medications on File Prior to Visit   Medication Sig Dispense Refill    aspirin (ECOTRIN) 81 MG EC tablet Take 81 mg by mouth once daily.      atorvastatin (LIPITOR) 40 MG tablet Take 40 mg by mouth once daily.      empagliflozin-metformin (SYNJARDY XR) 25-1,000 mg TBph Take 1 tablet by mouth Daily. 90 tablet 1    evolocumab (REPATHA SURECLICK) 140 mg/mL PnIj Inject 1 pen under the skin once every 2 weeks. 2 mL 12    evolocumab (REPATHA SURECLICK) 140 mg/mL PnIj INJECT ONE PEN INTO THE SKIN EVERY 2 WEEKS 2 mL 12    gabapentin (NEURONTIN) 300 MG capsule Take 1 capsule (300 mg total) by mouth every evening. 90 capsule 2    isosorbide mononitrate (IMDUR) 30 MG 24 hr tablet Take 1 tablet orally once a day. 90 tablet 4    levETIRAcetam (KEPPRA) 250 MG Tab Take 1 tablet (250 mg total) by mouth every evening. 30 tablet 11    metoprolol succinate (TOPROL-XL) 100 MG 24 hr tablet Take 100 mg by mouth once daily.      nitroGLYCERIN (NITROSTAT) 0.4 MG SL tablet PLEASE SEE ATTACHED FOR DETAILED DIRECTIONS (Patient not taking: Reported on 4/23/2024)      nitroGLYCERIN (NITROSTAT) 0.4 MG SL tablet PLACE 1 TABLET UNDER TONGUE EVERY 5 MINUTES FOR CHEST PAIN. GO TO ER IF CHEST PAIN PERSISTS AFTER 3 DOSES. 25 tablet 3    pantoprazole (PROTONIX) 40 MG tablet Take 1 tablet orally once a day 90 tablet 4    rimegepant 75 mg odt Take 1 tablet (75 mg total) by mouth daily as needed for Migraine. Place ODT tablet on the tongue; alternatively the ODT tablet may be placed under the tongue 8 tablet 11    sertraline (ZOLOFT) 100 MG tablet Take 2 tablets (200 mg total) by mouth once daily. 60 tablet 3     tirzepatide (MOUNJARO) 15 mg/0.5 mL PnIj Inject 15 mg into the skin every 7 days. 4 Pen 6     No current facility-administered medications on file prior to visit.        Review of patient's allergies indicates:  No Known Allergies    Medications Reconciliation:   I have reconciled the patient's home medications and discharge medications with the patient/family. I have updated all changes.  Refer to After-Visit Medication List.    Objective:  Vital Signs:  There were no vitals filed for this visit.  Wt Readings from Last 3 Encounters:   04/23/24 1041 84 kg (185 lb 3 oz)   03/22/24 0816 86.1 kg (189 lb 13.1 oz)   03/15/24 1243 87 kg (191 lb 12.8 oz)     There is no height or weight on file to calculate BMI.           Neurological examination:  Mental Status:   His appearance is normal (hygiene is appropriate; attire is proper and clean).  Throughout the interview, he behaved abnormally and was not cooperative. Comment: poor eye contact;  His behavior is appropriate to the clinical context without impropriety or improper language/conduct.  His behavior was not characterized by episodes of sudden uncontrollable and inappropriate laughing or crying.  The patient's energy level is abnormal. Comment: Hypoactive;  His orientation is normal; Spatial 5/5 (location, the floor of building, city, county, state) and temporal 5/5 (month, day, year, [place]) dimensions are accurate.  His attention/concentration is impaired.  He can complete three-step commands.  His fund of knowledge was appropriate for age, culture, and level of education.  His thought process is not logical or goal-oriented. Comment: bradyphrenia;  He demonstrated impaired insight based on actions, awareness of his illness, plans for the future.  He demonstrated good judgment based on actions and plans for the future.  He has no evidence of hallucinations (auditory, visual, olfactory).  He has no evidence of delusions (paranoid, grandiose, bizarre).  Cranial  Nerves:   His pupils were normal.  His visual fields were full to confrontation in all quadrants.  His ocular pursuit in the horizontal and vertical plane was complete.  His saccadic initiation, velocity, and amplitude are normal.  His facial strength was normal.  His facial expression was symmetric and appropriate to the context.  His hearing was diminished.  His oropharynx and soft palate appeared abnormal. Comment: mallampati 3;  His tongue showed no evidence of scalloping.  He tongue movement with normal.  He had no significant evidence of anterocollis or retrocollis.  Speech/Language:   The patient's speech was fluent, non-effortful, and his rate was appropriate to the context.  He has no articulation (segmental features) errors.  The patient's speech is not dysarthric.  The patient's speech was without evidence of anomia.  He makes no phonological loop errors.  He can comprehend commands that cross the midline (e.g., with your left thumb, touch your right ear).  He can comprehend syntactically complex sentences.  Motor:   The patient's bilateral upper extremity muscle bulk is appropriate.  The patient's upper extremity muscle tone is increased.  The patient's bilateral upper extremity muscle tone does not suggest spasticity.  The patient's bilateral upper extremity muscle tone does not suggest rigidity/cogwheeling.  There is evidence of paratonia. Comment: B/L, R>L, Mild; Muscle tone is increased and there is evidence of paratonia.  There was no dystonia observed on examination.  Assessment of motor strength was symmetric and at minimal anti-gravity.  There is no pronator or downward drift.  There is myoclonus observed in The patient's bilateral upper and lower extremities. Comment: myoclonus; myoclonus  There are no fasciculations observed in The patient's bilateral upper and lower extremities.  Coordination:   He has no bilateral upper extremity limb dysmetria or past pointing on finger-nose-finger  bilaterally.  He has no limb dysdiadochokinesia of the upper extremity on the pronation/supination test and screwing in a light bulb or lower extremity during tapping ball of each foot bilaterally.  He has a visible tremor. Comment: B/L;  He has a kinetic tremor.  He has a postural tremor. Comment: B/L, R>L, Mild;  He has no resting tremor bilaterally.  He has no evidence of interhemispheric motor control deficits.  He has no motor overflow bilaterally.  The patient's bilateral upper extremity coordination with finger tapping, pronation/supination, and the open-close fist showed no slowing, no hypometria, and no dysrhythmia inconsistent with bradykinesia.  The patient's bilateral upper extremity coordination with finger tapping, pronation/supination, and the open-close fist showed slowing.  The patient's bilateral upper extremity coordination with finger tapping, pronation/supination, and the open-close fist showed hypometria.  The patient's bilateral upper extremity coordination with finger tapping, pronation/supination, and the open-close fist showed dysrhythmia.  Higher Cortical Function:   The patient showed no evidence of simultanagnosia (Navon hierarchical letters).  The patient showed no evidence of visuospatial constructional dysfunction.  The patient showed no evidence of apraxia.  Sensory:   His cortical sensory assessment demonstrated no neglect bilaterally.  His sensation was diminished to light touch, and vibratory sense. Comment: in the bilateral upper and lower extremities in a length-dependant pattern.; in the bilateral upper and lower extremities in a length-dependant pattern.  His sensation was impaired to temperature/pinprick. Comment: in the bilateral upper and lower extremities. ; in the bilateral upper and lower extremities.  Reflexes:   Reflexes were abnormal. Comment: decreased throughout;  Gait:   He has normal posture sitting unaided.  He is unable to rise from a chair and sit back down  without using their arms.  His gait was abnormal.  His gait initiation/inhibition was normal.  His stance while walking is abnormal.  His gait speed was abnormal (70-80 F 1.13 m/s M 1.26 m/s, >80 F 0.94 m/sec, M 0.97 m/sec). Comment: slow;  When attempting to walk abnormally (heels, tiptoes, tandem), he makes no errors.  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  Moderate Visuospatial Impairment: visuospatial construction.  Mild Executive Impairment: He scored >1.5 standard deviations below the norm on at least one measure. He had difficulty with lexical fluency, working memory.  Very Mild Attention Impairment: orientation.  MMSE 28/30: MMSE Score suggestive of normal to questionable cognitive impairment.  MOCA 22/30: MOCA Score suggestive of mild cognitive impairment.  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  MOCA 22/30: MOCA Score suggestive of mild cognitive impairment.  Neurocognitive/Neurobehavioral Evaluation completed on 2024-04-30    Neuropsychiatric/Behavioral Focused Evaluation Assessment    BEHAV5+ 2/6 See ROS section for a full description   Laboratories:     Lab Date Value [Reference]   Neurodegenerative Cerebrospinal Fluid Assessment           Abeta40 2024, Jan-03    273 [pg/mL]      Abeta42 2024, Jan-03    45 [pg/mL]      Phospho-Tau (181P) 2024, Jan-03    0.59 [0.00 - 0.95 pg/mL]      Coagulopathy Screening   Antithrombin III 2023, Dec-07    118 [83 - 118 %]      aPTT 2022, Jul-07    25.3 (L) (E) [26.2 - 37.2 Seconds]      D-Dimer 2022, Jul-18    <0.19 [<0.50 mg/L FEU]      PLATELET NEUTRALIZATION APTT 2023, Dec-07    Not Applicable      Protein C Activity 2023, Dec-07    141 [70 - 150 %]      Protein  S Activity 2023, Dec-07    149 [65 - 150 %]      Protime 2022, Jul-18    17.2 (H) (E) [9.8 - 13.3 Seconds]      PTT Heparin Neutralized 2023, Dec-07    Not Applicable [32 - 48 sec]      PTT-LA Mix 2023, Dec-07    Not Applicable [32 - 48 sec]      Reptilase Time 2023, Dec-07    Not Applicable [<=21.9 sec]      Thrombin Time 2023, Dec-07    Not Applicable [14.7 - 19.5 sec]      Autoimmune/Paraneoplastic Screening   APA Isotype IgM 2023, Dec-07    <9.40 [0.00 - 12.49 MPL]      Beta-2 Glyco 1 IgA 2023, Dec-07    4.6 [<7.0 U/mL]      Beta-2 Glyco 1 IgG 2023, Dec-07    12.0 (H) [<7.0 U/mL]      Beta-2 Glyco 1 IgM 2023, Dec-07    <2.4 [<7.0 U/mL]      dRVVT Confirm 2023, Dec-07    Not Applicable [Negative ratio]      dRVVT Incubated 1:1 Mix 2023, Dec-07    Not Applicable [33 - 44 sec]      DRVVT Screen 2023, Dec-07    35 [33 - 44 sec]      PT Lupus Anticoagulant 2023, Dec-07    12.9 [12.0 - 15.5 sec]      PTT Lupus Anticoagulant 2023, Dec-07    31 (L) [32 - 48 sec]      Sed Rate 2022, Jul-18    18 (H) [0 - 10 mm/Hr]      Myopathy/Myalgia   CPK 2022, Jul-18    63      Neurodegenerative Skin Protein Biomarkers   Distal Leg 2024, Mar-15    Normal No phosphorylated alpha-synuclein deposition observed in stained sections. [Normal]      Distal Thigh 2024, Mar-15    Abnormal Two or more colocalized fibers seen across all stained sections. [Normal]      Posterior Cervical 2024, Mar-15    Normal No phosphorylated alpha-synuclein deposition observed in stained sections. [Normal]      Metabolic Screening   Hemoglobin A1C External 01/03/2024  7.2 (H) [4.0 - 5.6 %]      Methlymalonic Acid 01/03/2024  0.18 [<0.40 umol/L]      T4 Total 01/03/2024  9.0 [4.5 - 11.5 ug/dL]      TSH 07/07/2022  1.711 [0.400 - 4.000 uIU/mL]      Glucose 2024, Jan-03    140 (H) [70 - 110 mg/dL]      Albumin 2024, Jan-03    4.4 [3.5 - 5.2 g/dL]      ALT 2024, Jan-03    42 [10 - 44 U/L]      AST 2024, Jan-03    30 [10 - 40 U/L]      BILIRUBIN TOTAL 2024,  Jan-03    0.5 [0.1 - 1.0 mg/dL]      PROTEIN TOTAL 2024, Jan-03    8.3 [6.0 - 8.4 g/dL]      HDL 2022, Jul-07    47 (E) [35 - 70 mg/dL]      Triglycerides 01/03/2024  139 (E) [40 - 160 mg/dL]      B12 Def. Methylmalonic Acid 01/03/2024  0.16 [<=0.40 nmol/mL]      Folate 01/03/2024  6.8 [4.0 - 24.0 ng/mL]      Thiamine 07/07/2022  102 [38 - 122 ug/L]      Vitamin B-12 2022, Jul-07    708 [210 - 950 pg/mL]      Cerebrospinal Fluid Assessment   IgG 01/03/2024  1027 [650 - 1600 mg/dL]      Neuroendocrine/Electrolyte Screening   Magnesium 2024, Jan-03    2.0 [1.6 - 2.6 mg/dL]      PTH 2023, Dec-07    41.1 [9.0 - 77.0 pg/mL]      BUN 2024, Jan-03    33 (H) [6 - 20 mg/dL]      Chloride 2024, Jan-03    103 [95 - 110 mmol/L]      Creatinine 2024, Jan-03    0.9 [0.5 - 1.4 mg/dL]      Potassium 2024, Jan-03    4.1 [3.5 - 5.1 mmol/L]      Sodium 2024, Jan-03    139 [136 - 145 mmol/L]      Neurodegenerative Serum Fluid Assessment   NEUROFILAMENT LIGHT CHAIN, PLASMA 2024, Jan-03    14.9 [<=20.8 pg/mL]      Standard Hematology Screen   Hematocrit 2024, Jan-03    48.6 [40.0 - 54.0 %]      Hemoglobin 2024, Jan-03    15.7 [14.0 - 18.0 g/dL]      MCV 07/07/2022  87 [82 - 98 fL]      Infectious Disease/Immunocompromised Screening   RPR 01/03/2024  Non-reactive      Syphilis Treponemal Ab 2024, Jan-03    Nonreactive [Nonreactive ]           Neuroimaging:    MRI brain/head without contrast on 1/18/2024  Formal interpretation by Radiology:  Age-appropriate generalized cerebral volume loss with mild chronic microvascular ischemic disease with a remote lacunar-type infarction in the right cerebellar hemisphere. There is no evidence for an acute infarction or intracranial hemorrhage.  Independently reviewed radiological imaging by Juan Long MD. MPH. Behavioral Neurologist  T1: Mild generalized cortical atrophy posterior>frontal, dorsal>ventral, lateral>medial without a clear degenerative patterning.  T2/FLAIR: No Significant  hyperintensities appreciated on MRI T2/FLAIR  DWI/ADC: No Significant DWI hyperintensities/hypointensities. No ADC correlation.  SWI/GRE: No Significant hypointensities to suggest cortical/subcortical hemosiderin deposition.  Impression: : Normal Brain Imaging. No significant cortical/subcortical atrophy. No significant T2/FLAIR hyperintensities, DWI/ADC/SWI abnormalities    MRI brain/head without contrast on 1/18/2024  Formal interpretation by Radiology:  Age-appropriate generalized cerebral volume loss with mild chronic microvascular ischemic disease with a remote lacunar-type infarction in the right cerebellar hemisphere. There is no evidence for an acute infarction or intracranial hemorrhage.  Independently reviewed radiological imaging by Juan Long MD. MPH. Behavioral Neurologist  T1: Unchanged since early 2023 - Mild generalized cortical atrophy posterior>frontal, dorsal>ventral, lateral>medial without a clear degenerative patterning. Intact corpus callosum with normal volume ratio. intact midbrain and brainstem normal volume and ratio. Intact hippocampal structures, volume ratio  T2/FLAIR: Unchanged since early 2023- scattered subcortical microvascular disease. Age infarcts in the bilateral cerebellum. Right greater than left. Otherwise stable. Mild parahippocampal stippling mild anterior periventricular capping.  DWI/ADC: No Significant DWI hyperintensities/hypointensities. No ADC correlation.  SWI/GRE: No Significant hypointensities to suggest cortical/subcortical hemosiderin deposition.  Impression: : Unchanged since early 2023- Mild generalized cortical atrophy posterior>frontal, dorsal>ventral, lateral>medial without a clear degenerative patterning. age indeterminate right cerebellar infarct with mild microvascular disease    MRI brain/head without contrast on 6/26/2023  Formal interpretation by Radiology:  Two small areas of remote infarction are seen in the right cerebellar hemisphere. No new  infarction is seen. Stable focus of FLAIR signal abnormality in the left posterior frontal white matter, unchanged from prior study likely related to chronic microvascular ischemic disease.  Independently reviewed radiological imaging by Juan Long MD. MPH. Behavioral Neurologist  T1: Mild generalized cortical atrophy posterior>frontal, dorsal>ventral, lateral>medial without a clear degenerative patterning. Intact corpus callosum with normal volume ratio. intact midbrain and brainstem normal volume and ratio. Intact hippocampal structures, volume ratio  T2/FLAIR: scattered subcortical microvascular disease. Age infarcts in the bilateral cerebellum. Right greater than left. Otherwise stable. Mild parahippocampal stippling mild anterior periventricular capping.  DWI/ADC: No Significant DWI hyperintensities/hypointensities. No ADC correlation.  SWI/GRE: No Significant hypointensities to suggest cortical/subcortical hemosiderin deposition.  Impression: : Mild generalized cortical atrophy posterior>frontal, dorsal>ventral, lateral>medial without a clear degenerative patterning. age indeterminate right cerebellar infarct with mild microvascular disease     Procedures:    Electroencephalogram on 1/18/2024  Formal interpretation:  During the maximally alert state, the posterior dominant rhythm consisted of moderate irregular 9-10 Hz activity which was seen symmetrically over the occipital, parietal and posterior temporal regions. The PDR was reactive to eye opening and attenuated but returned with eye closure. No abnormal activity was noted with eye closure. A moderate amplitude somewhat irregular mid-range beta activity was present and had a symmetrical distribution over both hemispheres. Drowsiness and sleep were not recorded. No lateralized nor focal features were present and no spike or sharp waves were recorded.  Independently reviewed Electroencephalogram by Juan Long MD. MPH. Behavioral  Neurologist  Impression: : No evidence of epileptic abnormalities    Electrocardiogram on 10/21/2023  Formal interpretation:  Vent. Rate : 099 BPM Atrial Rate : 099 BPM P-R Int : 172 ms QRS Dur : 080 ms QT Int : 328 ms P-R-T Axes : 050 105 015 degrees QTc Int : 420 ms Normal sinus rhythm Rightward axis Borderline Abnormal ECG  Independently reviewed Electrocardiogram by Juan Long MD. MPH. Behavioral Neurologist  Impression: : Received ECG has no evidence of sinus node disease. HR (>=50-60). Prolonged MN interval (>0.22 s). Broad QRS complex (> 0.12 s).    Electrocardiogram on 10/21/2023  Formal interpretation:  Vent. Rate : 099 BPM Atrial Rate : 099 BPM P-R Int : 172 ms QRS Dur : 080 ms QT Int : 328 ms P-R-T Axes : 050 105 015 degrees QTc Int : 420 ms Normal sinus rhythm Rightward axis Borderline Abnormal ECG  Independently reviewed Electrocardiogram by Juan Long MD. MPH. Behavioral Neurologist  Impression: : Received ECG has no evidence of sinus node disease. HR (>=50-60). Prolonged MN interval (>0.22 s). Broad QRS complex (> 0.12 s).     Clinical Summary:     The patient is a 54-year-old right-handed male with a relevant past medical history of CLAUDIA, DM2 onset 30s, CVD, CAD, MD, HLD, HTN, JEANNINE, who presents reporting a 2-year history of step-wise progressive neurocognitive impairment.       The clinical history is suggestive of:  Memory Impairment: STM encoding impairment, LTM encoding-retrieval impairment  Attention Impairment: Attention, Sustained attention, Shifting attention  Executive Impairment: Energization, Working Memory  Language Impairment: Language Dysfunction, Semantic Dysfunction  Motor/Coordination Impairment: Sensory motor integration, Central pattern generators dysfunction, Cortical-Striatal Loop Dysfunction  Sensory Impairment: Sensory Deprivation  Behavior Impairment: Response Inhibition, Emotional Regulation  Psychiatric Impairment: Signal-Noise Dysregulation,  Hallucinations  Medical Review of Systems Impairment: Limbic Dysfunction  iADL Impairment: Lois Instrumental Activities of Daily Living Scale  The neurological examination is significant for:  Executive Impairment: thought disorder  Movement Disorder (Gait): strength (difficulty rising), abnormal features (stance, speed)  Movement Disorder (Hyperkinetic): myoclonus, tremor (kinetic, postural)  Movement Disorder (Hypokinetic): paratonia (tone)  Pyramidal Impairment: abnormal reflexes  Sensory Dysfunction: hearing (diminished), peripheral (A? 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  MOCA 22/30: MOCA Score suggestive of mild cognitive impairment.  BEHAV5+ 2/6: See ROS section for a full description  The neurologically relevant imaging is significant for  MRI brain/head without contrast (1/18/2024): Normal Brain Imaging. No significant cortical/subcortical atrophy. No significant T2/FLAIR hyperintensities, DWI/ADC/SWI abnormalities  MRI brain/head without contrast (1/18/2024): Unchanged since early 2023- Mild generalized cortical atrophy posterior>frontal, dorsal>ventral, lateral>medial without a clear degenerative patterning. 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      The patient's clinical presentation meets the criteria for Mild Cognitive Impairment (MCI-ADRC) (Jose MS, et al. 2011 Alzheimer's & Dementia).     Concern regarding an intraindividual change in cognition  Impairment in one or more cognitive domains  Preservation of independence in functional abilities.  Not demented     The patient's initial clinical syndrome was suggestive of Vascular contributions to cognitive impairment (Shubham et al., Neurology 1993).  Fluctuating, stepwise progression of cognitive deficits.  Personality and mood changes, abulia, depression, emotional incontinence, or other subcortical deficits including psychomotor retardation and abnormal executive function.     However over the last year between 2023 to 2024 clinical presentation is increasingly concerning for early signs of Dementia with Lewy Bodies (DLB)   Prominent or persistent memory impairment may not necessarily occur in the early stages but is usually evident with progression.  Deficits on tests of attention, executive function, and visuospatial ability may be especially prominent.  Fluctuating cognition with pronounced variations in attention and alertness.  Recurrent visual hallucinations.  Spontaneous motor features of parkinsonism.  REM sleep behavior disorder.     There is a high probability that the patient's clinical presentation is indicative of early signs of an alpha-synuclein mediated disease. This condition may have been provoked by hypoxemia and metabolic deficits, exacerbating cortical hyperexcitability in the posterior hemisphere, manifested as cortical myoclonus. 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  At present, all neurodegenerative diseases can only be diagnosed with 100% certainty through a brain autopsy. The suspected neuropathology underlying the patient's neurocognitive impairment is likely a mixture of pathologies (Lewy body  disease/alpha-synucleinopathy, Vascular Contributions to Cognitive Impairment and Dementia).  Plasma Protein Biomarkers: [01/03/2024] Neurofilament Light Chain (Nfl): 14.9.  Cerebrospinal Fluid Protein Biomarkers: [01/03/2024] Abeta42: 45. Phospho-Tau (181P): 0.59.  [facility] Skin Biomarkers: [03/15/2024] Syn-One PC: 0 (Negative). Syn-One DT: 2 (Positive). Syn-One DL: 0 (Negative).  There is no relevant genetic biomarkers available on record.     The patient presents with a complex two-year history of stepwise progressive neurocognitive disorder, compounded by multiple metabolic and physiological stressors. Increasing psychosocial stressors have been reported, exacerbating the neurocognitive disorder, particularly following cardiac complications in 2022 to 2023, which led to a hypoxemic state and subsequent cortical myoclonus. In the context of the post-hypoxic state and cortical myoclonus, the patient has experienced increasing insomnia and new signs and symptoms consistent with early signs of Lewy body disease-related processes. These include increasing REM sleep behavior disorder, insomnia, vivid and well-formed nocturnal hallucinations, and increasing gait instability, alongside alpha-synuclein positive testing on skin biopsy. Cortical hyperexcitability has shown responsiveness to low-dose Keppra. To address the emerging symptoms, it is recommended to focus on sleep prophylaxis for hallucinations. Therefore, initiating Belsomra 10 mg q.h.s. and cross-tapering with Seroquel 25 mg q.h.s. after one month, followed by discontinuation of Seroquel, to evaluate if Belsomra alone is sufficient.Following stabilization of sleep, consideration will be given to L-dopa therapy for the early signs of an alpha-synuclein process.     The observations made above, were discussed with the patient and their supporting historian(s) (wife). We have discussed the additional diagnostic(s) and/or managenent below.     Care  Management Plan:    #Optimize Neurocognitive Impairment and Quality  We have discussed the MIND Diet and other lifestyle behavior that may help maintain brain health.  We have provided written/digital reading material  No indication for donepezil at this time may consider a later date however patient is not interested in symptomatic medications  #Optimize Behavioral Management and Quality.  No indication for memantine at this time  Continue Zoloft 100 mg daily  #Optimize Sleep Hygiene and Quality  We discussed and recommended additional diagnostic/management of sleep disorder to optimize brain health and longevity.  f/u sleep medicine specialist for symptoms consistent with sleep apnea  Start Belsomra 10 mg q.h.s.  Start Seroquel 25 mg q.h.s. to cross taper with Belsomra. After 1 month on Belsomra recommend stopping Seroquel to confirm no reemergence of hallucinations  #Optimize Movement Disorder and Quality.  Continue low-dose Keppra 250 mg q.h.s. 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  f/u Endocrinology for evaluation early-onset diabetes in his 30s with a family history of multiple relatives with early onset diabetes with multiple medical complications - consider alternatives to Ozempic due to stepwise progressive decline since starting in mid 2022  f/u hematology  #Optimize Movement Disorder and Quality.  Next appointment consider starting L-dopa therapy for parkinsonism  #Behavioral/Environmental Strategies  We recommend engaging in activities that stimulate cognitively and socially while avoiding excessive stimulation and fatigue in overwhelmingly complex situations.  We recommend integrating routine and schedule into your daily life. https://www.alzheimersproject.org/news/the-importance-of-routine-and-familiarity-to-persons-with-dementia/  #Health Maintenance/Lifestyle Advice  We have discussed the value in aggressively controlling vascular risk factors like  "hypertension, hyperlipidemia, and Diabetes SBP<130, LDL<100, A1C<7.0.  We discussed the need to optimize lifestyle choices including a heart-healthy diet (e.g., Mediterranean or DASH), increased cardiovascular exercise (goal 150 minutes of moderate-intensity per week), and stay cognitively and socially active.  We recommend the MIND diet, a combination of two healthy diets: the Mediterranean diet and the DASH (Dietary Approaches to Stop Hypertension) diet, and includes a variety of brain-friendly foods to optimize cognitive health and longevity.  #Support  We all need support sometimes. Get easy access to local resources, community programs, and services. https://www.communityresourcefinder.org/  Learn more about Cognitive Impairment in [place]: https://www.alz.org/professionals/public-health/state-overview/[place]  #Safety  The Alzheimer's Association administers the nationwide "Safe Return" program with identification bracelets, necklaces, or clothing tags and 24-hour assistance. More information is available online at https://www.alz.org/help-support/caregiving/safety/medicalert-with-24-7-wandering-support  #Follow up:  Follow-up as needed.    Thank you for allowing us to participate in the care of your patient. Please do not hesitate to contact us with any questions or concerns.     It was a pleasure seeing The patient and we look forward to seeing them at their follow-up visit.     This note is dictated on M*Modal Fluency Direct word recognition program. There are word recognition mistakes that are occasionally missed on review.       Scheduled Follow-up :  Future Appointments   Date Time Provider Department Center   5/14/2024  7:30 AM STA NM1 [facility]. [facility]   6/18/2024  9:00 AM Froylan Marie III, MD [facility] PSYCH [place] [facility]   7/10/2024  3:30 PM Mj Vincent MD [facility] ENDOCR [facility]       After Visit Medication List :     Medication List            Accurate as of April 30, 2024 10:40 " AM. If you have any questions, ask your nurse or doctor.                CONTINUE taking these medications      aspirin 81 MG EC tablet  Commonly known as: ECOTRIN     atorvastatin 40 MG tablet  Commonly known as: LIPITOR     gabapentin 300 MG capsule  Commonly known as: NEURONTIN  Take 1 capsule (300 mg total) by mouth every evening.     isosorbide mononitrate 30 MG 24 hr tablet  Commonly known as: IMDUR  Take 1 tablet orally once a day.     levETIRAcetam 250 MG Tab  Commonly known as: KEPPRA  Take 1 tablet (250 mg total) by mouth every evening.     metoprolol succinate 100 MG 24 hr tablet  Commonly known as: TOPROL-XL     MOUNJARO 15 mg/0.5 mL Pnij  Generic drug: tirzepatide  Inject 15 mg into the skin every 7 days.     * nitroGLYCERIN 0.4 MG SL tablet  Commonly known as: NITROSTAT     * nitroGLYCERIN 0.4 MG SL tablet  Commonly known as: NITROSTAT  PLACE 1 TABLET UNDER TONGUE EVERY 5 MINUTES FOR CHEST PAIN. GO TO ER IF CHEST PAIN PERSISTS AFTER 3 DOSES.     * pantoprazole 40 MG tablet  Commonly known as: PROTONIX  Take 1 tablet (40 mg total) by mouth once daily.     * pantoprazole 40 MG tablet  Commonly known as: PROTONIX  Take 1 tablet orally once a day     * REPATHA SURECLICK 140 mg/mL Pnij  Generic drug: evolocumab  Inject 1 pen under the skin once every 2 weeks.     * REPATHA SURECLICK 140 mg/mL Pnij  Generic drug: evolocumab  INJECT ONE PEN INTO THE SKIN EVERY 2 WEEKS     rimegepant 75 mg odt  Take 1 tablet (75 mg total) by mouth daily as needed for Migraine. Place ODT tablet on the tongue; alternatively the ODT tablet may be placed under the tongue     sertraline 100 MG tablet  Commonly known as: ZOLOFT  Take 2 tablets (200 mg total) by mouth once daily.     SYNJARDY XR 25-1,000 mg Tbph  Generic drug: empagliflozin-metformin  Take 1 tablet by mouth Daily.           * This list has 6 medication(s) that are the same as other medications prescribed for you. Read the directions carefully, and ask your doctor or  other care provider to review them with you.                  Signing Physician:  Juan Espinosa MD    Billing:       -----------------------------------------------------------------------------  I performed this consultation using real-time Telehealth tools, including a live video connection between my location and the patient's location (their home within the [facility]). Prior to initiating the consultation, I obtained informed verbal consent to perform this consultation using Telehealth tools and answered all the questions about the Telehealth interaction. The participants understand that only a limited neurological exam and limited neuropsychological testing can be performed using Telehealth tools.  I spent a total of 45 minutes (time-in: 10:15 AM; time-out: 11:00 AM) on 2024-04-30, virtually face-to-face with the patient and caregiver(s), >50% of that time was spent counseling regarding the symptoms, treatment plan, risks, therapeutic options, lifestyle modifications, and/or safety issues for the diagnoses above.  10/14 Review of Systems completed and is negative except as stated above in HPI (Systems reviewed: Const, Eyes, ENT, Resp, CV, GI, , MSK, Skin, Neuro)  I reviewed previous labs for a total of 10 minutes on 2024-04-30. This is directly related to the face-to-face encounter. Review of previous labs was performed all negative except as stated above in HPI  I performed a neurobehavioral status examination that included a clinical assessment of thinking, reasoning, and judgment to ensure a comprehensive approach in managing the complex and evolving needs of the patient's neurocognitive condition. Please see above HPI and ROS for full details. This exam was performed on 2024-04-30 and included 12 minutes spent on direct face-to-face clinical observation and interview with the patient and 19 minutes spent interpreting test results and preparing the report. The total time of 31 minutes spent on  the neurobehavioral status examination is not included in the time spent on evaluation and management coding.  Total Billing time spent on encounter/documentation for this patient's evaluation and management, not including the neurobehavioral status examination: 43 minutes.

## 2024-04-30 NOTE — TELEPHONE ENCOUNTER
Spoke to pt per providers request to notify that Dr. Espinosa will be around 25 minutes behind getting to his appointment. Pt is okay with waiting.

## 2024-05-01 RX ORDER — GABAPENTIN 300 MG/1
600 CAPSULE ORAL NIGHTLY
Qty: 180 CAPSULE | Refills: 1 | Status: ON HOLD | OUTPATIENT
Start: 2024-05-01

## 2024-05-02 NOTE — TELEPHONE ENCOUNTER
Called the pt to advise that his messages have been forwarded to Dr. Espinosa to advise on and stated that he would be out of office until next week and to allow him some time to get back to me on how to further advise. Pt stated that it is fine and he will be sending another message momentarily about questions his eye doctor has.

## 2024-05-13 ENCOUNTER — PATIENT MESSAGE (OUTPATIENT)
Dept: ADMINISTRATIVE | Facility: OTHER | Age: 54
End: 2024-05-13
Payer: COMMERCIAL

## 2024-05-14 ENCOUNTER — HOSPITAL ENCOUNTER (OUTPATIENT)
Dept: RADIOLOGY | Facility: HOSPITAL | Age: 54
Discharge: HOME | End: 2024-05-14
Attending: FAMILY MEDICINE
Payer: COMMERCIAL

## 2024-05-14 VITALS — BODY MASS INDEX: 27.32 KG/M2 | WEIGHT: 185 LBS

## 2024-05-14 DIAGNOSIS — K80.42 CALCULUS OF BILE DUCT WITH ACUTE CHOLECYSTITIS WITHOUT OBSTRUCTION: ICD-10-CM

## 2024-05-14 DIAGNOSIS — R10.13 EPIGASTRIC PAIN: ICD-10-CM

## 2024-05-14 PROCEDURE — 78227 HEPATOBIL SYST IMAGE W/DRUG: CPT | Mod: TC

## 2024-05-14 PROCEDURE — 78227 HEPATOBIL SYST IMAGE W/DRUG: CPT | Mod: 26,,, | Performed by: RADIOLOGY

## 2024-05-14 PROCEDURE — A9537 TC99M MEBROFENIN: HCPCS | Performed by: FAMILY MEDICINE

## 2024-05-14 RX ORDER — SINCALIDE 5 UG/5ML
0.02 INJECTION, POWDER, LYOPHILIZED, FOR SOLUTION INTRAVENOUS ONCE
Status: DISCONTINUED | OUTPATIENT
Start: 2024-05-14 | End: 2024-05-15 | Stop reason: HOSPADM

## 2024-05-14 RX ORDER — KIT FOR THE PREPARATION OF TECHNETIUM TC 99M MEBROFENIN 45 MG/10ML
5.2 INJECTION, POWDER, LYOPHILIZED, FOR SOLUTION INTRAVENOUS
Status: COMPLETED | OUTPATIENT
Start: 2024-05-14 | End: 2024-05-14

## 2024-05-14 RX ADMIN — MEBROFENIN 5.2 MILLICURIE: 45 INJECTION, POWDER, LYOPHILIZED, FOR SOLUTION INTRAVENOUS at 08:05

## 2024-05-15 ENCOUNTER — LAB VISIT (OUTPATIENT)
Dept: LAB | Facility: HOSPITAL | Age: 54
End: 2024-05-15
Attending: INTERNAL MEDICINE
Payer: COMMERCIAL

## 2024-05-15 DIAGNOSIS — E11.311 TYPE 2 DIABETES MELLITUS WITH RETINOPATHY AND MACULAR EDEMA, WITHOUT LONG-TERM CURRENT USE OF INSULIN, UNSPECIFIED LATERALITY, UNSPECIFIED RETINOPATHY SEVERITY: ICD-10-CM

## 2024-05-15 LAB
ESTIMATED AVG GLUCOSE: 140 MG/DL (ref 68–131)
HBA1C MFR BLD: 6.5 % (ref 4–5.6)

## 2024-05-15 PROCEDURE — 83036 HEMOGLOBIN GLYCOSYLATED A1C: CPT | Performed by: FAMILY MEDICINE

## 2024-05-15 PROCEDURE — 36415 COLL VENOUS BLD VENIPUNCTURE: CPT | Performed by: FAMILY MEDICINE

## 2024-05-16 ENCOUNTER — TELEPHONE (OUTPATIENT)
Dept: FAMILY MEDICINE | Facility: CLINIC | Age: 54
End: 2024-05-16
Payer: COMMERCIAL

## 2024-05-16 ENCOUNTER — PATIENT MESSAGE (OUTPATIENT)
Dept: FAMILY MEDICINE | Facility: CLINIC | Age: 54
End: 2024-05-16
Payer: COMMERCIAL

## 2024-05-16 DIAGNOSIS — K82.8 DYSFUNCTIONAL GALLBLADDER: Primary | ICD-10-CM

## 2024-05-16 NOTE — TELEPHONE ENCOUNTER
----- Message from Keira Wheat MD sent at 5/15/2024 12:48 PM CDT -----  He definitely has a poor functioning gallbladder.  He needs to be seen by a general surgeon for evaluation. Because of his heart, he is of course higher risk for even a low risk procedure, so we all need to discuss his options. Please find out if he has a specific general surgeon that he wants me to refer him to.  Mh  NM Hepatobiliary (HIDA) W Pharm and EF When Performed

## 2024-05-21 ENCOUNTER — TELEPHONE (OUTPATIENT)
Dept: NEUROLOGY | Facility: CLINIC | Age: 54
End: 2024-05-21
Payer: COMMERCIAL

## 2024-05-21 NOTE — TELEPHONE ENCOUNTER
FW: Appointment Request   Juan Espinosa MD   Sent: Mon May 20, 2024  4:26 PM   To: VANESSA ZAMAN Staff      Abelcassy Hackett   MRN: [id number] : 1970   Pt Home: [phone]     Entered: [phone]        Message    This is a bit of a complicated one   Schedule with me   ----- Message -----   From: Shawna Kang MA   Sent: 2024   4:11 PM CDT   To: Juan Espinosa MD   Subject: FW: Appointment Request                            Just running by you first, 3 mo follow up? And with you or Seda?   ----- Message -----   From: Rosa Adler MA   Sent: 2024   3:00 PM CDT   To: Francisca ZAMAN Staff   Subject: FW: Appointment Request                            Hi All,      This patient would like to schedule their 3MO follow up with Francisca. Last seen in .      Thanks!   May   ----- Message -----   From: Abel Hackett   Sent: 5/15/2024   3:54 PM CDT   To: *   Subject: Appointment Request                                Appointment Request From: Abel Hackett      With Provider: Juan Espinosa [[facility]]      Preferred Date Range: 2024 - 2024      Preferred Times: Any Time      Reason for visit: 3 month F/U      Comments:   F/u

## 2024-05-22 ENCOUNTER — TELEPHONE (OUTPATIENT)
Dept: NEUROLOGY | Facility: CLINIC | Age: 54
End: 2024-05-22
Payer: COMMERCIAL

## 2024-05-22 PROBLEM — K82.8 BILIARY DYSKINESIA: Status: ACTIVE | Noted: 2024-05-22

## 2024-05-22 NOTE — TELEPHONE ENCOUNTER
----- Message from Nimisha Anderson sent at 5/22/2024  1:02 PM CDT -----  Regarding: pt advice  Contact: pt [phone]  Pt returning call from missed call, pls call

## 2024-06-13 ENCOUNTER — ANESTHESIA (OUTPATIENT)
Dept: SURGERY | Facility: HOSPITAL | Age: 54
End: 2024-06-13
Payer: COMMERCIAL

## 2024-06-13 ENCOUNTER — HOSPITAL ENCOUNTER (OUTPATIENT)
Facility: HOSPITAL | Age: 54
Discharge: HOME | End: 2024-06-13
Attending: SURGERY | Admitting: SURGERY
Payer: COMMERCIAL

## 2024-06-13 ENCOUNTER — ANESTHESIA EVENT (OUTPATIENT)
Dept: SURGERY | Facility: HOSPITAL | Age: 54
End: 2024-06-13
Payer: COMMERCIAL

## 2024-06-13 VITALS
RESPIRATION RATE: 18 BRPM | OXYGEN SATURATION: 96 % | TEMPERATURE: 98 F | DIASTOLIC BLOOD PRESSURE: 83 MMHG | HEART RATE: 96 BPM | SYSTOLIC BLOOD PRESSURE: 147 MMHG

## 2024-06-13 DIAGNOSIS — K82.8 BILIARY DYSKINESIA: Primary | ICD-10-CM

## 2024-06-13 LAB — GLUCOSE SERPL-MCNC: 126 MG/DL (ref 70–110)

## 2024-06-13 PROCEDURE — 36000709 HC OR TIME LEV III EA ADD 15 MIN: Performed by: SURGERY

## 2024-06-13 PROCEDURE — 71000033 HC RECOVERY, INTIAL HOUR: Performed by: SURGERY

## 2024-06-13 PROCEDURE — D9220AH HC ANESTHESIA PROFESSIONAL FEE: Mod: QZ,P4 | Performed by: NURSE ANESTHETIST, CERTIFIED REGISTERED

## 2024-06-13 PROCEDURE — 63600175 PHARM REV CODE 636 W HCPCS: Performed by: SURGERY

## 2024-06-13 PROCEDURE — 36000708 HC OR TIME LEV III 1ST 15 MIN: Performed by: SURGERY

## 2024-06-13 PROCEDURE — 25000003 PHARM REV CODE 250: Performed by: SURGERY

## 2024-06-13 PROCEDURE — 25000003 PHARM REV CODE 250

## 2024-06-13 PROCEDURE — 82962 GLUCOSE BLOOD TEST: CPT | Performed by: SURGERY

## 2024-06-13 PROCEDURE — 27201423 OPTIME MED/SURG SUP & DEVICES STERILE SUPPLY: Performed by: SURGERY

## 2024-06-13 PROCEDURE — 63600175 PHARM REV CODE 636 W HCPCS

## 2024-06-13 PROCEDURE — 63600175 PHARM REV CODE 636 W HCPCS: Performed by: NURSE ANESTHETIST, CERTIFIED REGISTERED

## 2024-06-13 PROCEDURE — 71000039 HC RECOVERY, EACH ADD'L HOUR: Performed by: SURGERY

## 2024-06-13 PROCEDURE — 37000008 HC ANESTHESIA 1ST 15 MINUTES: Performed by: SURGERY

## 2024-06-13 PROCEDURE — 88304 TISSUE EXAM BY PATHOLOGIST: CPT | Mod: 26,,, | Performed by: PATHOLOGY

## 2024-06-13 PROCEDURE — 88304 TISSUE EXAM BY PATHOLOGIST: CPT | Performed by: PATHOLOGY

## 2024-06-13 PROCEDURE — 37000009 HC ANESTHESIA EA ADD 15 MINS: Performed by: SURGERY

## 2024-06-13 PROCEDURE — 00790 ANES IPER UPR ABD NOS: CPT | Mod: QZ,P4 | Performed by: NURSE ANESTHETIST, CERTIFIED REGISTERED

## 2024-06-13 RX ORDER — ACETAMINOPHEN 10 MG/ML
INJECTION, SOLUTION INTRAVENOUS
Status: DISCONTINUED | OUTPATIENT
Start: 2024-06-13 | End: 2024-06-13

## 2024-06-13 RX ORDER — FENTANYL CITRATE 50 UG/ML
INJECTION, SOLUTION INTRAMUSCULAR; INTRAVENOUS
Status: DISCONTINUED | OUTPATIENT
Start: 2024-06-13 | End: 2024-06-13

## 2024-06-13 RX ORDER — HYDROMORPHONE HYDROCHLORIDE 2 MG/ML
INJECTION, SOLUTION INTRAMUSCULAR; INTRAVENOUS; SUBCUTANEOUS
Status: DISCONTINUED | OUTPATIENT
Start: 2024-06-13 | End: 2024-06-13

## 2024-06-13 RX ORDER — PROPOFOL 10 MG/ML
VIAL (ML) INTRAVENOUS
Status: DISCONTINUED | OUTPATIENT
Start: 2024-06-13 | End: 2024-06-13

## 2024-06-13 RX ORDER — ONDANSETRON HYDROCHLORIDE 2 MG/ML
INJECTION, SOLUTION INTRAVENOUS
Status: DISCONTINUED | OUTPATIENT
Start: 2024-06-13 | End: 2024-06-13

## 2024-06-13 RX ORDER — MIDAZOLAM HYDROCHLORIDE 1 MG/ML
INJECTION INTRAMUSCULAR; INTRAVENOUS
Status: DISCONTINUED | OUTPATIENT
Start: 2024-06-13 | End: 2024-06-13

## 2024-06-13 RX ORDER — ROCURONIUM BROMIDE 10 MG/ML
INJECTION, SOLUTION INTRAVENOUS
Status: DISCONTINUED | OUTPATIENT
Start: 2024-06-13 | End: 2024-06-13

## 2024-06-13 RX ORDER — PHENYLEPHRINE HYDROCHLORIDE 10 MG/ML
INJECTION INTRAVENOUS
Status: DISCONTINUED | OUTPATIENT
Start: 2024-06-13 | End: 2024-06-13

## 2024-06-13 RX ORDER — KETOROLAC TROMETHAMINE 30 MG/ML
INJECTION, SOLUTION INTRAMUSCULAR; INTRAVENOUS
Status: DISCONTINUED | OUTPATIENT
Start: 2024-06-13 | End: 2024-06-13

## 2024-06-13 RX ORDER — HYDROCODONE BITARTRATE AND ACETAMINOPHEN 7.5; 325 MG/1; MG/1
1 TABLET ORAL EVERY 4 HOURS PRN
Qty: 31 TABLET | Refills: 0 | Status: SHIPPED | OUTPATIENT
Start: 2024-06-13

## 2024-06-13 RX ORDER — LIDOCAINE HYDROCHLORIDE 20 MG/ML
INJECTION, SOLUTION EPIDURAL; INFILTRATION; INTRACAUDAL; PERINEURAL
Status: DISCONTINUED | OUTPATIENT
Start: 2024-06-13 | End: 2024-06-13

## 2024-06-13 RX ORDER — DEXAMETHASONE SODIUM PHOSPHATE 4 MG/ML
INJECTION, SOLUTION INTRA-ARTICULAR; INTRALESIONAL; INTRAMUSCULAR; INTRAVENOUS; SOFT TISSUE
Status: DISCONTINUED | OUTPATIENT
Start: 2024-06-13 | End: 2024-06-13

## 2024-06-13 RX ORDER — SODIUM CHLORIDE 9 MG/ML
INJECTION, SOLUTION INTRAVENOUS CONTINUOUS
Status: DISCONTINUED | OUTPATIENT
Start: 2024-06-13 | End: 2024-06-26 | Stop reason: HOSPADM

## 2024-06-13 RX ORDER — BUPIVACAINE HYDROCHLORIDE AND EPINEPHRINE 5; 5 MG/ML; UG/ML
INJECTION, SOLUTION EPIDURAL; INTRACAUDAL; PERINEURAL
Status: DISCONTINUED | OUTPATIENT
Start: 2024-06-13 | End: 2024-06-13 | Stop reason: HOSPADM

## 2024-06-13 RX ADMIN — GLYCOPYRROLATE 0.2 MG: 0.2 INJECTION, SOLUTION INTRAMUSCULAR; INTRAVENOUS at 08:06

## 2024-06-13 RX ADMIN — PHENYLEPHRINE HYDROCHLORIDE 50 MCG: 10 INJECTION INTRAVENOUS at 08:06

## 2024-06-13 RX ADMIN — PHENYLEPHRINE HYDROCHLORIDE 100 MCG: 10 INJECTION INTRAVENOUS at 08:06

## 2024-06-13 RX ADMIN — MIDAZOLAM HYDROCHLORIDE 2 MG: 1 INJECTION, SOLUTION INTRAMUSCULAR; INTRAVENOUS at 08:06

## 2024-06-13 RX ADMIN — CEFAZOLIN 2 G: 2 INJECTION, POWDER, FOR SOLUTION INTRAMUSCULAR; INTRAVENOUS at 08:06

## 2024-06-13 RX ADMIN — DEXAMETHASONE SODIUM PHOSPHATE 4 MG: 4 INJECTION, SOLUTION INTRA-ARTICULAR; INTRALESIONAL; INTRAMUSCULAR; INTRAVENOUS; SOFT TISSUE at 08:06

## 2024-06-13 RX ADMIN — HYDROMORPHONE HYDROCHLORIDE 1 MG: 2 INJECTION, SOLUTION INTRAMUSCULAR; INTRAVENOUS; SUBCUTANEOUS at 09:06

## 2024-06-13 RX ADMIN — KETOROLAC TROMETHAMINE 30 MG: 30 INJECTION, SOLUTION INTRAMUSCULAR at 09:06

## 2024-06-13 RX ADMIN — PROPOFOL 200 MG: 10 INJECTION, EMULSION INTRAVENOUS at 08:06

## 2024-06-13 RX ADMIN — ROCURONIUM BROMIDE 50 MG: 10 INJECTION, SOLUTION INTRAVENOUS at 08:06

## 2024-06-13 RX ADMIN — SODIUM CHLORIDE, SODIUM LACTATE, POTASSIUM CHLORIDE, AND CALCIUM CHLORIDE: .6; .31; .03; .02 INJECTION, SOLUTION INTRAVENOUS at 08:06

## 2024-06-13 RX ADMIN — SUGAMMADEX 200 MG: 100 INJECTION, SOLUTION INTRAVENOUS at 08:06

## 2024-06-13 RX ADMIN — ONDANSETRON 4 MG: 2 INJECTION INTRAMUSCULAR; INTRAVENOUS at 08:06

## 2024-06-13 RX ADMIN — FENTANYL CITRATE 100 MCG: 50 INJECTION, SOLUTION INTRAMUSCULAR; INTRAVENOUS at 08:06

## 2024-06-13 RX ADMIN — ACETAMINOPHEN 1000 MG: 10 INJECTION, SOLUTION INTRAVENOUS at 08:06

## 2024-06-13 RX ADMIN — LIDOCAINE HYDROCHLORIDE 100 MG: 20 INJECTION, SOLUTION EPIDURAL; INFILTRATION; INTRACAUDAL; PERINEURAL at 08:06

## 2024-06-13 NOTE — DISCHARGE SUMMARY
[facility] Surgery  Discharge Note  Short Stay    Admit Date: 6/13/2024    Discharge Date and Time: 06/13/2024    Attending Physician: Kevin Ray MD     Discharge Provider: Kevin Ray    Diagnoses:  Active Hospital Problems    Diagnosis  POA    *Biliary dyskinesia [K82.8]  Yes      Resolved Hospital Problems   No resolved problems to display.       Discharged Condition: Good.    Hospital Course: Patient was admitted for an outpatient procedure and tolerated the procedure well with no complications.    Final Diagnoses: Same as principal diagnosis.    Disposition: Home or Self Care.     Follow up/Patient Instructions:    Medications:  Reconciled Home Medications:      Medication List        START taking these medications      HYDROcodone-acetaminophen 7.5-325 mg per tablet  Commonly known as: NORCO  Take 1 tablet by mouth every 4 (four) hours as needed for Pain.            CONTINUE taking these medications      aspirin 81 MG EC tablet  Commonly known as: ECOTRIN  Take 81 mg by mouth once daily.     atorvastatin 40 MG tablet  Commonly known as: LIPITOR  Take 40 mg by mouth once daily.     BELSOMRA 10 mg Tab  Generic drug: suvorexant  Take 1 tablet by mouth every evening.     gabapentin 300 MG capsule  Commonly known as: NEURONTIN  Take 2 capsules (600 mg total) by mouth every evening.     isosorbide mononitrate 30 MG 24 hr tablet  Commonly known as: IMDUR  Take 1 tablet orally once a day.     levETIRAcetam 250 MG Tab  Commonly known as: KEPPRA  Take 1 tablet (250 mg total) by mouth every evening.     metoprolol succinate 100 MG 24 hr tablet  Commonly known as: TOPROL-XL  Take 100 mg by mouth once daily.     MOUNJARO 15 mg/0.5 mL Pnij  Generic drug: tirzepatide  Inject 15 mg into the skin every 7 days.     * nitroGLYCERIN 0.4 MG SL tablet  Commonly known as: NITROSTAT  PLACE 1 TABLET UNDER TONGUE EVERY 5 MINUTES FOR CHEST PAIN. GO TO ER IF CHEST PAIN PERSISTS AFTER 3 DOSES.     pantoprazole 40 MG  tablet  Commonly known as: PROTONIX  Take 1 tablet orally once a day     QUEtiapine 25 MG Tab  Commonly known as: SEROQUEL  Take 1 tablet (25 mg total) by mouth every evening.     * REPATHA SURECLICK 140 mg/mL Pnij  Generic drug: evolocumab  Inject 1 pen under the skin once every 2 weeks.     * REPATHA SURECLICK 140 mg/mL Pnij  Generic drug: evolocumab  INJECT ONE PEN INTO THE SKIN EVERY 2 WEEKS     rimegepant 75 mg odt  Take 1 tablet (75 mg total) by mouth daily as needed for Migraine. Place ODT tablet on the tongue; alternatively the ODT tablet may be placed under the tongue     sertraline 100 MG tablet  Commonly known as: ZOLOFT  Take 2 tablets (200 mg total) by mouth once daily.     SYNJARDY XR 25-1,000 mg Tbph  Generic drug: empagliflozin-metformin  Take 1 tablet by mouth Daily.           * This list has 3 medication(s) that are the same as other medications prescribed for you. Read the directions carefully, and ask your doctor or other care provider to review them with you.                ASK your doctor about these medications      * nitroGLYCERIN 0.4 MG SL tablet  Commonly known as: NITROSTAT  PLEASE SEE ATTACHED FOR DETAILED DIRECTIONS           * This list has 1 medication(s) that are the same as other medications prescribed for you. Read the directions carefully, and ask your doctor or other care provider to review them with you.                Discharge Procedure Orders   Diet general     Remove dressing in 24 hours     Call MD for:  temperature >100.4     Call MD for:  persistent nausea and vomiting     Call MD for:  severe uncontrolled pain     Call MD for:  redness, tenderness, or signs of infection (pain, swelling, redness, odor or green/yellow discharge around incision site)      Follow-up Information       Kevin Ray MD Follow up in 2 week(s).    Specialties: General Surgery, Wound Care  Why: For wound re-check  Contact information:  [address] [place]  [address]  [phone]

## 2024-06-13 NOTE — ANESTHESIA POSTPROCEDURE EVALUATION
Anesthesia Post Evaluation    Patient: Abel Hackett    Procedure(s) Performed: Procedure(s) (LRB):  CHOLECYSTECTOMY, LAPAROSCOPIC (N/A)    Final Anesthesia Type: general      Patient location during evaluation: PACU  Patient participation: Yes- Able to Participate  Level of consciousness: awake and alert, oriented and awake  Post-procedure vital signs: reviewed and stable  Pain management: adequate  Airway patency: patent    PONV status at discharge: No PONV  Anesthetic complications: no      Cardiovascular status: blood pressure returned to baseline, hemodynamically stable and stable  Respiratory status: unassisted, spontaneous ventilation and room air  Hydration status: euvolemic  Follow-up not needed.              Vitals Value Taken Time   /83 06/13/24 0945   Temp 36.4 °C (97.5 °F) 06/13/24 0905   Pulse 96 06/13/24 0945   Resp 18 06/13/24 0945   SpO2 96 % 06/13/24 0945         No case tracking events are documented in the log.      Pain/Pat Score: Pat Score: 10 (6/13/2024  9:55 AM)

## 2024-06-13 NOTE — OP NOTE
DATE OF PROCEDURE: 6/13/2024     PREOPERATIVE DIAGNOSES:   Biliary dyskinesia [K82.8]    POSTOPERATIVE DIAGNOSES:   Post-Op Diagnosis Codes:     * Biliary dyskinesia [K82.8]    PROCEDURES PERFORMED:   Procedure(s) (LRB):  CHOLECYSTECTOMY, LAPAROSCOPIC (N/A)    Surgeons and Role:     * Kevin Ray MD - Primary    ANESTHESIA: General    ESTIMATED BLOOD LOSS: 10 cc    SPECIMEN:   Specimen (24h ago, onward)       Start     Ordered    Pending  Specimen to Pathology, Surgery General Surgery  Once        Comments: Pre-op Diagnosis: Biliary dyskinesia [K82.8]Procedure(s):CHOLECYSTECTOMY, LAPAROSCOPIC Number of specimens: 1Name of specimens: GallbladderDr Cory     References:    Click here for ordering Quick Tip   Question Answer Comment   Procedure Type: General Surgery    Specimen Class: Routine/Screening    Release to patient Immediate        Pending                    CLINICAL HISTORY AND INDICATION FOR SURGERY: Abel Hackett is a 54 y.o. male with biliary dyskinesia.     PROCEDURE IN DETAIL: The patient was explained the risks and benefits of the procedure. They agreed to the procedure. They were taken to the operating room on 6/13/2024. The patient was placed on the OR table in the supine position. The abdomen was prepped and draped in a sterile manner. An 11 blade was used to make an incision above the umbilicus. A veress needle was inserted. Pneumoperitoneum was established. A 5 mm Optiview port was placed without difficulty. 3 more working ports were placed, one subxiphoid, two over the gallbladder. The gallbladder was noted to be moderately inflamed and very intra-hepatic. The gallbladder was grasped. Blunt dissection was used to create a critical view. The cystic duct was clipped twice distally once proximally and divided. The cystic artery was clipped twice distally and one proximally and divided. The gallbladder was removed off the liver with Bovie. The gallbladder was removed from the abdomen using an  Endocatch bag. The right upper quadrant was inspected for hemostasis. Hemostasis was excellent.The right upper quadrant was copiously irrigated. All ports were removed under laparoscopic vision. All port sites closed at skin with interrupted suture. Sterile dressing was placed. The procedure was tolerated well. The patient was brought back to recovery in stable condition. All instrument and sponge counts were correct x 2.  This dictation was done with dictating software and there maybe areas contained within it.

## 2024-06-13 NOTE — TRANSFER OF CARE
Anesthesia Transfer of Care Note    Patient: Abel Hackett    Procedure(s) Performed: Procedure(s) (LRB):  CHOLECYSTECTOMY, LAPAROSCOPIC (N/A)    Patient location: PACU    Anesthesia Type: general    Transport from OR: Transported from OR on room air with adequate spontaneous ventilation    Post pain: adequate analgesia    Post assessment: no apparent anesthetic complications    Post vital signs: stable    Level of consciousness: sedated    Nausea/Vomiting: no nausea/vomiting    Complications: none    Transfer of care protocol was followed    Last vitals: Visit Vitals  /80

## 2024-06-13 NOTE — ANESTHESIA PROCEDURE NOTES
Intubation    Date/Time: 6/13/2024 8:21 AM    Performed by: Yakelin Mata  Authorized by: Mihai Sanderson CRNA    Intubation:     Induction:  Intravenous    Intubated:  Postinduction    Mask Ventilation:  Easy mask    Attempts:  1    Attempted By:  Student and CRNA    Method of Intubation:  Video laryngoscopy    Blade:  Goel 4    Laryngeal View Grade: Grade I - full view of cords      Difficult Airway Encountered?: No      Complications:  None    Airway Device:  Oral endotracheal tube    Airway Device Size:  7.5    Style/Cuff Inflation:  Cuffed (inflated to minimal occlusive pressure)    Tube secured:  22    Secured at:  The lips    Placement Verified By:  Capnometry and Revisualization with laryngoscopy    Complicating Factors:  None    Findings Post-Intubation:  BS equal bilateral and atraumatic/condition of teeth unchanged

## 2024-06-13 NOTE — ANESTHESIA PREPROCEDURE EVALUATION
06/13/2024  Abel Hackett is a 54 y.o., male.      Pre-op Assessment    I have reviewed the Patient Summary Reports.     I have reviewed the Nursing Notes. I have reviewed the NPO Status.   I have reviewed the Medications.     Review of Systems  Anesthesia Hx:  No problems with previous Anesthesia                Hematology/Oncology:  Hematology Normal   Oncology Normal                                   EENT/Dental:  EENT/Dental Normal           Cardiovascular:        CAD   CABG/stent   Angina        ECG has been reviewed.                          Pulmonary:        Sleep Apnea                Hepatic/GI:  Hepatic/GI Normal                 Musculoskeletal:  Musculoskeletal Normal                Neurological:    Neuromuscular Disease,                                   Endocrine:  Diabetes, poorly controlled, type 2           Psych:  Psychiatric History anxiety                 Physical Exam  General: Well nourished, Cooperative, Alert and Oriented    Airway:  Mallampati: I   Mouth Opening: Normal  TM Distance: Normal  Tongue: Normal  Neck ROM: Normal ROM    Chest/Lungs:  Clear to auscultation    Heart:  Rate: Normal        Anesthesia Plan  Type of Anesthesia, risks & benefits discussed:    Anesthesia Type: Gen ETT  Intra-op Monitoring Plan: Standard ASA Monitors  Post Op Pain Control Plan: multimodal analgesia  Induction:  IV  Airway Plan: Video  Informed Consent: Patient consented to blood products? No  ASA Score: 4  Day of Surgery Review of History & Physical: H&P Update referred to the surgeon/provider.I have interviewed and examined the patient. I have reviewed the patient's H&P dated: 6/13/24. There are no significant changes.     Ready For Surgery From Anesthesia Perspective.     .

## 2024-06-14 LAB — POCT GLUCOSE: 126 MG/DL (ref 70–110)

## 2024-06-17 LAB
FINAL PATHOLOGIC DIAGNOSIS: NORMAL
GROSS: NORMAL
Lab: NORMAL

## 2024-07-02 DIAGNOSIS — G20.C PARKINSONISM, UNSPECIFIED PARKINSONISM TYPE: ICD-10-CM

## 2024-07-02 DIAGNOSIS — F51.01 PRIMARY INSOMNIA: ICD-10-CM

## 2024-07-02 DIAGNOSIS — R44.3 HALLUCINATION: ICD-10-CM

## 2024-07-02 RX ORDER — GABAPENTIN 300 MG/1
600 CAPSULE ORAL NIGHTLY
Qty: 180 CAPSULE | Refills: 1 | Status: SHIPPED | OUTPATIENT
Start: 2024-07-02

## 2024-07-02 RX ORDER — QUETIAPINE FUMARATE 25 MG/1
25 TABLET, FILM COATED ORAL NIGHTLY
Qty: 90 TABLET | Refills: 3 | Status: SHIPPED | OUTPATIENT
Start: 2024-07-02 | End: 2025-07-02

## 2024-07-08 ENCOUNTER — PATIENT MESSAGE (OUTPATIENT)
Dept: ADMINISTRATIVE | Facility: OTHER | Age: 54
End: 2024-07-08
Payer: COMMERCIAL

## 2024-07-10 ENCOUNTER — PATIENT MESSAGE (OUTPATIENT)
Dept: NEUROLOGY | Facility: CLINIC | Age: 54
End: 2024-07-10
Payer: COMMERCIAL

## 2024-07-22 ENCOUNTER — OFFICE VISIT (OUTPATIENT)
Dept: PSYCHIATRY | Facility: CLINIC | Age: 54
End: 2024-07-22
Payer: COMMERCIAL

## 2024-07-22 ENCOUNTER — PATIENT MESSAGE (OUTPATIENT)
Dept: NEUROLOGY | Facility: CLINIC | Age: 54
End: 2024-07-22
Payer: COMMERCIAL

## 2024-07-22 VITALS
RESPIRATION RATE: 17 BRPM | OXYGEN SATURATION: 98 % | DIASTOLIC BLOOD PRESSURE: 70 MMHG | SYSTOLIC BLOOD PRESSURE: 104 MMHG | BODY MASS INDEX: 27.95 KG/M2 | HEART RATE: 91 BPM | WEIGHT: 188.69 LBS | HEIGHT: 69 IN

## 2024-07-22 DIAGNOSIS — F41.9 ANXIETY: ICD-10-CM

## 2024-07-22 DIAGNOSIS — F41.1 GENERALIZED ANXIETY DISORDER: ICD-10-CM

## 2024-07-22 DIAGNOSIS — Z65.8 PSYCHOSOCIAL STRESSORS: Primary | ICD-10-CM

## 2024-07-22 LAB
CHOLEST SERPL-MSCNC: 126 MG/DL (ref 0–200)
HDLC SERPL-MCNC: 54 MG/DL (ref 35–70)
LDLC SERPL CALC-MCNC: 44 MG/DL (ref 0–160)
TRIGL SERPL-MCNC: 173 MG/DL (ref 40–160)
VLDLC SERPL-MCNC: 28 MG/DL

## 2024-07-22 PROCEDURE — 1160F RVW MEDS BY RX/DR IN RCRD: CPT | Mod: CPTII,S$GLB,, | Performed by: STUDENT IN AN ORGANIZED HEALTH CARE EDUCATION/TRAINING PROGRAM

## 2024-07-22 PROCEDURE — 3074F SYST BP LT 130 MM HG: CPT | Mod: CPTII,S$GLB,, | Performed by: STUDENT IN AN ORGANIZED HEALTH CARE EDUCATION/TRAINING PROGRAM

## 2024-07-22 PROCEDURE — 90833 PSYTX W PT W E/M 30 MIN: CPT | Mod: S$GLB,,, | Performed by: STUDENT IN AN ORGANIZED HEALTH CARE EDUCATION/TRAINING PROGRAM

## 2024-07-22 PROCEDURE — 3008F BODY MASS INDEX DOCD: CPT | Mod: CPTII,S$GLB,, | Performed by: STUDENT IN AN ORGANIZED HEALTH CARE EDUCATION/TRAINING PROGRAM

## 2024-07-22 PROCEDURE — 3044F HG A1C LEVEL LT 7.0%: CPT | Mod: CPTII,S$GLB,, | Performed by: STUDENT IN AN ORGANIZED HEALTH CARE EDUCATION/TRAINING PROGRAM

## 2024-07-22 PROCEDURE — 1159F MED LIST DOCD IN RCRD: CPT | Mod: CPTII,S$GLB,, | Performed by: STUDENT IN AN ORGANIZED HEALTH CARE EDUCATION/TRAINING PROGRAM

## 2024-07-22 PROCEDURE — 99214 OFFICE O/P EST MOD 30 MIN: CPT | Mod: S$GLB,,, | Performed by: STUDENT IN AN ORGANIZED HEALTH CARE EDUCATION/TRAINING PROGRAM

## 2024-07-22 PROCEDURE — 3078F DIAST BP <80 MM HG: CPT | Mod: CPTII,S$GLB,, | Performed by: STUDENT IN AN ORGANIZED HEALTH CARE EDUCATION/TRAINING PROGRAM

## 2024-07-22 PROCEDURE — 99999 PR PBB SHADOW E&M-EST. PATIENT-LVL III: CPT | Mod: PBBFAC,,, | Performed by: STUDENT IN AN ORGANIZED HEALTH CARE EDUCATION/TRAINING PROGRAM

## 2024-07-22 RX ORDER — GABAPENTIN 300 MG/1
600 CAPSULE ORAL NIGHTLY
Qty: 180 CAPSULE | Refills: 1 | Status: SHIPPED | OUTPATIENT
Start: 2024-07-22

## 2024-07-22 RX ORDER — SERTRALINE HYDROCHLORIDE 100 MG/1
200 TABLET, FILM COATED ORAL DAILY
Qty: 60 TABLET | Refills: 3 | Status: SHIPPED | OUTPATIENT
Start: 2024-07-22

## 2024-07-22 NOTE — PROGRESS NOTES
"    07/22/2024  8:40 AM  Abel Hackett  [id number]    Outpatient Psychiatry Follow-Up Visit (MD/NP)    7/22/2024    Clinical Status of Patient:  Outpatient (Ambulatory)    Chief Complaint:  Abel Hackett is a 54 y.o. male who presents today for follow-up of anxiety.  Met with patient.      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 it's hard to understand." Reports anxiety "a few times, I sit down and think about the burden it will become."         Psychiatric Review Of Systems - Is patient experiencing or having changes in:  Depressed mood: no  sleep: improved  appetite: no  energy/anergy: yes  interest/pleasure/anhedonia: yes  anxiety/panic: variable  guilty/hopelessness/worthlessness: no  concentration: no  S.I.B.s/risky behavior: no  Irritability: denies  Substance abuse: no  Racing thoughts: no  Impulsive behaviors: no  Paranoia: no  AVH: "controlled with the seroquel"          Psychotherapy:  Target symptoms: depression, anxiety   Why chosen therapy is appropriate versus another modality: relevant to diagnosis  Outcome monitoring methods: self-report  Therapeutic intervention type: supportive psychotherapy  Topics discussed/themes: stress related to medical comorbidities, building skills sets for symptom management, symptom recognition  The patient's response to the intervention is accepting. The patient's progress toward treatment goals is good.   Duration of intervention: 16 minutes.          Medical Review of Systems   Review of Systems   Constitutional:  Negative for chills and fever.   HENT:  Negative for hearing loss.    Eyes:  Negative for blurred vision.   Respiratory:  Negative for shortness of breath.    Cardiovascular:  Negative for chest pain and palpitations.   Gastrointestinal:  Negative for " constipation, diarrhea and nausea.   Genitourinary:  Negative for dysuria.   Musculoskeletal:  Negative for back pain and neck pain.   Skin:  Negative for rash.   Neurological:  Negative for dizziness and headaches.   Endo/Heme/Allergies:  Negative for environmental allergies.       Past Medical, Family and Social History: The patient's past medical, family and social history have been reviewed and updated as appropriate within the electronic medical record - see encounter notes.      Social History     Socioeconomic History    Marital status:    Tobacco Use    Smoking status: Never    Smokeless tobacco: Never   Substance and Sexual Activity    Alcohol use: Not Currently     Comment: occassional    Drug use: No    Sexual activity: Yes     Partners: Female     Social Determinants of Health     Financial Resource Strain: Low Risk  (3/20/2024)    Overall Financial Resource Strain (CARDIA)     Difficulty of Paying Living Expenses: Not hard at all   Food Insecurity: No Food Insecurity (3/20/2024)    Hunger Vital Sign     Worried About Running Out of Food in the Last Year: Never true     Ran Out of Food in the Last Year: Never true   Transportation Needs: No Transportation Needs (3/20/2024)    PRAPARE - Transportation     Lack of Transportation (Medical): No     Lack of Transportation (Non-Medical): No   Physical Activity: Sufficiently Active (3/20/2024)    Exercise Vital Sign     Days of Exercise per Week: 6 days     Minutes of Exercise per Session: 40 min   Stress: No Stress Concern Present (3/20/2024)    [place] [employer] - Occupational Stress Questionnaire     Feeling of Stress : Only a little   Housing Stability: Low Risk  (3/20/2024)    Housing Stability Vital Sign     Unable to Pay for Housing in the Last Year: No     Number of Places Lived in the Last Year: 1     Unstable Housing in the Last Year: No         Compliance: yes    Side effects: None    Risk Parameters:  Patient reports no  "suicidal ideation  Patient reports no homicidal ideation  Patient reports no self-injurious behavior  Patient reports no violent behavior      Exam (detailed: at least 9 elements; comprehensive: all 15 elements)     Vitals:    07/22/24 1403   BP: 104/70   Pulse: 91   Resp: 17             CONSTITUTIONAL  General Appearance: unremarkable, age appropriate    MUSCULOSKELETAL  Muscle Strength and Tone:no tremor, no tic  Abnormal Involuntary Movements: No  Gait and Station: non-ataxic    PSYCHIATRIC   Level of Consciousness: awake and alert   Orientation: person, place, and situation  Grooming: Casually dressed and Well groomed  Psychomotor Behavior: normal, cooperative  Speech: normal tone, normal rate, normal pitch, normal volume  Language: grossly intact  Mood: "ok"  Affect: Consistent with mood  Thought Process: linear, logical  Associations: intact   Thought Content: DENIES suicidal ideation and DENIES homicidal ideation  Perceptions: denies hallucinations  Memory: Able to recall past events, Remote intact, and Recent intact  Attention:Attends to interview without distraction  Fund of Knowledge: Aware of current events and Vocabulary appropriate   Estimate if Intelligence:  Average based on work/education history, vocabulary and mental status exam  Insight: has awareness of illness  Judgment: behavior is adequate to circumstances        Assessment and Diagnosis   Status/Progress: Based on the examination today, the patient's problem(s) is/are improved.  New problems have not been presented today.   Co-morbidities are complicating management of the primary condition.            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 Instructed to call 911 or present to ER for emergency including SI or HI.    - Discussed diagnosis, risks and benefits of proposed treatment above vs alternative treatments vs no treatment, and potential side effects of these treatments. Discussed the inherent unpredictability of treatment. The patient expresses understanding of the above and displays the capacity to agree with this treatment given said understanding. Patient also agrees that, currently, the benefits outweigh the risks and would like to pursue this treatment at this time.     - Any medications being used off-label were discussed with the patient inclusive of the evidence base for the use of the medications and consent was obtained for the off-label use of the medication.     Froylan Marie III, MD    Return to Clinic:  4-6 m

## 2024-07-23 DIAGNOSIS — R51.9 SEVERE HEADACHE: ICD-10-CM

## 2024-07-23 RX ORDER — RIMEGEPANT SULFATE 75 MG/75MG
TABLET, ORALLY DISINTEGRATING ORAL
Qty: 8 TABLET | Refills: 11 | Status: SHIPPED | OUTPATIENT
Start: 2024-07-23

## 2024-07-23 NOTE — TELEPHONE ENCOUNTER
IVONNE called patient to discuss concerns in MyChart. IVONNE explained that clinic is happy to assist with paperwork, but clinic will need scanned form sent to the clinic. He explained that he isn't sure why [facility] didn't accept Dr. Espinosa's previous form, so he plans on calling them to inquire. Patient reports that the [facility] told him that they didn't have the medical records they requested, but patient sent them to the [facility] several times.     He states that he will work on getting a scanned copy to the  for clinic to assist with in the meantime, but he will also see if the previous form can be accepted. Since, Dr. Espinosa is out of office this week, he is hoping that they can accept his previously signed form.     will send contact information to the patient for him to call with updates.

## 2024-08-15 ENCOUNTER — TELEPHONE (OUTPATIENT)
Dept: PHARMACY | Facility: CLINIC | Age: 54
End: 2024-08-15
Payer: COMMERCIAL

## 2024-08-15 NOTE — TELEPHONE ENCOUNTER
[facility]/Population Health Chart Review & Patient Outreach Details For Medication Adherence Project    Reason for Outreach Encounter: 3rd Party payor non-compliance report ([employer], [employer], C, etc)    2.  Patient Outreach Method: N/A, reviewed patient chart     3.   Medication in question:      Hypertension Medications               metoprolol succinate (TOPROL-XL) 100 MG 24 hr tablet Take 100 mg by mouth once daily.                Hyperlipidemia Medications               atorvastatin (LIPITOR) 40 MG tablet Take 40 mg by mouth once daily.    evolocumab (REPATHA SURECLICK) 140 mg/mL PnIj INJECT ONE PEN INTO THE SKIN EVERY 2 WEEKS       Valsartan and atorvastatin discontinued; pt is on repatha    4.  Reviewed and or Updates Made To: Patient Chart    5. Outreach Outcomes and/or actions taken: Medication discontinued    Additional Notes:

## 2024-08-19 DIAGNOSIS — G20.C PARKINSONISM, UNSPECIFIED PARKINSONISM TYPE: ICD-10-CM

## 2024-08-19 DIAGNOSIS — F51.01 PRIMARY INSOMNIA: ICD-10-CM

## 2024-08-19 RX ORDER — SUVOREXANT 10 MG/1
1 TABLET, FILM COATED ORAL NIGHTLY
Qty: 30 TABLET | Refills: 5 | Status: SHIPPED | OUTPATIENT
Start: 2024-08-19

## 2024-08-19 NOTE — TELEPHONE ENCOUNTER
Care Due:                  Date            Visit Type   Department     Provider  --------------------------------------------------------------------------------                                EP -                              PRIMARY      [facility] FAMILY  Last Visit: 03-      CARE ([facility])   MEDICINE       Keira Wheat  Next Visit: None Scheduled  None         None Found                                                            Last  Test          Frequency    Reason                     Performed    Due Date  --------------------------------------------------------------------------------    HBA1C.......  6 months...  empagliflozin-metformin,   05-   11-                             tirzepatide..............    [employer] Embedded Care Due Messages. Reference number: [id number].   8/19/2024 1:43:46 PM CDT

## 2024-08-20 RX ORDER — EMPAGLIFLOZIN, METFORMIN HYDROCHLORIDE 25; 1000 MG/1; MG/1
1 TABLET, EXTENDED RELEASE ORAL DAILY
Qty: 90 TABLET | Refills: 1 | Status: SHIPPED | OUTPATIENT
Start: 2024-08-20

## 2024-08-21 ENCOUNTER — TELEPHONE (OUTPATIENT)
Dept: NEUROLOGY | Facility: CLINIC | Age: 54
End: 2024-08-21
Payer: COMMERCIAL

## 2024-08-22 ENCOUNTER — PATIENT MESSAGE (OUTPATIENT)
Dept: NEUROLOGY | Facility: CLINIC | Age: 54
End: 2024-08-22

## 2024-08-22 ENCOUNTER — OFFICE VISIT (OUTPATIENT)
Dept: NEUROLOGY | Facility: CLINIC | Age: 54
End: 2024-08-22
Payer: COMMERCIAL

## 2024-08-22 VITALS
WEIGHT: 188 LBS | HEIGHT: 69 IN | HEART RATE: 96 BPM | SYSTOLIC BLOOD PRESSURE: 104 MMHG | DIASTOLIC BLOOD PRESSURE: 68 MMHG | BODY MASS INDEX: 27.85 KG/M2

## 2024-08-22 DIAGNOSIS — G31.83 DIFFUSE LEWY BODY DISEASE: ICD-10-CM

## 2024-08-22 DIAGNOSIS — F02.80 DIFFUSE LEWY BODY DISEASE: ICD-10-CM

## 2024-08-22 DIAGNOSIS — G25.3 LANCE-ADAMS SYNDROME WITH ACTION INDUCED MYOCLONUS: ICD-10-CM

## 2024-08-22 DIAGNOSIS — R44.3 HALLUCINATION: ICD-10-CM

## 2024-08-22 DIAGNOSIS — Z86.74 LANCE-ADAMS SYNDROME WITH ACTION INDUCED MYOCLONUS: ICD-10-CM

## 2024-08-22 DIAGNOSIS — G31.9 NEURODEGENERATIVE COGNITIVE IMPAIRMENT: ICD-10-CM

## 2024-08-22 DIAGNOSIS — G31.84 MCI (MILD COGNITIVE IMPAIRMENT): Primary | ICD-10-CM

## 2024-08-22 DIAGNOSIS — G47.52 RBD (REM BEHAVIORAL DISORDER): ICD-10-CM

## 2024-08-22 PROCEDURE — 99999 PR PBB SHADOW E&M-EST. PATIENT-LVL IV: CPT | Mod: PBBFAC,,, | Performed by: PSYCHIATRY & NEUROLOGY

## 2024-08-22 RX ORDER — GABAPENTIN 300 MG/1
900 CAPSULE ORAL NIGHTLY
Qty: 180 CAPSULE | Refills: 1 | Status: SHIPPED | OUTPATIENT
Start: 2024-08-22

## 2024-08-22 NOTE — PROGRESS NOTES
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 offered:. -Recommend tight control of risk factors for vascular dementia including compliance with CPAP. (Now compliant). -Also seeing psychiatry for mood. -No driving restrictions but he is off of work currently due to symptoms. Advised he remain off given his daily memory loss". -Given the above, I will refer to Dr Espinosa in memory disorders clinic for further evaluation. -noting his significant CAD, worsening currently / pending cardiology surgery evaluation. -Speech therapy referral as recommended by neuropsychiatry.  [facility] - Juan Espinosa MD. Neurologist (01/03/2024)  Type: Chart Review. The patient, a 52-year-old male with a history of early-onset diabetes diagnosed in his 20s, presents with his wife as the primary historian. His diabetes, which appears to be familial but not classified as Type 1, has been a consistent health concern. The patient has faced challenges with medication adherence, particularly insulin, due to concerns about weight gain and the burden of managing multiple medications. In 2020, the patient was laid off from his job and subsequently began working at [employer] in 2021, which involved a demanding three-hour daily commute. During this time, he experienced increased daytime fatigue and difficulty keeping up with work responsibilities, leading to a general decline in health. " In December 2021, he contracted COVID-19, resulting in post-COVID fatigue consistent with long COVID. He also reported new onset visual disturbances described as kaleidoscopic sensations in his vision, with occasional palpitations, which later became associated with migraine headaches. These headaches were diagnosed in the last 6-12 months and were effectively managed with Nurtec. In June 2022, the patient started Ozempic for diabetes and weight loss control. Subsequently, he reported worsening subjective cognitive impairment. his family members noticed increased forgetfulness and disorientation, while coworkers observed typos and incoherent conversations. In July 2022, he sought medical attention for these memory issues. An MRI revealed cerebellar abnormalities, and a CT scan showed mild cortical atrophy. His family history included significant vascular dementia. Neuropsychological assessments indicated a need for better control of vascular risk factors. The patient was unaware of his moderate obstructive sleep apnea diagnosis. Further evaluation in late September 2022 showed intact visuospatial skills with some impairment in language and memory functions. Stress and irritability due to cognitive difficulties were noted. In November 2022, vascular complications led to the placement of stents, and MRI showed remote lacunar infarcts. Despite improved diabetes control with Ozempic, he continued to experience memory problems. In mid-2023, his memory problems worsened, with incidents like using a razor as a toothbrush. MRI and CTA revealed cerebral abnormalities. By late September 2023, his diabetes was well-managed, but cognitive issues persisted. The patient's condition deteriorated further with the thrombosis of previously placed stents, leading to cardiac ischemia and tamponade requiring emergency bypass surgery and stent replacement. He has been on disability since September 2022 due to these complications. He is  currently withdrawn and clearly depressed. 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 The patient's cognitive impairment, dating back to 2021, could be related to chronic stress, early-onset diabetes, and the initiation of Ozempic. His condition is likely compounded by mood disorders. Given the complexity of his early-onset diabetes with a hereditary factor, it is recommended to consult with his primary doctor about adjusting the Ozempic dosage and to follow up with Endocrinology for closer monitoring. Further evaluation and management by a multidisciplinary team, including a neurologist, endocrinologist, and psychiatrist, are crucial to address the various aspects of his health concerns.  [facility] - Juan Espinosa MD. Neurologist (03/05/2024)  Type: Chart Review. 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 We have been awaiting the final interpretation of an EEG before initiating treatment with Keppra. The patient's his family has raised concerns regarding the possibility of Lewy body dementia, although there is no strong evidence supporting the presence of Lewy body disease at this time. However, we have discussed the option of confirmatory testing through a skin biopsy to address these concerns. The family has reported recent episodes of dong-nocturnal questionable hallucinations over the last few weeks. These episodes have been specifically noted around the transition periods of sleep, suggesting a  potential REM sleep behavior disorder or dong-nocturnal sleep disorder, rather than being indicative of a fully awake state. Despite these reports, there has been no observed evidence of significant fluctuating levels of arousal or strong indications of parkinsonism during examinations. Nonetheless, to alleviate the family's concerns and to thoroughly investigate, we will proceed with scheduling a skin biopsy to rule out any early signs of alpha-synuclein-mediated disease. Reviewing the patient's recent EEG, we found no evidence of epileptiform spikes, supporting the absence of epilepsy. However, the patient does exhibit mild myoclonus upon examination. In light of these findings, we have discussed the initiation of symptomatic medication and recommend starting with a very low dose of Keppra, 250 mg at bedtime (q. H. S. The family has agreed to this treatment plan. Additionally, it is worth noting that the patient continues to respond well to their current medication regimen, which includes Nurtec 75 mg for migraine prevention and Zoloft 100 mg for depression. These medications remain part of the patient's treatment strategy, contributing to the management of their symptoms and overall well-being.  [facility] - Juan Espinosa MD. Neurologist (04/30/2024)  Type: Chart Review. 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 Initial examination showed no evidence of parkinsonism. However, the patient's neurological status has since declined, with worsening symptoms such as insomnia and REM sleep behavior disorder. Over the past three months, there has been an increase in Sukhdeep nocturnal hallucinations, transitioning from poorly formed to increasingly vivid, often involving his family members floating above the bed in the evening. his family members have also noted gait instability, leading to frequent tripping and at least one  ground-level fall. Additionally, there has been an increase in tremor, now more isometric and slightly kinetic, compared to the initial evaluation. The family reports a significant change in the patient's temperament over the last year, transitioning from high-strung and irritable to more laid-back. However, the clinical presentation remains consistent with a form of post-traumatic/post-hypoxic injury to the central nervous system, likely accelerating an early alpha-synuclein process. It's important to note that the patient's symptoms still fall within the range of mild cognitive impairment, and the presentation is not consistent with Lewy body dementia. The distal, single, isolated alpha-synuclein may be indicative of a prodrome risk for later-life dysautonomia. Nonetheless, the clinical presentation remains consistent with post-hypoxic neurological injury. During the discussion, symptomatic medications for cognitive impairment were explored, along with a review of the patient's ongoing medication therapy. Primary concerns at this time include worsening insomnia. To address this, it is recommended to initiate Belsomra. Given the increasing hallucinations and REM sleep behavior disorder, a gradual tapering of low-dose Seroquel over one month and subsequent discontinuation is advised. If hallucinations reemerge after one month of Belsomra, Seroquel may be restarted. Following stabilization of sleep, consideration will be given to L-dopa therapy for the movement disorder.  Cardiology (08/12/2024)  Type: Chart Review. 3 month follow up. 54-year-old male here for follow-up. Feels much better. Recently underwent seen by Neurology and was diagnosed Lewy Body Dementia. No further abdominal pain since having gall bladder removed. 1. Coronary artery disease, s/p CABG x 4. - Multivessel PCI in the past. Had severe in-stent restenosis and diffuse disease. Now s/p CABG x 4. -Ranexa did not work so stopped. -No longer isosorbide  and not having angina. - continue metoprolol  mg daily. Tolerating well. -Continue cardiac rehab. 2. Dyslipidemia. - LDL 46, Total Chol > 100 and TG elevated. -Continue atorvastatin 40 mg daily. - Repatha. 3. Type 2 diabetes mellitus. - On Synjardy. - recently switched from Ozempic to Monjaro. 4. Lewy Body Dementia. - seeing Neurology. - on seroquel now.     Current Presentation  Recent/Interim History:  During the last visit, the patient was diagnosed with mild cognitive impairment of mixed etiology, including, but not limited to, post-hypoxic injury with myoclonus and a suspected underlying Lewy body disease-related process. We emphasized again in today's appointment that this diagnosis does not indicate Lewy body dementia but rather evidence of alpha-synuclein disease presenting with parkinsonism, REM sleep behavior disorder, and presumed provoked hallucinations in the context of insomnia and post-hypoxic cortical injury. In the last appointment, we stressed the importance of managing sleep to avoid unnecessary polypharmacy, with a plan to gradually taper down Seroquel once sleep was under control, as the majority of the patient's hallucinations appeared to be an overflow of REM sleep behavior disorder into the waking state. The patient has tolerated Keppra 250 mg in the evening well, with an improvement in myoclonus. At the last visit, the patient's headaches were under control with Nurtec, and Belsomra was initiated to gradually improve insomnia, as the patient had been using sleep aids incorrectly. There appear to be multiple issues with the patient's understanding of when to take medications. The patient is taking Keppra, Belsomra, Seroquel, and gabapentin all at 6:00 p. M. He then stays on the couch for several hours, fighting sleep, which results in a heightened state before finally falling asleep later in the evening. As a consequence of this irregular schedule, the patient's sleep has become  increasingly fragmented, though he does achieve restful sleep when he remains in bed. We emphasized the importance of adequate sleep within a controlled sleep schedule, particularly in the context of REM sleep behavior disorder and Lewy body disease. We recommended adjusting the patient's medication schedule to 8-9 p. M. The patient reported difficulty falling asleep and expressed concerns that food might interfere with the absorption and clinical benefit of the medications. Gabapentin is the only medication likely affected by this. We recommended potentially increasing gabapentin in the evening to help ease the patient into sleep. After achieving better sleep control with the current medication regimen, it may be advisable to consolidate the patient's sleep aids by increasing Belsomra and replacing Keppra and gabapentin with low-dose clonazepam, with the goal of discontinuing Seroquel. This plan was discussed with the patient and family, who expressed understanding. We will schedule a telephone follow-up in three weeks to assess benefits before making further medication adjustments.  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  He does repeat statements or asks the same question repeatedly.  He does have difficulty remembering recent important conversations.  He does have difficulty remembering recent events.  He does not forget information within minutes.  His recent retrograde memory is intact.  His remote memory is intact.  Attention:   The patient's attention and concentration are impaired.  He does not have attentional fluctuations.  He does not have difficulty maintaining selective attention.  He does become easily  distracted.  He does have difficulty with divided attention.  Executive:   The patient's cognitive processing speed is slower.  He does have difficulty with working memory.  He does misplace personal items (e.g., keys, cell phone, wallet) more frequently.  He does have difficulty keeping track of his medications.  He does have difficulty with planning/organizing/completing multistep tasks.  He does have difficulty with executive attention.  He does have difficulty with flexible thinking.  He does not have difficulty with response inhibition.  He denies new impulsivity or rash/careless actions.  His judgment is intact.  Language:   The patient's speech is affected.  He does not forget people's names more frequently.  He does have word-finding difficulties.  His speech is fluent and non-effortful.  His speech is grammatically intact.  He does make inaccurate word substitutions.  He does not have difficulty reading.  He does not appear to have impaired comprehension.  Visuospatial:   The patient has new visuospatial problems.  He does not become confused or disoriented in *new*, unfamiliar places.  He does not have trouble with navigation.  He does not get lost in familiar places.  He does not have visuospatial disorientation.  He does not have difficulty recognizing objects or faces.  He denies problems with driving or parking.  Motor/Coordination:   The patient does have difficulty with walking.  He does feel imbalanced.  He denies having fallen.  He does not appear to have new muscle weakness.  He does have difficulty buttoning shirts, operating zippers, or manipulating tools/utensils.  His handwriting has not become micrographic.  He does have a resting tremor.  He does report having new involuntary movements and/or muscle jerking. Comment: nocturnal jerking - worse since 2023  He does not have swallowing difficulty.  He denies new muscle cramps and twitching.  Sensory:   The patient denies new numbness, tingling,  paresthesias, or pain.  The patient reports new loss of vision, blurry vision, and/or double vision.  The patient reports a recent loss of hearing and/or worsening tinnitus.  The patient denies anosmia.  Sleep:   The patient reports difficulty sleeping.  The patient does have difficulty going to sleep.  The patient reports difficulty staying asleep and/or frequently awakening at night.  The patient does snore and/or have witnessed apneas while sleeping.  When he wakes up in the morning, he does not feel well-rested.  He denies dream-enactment behavior.  He has reported symptoms suggestive of restless leg syndrome.  Behavior:   The patient's personality has changed.  He does not have symptoms of disinhibition and social inappropriateness.  He does not have symptoms to suggest a loss of manners or decorum.  He does not appear apathetic or has decreased motivation.  He does not appear to have a change in inertia.  The patient's emotional expression has changed.  He does have emotional blunting or lability.  He does not have symptoms of irritability and mood lability.  He does not have symptoms of agitation, aggression, or violent outbursts.  His insight into his health and situation is intact.  His personal hygiene is intact.  He is not exhibiting a diminished response to other people's needs and feelings.  He is not exhibiting a diminished social interest, interrelatedness, or personal warmth.  He denies restlessness.  He denies new and/or worsening simple repetitive behaviors.  His speech has not become simplified or become repetitive/stereotyped.  He denies new/worsening complex repetitive/ritualistic compulsions and behaviors.  He does not have symptoms of hyper-religiosity or dogmatism.  His interests/pleasures have not become restrictive, simplified, interrupting, or repetitive.  He denies a change of self-stimulating behavior.  He denies any changes in eating behavior.  He denies increased consumption of food or  substances.  He denies oral exploration or consumption of inedible objects.  Psychiatric:   He does not feel depressed.  He is not exhibiting symptoms of social withdrawal/indifference.  He does have anxiety.  He does not exhibit cycling behavior.  He does not exhibit hyperactive behavior.  He is not exhibited symptoms of paranoia.  He does not have delusions.  He does have hallucinations. Comment: possible hallucinations seeing ghosts upon awakening  He does not have a history of sensitivity to neuroleptic/psychotropic medications.  Medical Review of Systems:   The patient does not have constipation.  The patient does not have urinary incontinence.  The patient denies orthostatic lightheadedness.  The patient's weight is unstable. Comment: [id number] 40lbs  Functional status:  Difficulty performing the following Instrumental ADLs:  Housekeeping: No  Food Preparation: No  Shopping: No  Ability to Handle Finances: Yes  Transportation/Driving: No  Household Appliances/Stove: No  Laundry: No  Difficulty performing the following Basic ADLs:  Dressing: No  Bathing: No  Toileting: No  Personal hygiene and grooming: No  Feeding: No  Care Management:  Patient/Family Safety Concerns:  Medication Adherence: No  Home Safety: No  Wandered: No  Firearms: No  Fall Risk: No  Home Alone: No        Past Medical History:   Diagnosis Date    Depression     Diabetes mellitus     Diabetes mellitus, type 2     History of right coronary artery stent placement     done at [facility]       Past Surgical History:   Procedure Laterality Date    ANGIOGRAPHY N/A 10/2023    COLONOSCOPY N/A 08/02/2022    Procedure: COLONOSCOPY;  Surgeon: Keira Wheat MD;  Location: [facility];  Service: Endoscopy;  Laterality: N/A;    coronary stents      x6    LAPAROSCOPIC CHOLECYSTECTOMY N/A 6/13/2024    Procedure: CHOLECYSTECTOMY, LAPAROSCOPIC;  Surgeon: Kevin Ray MD;  Location: [facility];  Service: General;  Laterality: N/A;    REFRACTIVE SURGERY  04/13/2016     VASECTOMY         Family History   Problem Relation Name Age of Onset    Heart disease Mother      Hypertension Mother      Diabetes Mother      Heart attack Mother      Heart disease Father      Hypertension Father      Diabetes Father      Heart attack Father      Heart attack Maternal Uncle      Heart attack Maternal Grandfather         Social History     Socioeconomic History    Marital status:    Tobacco Use    Smoking status: Never    Smokeless tobacco: Never   Substance and Sexual Activity    Alcohol use: Not Currently     Comment: occassional    Drug use: No    Sexual activity: Yes     Partners: Female     Social Determinants of Health     Financial Resource Strain: Low Risk  (3/20/2024)    Overall Financial Resource Strain (CARDIA)     Difficulty of Paying Living Expenses: Not hard at all   Food Insecurity: No Food Insecurity (3/20/2024)    Hunger Vital Sign     Worried About Running Out of Food in the Last Year: Never true     Ran Out of Food in the Last Year: Never true   Transportation Needs: No Transportation Needs (3/20/2024)    PRAPARE - Transportation     Lack of Transportation (Medical): No     Lack of Transportation (Non-Medical): No   Physical Activity: Sufficiently Active (3/20/2024)    Exercise Vital Sign     Days of Exercise per Week: 6 days     Minutes of Exercise per Session: 40 min   Stress: No Stress Concern Present (3/20/2024)    [employer] [place] [employer] - Occupational Stress Questionnaire     Feeling of Stress : Only a little   Housing Stability: Low Risk  (3/20/2024)    Housing Stability Vital Sign     Unable to Pay for Housing in the Last Year: No     Number of Places Lived in the Last Year: 1     Unstable Housing in the Last Year: No       Medication:     Current Outpatient Medications on File Prior to Visit   Medication Sig Dispense Refill    aspirin (ECOTRIN) 81 MG EC tablet Take 81 mg by mouth once daily.      atorvastatin (LIPITOR) 40 MG tablet Take 40 mg  by mouth once daily.      empagliflozin-metformin (SYNJARDY XR) 25-1,000 mg TBph Take 1 tablet by mouth Daily. 90 tablet 1    evolocumab (REPATHA SURECLICK) 140 mg/mL PnIj INJECT ONE PEN INTO THE SKIN EVERY 2 WEEKS 2 mL 12    evolocumab (REPATHA SURECLICK) 140 mg/mL PnIj Inject 1 pen into the skin once every 2 weeks. 2 mL 12    levETIRAcetam (KEPPRA) 250 MG Tab Take 1 tablet (250 mg total) by mouth every evening. 30 tablet 11    metoprolol succinate (TOPROL-XL) 100 MG 24 hr tablet Take 100 mg by mouth once daily.      nitroGLYCERIN (NITROSTAT) 0.4 MG SL tablet       nitroGLYCERIN (NITROSTAT) 0.4 MG SL tablet PLACE 1 TABLET UNDER TONGUE EVERY 5 MINUTES FOR CHEST PAIN. GO TO ER IF CHEST PAIN PERSISTS AFTER 3 DOSES. 25 tablet 3    nitroGLYCERIN (NITROSTAT) 0.4 MG SL tablet PLACE 1 TABLET UNDER TONGUE EVERY 5 MINUTES FOR CHEST PAIN. GO TO ER IF CHEST PAIN PERSISTS AFTER 3 DOSES. 25 tablet 3    pantoprazole (PROTONIX) 40 MG tablet Take 1 tablet orally once a day 90 tablet 4    pantoprazole (PROTONIX) 40 MG tablet Take 1 tablet orally once a day 90 tablet 4    QUEtiapine (SEROQUEL) 25 MG Tab Take 1 tablet (25 mg total) by mouth every evening. 90 tablet 3    rimegepant (NURTEC) 75 mg odt DISSOLVE 1 TABLET BY MOUTH ONCE DAILY AS NEEDED FOR  MIGRAINE.  PLACE  ODT  TABLET  ON  THE  TONGUE.  MAY  BE  PLACED  UNDER  THE  TONGUE. 8 tablet 11    sertraline (ZOLOFT) 100 MG tablet Take 2 tablets (200 mg total) by mouth once daily. 60 tablet 3    suvorexant (BELSOMRA) 10 mg Tab Take 1 tablet by mouth every evening. 30 tablet 5    tirzepatide (MOUNJARO) 15 mg/0.5 mL PnIj Inject 15 mg into the skin every 7 days. 4 Pen 6    [DISCONTINUED] gabapentin (NEURONTIN) 300 MG capsule Take 2 capsules (600 mg total) by mouth every evening. 180 capsule 1     No current facility-administered medications on file prior to visit.        Review of patient's allergies indicates:  No Known Allergies    Medications Reconciliation:   I have reconciled the  patient's home medications and discharge medications with the patient/family. I have updated all changes.  Refer to After-Visit Medication List.    Objective:  Vital Signs:  Vitals:    08/22/24 0810   BP: 104/68   Pulse: 96     Wt Readings from Last 3 Encounters:   08/22/24 0810 85.3 kg (188 lb)   07/22/24 1403 85.6 kg (188 lb 11.2 oz)   05/22/24 1007 82.7 kg (182 lb 5.1 oz)     Body mass index is 27.76 kg/m².           Neurological examination:  Mental Status:   His appearance is normal (hygiene is appropriate; attire is proper and clean).  Throughout the interview, he behaved abnormally and was not cooperative. Comment: poor eye contact;  His behavior is appropriate to the clinical context without impropriety or improper language/conduct.  His behavior was not characterized by episodes of sudden uncontrollable and inappropriate laughing or crying.  The patient's energy level is abnormal. Comment: Hypoactive;  His orientation is normal; Spatial 5/5 (location, the floor of building, city, county, state) and temporal 5/5 (month, day, year, [place]) dimensions are accurate.  His attention/concentration is impaired.  He can complete three-step commands.  His fund of knowledge was appropriate for age, culture, and level of education.  His thought process is not logical or goal-oriented. Comment: bradyphrenia;  He demonstrated impaired insight based on actions, awareness of his illness, plans for the future.  He demonstrated good judgment based on actions and plans for the future.  He has no evidence of hallucinations (auditory, visual, olfactory).  He has no evidence of delusions (paranoid, grandiose, bizarre).  Cranial Nerves:   His pupils were normal.  His visual fields were full to confrontation in all quadrants.  His ocular pursuit in the horizontal and vertical plane was complete.  His saccadic initiation, velocity, and amplitude are normal.  His facial strength was normal.  His facial expression was symmetric and  appropriate to the context.  His hearing was diminished.  His oropharynx and soft palate appeared abnormal. Comment: mallampati 3;  His tongue showed no evidence of scalloping.  He tongue movement with normal.  He had no significant evidence of anterocollis or retrocollis.  Speech/Language:   The patient's speech was fluent, non-effortful, and his rate was appropriate to the context.  He has no articulation (segmental features) errors.  The patient's speech is not dysarthric.  The patient's speech was without evidence of anomia.  He makes no phonological loop errors.  He can comprehend commands that cross the midline (e.g., with your left thumb, touch your right ear).  He can comprehend syntactically complex sentences.  Motor:   The patient's bilateral upper extremity muscle bulk is appropriate.  The patient's upper extremity muscle tone is increased.  The patient's bilateral upper extremity muscle tone does not suggest spasticity.  The patient's bilateral upper extremity muscle tone does not suggest rigidity/cogwheeling.  There is evidence of paratonia. Comment: B/L, R>L, Mild; Muscle tone is increased and there is evidence of paratonia.  There was no dystonia observed on examination.  Assessment of motor strength was symmetric and at minimal anti-gravity.  There is no pronator or downward drift.  There is myoclonus observed in The patient's bilateral upper and lower extremities. Comment: myoclonus; myoclonus  There are no fasciculations observed in The patient's bilateral upper and lower extremities.  Coordination:   He has no bilateral upper extremity limb dysmetria or past pointing on finger-nose-finger bilaterally.  He has no limb dysdiadochokinesia of the upper extremity on the pronation/supination test and screwing in a light bulb or lower extremity during tapping ball of each foot bilaterally.  He has a visible tremor. Comment: B/L;  He has a kinetic tremor.  He has a postural tremor. Comment: B/L, R>L,  Mild;  He has no resting tremor bilaterally.  He has no evidence of interhemispheric motor control deficits.  He has no motor overflow bilaterally.  The patient's bilateral upper extremity coordination with finger tapping, pronation/supination, and the open-close fist showed no slowing, no hypometria, and no dysrhythmia inconsistent with bradykinesia.  The patient's bilateral upper extremity coordination with finger tapping, pronation/supination, and the open-close fist showed slowing.  The patient's bilateral upper extremity coordination with finger tapping, pronation/supination, and the open-close fist showed hypometria.  The patient's bilateral upper extremity coordination with finger tapping, pronation/supination, and the open-close fist showed dysrhythmia.  Higher Cortical Function:   The patient showed no evidence of simultanagnosia (Navon hierarchical letters).  The patient showed no evidence of visuospatial constructional dysfunction.  The patient showed no evidence of apraxia.  Sensory:   His cortical sensory assessment demonstrated no neglect bilaterally.  His sensation was diminished to light touch, and vibratory sense. Comment: in the bilateral upper and lower extremities in a length-dependant pattern.; in the bilateral upper and lower extremities in a length-dependant pattern.  His sensation was impaired to temperature/pinprick. Comment: in the bilateral upper and lower extremities. ; in the bilateral upper and lower extremities.  Reflexes:   Reflexes were abnormal. Comment: decreased throughout;  Gait:   He has normal posture sitting unaided.  He is unable to rise from a chair and sit back down without using their arms.  His gait was abnormal.  His gait initiation/inhibition was normal.  His stance while walking is abnormal.  His gait speed was abnormal (70-80 F 1.13 m/s M 1.26 m/s, >80 F 0.94 m/sec, M 0.97 m/sec). Comment: slow;  When attempting to walk abnormally (heels, tiptoes, tandem), he makes no  errors.  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  Moderate Visuospatial Impairment: visuospatial construction.  Mild Executive Impairment: He scored >1.5 standard deviations below the norm on at least one measure. He had difficulty with lexical fluency, working memory.  Very Mild Attention Impairment: orientation.  MMSE 28/30: MMSE Score suggestive of normal to questionable cognitive impairment.  MOCA 22/30: MOCA Score suggestive of mild cognitive impairment.  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  MOCA 22/30: MOCA Score suggestive of mild cognitive impairment.  Neurocognitive/Neurobehavioral Evaluation completed on 2024-08-22    Neuropsychiatric/Behavioral Focused Evaluation Assessment    BEHAV5+ 2/6 See ROS section for a full description   Laboratories:     Lab Date Value [Reference]   Neurodegenerative Cerebrospinal Fluid Assessment           Abeta40 2024, Jan-03    273 [pg/mL]      Abeta42 2024, Jan-03    45 [pg/mL]      Phospho-Tau (181P) 2024, Jan-03    0.59 [0.00 - 0.95 pg/mL]      Coagulopathy Screening   Antithrombin III 2024, Jan-03    118 [83 - 118 %]      aPTT 2022, Jul-07    25.3 (L) (E) [26.2 - 37.2 Seconds]      D-Dimer 2022, Jul-18    <0.19 [<0.50 mg/L FEU]      PLATELET NEUTRALIZATION APTT 2024, Jan-03    Not Applicable      Protein C Activity 2024, Jan-03    141 [70 - 150 %]      Protein S Activity 2024, Jan-03    149 [65 - 150 %]      Protime 2022, Jul-18    17.2 (H) (E) [9.8 - 13.3 Seconds]      PTT Heparin Neutralized 2024, Jan-03    Not Applicable [<=1.20 ]      PTT-LA Mix 2024, Jan-03    Not Applicable [32 - 48 sec]      Reptilase Time 2024, Jan-03    Not Applicable [<=21.9 sec]       Thrombin Time 2024, Jan-03    Not Applicable [<=19.5 s]      Autoimmune/Paraneoplastic Screening   APA Isotype IgM 2024, Jan-03    <9.40 [0.00 - 12.49 MPL]      Beta-2 Glyco 1 IgA 2024, Jan-03    4.6 [<7.0 U/mL]      Beta-2 Glyco 1 IgG 2024, Jan-03    12.0 (H) [<7.0 U/mL]      Beta-2 Glyco 1 IgM 2024, Jan-03    <2.4 [<7.0 U/mL]      dRVVT Confirm 2024, Jan-03    Not Applicable [<=1.20 ]      dRVVT Incubated 1:1 Mix 2024, Jan-03    Not Applicable [<=1.20 ]      DRVVT Screen 2024, Jan-03    35 [<=1.20 ]      PT Lupus Anticoagulant 2024, Jan-03    12.9 [12.0 - 15.5 s]      PTT Lupus Anticoagulant 2024, Jan-03    31 (L) [<=1.20 ]      Sed Rate 2022, Jul-18    18 (H) [0 - 10 mm/Hr]      Myopathy/Myalgia   CPK 2022, Jul-18    63      Neurodegenerative Skin Protein Biomarkers   Distal Leg 2024, Mar-15    Normal No phosphorylated alpha-synuclein deposition observed in stained sections. [Normal]      Distal Thigh 2024, Mar-15    Abnormal Two or more colocalized fibers seen across all stained sections. [Normal]      Posterior Cervical 2024, Mar-15    Normal No phosphorylated alpha-synuclein deposition observed in stained sections. [Normal]      Metabolic Screening   Hemoglobin A1C External 01/03/2024  7.2 (H) [4.0 - 5.6 %]      Methlymalonic Acid 01/03/2024  0.18 [<0.40 umol/L]      T4 Total 01/03/2024  9.0 [4.5 - 11.5 ug/dL]      TSH 07/07/2022  1.711 [0.400 - 4.000 uIU/mL]      Glucose 2024, Jan-03    140 (H) [70 - 110 mg/dL]      Albumin 2024, Jan-03    4.4 [3.5 - 5.2 g/dL]      ALT 2024, Jan-03    42 [10 - 44 U/L]      AST 2024, Jan-03    30 [10 - 40 U/L]      BILIRUBIN TOTAL 2024, Jan-03    0.5 [0.1 - 1.0 mg/dL]      PROTEIN TOTAL 2024, Jan-03    8.3 [6.0 - 8.4 g/dL]      HDL 2022, Jul-07    47 (E) [35 - 70 mg/dL]      Triglycerides 01/03/2024  139 (E) [40 - 160 mg/dL]      B12 Def. Methylmalonic Acid 01/03/2024  0.16 [<=0.40 nmol/mL]      Folate 01/03/2024  6.8 [4.0 - 24.0 ng/mL]      Thiamine 07/07/2022  102 [38 -  122 ug/L]      Vitamin B-12 2022, Jul-07    708 [210 - 950 pg/mL]      Cerebrospinal Fluid Assessment   IgG 01/03/2024  1027 [650 - 1600 mg/dL]      Neuroendocrine/Electrolyte Screening   Magnesium 01/03/2024  2.0 [1.6 - 2.6 mg/dL]      PTH 2024, Jan-03    41.1 [9.0 - 77.0 pg/mL]      BUN 2024, Jan-03    33 (H) [6 - 20 mg/dL]      Chloride 2024, Jan-03    103 [95 - 110 mmol/L]      Creatinine 2024, Jan-03    0.9 [0.5 - 1.4 mg/dL]      Potassium 2024, Jan-03    4.1 [3.5 - 5.1 mmol/L]      Sodium 2024, Jan-03    139 [136 - 145 mmol/L]      Neurodegenerative Serum Fluid Assessment   NEUROFILAMENT LIGHT CHAIN, PLASMA 2024, Jan-03    14.9 [<=20.8 pg/mL]      Standard Hematology Screen   Hematocrit 2024, Jan-03    48.6 [40.0 - 54.0 %]      Hemoglobin 2024, Jan-03    15.7 [14.0 - 18.0 g/dL]      MCV 07/07/2022  87 [82 - 98 fL]      Infectious Disease/Immunocompromised Screening   RPR 01/03/2024  Non-reactive      Syphilis Treponemal Ab 2024, Jan-03    Nonreactive [Nonreactive ]           Neuroimaging:    MRI brain/head without contrast on 1/18/2024  Formal interpretation by Radiology:  Age-appropriate generalized cerebral volume loss with mild chronic microvascular ischemic disease with a remote lacunar-type infarction in the right cerebellar hemisphere. There is no evidence for an acute infarction or intracranial hemorrhage.  Independently reviewed radiological imaging by Juan Long MD. MPH. Behavioral Neurologist  T1: Mild generalized cortical atrophy posterior>frontal, dorsal>ventral, lateral>medial without a clear degenerative patterning.  T2/FLAIR: No Significant hyperintensities appreciated on MRI T2/FLAIR  DWI/ADC: No Significant DWI hyperintensities/hypointensities. No ADC correlation.  SWI/GRE: No Significant hypointensities to suggest cortical/subcortical hemosiderin deposition.  Impression: : Normal Brain Imaging. No significant cortical/subcortical atrophy. No significant T2/FLAIR hyperintensities,  DWI/ADC/SWI abnormalities    MRI brain/head without contrast on 1/18/2024  Formal interpretation by Radiology:  Age-appropriate generalized cerebral volume loss with mild chronic microvascular ischemic disease with a remote lacunar-type infarction in the right cerebellar hemisphere. There is no evidence for an acute infarction or intracranial hemorrhage.  Independently reviewed radiological imaging by Juan Long MD. MPH. Behavioral Neurologist  T1: Unchanged since early 2023 - Mild generalized cortical atrophy posterior>frontal, dorsal>ventral, lateral>medial without a clear degenerative patterning. Intact corpus callosum with normal volume ratio. intact midbrain and brainstem normal volume and ratio. Intact hippocampal structures, volume ratio  T2/FLAIR: Unchanged since early 2023- scattered subcortical microvascular disease. Age infarcts in the bilateral cerebellum. Right greater than left. Otherwise stable. Mild parahippocampal stippling mild anterior periventricular capping.  DWI/ADC: No Significant DWI hyperintensities/hypointensities. No ADC correlation.  SWI/GRE: No Significant hypointensities to suggest cortical/subcortical hemosiderin deposition.  Impression: : Unchanged since early 2023- Mild generalized cortical atrophy posterior>frontal, dorsal>ventral, lateral>medial without a clear degenerative patterning. age indeterminate right cerebellar infarct with mild microvascular disease    MRI brain/head without contrast on 6/26/2023  Formal interpretation by Radiology:  Two small areas of remote infarction are seen in the right cerebellar hemisphere. No new infarction is seen. Stable focus of FLAIR signal abnormality in the left posterior frontal white matter, unchanged from prior study likely related to chronic microvascular ischemic disease.  Independently reviewed radiological imaging by Juan Long MD. MPH. Behavioral Neurologist  T1: Mild generalized cortical atrophy posterior>frontal,  dorsal>ventral, lateral>medial without a clear degenerative patterning. Intact corpus callosum with normal volume ratio. intact midbrain and brainstem normal volume and ratio. Intact hippocampal structures, volume ratio  T2/FLAIR: scattered subcortical microvascular disease. Age infarcts in the bilateral cerebellum. Right greater than left. Otherwise stable. Mild parahippocampal stippling mild anterior periventricular capping.  DWI/ADC: No Significant DWI hyperintensities/hypointensities. No ADC correlation.  SWI/GRE: No Significant hypointensities to suggest cortical/subcortical hemosiderin deposition.  Impression: : Mild generalized cortical atrophy posterior>frontal, dorsal>ventral, lateral>medial without a clear degenerative patterning. age indeterminate right cerebellar infarct with mild microvascular disease     Procedures:    Electroencephalogram on 1/18/2024  Formal interpretation:  During the maximally alert state, the posterior dominant rhythm consisted of moderate irregular 9-10 Hz activity which was seen symmetrically over the occipital, parietal and posterior temporal regions. The PDR was reactive to eye opening and attenuated but returned with eye closure. No abnormal activity was noted with eye closure. A moderate amplitude somewhat irregular mid-range beta activity was present and had a symmetrical distribution over both hemispheres. Drowsiness and sleep were not recorded. No lateralized nor focal features were present and no spike or sharp waves were recorded.  Independently reviewed Electroencephalogram by Juan Long MD. MPH. Behavioral Neurologist  Impression: : No evidence of epileptic abnormalities    Electrocardiogram on 10/21/2023  Formal interpretation:  Vent. Rate : 099 BPM Atrial Rate : 099 BPM P-R Int : 172 ms QRS Dur : 080 ms QT Int : 328 ms P-R-T Axes : 050 105 015 degrees QTc Int : 420 ms Normal sinus rhythm Rightward axis Borderline Abnormal ECG  Independently reviewed  Electrocardiogram by Juan Long MD. MPH. Behavioral Neurologist  Impression: : Received ECG has no evidence of sinus node disease. HR (>=50-60). Prolonged WA interval (>0.22 s). Broad QRS complex (> 0.12 s).    Electrocardiogram on 10/21/2023  Formal interpretation:  Vent. Rate : 099 BPM Atrial Rate : 099 BPM P-R Int : 172 ms QRS Dur : 080 ms QT Int : 328 ms P-R-T Axes : 050 105 015 degrees QTc Int : 420 ms Normal sinus rhythm Rightward axis Borderline Abnormal ECG  Independently reviewed Electrocardiogram by Juan Long MD. MPH. Behavioral Neurologist  Impression: : Received ECG has no evidence of sinus node disease. HR (>=50-60). Prolonged WA interval (>0.22 s). Broad QRS complex (> 0.12 s).     Clinical Summary:     The patient is a 54-year-old right-handed male with a relevant past medical history of CLAUDIA, DM2 onset 30s, CVD, CAD, MD, HLD, HTN, JEANNINE, who presents reporting a 2-year history of step-wise progressive neurocognitive impairment.       The clinical history is suggestive of:  Memory Impairment: STM encoding impairment, LTM encoding-retrieval impairment  Attention Impairment: Attention, Sustained attention, Shifting attention  Executive Impairment: Energization, Working Memory  Language Impairment: Language Dysfunction, Semantic Dysfunction  Motor/Coordination Impairment: Sensory motor integration, Central pattern generators dysfunction, Cortical-Striatal Loop Dysfunction  Sensory Impairment: Sensory Deprivation  Behavior Impairment: Response Inhibition, Emotional Regulation  Psychiatric Impairment: Signal-Noise Dysregulation, Hallucinations  Medical Review of Systems Impairment: Limbic Dysfunction  iADL Impairment: [place] Instrumental Activities of Daily Living Scale  The neurological examination is significant for:  Executive Impairment: thought disorder  Movement Disorder (Gait): strength (difficulty rising), abnormal features (stance, speed)  Movement Disorder (Hyperkinetic): myoclonus,  tremor (kinetic, postural)  Movement Disorder (Hypokinetic): paratonia (tone)  Pyramidal Impairment: abnormal reflexes  Sensory Dysfunction: hearing (diminished), peripheral (A? 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  MOCA 22/30: MOCA Score suggestive of mild cognitive impairment.  BEHAV5+ 2/6: See ROS section for a full description  The neurologically relevant imaging is significant for  MRI brain/head without contrast (1/18/2024): Normal Brain Imaging. No significant cortical/subcortical atrophy. No significant T2/FLAIR hyperintensities, DWI/ADC/SWI abnormalities  MRI brain/head without contrast (1/18/2024): Unchanged since early 2023- Mild generalized cortical atrophy posterior>frontal, dorsal>ventral, lateral>medial without a clear degenerative patterning. 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     The patient's clinical presentation meets the criteria for Mild Cognitive Impairment (MCI-ADRC) (Jose MS, et al. 2011 Alzheimer's & Dementia).     Concern regarding an intraindividual change in cognition  Impairment in one or more cognitive domains  Preservation of independence in functional abilities.  Not demented     The patient's initial clinical syndrome was  suggestive of Vascular contributions to cognitive impairment (Shubham et al., Neurology 1993).  Fluctuating, stepwise progression of cognitive deficits.  Personality and mood changes, abulia, depression, emotional incontinence, or other subcortical deficits including psychomotor retardation and abnormal executive function.     However over the last year between 2023 to 2024 clinical presentation is increasingly concerning for early signs of Dementia with Lewy Bodies (DLB)   Prominent or persistent memory impairment may not necessarily occur in the early stages but is usually evident with progression.  Deficits on tests of attention, executive function, and visuospatial ability may be especially prominent.  Fluctuating cognition with pronounced variations in attention and alertness.  Recurrent visual hallucinations.  Spontaneous motor features of parkinsonism.  REM sleep behavior disorder.     There is a high probability that the patient's clinical presentation is indicative of early signs of an alpha-synuclein mediated disease. This condition may have been provoked by hypoxemia and metabolic deficits, exacerbating cortical hyperexcitability in the posterior hemisphere, manifested as cortical myoclonus. 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  At present, all neurodegenerative diseases can only be diagnosed with 100% certainty through a brain autopsy. The suspected neuropathology underlying the patient's neurocognitive impairment is likely a mixture of pathologies (Lewy body disease/alpha-synucleinopathy, Vascular Contributions to Cognitive Impairment and Dementia).  Plasma Protein Biomarkers: [01/03/2024] Neurofilament Light Chain (Nfl): 14.9. [01/03/2024] Neurofilament Light Chain (Nfl): 14.9.  Cerebrospinal Fluid Protein Biomarkers: [01/03/2024] Abeta42: 45. Phospho-Tau (181P): 0.59. [01/03/2024] Abeta42: 45. Phospho-Tau (181P): 0.59.  [email or web address]  Skin Biomarkers: [03/15/2024] Syn-One PC: 0 (Negative). Syn-One DT: 2 (Positive). Syn-One DL: 0 (Negative).  There is no relevant genetic biomarkers available on record.     Since the last visit, the patient still has not adequately controlled sleep, partly due to sleep behaviors. I recommend advancing the timing of sleep aids from 6 p.m. to 9 p.m. and increasing gabapentin from 600 mg to 900 mg at bedtime. Once sleep is better controlled, we will consolidate sleep aids and other medications to avoid polypharmacy, likely substituting gabapentin and Keppra with clonazepam, with the long-term goal of discontinuing Seroquel.     The observations made above, were discussed with the patient and their supporting historian(s) (wife). We have discussed the additional diagnostic(s) and/or managenent below. Prior Authorization Statement(s) Suvorexant (as well as other orexin inhibitors) is extremely effective, well-tolerated in those over 65 with minimal adverse reactions in clinical trials, and non-addictive. In clinical trials it was shown to improve total sleep time, sleep latency, waking after sleep onset, and quality of sleep, and the only adverse reaction that clearly  from placebo was morning grogginess (Suvorexant 7%, placebo 3%). If insurance requires a prior authorization, guidelines established by the American Academy of Sleep Medicine (AASM) should be sufficient justification for using Suvorexant. Per the AASM guidelines (Sateduin et al, J Clin Sleep Med, 2017;13(2):307-349), Suvorexant is the most reasonable choice for the treatment of the insomnia in those over 65, particularly in the setting of cognitive impairment. Benzodiazepines (e.g., Triazolam, Temazepam), Z drugs (e.g., Zolpidem, Eszopiclone), and anticholinergic medications (e.g., Doxepin) are not recommended to treat insomnia in those over 65 and are listed on the AGS Beers Criteria (AGS Beers Criteria Update Expert Panel, J Am Geriatr Soc,  2019;67(4):674-694). These medications increase the risk for falls and worsen cognition. Other medications, including Zaleplon and Ramelteon are only indicated for sleep-onset insomnia. Trazodone is not recommended by AASM for the treatment of insomnia.     Care Management Plan:    #Optimize Neurocognitive Impairment and Quality  We have discussed the MIND Diet and other lifestyle behavior that may help maintain brain health.  We have provided written/digital reading material  No indication for donepezil at this time may consider a later date however patient is not interested in symptomatic medications  #Optimize Behavioral Management and Quality.  No indication for memantine at this time  Continue Zoloft 100 mg daily  #Optimize Sleep Hygiene and Quality  We discussed and recommended additional diagnostic/management of sleep disorder to optimize brain health and longevity.  f/u sleep medicine specialist for symptoms consistent with sleep apnea  We have discussed sleep hygiene and behavior  Continue Belsomra 10 mg q.h.s.  Continue Seroquel 25 mg q.h.s. for now  Increase gabapentin from 600 mg to 100 mg q.h.s. for restless leg and sleep onset disorder  #Optimize Movement Disorder and Quality.  Continue low-dose Keppra 250 mg q.h.s. 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  f/u Endocrinology for evaluation early-onset diabetes in his 30s with a family history of multiple relatives with early onset diabetes with multiple medical complications - consider alternatives to Ozempic due to stepwise progressive decline since starting in mid 2022  f/u hematology - hypercoagulable state of unclear etiology  #Optimize Movement Disorder and Quality.  Next appointment consider starting L-dopa therapy for parkinsonism  We discussed potentially starting over-the-counter L-dopa therapy in the form of Dopa [place]  #Behavioral/Environmental Strategies  We recommend engaging in activities  "that stimulate cognitively and socially while avoiding excessive stimulation and fatigue in overwhelmingly complex situations.  We recommend integrating routine and schedule into your daily life. https://www.alzheimersproject.org/news/the-importance-of-routine-and-familiarity-to-persons-with-dementia/  #Health Maintenance/Lifestyle Advice  We have discussed the value in aggressively controlling vascular risk factors like hypertension, hyperlipidemia, and Diabetes SBP<130, LDL<100, A1C<7.0.  We discussed the need to optimize lifestyle choices including a heart-healthy diet (e.g., Mediterranean or DASH), increased cardiovascular exercise (goal 150 minutes of moderate-intensity per week), and stay cognitively and socially active.  We recommend the MIND diet, a combination of two healthy diets: the Mediterranean diet and the DASH (Dietary Approaches to Stop Hypertension) diet, and includes a variety of brain-friendly foods to optimize cognitive health and longevity.  #Support  We all need support sometimes. Get easy access to local resources, community programs, and services. https://www.communityresourcefinder.org/  Learn more about Cognitive Impairment in [place]: https://www.alz.org/professionals/public-health/state-overview[place]  #Safety  The [employer] administers the nationwide "Safe Return" program with identification bracelets, necklaces, or clothing tags and 24-hour assistance. More information is available online at https://www.alz.org/help-support/caregiving/safety/medicalert-with-24-7-wandering-support  #Follow up:  Follow-up as needed.    Thank you for allowing us to participate in the care of your patient. Please do not hesitate to contact us with any questions or concerns.     It was a pleasure seeing The patient and we look forward to seeing them at their follow-up visit.     This note is dictated on M*Modal Fluency Direct word recognition program. There are word recognition mistakes " that are occasionally missed on review.       Scheduled Follow-up :  No future appointments.    After Visit Medication List :     Medication List            Accurate as of August 22, 2024  1:07 PM. If you have any questions, ask your nurse or doctor.                CHANGE how you take these medications      gabapentin 300 MG capsule  Commonly known as: NEURONTIN  Take 3 capsules (900 mg total) by mouth every evening.  What changed: how much to take  Changed by: Juan Espinosa MD            CONTINUE taking these medications      aspirin 81 MG EC tablet  Commonly known as: ECOTRIN     atorvastatin 40 MG tablet  Commonly known as: LIPITOR     BELSOMRA 10 mg Tab  Generic drug: suvorexant  Take 1 tablet by mouth every evening.     levETIRAcetam 250 MG Tab  Commonly known as: KEPPRA  Take 1 tablet (250 mg total) by mouth every evening.     metoprolol succinate 100 MG 24 hr tablet  Commonly known as: TOPROL-XL     MOUNJARO 15 mg/0.5 mL Pnij  Generic drug: tirzepatide  Inject 15 mg into the skin every 7 days.     * nitroGLYCERIN 0.4 MG SL tablet  Commonly known as: NITROSTAT     * nitroGLYCERIN 0.4 MG SL tablet  Commonly known as: NITROSTAT  PLACE 1 TABLET UNDER TONGUE EVERY 5 MINUTES FOR CHEST PAIN. GO TO ER IF CHEST PAIN PERSISTS AFTER 3 DOSES.     * nitroGLYCERIN 0.4 MG SL tablet  Commonly known as: NITROSTAT  PLACE 1 TABLET UNDER TONGUE EVERY 5 MINUTES FOR CHEST PAIN. GO TO ER IF CHEST PAIN PERSISTS AFTER 3 DOSES.     NURTEC 75 mg odt  Generic drug: rimegepant  DISSOLVE 1 TABLET BY MOUTH ONCE DAILY AS NEEDED FOR  MIGRAINE.  PLACE  ODT  TABLET  ON  THE  TONGUE.  MAY  BE  PLACED  UNDER  THE  TONGUE.     * pantoprazole 40 MG tablet  Commonly known as: PROTONIX  Take 1 tablet orally once a day     * pantoprazole 40 MG tablet  Commonly known as: PROTONIX  Take 1 tablet orally once a day     QUEtiapine 25 MG Tab  Commonly known as: SEROQUEL  Take 1 tablet (25 mg total) by mouth every evening.     * REPATHA SURECLICK 140 mg/mL  Pnij  Generic drug: evolocumab  INJECT ONE PEN INTO THE SKIN EVERY 2 WEEKS     * REPATHA SURECLICK 140 mg/mL Pnij  Generic drug: evolocumab  Inject 1 pen into the skin once every 2 weeks.     sertraline 100 MG tablet  Commonly known as: ZOLOFT  Take 2 tablets (200 mg total) by mouth once daily.     SYNJARDY XR 25-1,000 mg Tbph  Generic drug: empagliflozin-metformin  Take 1 tablet by mouth Daily.           * This list has 7 medication(s) that are the same as other medications prescribed for you. Read the directions carefully, and ask your doctor or other care provider to review them with you.                   Where to Get Your Medications        These medications were sent to [facility] [address] [place], [facility] [address]      Hours: Mon-Fri, 8a-5:30p Phone: [phone]   gabapentin 300 MG capsule         Signing Physician:  Juan Espinosa MD    Billing:       -----------------------------------------------------------------------------  I spent a total of 50 minutes (time-in: 08:00 AM; time-out: 08:50 AM) on 2024-08-22, in person face-to-face with the patient and caregiver(s), >50% of that time was spent counseling regarding the symptoms, treatment plan, risks, therapeutic options, lifestyle modifications, and/or safety issues for the diagnoses above.  10/14 Review of Systems completed and is negative except as stated above in HPI (Systems reviewed: Const, Eyes, ENT, Resp, CV, GI, , MSK, Skin, Neuro)  I reviewed the summation of records from outside physicians for a total of 5 minutes on 2024-08-22. Reviewed and summation of records from an outside physician was performed as summarized above in HPI  I performed a neurobehavioral status examination that included a clinical assessment of thinking, reasoning, and judgment to ensure a comprehensive approach in managing the complex and evolving needs of the patient's neurocognitive condition. Please see above HPI and ROS for full details. This exam  was performed on 2024-08-22 and included 14 minutes spent on direct face-to-face clinical observation and interview with the patient and 19 minutes spent interpreting test results and preparing the report. The total time of 33 minutes spent on the neurobehavioral status examination is not included in the time spent on evaluation and management coding.  Total Billing time spent on encounter/documentation for this patient's evaluation and management, not including the neurobehavioral status examination: 41 minutes.

## 2024-08-23 ENCOUNTER — TELEPHONE (OUTPATIENT)
Dept: NEUROLOGY | Facility: CLINIC | Age: 54
End: 2024-08-23
Payer: COMMERCIAL

## 2024-08-23 NOTE — TELEPHONE ENCOUNTER
----- Message from Juan Espinosa MD sent at 8/23/2024  8:04 AM CDT -----  Please schedule patient for 3 week telephone follow-up for medication titration

## 2024-08-27 ENCOUNTER — PATIENT MESSAGE (OUTPATIENT)
Dept: NEUROLOGY | Facility: CLINIC | Age: 54
End: 2024-08-27
Payer: COMMERCIAL

## 2024-08-29 DIAGNOSIS — R51.9 SEVERE HEADACHE: ICD-10-CM

## 2024-08-30 NOTE — TELEPHONE ENCOUNTER
Please see the attached refill request. Last office visit 4/23/2024, RTC 1 year, no upcoming appointment scheduled.

## 2024-09-03 LAB
LEFT EYE DM RETINOPATHY: POSITIVE
RIGHT EYE DM RETINOPATHY: POSITIVE

## 2024-09-03 RX ORDER — RIMEGEPANT SULFATE 75 MG/75MG
75 TABLET, ORALLY DISINTEGRATING ORAL DAILY PRN
Qty: 8 TABLET | Refills: 11 | Status: SHIPPED | OUTPATIENT
Start: 2024-09-03

## 2024-09-09 ENCOUNTER — PATIENT OUTREACH (OUTPATIENT)
Dept: ADMINISTRATIVE | Facility: HOSPITAL | Age: 54
End: 2024-09-09
Payer: COMMERCIAL

## 2024-09-09 NOTE — PROGRESS NOTES
Received external Eye Exam collected/completed on 9/3/2024, updated to .  Received external Lipid Panel collected/completed on 3/15/2024 and 7/22/2024, updated to .

## 2024-09-16 ENCOUNTER — TELEPHONE (OUTPATIENT)
Dept: RESEARCH | Facility: HOSPITAL | Age: 54
End: 2024-09-16
Payer: COMMERCIAL

## 2024-09-16 NOTE — TELEPHONE ENCOUNTER
Attempted to speak with Pt in regards to confirming tomorrows audio only vv with Francisca 9/17, was unable to get a hold of anyone so left a detailed message for tomorrows visit.

## 2024-09-17 ENCOUNTER — OFFICE VISIT (OUTPATIENT)
Dept: NEUROLOGY | Facility: CLINIC | Age: 54
End: 2024-09-17
Payer: COMMERCIAL

## 2024-09-17 DIAGNOSIS — G20.C PARKINSONISM, UNSPECIFIED PARKINSONISM TYPE: ICD-10-CM

## 2024-09-17 DIAGNOSIS — R25.1 TREMOR: ICD-10-CM

## 2024-09-17 DIAGNOSIS — F51.01 PRIMARY INSOMNIA: ICD-10-CM

## 2024-09-17 DIAGNOSIS — I95.1 ORTHOSTATIC HYPOTENSION: ICD-10-CM

## 2024-09-17 DIAGNOSIS — F02.80 DIFFUSE LEWY BODY DISEASE: ICD-10-CM

## 2024-09-17 DIAGNOSIS — T88.7XXA MEDICATION SIDE EFFECT: ICD-10-CM

## 2024-09-17 DIAGNOSIS — G31.83 DIFFUSE LEWY BODY DISEASE: ICD-10-CM

## 2024-09-17 DIAGNOSIS — G47.52 RBD (REM BEHAVIORAL DISORDER): ICD-10-CM

## 2024-09-17 DIAGNOSIS — G31.84 MCI (MILD COGNITIVE IMPAIRMENT): Primary | ICD-10-CM

## 2024-09-17 DIAGNOSIS — G25.3 POST HYPOXIC MYOCLONUS: ICD-10-CM

## 2024-09-17 PROCEDURE — 3044F HG A1C LEVEL LT 7.0%: CPT | Mod: CPTII,95,, | Performed by: PSYCHIATRY & NEUROLOGY

## 2024-09-17 PROCEDURE — 99358 PROLONG SERVICE W/O CONTACT: CPT | Mod: 95,,, | Performed by: PSYCHIATRY & NEUROLOGY

## 2024-09-17 RX ORDER — CLONAZEPAM 0.25 MG/1
0.25 TABLET, ORALLY DISINTEGRATING ORAL NIGHTLY
Qty: 60 TABLET | Refills: 1 | Status: SHIPPED | OUTPATIENT
Start: 2024-09-17 | End: 2025-09-17

## 2024-09-17 NOTE — PROGRESS NOTES
[facility]  [facility]     PATIENT: Abel Hackett  VISIT DATE: 2024  MRN: [id number]  PRIMARY PROVIDER: Keira Wheat MD  : 1970    Recent Clinical History:    Chief Complaint: Progressive Cognitive Impairment    Clinical Interim: During the patient's last visit, they were diagnosed with mild cognitive impairment of mixed etiology, which includes post-hypoxic injury with myoclonus and a suspected underlying Lewy body disease-related process. Previously, the patient reported difficulty falling asleep and expressed concerns that food might interfere with the absorption and clinical benefit of their medications. Gabapentin is the only medication likely affected by this issue. We recommended potentially increasing the gabapentin dosage in the evening to help ease the patient into sleep. After achieving better sleep control with the current medication regimen, it may be advisable to consolidate the patients sleep aids by increasing the dosage of Belsomra and replacing Keppra and gabapentin with low-dose clonazepam. The goal is to eventually discontinue Seroquel. I recommend advancing the timing of sleep aids from 6 p.m. to 9 p.m. and increasing the gabapentin dosage from 600 mg to 900 mg at bedtime. Once sleep is better controlled, we will consolidate sleep aids and other medications to avoid polypharmacy, likely substituting gabapentin and Keppra with clonazepam, with the long-term goal of discontinuing Seroquel. On presentation today, the patient reports a marked improvement since making the discussed adjustments. We discussed concerns regarding the patient's ongoing polypharmacy and considered substituting clonazepam for Keppra, gabapentin, and Seroquel. We discussed the risks versus benefits of additional medication adjustments and provided instructions on how to manage the tapering of medications. The patient is in agreement with the proposed plan.    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 The patient met all developmental milestones.  Learning Neurodivergence: The patient/family report no signs or symptoms suggestive of developmental learning disorder.  Educational History: HS AA. + 2 years Emergency Medical Science BA. + 2 years Management MA.+ 2 years Administration  Estimated Formal Educational Experience: 18  Career/Skill Reserve: The patient has an extensive background, having worked as a paramedic for 12 years. 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 and [facility]. In 2022, there were reports of short-term and long-term issues, with further details noted in 2022.  Retired:       Relevant Neurotrauma History:        Relevant Family History:    Relevant General History:  Mother -  at age 74 LOAD/VAD  Father -  at age 74 CVA/DM2 onset 30s, migraine, brain tumor/meningioma  Paternal family Unknown  Maternal Grandmother - cancer  Brother -  50s with ESRD with early onset DM2 onset 30s  Brother - mild DM2  Daughter - lives with family  Relevant Neurocognitive Disorder History:  Mother - VAD/LOAD onset late 60s  Relevant Movement Disorder History:  Father - Tremors onset 30-40s  Relevant Motor Neuron Disease History:  The patient/family denies a history of ALS, MND, PLS.  Relevant Developmental/Neurodivergent History:  Brother - developmental disorder  Relevant Psychiatric History:  The patient/family denies a history of MDD, BD, CLAUDIA, Schizophrenia.  Known Genetic Profile: There is no relevant genetic biomarkers available on record.            Clinical History Per Electronic Medical  Record:    Past Medical History  Depression  Diabetes mellitus  Diabetes mellitus, type 2  History of right coronary artery stent placement  Biliary dyskinesia 05/22/2024  Hx of CABG 04/12/2024  Coronary artery disease involving coronary bypass graft with angina pectoris 04/12/2024  Myoclonus 01/19/2024  MCI (mild cognitive impairment) with memory loss 01/19/2024  PAF (paroxysmal atrial fibrillation) 01/15/2024  Mild cognitive impairment of uncertain or unknown etiology 09/20/2023  Cognitive impairment 03/21/2023  Hx of arterial ischemic stroke 03/21/2023  Psychosocial stressors 03/02/2023  Positive colorectal cancer screening using Cologuard test 08/02/2022  Obstructive sleep apnea (adult) (pediatric)  Atypical chest pain 04/25/2016  Anxiety 04/19/2016  Type 2 diabetes mellitus with retinopathy and macular edema, without long-term current use of insulin, unspecified laterality, unspecified retinopathy severity 04/06/2016  Generalized anxiety disorder 04/06/2016  Hyperlipidemia 12/23/2014  Impotence of organic origin 12/23/2014  Insomnia 07/10/2013  Uncontrolled type 2 diabetes mellitus 07/10/2013  Diabetes mellitus 09/06/2012  Ulnar nerve entrapment 09/06/2012  Past Surgical History  Angiography N/a 10/2023  Colonoscopy N/a 08/02/2022  Coronary Stents  Laparoscopic Cholecystectomy N/a 6/13/2024  Refractive Surgery 04/13/2016  Vasectomy  Current Medication(s) Prior to Appointment  aspirin (ECOTRIN) 81 MG EC tablet Take 81 mg by mouth once daily  atorvastatin (LIPITOR) 40 MG tablet Take 40 mg by mouth once daily  empagliflozin-metformin (SYNJARDY XR) 25-1,000 mg TBph Take 1 tablet by mouth Daily  evolocumab (REPATHA SURECLICK) 140 mg/mL PnIj INJECT ONE PEN INTO THE SKIN EVERY 2 WEEK  evolocumab (REPATHA SURECLICK) 140 mg/mL PnIj Inject 1 pen into the skin once every 2 week  gabapentin (NEURONTIN) 300 MG capsule Take 3 capsules (900 mg total) by mouth every evening. 180 capsule 1  levETIRAcetam (KEPPRA) 250 MG Tab  Take 1 tablet (250 mg total) by mouth every evening. 30 tablet 11  metoprolol succinate (TOPROL-XL) 100 MG 24 hr tablet Take 100 mg by mouth once daily  nitroGLYCERIN (NITROSTAT) 0.4 MG SL tablet  nitroGLYCERIN (NITROSTAT) 0.4 MG SL tablet PLACE 1 TABLET UNDER TONGUE EVERY 5 MINUTES FOR CHEST PAIN. GO TO ER IF CHEST PAIN PERSISTS AFTER 3 DOSES. 25 tablet 3  nitroGLYCERIN (NITROSTAT) 0.4 MG SL tablet PLACE 1 TABLET UNDER TONGUE EVERY 5 MINUTES FOR CHEST PAIN. GO TO ER IF CHEST PAIN PERSISTS AFTER 3 DOSES. 25 tablet 3  pantoprazole (PROTONIX) 40 MG tablet Take 1 tablet orally once a day  pantoprazole (PROTONIX) 40 MG tablet Take 1 tablet orally once a day  QUEtiapine (SEROQUEL) 25 MG Tab Take 1 tablet (25 mg total) by mouth every evening. 90 tablet 3  rimegepant (NURTEC) 75 mg odt Take 1 tablet (75 mg total) by mouth daily  sertraline (ZOLOFT) 100 MG tablet Take 2 tablets (200 mg total) by mouth once daily  suvorexant (BELSOMRA) 10 mg Tab Take 1 tablet by mouth every evening. 30 tablet 5  tirzepatide (MOUNJARO) 15 mg/0.5 mL PnIj Inject 15 mg into the skin every 7 day  Allergies  No Alergies on Chart            Review of cognitive, visuospatial, motor, sensory, and behavioral systems:     Memory  The patient's memory has worsened in the past few years.  The patient does repeat statements or asks the same question repeatedly.  The patient does have difficulty remembering recent important conversations.  The patient does have difficulty remembering recent events.  The patient does not forget information within minutes.  The patient's recent retrograde memory is intact.  The patient's remote memory is intact.  Attention  The patient's attention and concentration are impaired.  The patient does not have attentional fluctuations.  The patient does not have difficulty maintaining selective attention.  The patient does become easily distracted.  The patient does have difficulty with divided attention.  Executive  The  patient's cognitive processing speed is slower.  The patient does have difficulty with working memory.  The patient does misplace personal items (e.g., keys, cell phone, wallet) more frequently.  The patient does have difficulty keeping track of @HIS@ medications.  The patient does have difficulty with planning/organizing/completing multistep tasks.  The patient does have difficulty with executive attention.  The patient does have difficulty with flexible thinking.  The patient does not have difficulty with response inhibition.  The patient denies new impulsivity or rash/careless actions.  The patient's judgment is intact.  Language  The patient's speech is affected.  The patient does not forget people's names more frequently.  The patient does have word-finding difficulties.  The patient's speech is fluent and non-effortful.  The patient's speech is grammatically intact.  The patient does make inaccurate word substitutions.  The patient does not have difficulty reading.  The patient does not appear to have impaired comprehension.  Visuospatial  The patient has new visuospatial problems.  The patient does not become confused or disoriented in *new*, unfamiliar places.  The patient does not have trouble with navigation.  The patient does not get lost in familiar places.  The patient does not have visuospatial disorientation.  The patient does not have difficulty recognizing objects or faces.  The patient denies problems with driving or parking.  Motor/Coordination  The patient does have difficulty with walking.  The patient does feel imbalanced.  The patient denies having fallen.  The patient does not appear to have new muscle weakness.  The patient does have difficulty buttoning shirts, operating zippers, or manipulating tools/utensils.  The patient's handwriting has not become micrographic.  The patient does have a resting tremor.  The patient does report having new involuntary movements and/or muscle jerking.  Comment: nocturnal jerking - worse since 2023  The patient does not have swallowing difficulty.  The patient denies new muscle cramps and twitching.  Sensory  The patient denies new numbness, tingling, paresthesias, or pain.  The patient reports new loss of vision, blurry vision, and/or double vision.  The patient reports a recent loss of hearing and/or worsening tinnitus.  The patient denies anosmia.  Sleep  The patient reports difficulty sleeping.  The patient does have difficulty going to sleep.  The patient reports difficulty staying asleep and/or frequently awakening at night.  The patient does snore and/or have witnessed apneas while sleeping.  When the patient wakes up in the morning, the patient does not feel well-rested.  The patient denies dream-enactment behavior.  The patient has reported symptoms suggestive of restless leg syndrome.  Behavior  The patient's personality has changed.  The patient does not have symptoms of disinhibition and social inappropriateness.  The patient does not have symptoms to suggest a loss of manners or decorum.  The patient does not appear apathetic or has decreased motivation.  The patient does not appear to have a change in inertia.  The patient's emotional expression has changed.  The patient does have emotional blunting or lability.  The patient does not have symptoms of irritability and mood lability.  The patient does not have symptoms of agitation, aggression, or violent outbursts.  The patient's insight into their health and situation is intact.  The patient's personal hygiene is intact.  The patient is not exhibiting a diminished response to other people's needs and feelings.  The patient is not exhibiting a diminished social interest, interrelatedness, or personal warmth.  The patient denies restlessness.  The patient denies new and/or worsening simple repetitive behaviors.  The patient's speech has not become simplified or become repetitive/stereotyped.  The  patient denies new/worsening complex repetitive/ritualistic compulsions and behaviors.  The patient does not have symptoms of hyper-religiosity or dogmatism.  The patient's interests/pleasures have not become restrictive, simplified, interrupting, or repetitive.  The patient denies a change of self-stimulating behavior.  The patient denies any changes in eating behavior.  The patient denies increased consumption of food or substances.  The patient denies oral exploration or consumption of inedible objects.  Psychiatric  The patient does not feel depressed.  The patient is not exhibiting symptoms of social withdrawal/indifference.  The patient does have anxiety.  The patient does not exhibit cycling behavior.  The patient does not exhibit hyperactive behavior.  The patient is not exhibited symptoms of paranoia.  The patient does not have delusions.  The patient does have hallucinations. Comment: possible hallucinations seeing ghosts upon awakening  The patient does not have a history of sensitivity to neuroleptic/psychotropic medications.  Medical Review of Systems  The patient does not have constipation.  The patient does not have urinary incontinence.  The patient denies orthostatic lightheadedness.  The patient's weight is unstable. Comment: [id number] 40lbs            Functional Capacity Assessment: Neurological Wellbeing, Intelligence, and Social Evaluation:     Instrumental Activities of Daily Living:   Communal Operations: Mild level of inability to perform independantly.  Finances: Mild level of inability to perform independantly.  Housework: Normal level of functional independance.  Personal Health: Normal level of functional independance.  Basic Activities of Daily Living:   Axial Motor: Normal level of functional independance.  Grooming: Normal level of functional independance.  Hygeine: Normal level of functional independance.  Oropharngeal Motor: Normal level of functional independance.  Personal Health:  Normal level of functional independance.  Toiletry: Normal level of functional independance.  Functional Capacity Scales:   Grant Instrumental Activities of Daily Living Scale: Score: 0/8 suggestive of Normal.  Functional Assessment Staging Tool (FAST Scale): Score: 2/15 suggestive of Mild Cognitive Impairment (Stage 3).  [facility] Functional Assessment Scale (FAS): Score: 2/30 suggestive of Normal.  Clinical Dementia Rating Sum of Boxes: Score: 2/18 suggestive of Normal.            Laboratories:     Neurodegenerative Biomarkers - Skin  Name Reference Previous Values   Phosphorylation Alpha-Synuclein - Posterior Cervical (Left) normal Normal   2024-03-15      Phosphorylation Alpha-Synuclein - Distal Thigh (Left) normal Abnormal (H)   2024-03-15      Phosphorylation Alpha-Synuclein - Distal Leg (Left) normal Normal   2024-03-15      Metabolic Screening  Name Reference Previous Values   Cholesterol Total 120 - 199 mg/dL 135   2023-03-06      Triglycerides 30 - 150 mg/dL 139   2023-03-06      HDL 40 - 75 mg/dL 47   2023-03-06      VLDL MG/DL 28 (E)   2023-03-06      LDL Direct MG/DL 71 (E)   2023-03-06      LDL Calculated 0 - 160 MG/DL 63   2023-03-06      Hemoglobin A1C External 4.0 - 5.6 % 7.9 (H)   2023-03-06      Estimated Avg Glucose 68 - 131 mg/dL 180 (H)   2023-03-06      BNP 0 - 99 pg/mL 10   2023-03-06      Lactic Acid, Plasma (Organic Acid) <=4000.0 nmol/mL 2120.9   2023-03-06      2-Hydroxybutyric Acid <=124.0 nmol/mL 155.2 (H)   2023-03-06      3-Hydroxybutyric Acid <=700.0 nmol/mL 462.8   2023-03-06      Pyruvic Acid, Plasma (Organic Acid) <=350.0 nmol/mL 92.1   2023-03-06      cis-Aconitic acid <=9.0 nmol/mL 2.3   2023-03-06      3-Hydroxypropionic Acid <=12.4 nmol/mL 4.9   2023-03-06      [id number] ACID <=2.5 nmol/mL 1.4   2023-03-06      3OH-ISOVALERIC ACID <=15.4 nmol/mL 1.0   2023-03-06      Succinic Acid, Plasma (Organic Acid) <=10.0 nmol/mL 4.2   2023-03-06      FUMARIC  ACID <=5.0 nmol/mL 3.0   2023-03-06      3-METHYLGLUTACONIC ACID <=1.6 nmol/mL 1.6   2023-03-06      MALIC ACID <=20.0 nmol/mL 8.9   2023-03-06      2-Ketobutyric Acid <=16.0 nmol/mL 9.3   2023-03-06      2-Ketoisovaleric Acid, Plasma (Organic Acid) <=35.0 nmol/mL 15.0   2023-03-06      2-Keto-3-Methyvaleric Acid, Plasma (Organic Acid) <=70.0 nmol/mL 50.4   2023-03-06      2-Ketoisocaproic Acid, Plasma (Organic Acid) <=70.0 nmol/mL 57.4   2023-03-06      2-Ketoglutaric Acid <=40.0 nmol/mL 11.8   2023-03-06      Neuroendocrine/Electrolyte Screening  Name Reference Previous Values   Creatinine 0.5 - 1.4 mg/dL 1.2   2023-03-06      TSH 0.400 - 4.000 uIU/mL 1.711   2023-03-06      T4 Total 4.5 - 11.5 ug/dL 9.0   2023-03-06      Neuro-Inflammatory Screening - Serum  Name Reference Previous Values   Sed Rate 0 - 36 mm/Hr 18 (H)   2023-03-06      Methlymalonic Acid <0.40 umol/L 0.18   2023-03-06      Neuro-Nutritional Screening  Name Reference Previous Values   B12 Def, 2-Methylcitric Acid <=1.0 nmol/mL 0.7   2023-03-06      Folate 4.0 - 24.0 ng/mL 6.8   2023-03-06      Vitamin B12 180 - 914 ng/L 400   2023-03-06      Thiamine 38 - 122 ug/L 102   2023-03-06      B12 Def. Methylmalonic Acid <=0.40 nmol/mL 0.16   2023-03-06      Neurodegenerative Biomarkers - CSF  Name Reference Previous Values   Abeta42 >834 pg/mL 45   2023-03-06      Neurodegenerative Biomarkers - Serum   Name Reference Previous Values   Abeta40 pg/mL 273   2023-03-06      Neuro-Infectious Screening  Name Reference Previous Values   Syphilis Treponemal Ab Nonreactive Nonreactive   2023-03-06      Neurodegenerative Biomarkers - Serum  Name Reference Previous Values   Phospho-Tau (181P) <0.97 pg/mL 0.59   2023-03-06      Neurofilament Light Chain, Plasma <=37.9 pg/mL 14.9   2023-03-06      Coagulopathy Screening  Name Reference Previous Values   PT Lupus Anticoagulant 12.0 - 15.5 sec 12.9   2023-03-06      PTT Lupus Anticoagulant 32 -  48 sec 31 (H)   2023-03-06      Thrombin Time 14.7 - 19.5 sec Not Applicable   2023-03-06      Reptilase Time <=21.9 sec Not Applicable   2023-03-06      PTT Heparin Neutralized 32 - 48 sec Not Applicable   2023-03-06      PTT-LA Mix 32 - 48 sec Not Applicable   2023-03-06      DRVVT Screen 33 - 44 sec 35 (H)   2023-03-06      dRVVT Incubated 1:1 Mix 33 - 44 sec Not Applicable   2023-03-06      dRVVT Confirm Negative ratio Not Applicable   2023-03-06      Beta-2 Glyco 1 IgG <=20 SGU 12.0 (H)   2023-03-06      Beta-2 Glyco 1 IgM <=20 SMU 2.4   2023-03-06      Beta-2 Glyco 1 IgA <=20 MISTY 4.6   2023-03-06      APA Isotype IgG 0.00 - 14.99 GPL 9.40   2023-03-06      APA Isotype IgM 0.00 - 12.49 MPL 9.40   2023-03-06      Protein C Activity 70 - 150 % 141   2023-03-06      Protein S Activity 65 - 160 % 149   2023-03-06      Antithrombin III 83 - 118 % 118   2023-03-06      Anti-Xa Qualitative Interpretation Not Present Not Performed   2023-03-06      Anticoagulant Medication Neutralization Not Performed Not Performed   2023-03-06      Neutralized dRVVT Screen Ratio <=1.20 Not Performed   2023-03-06                Neurological Relevant Procedures:    Electroencephalogram performed on 01/18/2024  Formal Interpretation: During the maximally alert state, the posterior dominant rhythm consisted of moderate irregular 9-10 Hz activity which was seen symmetrically over the occipital, parietal and posterior temporal regions. The PDR was reactive to eye opening and attenuated but returned with eye closure. No abnormal activity was noted with eye closure. A moderate amplitude somewhat irregular mid-range beta activity was present and had a symmetrical distribution over both hemispheres. Drowsiness and sleep were not recorded. No lateralized nor focal features were present and no spike or sharp waves were recorded.  Provider Interpretation: No evidence of epileptic abnormalities  Electrocardiogram performed on  10/21/2023  Formal Interpretation: Vent. Rate : 099 BPM Atrial Rate : 099 BPM P-R Int : 172 ms QRS Dur : 080 ms QT Int : 328 ms P-R-T Axes : 050 105 015 degrees QTc Int : 420 ms Normal sinus rhythm Rightward axis Borderline Abnormal ECG  Provider Interpretation: Received ECG has no evidence of sinus node disease. HR (>=50-60). Prolonged ND interval (>0.22 s). Broad QRS complex (> 0.12 s).  Electrocardiogram performed on 10/21/2023  Formal Interpretation: Vent. Rate : 099 BPM Atrial Rate : 099 BPM P-R Int : 172 ms QRS Dur : 080 ms QT Int : 328 ms P-R-T Axes : 050 105 015 degrees QTc Int : 420 ms Normal sinus rhythm Rightward axis Borderline Abnormal ECG  Provider Interpretation: Received ECG has no evidence of sinus node disease. HR (>=50-60). Prolonged ND interval (>0.22 s). Broad QRS complex (> 0.12 s).            Neurologically Relevant Imaging:    MRI brain/head without contrast performed on 01/18/2024  Radiologist Interpretation: Age-appropriate generalized cerebral volume loss with mild chronic microvascular ischemic disease with a remote lacunar-type infarction in the right cerebellar hemisphere. There is no evidence for an acute infarction or intracranial hemorrhage.  Provider Interpretation: Normal Brain Imaging. No significant cortical/subcortical atrophy. No significant T2/FLAIR hyperintensities, DWI/ADC/SWI abnormalities  T1-weighted (T1W) Image Summary: Mild generalized cortical atrophy posterior>frontal, dorsal>ventral, lateral>medial without a clear degenerative patterning.  FLAIR/T2-weighted (T2W) Image Summary: No Significant hyperintensities appreciated on MRI T2/FLAIR  Diffusion Weighted Imaging with ADC Mapping Summary: No Significant DWI hyperintensities/hypointensities. No ADC correlation.  Susceptibility-weighted imaging (SWI) and/or GRE Summary: No Significant hypointensities to suggest cortical/subcortical hemosiderin deposition.  MRI brain/head without contrast performed on  01/18/2024  Radiologist Interpretation: Age-appropriate generalized cerebral volume loss with mild chronic microvascular ischemic disease with a remote lacunar-type infarction in the right cerebellar hemisphere. There is no evidence for an acute infarction or intracranial hemorrhage.  Provider Interpretation: Unchanged since early 2023- Mild generalized cortical atrophy posterior>frontal, dorsal>ventral, lateral>medial without a clear degenerative patterning. age indeterminate right cerebellar infarct with mild microvascular disease  T1-weighted (T1W) Image Summary: Unchanged since early 2023 - Mild generalized cortical atrophy posterior>frontal, dorsal>ventral, lateral>medial without a clear degenerative patterning. Intact corpus callosum with normal volume ratio. intact midbrain and brainstem normal volume and ratio. Intact hippocampal structures, volume ratio  FLAIR/T2-weighted (T2W) Image Summary: Unchanged since early 2023- scattered subcortical microvascular disease. Age infarcts in the bilateral cerebellum. Right greater than left. Otherwise stable. Mild parahippocampal stippling mild anterior periventricular capping.  Diffusion Weighted Imaging with ADC Mapping Summary: No Significant DWI hyperintensities/hypointensities. No ADC correlation.  Susceptibility-weighted imaging (SWI) and/or GRE Summary: No Significant hypointensities to suggest cortical/subcortical hemosiderin deposition.  MRI brain/head without contrast performed on 06/26/2023  Radiologist Interpretation: Two small areas of remote infarction are seen in the right cerebellar hemisphere. No new infarction is seen. Stable focus of FLAIR signal abnormality in the left posterior frontal white matter, unchanged from prior study likely related to chronic microvascular ischemic disease.  Provider Interpretation: Mild generalized cortical atrophy posterior>frontal, dorsal>ventral, lateral>medial without a clear degenerative patterning. age indeterminate  right cerebellar infarct with mild microvascular disease  T1-weighted (T1W) Image Summary: Mild generalized cortical atrophy posterior>frontal, dorsal>ventral, lateral>medial without a clear degenerative patterning. Intact corpus callosum with normal volume ratio. intact midbrain and brainstem normal volume and ratio. Intact hippocampal structures, volume ratio  FLAIR/T2-weighted (T2W) Image Summary: scattered subcortical microvascular disease. Age infarcts in the bilateral cerebellum. Right greater than left. Otherwise stable. Mild parahippocampal stippling mild anterior periventricular capping.  Diffusion Weighted Imaging with ADC Mapping Summary: No Significant DWI hyperintensities/hypointensities. No ADC correlation.  Susceptibility-weighted imaging (SWI) and/or GRE Summary: No Significant hypointensities to suggest cortical/subcortical hemosiderin deposition.            Clinical Summary:     The patient is a 54-year-old male with a relevant past medical history of CLAUDIA, DM2 onset 30s, CVD, CAD, MD, HLD, HTN and JEANNINE who presents reporting a 3-year history of step-wise progressive neurocognitive impairment.  Neurobehavioral Review of Systems Interpretation: The review of systems indicates a range of neurocognitive and neurological deficits, suggesting dysfunctions in both cortical and subcortical regions. Memory impairments, including general memory, event recall, and working memory, are linked to hippocampal and frontal lobe pathologies. Issues with attention, processing speed, and planning point towards frontal lobe and prefrontal cortex involvement. Motor skill abnormalities like tremor, jerks, and imbalance, along with sleep disturbances such as JEANNINE and EDS, suggest basal ganglia and brainstem dysfunctions. Emotional and behavioral changes hint at disruptions in the limbic system and associated networks.  Neurological Examination Interpretation: The neurological examination revealed deficits in arousal,  attention, and thought processes, indicating dysfunctions potentially linked to frontal and parietal cortical regions. Impaired sensory fibers (A? and A?/C fibers) and abnormal reflexes suggest disruptions in both cortical and subcortical pathways, including the spinal cord and peripheral nerves. Motor impairments such as difficulty rising, altered tone, myoclonus, and issues with speed and stance point to possible basal ganglia and cerebellar network pathologies. Additionally, diminished insight further supports the involvement of frontal lobe dysfunction.  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    The stage of the patient's clinical presentation meets the criteria for Mild Cognitive Impairment (MCI) as defined by the National [place] on Aging-Alzheimer's Association(Jose et al., 2011, Alzheimer's & Dementia). This diagnosis is characterized by concerns regarding an intraindividual change in cognition, impairment in one or more cognitive domains, and preservation of independence in functional abilities. Notably, the patient is not demented.   [place] Instrumental Activities of Daily Living Scale: 0/8 suggestive of Normal.  Functional Assessment Staging Tool (FAST Scale): 2/15 suggestive of Mild Cognitive Impairment (Stage 3).  Clinical Dementia Rating Sum of Boxes (CDR-SOB): 2/18 suggestive of Normal.  [facility] Functional Assessment Scale (FAS): 2/30 suggestive of Normal.     The patient's clinical syndromic presentation has features suggestive of Dementia with Lewy Bodies (Selvin et al., Neurology 2005) and Vascular contributions to cognitive impairment and dementia (Shubham et al., Neurology 1993).    the patient's initial clinical syndrome was suggestive of Vascular contributions to  cognitive impairment (Shubham et al., Neurology 1993).  Fluctuating, stepwise progression of cognitive deficits.  Personality and mood changes, abulia, depression, emotional incontinence, or other subcortical deficits including psychomotor retardation and abnormal executive function.    However over the last year between 2023 to 2024 clinical presentation is increasingly concerning for early signs of Dementia with Lewy Bodies (DLB)   Prominent or persistent memory impairment may not necessarily occur in the early stages but is usually evident with progression.  Deficits on tests of attention, executive function, and visuospatial ability may be especially prominent.  Fluctuating cognition with pronounced variations in attention and alertness.  Recurrent visual hallucinations.  Spontaneous motor features of parkinsonism.  REM sleep behavior disorder.  There is a high probability that the patient's clinical presentation is indicative of early signs of an alpha-synuclein mediated disease. This condition may have been provoked by hypoxemia and metabolic deficits, exacerbating cortical hyperexcitability in the posterior hemisphere, manifested as cortical myoclonus. 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    At present, all neurodegenerative diseases can only be diagnosed with 100% certainty through a brain autopsy. The suspected neuropathology underlying the patient's neurocognitive impairment is suggestive of Lewy body disease/alpha-synucleinopathy (LBD) and Vascular Contributions to Cognitive Impairment and Dementia (VCID).    Serum fluid protein biomarkers include Phospho-Tau (181P) Serum: 0.59 (N) on 03/06/2023  There are no cerebrospinal fluid protein biomarkers available on record.  There are no dermatological protein biomarkers available on record.  There is no relevant genetic biomarkers available on record.  There are no radiographic biomarkers  available on record.              Impression:     The patient, who presented with a chief complaint of progressive cognitive impairment, was previously diagnosed with mild cognitive impairment of mixed etiology. This condition was attributed to post-hypoxic injury with myoclonus and a suspected underlying Lewy body disease. In early January 2024, the patient reported experiencing difficulty falling asleep and expressed concerns about medication absorption interference due to food intake. Adjustments to the medication regimen, including an increase in gabapentin dosage and changes to the timing of sleep aids, resulted in marked improvement. Subsequent discussions focused on further reducing polypharmacy by substituting clonazepam for Keppra, gabapentin, and Seroquel. A plan was agreed upon for tapering these medications. The neurobehavioral evaluation revealed deficits in general memory, attention and concentration, processing speed, planning, working memory, and speech. Notable issues included word-finding difficulties and word substitutions. Motor impairments such as balance issues, tremors, and challenges with fine motor skills were also observed. Additionally, the patient experiences sleep disturbances, emotional changes, anxiety, and hallucinations, along with significant deficits in executing multi-step tasks and visuospatial functioning. The overall neurobehavioral assessment suggests a condition consistent with Lewy body disease, given the clinical presentation and symptomatology. Since the last visit, the patient's sleep has been adequately controlled. It is recommended to reduce the patient's dependence on polypharmacy. Instructions have been provided to start clonazepam, discontinue Keppra and Seroquel, and taper down gabapentin.    The above observations were discussed with the patient and their supporting historian(s). We have discussed the additional diagnostic(s) and/or management  below.            Care Management Plan:       Optimize Neurocognitive Impairment and Quality  We have discussed the MIND Diet and other lifestyle behavior that may help maintain brain health.  We have provided written/digital reading material  No indication for donepezil at this time may consider a later date however patient is not interested in symptomatic medications  Optimize Behavioral Management and Quality.  No indication for memantine at this time  Continue Zoloft 200 mg daily  - Week 1: Start Clonazepam 0.25 mg at bedtime.  - Week 1: Stop Keppra 250 mg at bedtime.  - Week 2: Stop Seroquel 25 mg at bedtime.  - Week 3: Decrease Gabapentin to 600 mg at bedtime. If bothersome symptoms emerge, please reach out and we can increase Clonazepam to 0.5 mg at bedtime.  - Week 4: Switch Gabapentin to 300 mg at bedtime as needed. If bothersome symptoms emerge, please reach out.  Optimize Sleep Hygiene and Quality  We discussed and recommended additional diagnostic/management of sleep disorder to optimize brain health and longevity.  f/u sleep medicine specialist for symptoms consistent with sleep apnea  We have discussed sleep hygiene and behavior  Continue Belsomra 10 mg q.h.s. - may increase to 20 mg  D/C Seroquel  D/C Keppra  Will plan to taper off gabapentin 900 mg for restless leg and sleep onset disorder after starting clonazepam 0.25 mg HS  Optimize Complex Medical Disorder and Quality.  f/u Endocrinology for evaluation early-onset diabetes in his 30s with a family history of multiple relatives with early onset diabetes with multiple medical complications - consider alternatives to Ozempic due to stepwise progressive decline since starting in mid 2022  f/u hematology - hypercoagulable state of unclear etiology  Optimize Movement Disorder and Quality.  Next appointment consider starting L-dopa therapy for parkinsonism  We discussed potentially starting over-the-counter L-dopa therapy in the form of Dopa  Macmelvin  Given polypharmacy concerns recommend D/C Keppra and start low dose clonazepam 0.25 mg HS  Optimize Autonomic Disorder and Quality.  We have discussed the importance of maintaining adequate hydration and salt intake to help regulate blood pressure at the head of the bed to reduce nocturnal hypertension and prevent orthostatic hypotension upon standing.  We have discussed compression stockings or abdominal binders to improve venous return and prevent blood pooling in the legs.  We have discussed the importance of frequent small meals to avoid postprandial hypotension.  We recommend avoiding hot environments and activities that may trigger symptoms (e.g., saunas, hot showers).  Physical counter-maneuvers, such as crossing legs or clenching fists, to manage sudden drops in blood pressure.  We encourage exercise and physical therapy to improve circulation and manage motor symptoms  Recommend considering agents other than metoprolol given symptoms of OH      Thank you for entrusting us with the care of your patient. Should you have any questions or concerns, please feel free to contact us at your convenience.     It was a pleasure to see the patient, and we look forward to their follow-up visit.     This note was dictated using the M*Modal Fluency Direct voice recognition program. Please be aware that word recognition errors may occasionally occur and might be overlooked during review.                    Billing Statement:       I reviewed documents/diagnostics/laboratory/imaging records and communicated with the patient's family. This is directly related to a face-to-face visit encounter with the patient (Evaluation and Management service).     An Established-Patient Audio Only Telehealth Visit was requested by the provider with the family regarding the workup performed between the patient's last clinical encounter. A total of 45 minutes (from 08:00 AM to 08:45 AM) on 09/15/2024 was spent with the family and/or  patient via audio-only telemedicine discussing the patient's case. The reason for the audio-only service rather than synchronous audio and video virtual visit was related to technical difficulties or patient preference/necessity.   The patient's location is: Home  Visit type: Virtual visit with audio only (telephone)  Each patient to whom I provide medical services by telemedicine is: (1) informed of the relationship between the physician and patient and the respective role of any other health care provider with respect to management of the patient; and (2) notified that they may decline to receive medical services by telemedicine and may withdraw from such care at any time. Patient verbally consented to receive this service via voice-only telephone call.       A Review of Systems was completed, encompassing 10 of the 14 systems. All findings were negative, except those noted in the History of Present Illness (HPI) and Review of Systems (ROS) Sections. The systems reviewed were Constitutional (Const), Eyes, Ear/Nose/Throat (ENT), Respiratory (Resp), Cardiovascular (CV), Gastrointestinal (GI), Genitourinary (), Musculoskeletal (MSK), Skin, and Neurological (Neuro).     I spent a total of 10 minutes reviewing and summarizing records from outside physicians on the day of the clinical evaluation. This review and summary were conducted as described in the History of Present Illness (HPI) and Assessment.     Total Billing time of 55 minutes was spent on documentation and communication. The CPT code(s) for billing for prolonged evaluation and management service (non-face-to-face review of records and/or communications with patient's family or other medical professionals):   [id number]          Signing Physician:  Juan Espinosa MD

## 2024-10-21 ENCOUNTER — PATIENT MESSAGE (OUTPATIENT)
Dept: ADMINISTRATIVE | Facility: HOSPITAL | Age: 54
End: 2024-10-21
Payer: COMMERCIAL

## 2024-10-22 ENCOUNTER — PATIENT OUTREACH (OUTPATIENT)
Dept: ADMINISTRATIVE | Facility: HOSPITAL | Age: 54
End: 2024-10-22
Payer: COMMERCIAL

## 2024-10-22 ENCOUNTER — TELEPHONE (OUTPATIENT)
Dept: FAMILY MEDICINE | Facility: CLINIC | Age: 54
End: 2024-10-22
Payer: COMMERCIAL

## 2024-10-22 DIAGNOSIS — E11.311 TYPE 2 DIABETES MELLITUS WITH RETINOPATHY AND MACULAR EDEMA, WITHOUT LONG-TERM CURRENT USE OF INSULIN, UNSPECIFIED LATERALITY, UNSPECIFIED RETINOPATHY SEVERITY: Primary | ICD-10-CM

## 2024-10-22 NOTE — TELEPHONE ENCOUNTER
----- Message from Med Assistant Natalia sent at 10/22/2024 10:45 AM CDT -----  Regarding: Labs.  Good morning, My name Natalia I work in population health. Mr. Roman  Hemoglobin A1c will be in Nov. I scheduled him a labs appointment to get his labs done next month. Are there any labs Dr. Wheat would like to add to his order?  Thanks RHONDA Fisher

## 2024-11-18 RX ORDER — SERTRALINE HYDROCHLORIDE 100 MG/1
200 TABLET, FILM COATED ORAL DAILY
Qty: 60 TABLET | Refills: 3 | Status: SHIPPED | OUTPATIENT
Start: 2024-11-18

## 2024-11-22 ENCOUNTER — LAB VISIT (OUTPATIENT)
Dept: LAB | Facility: HOSPITAL | Age: 54
End: 2024-11-22
Attending: FAMILY MEDICINE
Payer: COMMERCIAL

## 2024-11-22 DIAGNOSIS — Z12.5 PROSTATE CANCER SCREENING: ICD-10-CM

## 2024-11-22 DIAGNOSIS — E11.311 TYPE 2 DIABETES MELLITUS WITH RETINOPATHY AND MACULAR EDEMA, WITHOUT LONG-TERM CURRENT USE OF INSULIN, UNSPECIFIED LATERALITY, UNSPECIFIED RETINOPATHY SEVERITY: ICD-10-CM

## 2024-11-22 DIAGNOSIS — E11.59 TYPE 2 DIABETES MELLITUS WITH OTHER CIRCULATORY COMPLICATION, WITH LONG-TERM CURRENT USE OF INSULIN: ICD-10-CM

## 2024-11-22 DIAGNOSIS — Z79.4 TYPE 2 DIABETES MELLITUS WITH OTHER CIRCULATORY COMPLICATION, WITH LONG-TERM CURRENT USE OF INSULIN: ICD-10-CM

## 2024-11-22 LAB
ALBUMIN SERPL BCP-MCNC: 3.8 G/DL (ref 3.5–5.2)
ALBUMIN/CREAT UR: 8.4 UG/MG (ref 0–30)
ALP SERPL-CCNC: 74 U/L (ref 40–150)
ALT SERPL W/O P-5'-P-CCNC: 41 U/L (ref 10–44)
ANION GAP SERPL CALC-SCNC: 8 MMOL/L (ref 8–16)
AST SERPL-CCNC: 30 U/L (ref 10–40)
BILIRUB SERPL-MCNC: 0.4 MG/DL (ref 0.1–1)
BUN SERPL-MCNC: 21 MG/DL (ref 6–20)
CALCIUM SERPL-MCNC: 9.6 MG/DL (ref 8.7–10.5)
CHLORIDE SERPL-SCNC: 108 MMOL/L (ref 95–110)
CO2 SERPL-SCNC: 26 MMOL/L (ref 23–29)
COMPLEXED PSA SERPL-MCNC: 0.99 NG/ML (ref 0–4)
CREAT SERPL-MCNC: 0.9 MG/DL (ref 0.5–1.4)
CREAT UR-MCNC: 71.2 MG/DL (ref 23–375)
EST. GFR  (NO RACE VARIABLE): >60 ML/MIN/1.73 M^2
ESTIMATED AVG GLUCOSE: 157 MG/DL (ref 68–131)
GLUCOSE SERPL-MCNC: 181 MG/DL (ref 70–110)
HBA1C MFR BLD: 7.1 % (ref 4–5.6)
MICROALBUMIN UR DL<=1MG/L-MCNC: 6 UG/ML
POTASSIUM SERPL-SCNC: 4.8 MMOL/L (ref 3.5–5.1)
PROT SERPL-MCNC: 7.4 G/DL (ref 6–8.4)
SODIUM SERPL-SCNC: 142 MMOL/L (ref 136–145)

## 2024-11-22 PROCEDURE — 82043 UR ALBUMIN QUANTITATIVE: CPT | Performed by: FAMILY MEDICINE

## 2024-11-22 PROCEDURE — 80053 COMPREHEN METABOLIC PANEL: CPT | Performed by: FAMILY MEDICINE

## 2024-11-22 PROCEDURE — 36415 COLL VENOUS BLD VENIPUNCTURE: CPT | Performed by: FAMILY MEDICINE

## 2024-11-22 PROCEDURE — 83036 HEMOGLOBIN GLYCOSYLATED A1C: CPT | Performed by: FAMILY MEDICINE

## 2024-11-22 PROCEDURE — 84153 ASSAY OF PSA TOTAL: CPT | Performed by: FAMILY MEDICINE

## 2024-11-27 ENCOUNTER — PATIENT MESSAGE (OUTPATIENT)
Dept: FAMILY MEDICINE | Facility: CLINIC | Age: 54
End: 2024-11-27
Payer: COMMERCIAL

## 2024-12-03 ENCOUNTER — TELEPHONE (OUTPATIENT)
Dept: FAMILY MEDICINE | Facility: CLINIC | Age: 54
End: 2024-12-03
Payer: COMMERCIAL

## 2025-01-07 DIAGNOSIS — G47.52 RBD (REM BEHAVIORAL DISORDER): Primary | ICD-10-CM

## 2025-01-07 RX ORDER — CLONAZEPAM 0.25 MG/1
0.25 TABLET, ORALLY DISINTEGRATING ORAL NIGHTLY
Qty: 60 TABLET | Refills: 2 | Status: SHIPPED | OUTPATIENT
Start: 2025-01-07 | End: 2026-01-07

## 2025-01-17 ENCOUNTER — TELEPHONE (OUTPATIENT)
Dept: FAMILY MEDICINE | Facility: CLINIC | Age: 55
End: 2025-01-17
Payer: MEDICARE

## 2025-01-17 ENCOUNTER — PATIENT MESSAGE (OUTPATIENT)
Dept: ADMINISTRATIVE | Facility: OTHER | Age: 55
End: 2025-01-17
Payer: MEDICARE

## 2025-01-24 ENCOUNTER — OFFICE VISIT (OUTPATIENT)
Dept: FAMILY MEDICINE | Facility: CLINIC | Age: 55
End: 2025-01-24
Payer: MEDICARE

## 2025-01-24 ENCOUNTER — HOSPITAL ENCOUNTER (OUTPATIENT)
Dept: RADIOLOGY | Facility: HOSPITAL | Age: 55
Discharge: HOME | End: 2025-01-24
Attending: FAMILY MEDICINE
Payer: MEDICARE

## 2025-01-24 VITALS
BODY MASS INDEX: 29.59 KG/M2 | HEART RATE: 92 BPM | HEIGHT: 69 IN | DIASTOLIC BLOOD PRESSURE: 68 MMHG | RESPIRATION RATE: 16 BRPM | SYSTOLIC BLOOD PRESSURE: 106 MMHG | WEIGHT: 199.75 LBS

## 2025-01-24 DIAGNOSIS — I48.0 PAF (PAROXYSMAL ATRIAL FIBRILLATION): ICD-10-CM

## 2025-01-24 DIAGNOSIS — E11.311 TYPE 2 DIABETES MELLITUS WITH RETINOPATHY AND MACULAR EDEMA, WITHOUT LONG-TERM CURRENT USE OF INSULIN, UNSPECIFIED LATERALITY, UNSPECIFIED RETINOPATHY SEVERITY: ICD-10-CM

## 2025-01-24 DIAGNOSIS — R10.11 RIGHT UPPER QUADRANT ABDOMINAL PAIN: ICD-10-CM

## 2025-01-24 DIAGNOSIS — R10.9 RIGHT FLANK PAIN: ICD-10-CM

## 2025-01-24 DIAGNOSIS — G31.83 DIFFUSE LEWY BODY DISEASE: ICD-10-CM

## 2025-01-24 DIAGNOSIS — R10.11 RIGHT UPPER QUADRANT ABDOMINAL PAIN: Primary | ICD-10-CM

## 2025-01-24 DIAGNOSIS — F02.80 DIFFUSE LEWY BODY DISEASE: ICD-10-CM

## 2025-01-24 PROCEDURE — 74176 CT ABD & PELVIS W/O CONTRAST: CPT | Mod: 26,,, | Performed by: RADIOLOGY

## 2025-01-24 PROCEDURE — 96372 THER/PROPH/DIAG INJ SC/IM: CPT | Mod: S$GLB,,, | Performed by: FAMILY MEDICINE

## 2025-01-24 PROCEDURE — 3074F SYST BP LT 130 MM HG: CPT | Mod: CPTII,S$GLB,, | Performed by: FAMILY MEDICINE

## 2025-01-24 PROCEDURE — 1159F MED LIST DOCD IN RCRD: CPT | Mod: CPTII,S$GLB,, | Performed by: FAMILY MEDICINE

## 2025-01-24 PROCEDURE — 3078F DIAST BP <80 MM HG: CPT | Mod: CPTII,S$GLB,, | Performed by: FAMILY MEDICINE

## 2025-01-24 PROCEDURE — 99214 OFFICE O/P EST MOD 30 MIN: CPT | Mod: 25,S$GLB,, | Performed by: FAMILY MEDICINE

## 2025-01-24 PROCEDURE — 74176 CT ABD & PELVIS W/O CONTRAST: CPT | Mod: TC

## 2025-01-24 PROCEDURE — 99999 PR PBB SHADOW E&M-EST. PATIENT-LVL IV: CPT | Mod: PBBFAC,,, | Performed by: FAMILY MEDICINE

## 2025-01-24 PROCEDURE — 3008F BODY MASS INDEX DOCD: CPT | Mod: CPTII,S$GLB,, | Performed by: FAMILY MEDICINE

## 2025-01-24 RX ORDER — TAMSULOSIN HYDROCHLORIDE 0.4 MG/1
0.4 CAPSULE ORAL DAILY
Qty: 30 CAPSULE | Refills: 2 | Status: SHIPPED | OUTPATIENT
Start: 2025-01-24 | End: 2026-01-24

## 2025-01-24 RX ORDER — KETOROLAC TROMETHAMINE 30 MG/ML
60 INJECTION, SOLUTION INTRAMUSCULAR; INTRAVENOUS
Status: COMPLETED | OUTPATIENT
Start: 2025-01-24 | End: 2025-01-24

## 2025-01-24 RX ORDER — KETOROLAC TROMETHAMINE 10 MG/1
10 TABLET, FILM COATED ORAL EVERY 6 HOURS PRN
Qty: 15 TABLET | Refills: 0 | Status: SHIPPED | OUTPATIENT
Start: 2025-01-24 | End: 2025-01-29

## 2025-01-24 RX ADMIN — KETOROLAC TROMETHAMINE 60 MG: 30 INJECTION, SOLUTION INTRAMUSCULAR; INTRAVENOUS at 10:01

## 2025-01-24 NOTE — PROGRESS NOTES
Subjective:       Patient ID: Abel Hackett is a 54 y.o. male.    Chief Complaint: Back Pain (Patient has been having some low right side back pain for about 2 months now) and Abdominal Pain (Patient started 2 weeks ago with pain in the lower right abdomen)    History of Present Illness    Patient presents today with flank and abdominal pain. He reports intermittent flank and abdominal pain. The back pain started 2 months ago, while the abdominal pain began a few weeks ago. He has been taking Aleve for pain management with moderate relief. He denies fever or vomiting. He has a history of kidney stones. Ultrasound in March showed a small cyst on the right kidney with debris and stones. A1C was 7.1.          Objective:          Physical Exam  Vitals and nursing note reviewed.   Constitutional:       General: He is not in acute distress.     Appearance: He is well-developed.   HENT:      Head: Normocephalic and atraumatic.   Eyes:      Conjunctiva/sclera: Conjunctivae normal.      Pupils: Pupils are equal, round, and reactive to light.   Neck:      Thyroid: No thyromegaly.   Cardiovascular:      Rate and Rhythm: Normal rate and regular rhythm.      Heart sounds: Normal heart sounds.   Pulmonary:      Effort: Pulmonary effort is normal. No respiratory distress.      Breath sounds: Normal breath sounds. No wheezing.   Abdominal:      General: Bowel sounds are normal.      Palpations: Abdomen is soft.      Tenderness: There is no abdominal tenderness.   Musculoskeletal:         General: Tenderness (right si area and pain through the right lower quad) present. Normal range of motion.      Cervical back: Normal range of motion and neck supple.   Lymphadenopathy:      Cervical: No cervical adenopathy.   Skin:     General: Skin is warm and dry.      Findings: No rash.   Neurological:      Mental Status: He is alert and oriented to person, place, and time.   Psychiatric:         Behavior: Behavior normal.         Assessment:            1. Right upper quadrant abdominal pain    2. Right flank pain    3. Diffuse Lewy body disease    4. 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 PAF (paroxysmal atrial fibrillation)        Plan:     Assessment & Plan    TYPE 2 DIABETES MELLITUS WITH RETINOPATHY AND MACULAR EDEMA, WITHOUT LONG-TERM CURRENT USE OF INSULIN, UNSPECIFIED LATERALITY, UNSPECIFIED RETINOPATHY SEVERITY:  - Monitored blood sugar and HbA1c.  - Patient reports good blood sugar control.  - HbA1c level is 7.1%.    PAF (PAROXYSMAL ATRIAL FIBRILLATION):  - Monitored blood pressure.  - Patient reports good control.  - Measured blood pressure is 106/68.    SUSPECTED KIDNEY STONE:  - Evaluated patient's condition, noting intermittent flank and abdominal pain present for 2 months.  - Physical exam confirmed pain in specific areas.  - March ultrasound showed small cyst on right kidney with probable debris and stones.  - Assessed as suspected kidney stone that has not passed and may be too large.  - Ordered CT urogram study for further evaluation.  - Prescribed Flomax for 2-3 weeks, to be picked up at [facility].  - Instructed patient to drink plenty of fluids to help flush out potential stones.  - Will consider referral to urologist if kidney stone is too large to pass naturally.    LEFT UPPER QUADRANT PAIN:  - Evaluated patient's condition, noting intermittent left upper quadrant pain present for a few weeks.  - Physical exam confirmed pain location.  - Administered Toradol injection in office for pain relief, pending labs results.  - Will consider alternative pain medication if kidney function is compromised.    LABS:  - Ordered labs to check kidney function.  - Results will guide further pain management decisions.         Abel was seen today for back pain and abdominal pain.    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  -     ketorolac injection 60 mg  -     tamsulosin (FLOMAX) 0.4 mg Cap; Take 1 capsule (0.4 mg total) by mouth once daily.          RTC if condition acutely worsens or any other concerns, otherwise RTC as scheduled      This note was generated with the assistance of ambient listening technology. Verbal consent was obtained by the patient and accompanying visitor(s) for the recording of patient appointment to facilitate this note. I attest to having reviewed and edited the generated note for accuracy, though some syntax or spelling errors may persist. Please contact the author of this note for any clarification.   .deep

## 2025-01-26 ENCOUNTER — PATIENT MESSAGE (OUTPATIENT)
Facility: CLINIC | Age: 55
End: 2025-01-26
Payer: MEDICARE

## 2025-01-26 DIAGNOSIS — G31.84 MCI (MILD COGNITIVE IMPAIRMENT): Primary | ICD-10-CM

## 2025-01-26 DIAGNOSIS — G47.00 INSOMNIA, UNSPECIFIED TYPE: ICD-10-CM

## 2025-01-26 RX ORDER — SUVOREXANT 20 MG/1
1 TABLET, FILM COATED ORAL NIGHTLY
Qty: 28 TABLET | Refills: 5 | Status: SHIPPED | OUTPATIENT
Start: 2025-01-26

## 2025-01-27 ENCOUNTER — TELEPHONE (OUTPATIENT)
Dept: UROLOGY | Facility: CLINIC | Age: 55
End: 2025-01-27

## 2025-01-27 ENCOUNTER — OFFICE VISIT (OUTPATIENT)
Dept: UROLOGY | Facility: CLINIC | Age: 55
End: 2025-01-27
Payer: MEDICARE

## 2025-01-27 VITALS
BODY MASS INDEX: 29.46 KG/M2 | HEART RATE: 96 BPM | WEIGHT: 199.5 LBS | DIASTOLIC BLOOD PRESSURE: 81 MMHG | SYSTOLIC BLOOD PRESSURE: 123 MMHG

## 2025-01-27 DIAGNOSIS — N40.1 BPH WITH URINARY OBSTRUCTION: ICD-10-CM

## 2025-01-27 DIAGNOSIS — N52.01 ERECTILE DYSFUNCTION DUE TO ARTERIAL INSUFFICIENCY: Primary | ICD-10-CM

## 2025-01-27 DIAGNOSIS — E11.8 DM TYPE 2 CAUSING COMPLICATION: ICD-10-CM

## 2025-01-27 DIAGNOSIS — N13.8 BPH WITH URINARY OBSTRUCTION: ICD-10-CM

## 2025-01-27 DIAGNOSIS — E78.49 OTHER HYPERLIPIDEMIA: ICD-10-CM

## 2025-01-27 PROBLEM — R19.5 POSITIVE COLORECTAL CANCER SCREENING USING COLOGUARD TEST: Status: RESOLVED | Noted: 2022-08-02 | Resolved: 2025-01-27

## 2025-01-27 PROBLEM — Z86.73 HX OF ARTERIAL ISCHEMIC STROKE: Status: RESOLVED | Noted: 2023-03-21 | Resolved: 2025-01-27

## 2025-01-27 PROBLEM — Z95.1 HX OF CABG: Status: RESOLVED | Noted: 2024-04-12 | Resolved: 2025-01-27

## 2025-01-27 PROCEDURE — 99999 PR PBB SHADOW E&M-EST. PATIENT-LVL IV: CPT | Mod: PBBFAC,,, | Performed by: UROLOGY

## 2025-01-27 PROCEDURE — G2211 COMPLEX E/M VISIT ADD ON: HCPCS | Mod: S$GLB,,, | Performed by: UROLOGY

## 2025-01-27 PROCEDURE — 3061F NEG MICROALBUMINURIA REV: CPT | Mod: CPTII,S$GLB,, | Performed by: UROLOGY

## 2025-01-27 PROCEDURE — 99205 OFFICE O/P NEW HI 60 MIN: CPT | Mod: S$GLB,,, | Performed by: UROLOGY

## 2025-01-27 PROCEDURE — 3079F DIAST BP 80-89 MM HG: CPT | Mod: CPTII,S$GLB,, | Performed by: UROLOGY

## 2025-01-27 PROCEDURE — 3066F NEPHROPATHY DOC TX: CPT | Mod: CPTII,S$GLB,, | Performed by: UROLOGY

## 2025-01-27 PROCEDURE — 3008F BODY MASS INDEX DOCD: CPT | Mod: CPTII,S$GLB,, | Performed by: UROLOGY

## 2025-01-27 PROCEDURE — 1159F MED LIST DOCD IN RCRD: CPT | Mod: CPTII,S$GLB,, | Performed by: UROLOGY

## 2025-01-27 PROCEDURE — 3074F SYST BP LT 130 MM HG: CPT | Mod: CPTII,S$GLB,, | Performed by: UROLOGY

## 2025-01-27 PROCEDURE — 1160F RVW MEDS BY RX/DR IN RCRD: CPT | Mod: CPTII,S$GLB,, | Performed by: UROLOGY

## 2025-01-27 PROCEDURE — 3051F HG A1C>EQUAL 7.0%<8.0%: CPT | Mod: CPTII,S$GLB,, | Performed by: UROLOGY

## 2025-01-27 NOTE — PATIENT INSTRUCTIONS
You have elected to undergo placement of a penile prosthesis. Several devices are currently available in the marketplace, including those which are non-inflatable, versus two- or three-piece inflatable prostheses. All of these devices have the capacity to give you the opportunity to have a rigid penis on demand and to be used as frequently and as long as you would like. There are, therefore, many advantages to the use of the prosthesis which you have already discussed with your physician. The goal of this informed consent form is to identify the potential problems that have been recognized to occur with penile prosthesis placement and that you understand the risks of undergoing prosthetic placement. It is important to recognize that as a result of improvements in design as well as better surgical technique, that all of the risks listed below are less than they were even 10 years ago. It is also important to recognize that most of the available studies report on historical data for devices which are now either not manufactured or have undergone structural improvements to reduce those side effects. The complications are simply noted in random order and do not reflect their frequency.    Prosthesis mechanical failure occurs as a result of leakage of fluid from the inflatable types of devices. This typically occurs as a result of a fracture in the tubing, most commonly as it emerges out of the scrotal pump, but it can also be due to a leak from the erectile cylinders in the penis. It is felt that this occurs as a result of repetitive mechanical trauma, which weakens the tubing and causes it to crack. When the fluid leaks, it is not something you would be aware of. It does not cause pain. The fluid contained within the device is typically a sterile saline solution that will simply be reabsorbed by the body. What you will recognize is that when you squeeze the pump, there will be no transfer of fluid and no rigidity or  "inadequate rigidity. The most recent long-term data looking at 5-10 year success reports mechanical failure in the 5-10% range over that period of time. Looked at another way, 90-95% of men will have a functional prosthesis in 10 years. These devices are designed to be used for life. Each company has its own warrantee which you should discuss with your physician.    Infection is a disastrous complication which usually requires the removal of the prosthesis, as it is rare to be able to clear infection so long as the prosthesis is within the body. Occasionally, the infected prosthesis can be removed, the location of the device can be washed out vigorously with a special antibiotic salvage procedure and then a new device may be able to be immediately placed. When this is not possible, the infected device is removed and then a period of healing will follow where the device can be replaced after a period of healing (6 weeks to 6 months). Delayed replacement of a prosthesis after initial removal is a more complicated operation associated with the potential for not being able to replace the device because of scarring but other problems such as shortening of the penis or change in sensation and shape may also occur.    As a result of improved surgical technique and device design, infection rates are now markedly reduced, typically being reported in the <1-2% range for new prosthesis placements and up to 3% when the prosthesis is uninfected and has mechanically failed.    Bleeding and hematoma are rarely a problem. There is likely to be localized swelling as well as "black and blue" of the penis, groin area, and scrotal sack. These will resolve without treatment over 1-3 weeks. Rarely a scrotal hematoma (collection of blood) will develop which can be treated with rest; it will reabsorb with time or, if it is growing in size or painful, it may need to be evacuated surgically (opened up and washed out). The risk of needing a " blood transfusion after penile prosthesis placement is extremely rare to nonexistent.    Post-operative pain is variable and can be minimal, but in most men it is quite significant. Your physician will provide you with adequate pain medication to help control the pain, but not necessarily eliminate it entirely. As the prosthesis heals, there will be complete resolution of the pain, such that you will typically not even be aware that the prosthesis is within your body unless you were to touch it directly. Complete pain resolution will most commonly occur within 4-12 weeks postoperatively. Post-operative pain is typically managed with oral narcotic agents. You should be aware that these drugs can cause constipation as well as sleepiness; therefore, you should not be in any position where you might need to make important decision or driving until the pain is under better control without the need for narcotic pain killers. It is recommended that during the first several days after the operation, that you spend as little time as possible on your feet, as this will encourage healing, reduce swelling, and result in more rapid resolution of pain.    Loss of length -  Penile shortening is probably the reason that most men are disappointed with the outcome from their prosthesis surgery. Studies have shown that when the flaccid penile stretched length is measured preoperatively, that the actual loss of length following placement of the prosthesis is on average no more than one centimeter (1/3 inch). To reduce the likelihood of loss of length, the surgeon will do his best to place the proper size device that fits your penis. You can expect that the length of your penis will be much like what you see when you grab the head of the penis and pull it straight out away from your body. Many men who believe they have lost length in their penis following prosthesis surgery in fact did not really lose length because of the surgery, but  rather because they have not had had a full erection for some time during which there may have been some loss of tissue elasticity and thereby shortening of the penis. In addition, they may have gained some weight in the pubic area which will cover the penis and make it look shorter. Lastly, they may be expecting that the postoperative result will be like the erections they had when they were a much younger man. Although the goal is to make the penis as long as possible, one can expect that the rigidity of the penis will be much like the erections obtained as a younger man.    Decreased girth - Girth is typically not substantially changed as most cylinders can expand and fill the penis satisfactorily, but if there has been scarring within the tissues of the penis, this can prevent expansion and result in a narrower appearing penis. The surgeon will do his best to put the largest cylinder in the penis, but there are limitations to which the tissues can expand. Decreased girth is not a common complaint.    Sensory loss - By and large the surgical techniques being used to avoid injury to the sensory nerves of the penis. Therefore, there is rarely any significant change in the sexual sensation of the penis. Some men find that it takes longer for them to experience orgasm. This may occur because they can simply inflate the penis without any sexuall arousal, and then it will seem to take longer for them to become aroused, resulting in the prolonged time to orgasm. Therefore, proper sexual stimulation is important to enjoy the entire sexual experience with a prosthesis.    Change in shape - The overall shape or configuration of the penis is rarely altered as a result of placement of any type of prosthesis, but if there is some unidentified scarring involving the penis such as that which occurs with Peyronie's disease, some curvature or indentations including hourglass narrowing can be identified along the shaft. Typically,  "in the postoperative period, the prosthesis will cause an internal tissue expansion which will correct these deformities. This may take 6-9 months occur.    Erosion or cylinder extrusion - If the tissues in the tips of the penis are weakened either by previous internal penile disease or as a result of the surgery, the prosthetic cylinder may migrate distally into the head of the penis and may appear to be ready to poke through the skin or the urethra. By all means, if such an irregularity is seen, one should address it with your doctor before the prosthesis is exposed so that it can be corrected without developing infection. This problem occurs in <1% of cases.    Pump problems - These include difficulty in activating or deactivating the pump as well as change of pump location due to migration or fixation of the pump to the scrotal skin. These problems are also quite unusual but can happen as a result of an altered healing process or a malposition of the pump by the implanting surgeon. If the pump were to migrate or become fixed or difficult to manipulate because of its position, a simple outpatient scrotal procedure can be performed to reposition the pump which is almost universally successful. This procedure is performed in no more than 1% of cases.    Prosthesis care - In the postoperative period, it is best to take the antibiotics prescribed by your physician, reduce your physical activity to reduce swelling and enhance healing, take pain medicine for your comfort, and avoid submergingthe incision during bathing for a minimum of 1-4 weeks. Specific bathing instructions will be issued by your physician. It is not uncommon to have an inflatable prosthesis partially fill during the postoperative period involuntarily. This is called "auto-inflation." To prevent this problem, it is important that once the prosthesis is activated, which is typically 4-6 weeks after surgery, that you perform what is known as "cycling" " of the device. Cycling means complete inflation and then complete deflation of the penile cylinders twice per day for one month. In doing this, the tissues around the prosthesis are softened and stretched allowing complete deflation of the device into the reservoir. It also will allow you to become more familiar with operating the device and make it easier for you to activate it quickly and inconspicuously when you want to engage in sexual relations. If you have an inflatable device with a scrotal pump, it is best to inflate it without twisting it. The repetitive twisting of the pump can weaken the connections between the tubing and the pump and is the most common cause for mechanical failure of the prosthesis.    It is hoped that this review will inform you of the potential problems associated with your prosthesis and as a result, will make for more realistic expectations regarding the outcome.

## 2025-01-27 NOTE — PROGRESS NOTES
CHIEF COMPLAINT:    Mr. Hackett is a 54 y.o. male presenting with ED.    PRESENTING ILLNESS:    Abel Hackett is a 54 y.o. male who c/o severe ED.  He's tried and failed oral meds, ICI, MUSE, and a CONSTANTIN.    He has LUTS.  Nocturia x 0-1. Is pleased with how he voids.    REVIEW OF SYSTEMS:    Abel Hackett denies headache, blurred vision, fever, nausea, vomiting, chills, abdominal pain, chest pain, sore throat, bleeding per rectum, cough, SOB, recent loss of consciousness, recent mental status changes, seizures, dizziness, or upper or lower extremity weakness.    BRIDGET  1. 1  2. 1  3. 0  4. 0  5. 0      PATIENT HISTORY:    Past Medical History:   Diagnosis Date    Depression     Diabetes mellitus     Diabetes mellitus, type 2     History of right coronary artery stent placement     done at [facility]    Hx of arterial ischemic stroke 03/21/2023    Hx of CABG 04/12/2024       Past Surgical History:   Procedure Laterality Date    ANGIOGRAPHY N/A 10/2023    COLONOSCOPY N/A 08/02/2022    Procedure: COLONOSCOPY;  Surgeon: Keira Wheat MD;  Location: [facility];  Service: Endoscopy;  Laterality: N/A;    coronary stents      x6    LAPAROSCOPIC CHOLECYSTECTOMY N/A 6/13/2024    Procedure: CHOLECYSTECTOMY, LAPAROSCOPIC;  Surgeon: Kevin Ray MD;  Location: [facility];  Service: General;  Laterality: N/A;    REFRACTIVE SURGERY  04/13/2016    VASECTOMY         Family History   Problem Relation Name Age of Onset    Heart disease Mother      Hypertension Mother      Diabetes Mother      Heart attack Mother      Heart disease Father      Hypertension Father      Diabetes Father      Heart attack Father      Heart attack Maternal Uncle      Heart attack Maternal Grandfather         Social History     Socioeconomic History    Marital status:    Tobacco Use    Smoking status: Never    Smokeless tobacco: Never   Substance and Sexual Activity    Alcohol use: Not Currently     Comment: occassional    Drug use: No    Sexual  activity: Yes     Partners: Female     Social Drivers of Health     Financial Resource Strain: Low Risk  (1/17/2025)    Overall Financial Resource Strain (CARDIA)     Difficulty of Paying Living Expenses: Not hard at all   Food Insecurity: No Food Insecurity (1/17/2025)    Hunger Vital Sign     Worried About Running Out of Food in the Last Year: Never true     Ran Out of Food in the Last Year: Never true   Transportation Needs: No Transportation Needs (3/20/2024)    PRAPARE - Transportation     Lack of Transportation (Medical): No     Lack of Transportation (Non-Medical): No   Physical Activity: Sufficiently Active (1/17/2025)    Exercise Vital Sign     Days of Exercise per Week: 5 days     Minutes of Exercise per Session: 40 min   Stress: No Stress Concern Present (1/17/2025)    [employer] - Occupational Stress Questionnaire     Feeling of Stress : Not at all   Housing Stability: Low Risk  (3/20/2024)    Housing Stability Vital Sign     Unable to Pay for Housing in the Last Year: No     Number of Places Lived in the Last Year: 1     Unstable Housing in the Last Year: No       Allergies:  Patient has no known allergies.    Medications:    Current Outpatient Medications:     aspirin (ECOTRIN) 81 MG EC tablet, Take 81 mg by mouth once daily., Disp: , Rfl:     atorvastatin (LIPITOR) 40 MG tablet, Take 40 mg by mouth once daily., Disp: , Rfl:     clonazePAM (KLONOPIN) 0.25 MG TbDL, Take 1 tablet (0.25 mg total) by mouth every evening., Disp: 60 tablet, Rfl: 2    empagliflozin-metformin (SYNJARDY XR) 25-1,000 mg TBph, Take 1 tablet by mouth daily, Disp: 90 tablet, Rfl: 1    evolocumab (REPATHA SURECLICK) 140 mg/mL PnIj, Inject 1 pen into the skin once every 2 weeks., Disp: 2 mL, Rfl: 12    gabapentin (NEURONTIN) 300 MG capsule, Take 3 capsules (900 mg total) by mouth every evening., Disp: 180 capsule, Rfl: 1    ketorolac (TORADOL) 10 mg tablet, Take 1 tablet (10 mg total) by mouth every 6  (six) hours as needed for Pain., Disp: 15 tablet, Rfl: 0    levETIRAcetam (KEPPRA) 250 MG Tab, Take 1 tablet (250 mg total) by mouth every evening. (Patient not taking: Reported on 1/24/2025), Disp: 30 tablet, Rfl: 11    metoprolol succinate (TOPROL-XL) 100 MG 24 hr tablet, Take 100 mg by mouth once daily., Disp: , Rfl:     nitroGLYCERIN (NITROSTAT) 0.4 MG SL tablet, PLACE 1 TABLET UNDER TONGUE EVERY 5 MINUTES FOR CHEST PAIN. GO TO ER IF CHEST PAIN PERSISTS AFTER 3 DOSES., Disp: 25 tablet, Rfl: 3    pantoprazole (PROTONIX) 40 MG tablet, Take 1 tablet orally once a day, Disp: 90 tablet, Rfl: 4    QUEtiapine (SEROQUEL) 25 MG Tab, Take 1 tablet (25 mg total) by mouth every evening., Disp: 90 tablet, Rfl: 3    rimegepant (NURTEC) 75 mg odt, Take 1 tablet (75 mg total) by mouth daily as needed for Migraine. Place ODT tablet on the tongue; alternatively the ODT tablet may be placed under the tongue, Disp: 8 tablet, Rfl: 11    sertraline (ZOLOFT) 100 MG tablet, Take 2 tablets (200 mg total) by mouth once daily., Disp: 60 tablet, Rfl: 3    suvorexant (BELSOMRA) 20 mg Tab, Take 1 tablet by mouth every evening., Disp: 28 tablet, Rfl: 5    tamsulosin (FLOMAX) 0.4 mg Cap, Take 1 capsule (0.4 mg total) by mouth once daily., Disp: 30 capsule, Rfl: 2    tirzepatide (MOUNJARO) 15 mg/0.5 mL PnIj, Inject 15 mg into the skin every 7 days., Disp: 4 Pen, Rfl: 0    PHYSICAL EXAMINATION:    The patient generally appears in good health, is appropriately interactive, and is in no apparent distress.     Eyes: anicteric sclerae, moist conjunctivae; no lid-lag; PERRLA     HENT: Atraumatic; oropharynx clear with moist mucous membranes and no mucosal ulcerations;normal hard and soft palate.  No evidence of lymphadenopathy.    Neck: Trachea midline.  No thyromegaly.    Skin: No lesions.    Mental: Cooperative with normal affect.  Is oriented to time, place, and person.    Neuro: Grossly intact.    Chest: Normal inspiratory effort.   No accessory  muscles.  No audible wheezes.  Respirations symmetric on inspiration and expiration.    Heart: Regular rhythm.      Abdomen:  Soft, non-tender. No masses or organomegaly. Bladder is not palpable. No evidence of flank discomfort. No evidence of inguinal hernia.    Genitourinary: The penis has no evidence of plaques or induration. The urethral meatus is normal. The testes, epididymides, and cord structures are normal in size and contour bilaterally. The scrotum is normal in size and contour.    Normal anal sphincter tone. No rectal mass.    The prostate is 40 g. Normal landmarks. Lateral sulci. Median furrow intact.  No nodularity or induration. Seminal vesicles are normal.    Extremities: No clubbing, cyanosis, or edema      LABS:    UA dipped negative today  Lab Results   Component Value Date    PSA 0.99 11/22/2024       IMPRESSION:    Encounter Diagnoses   Name Primary?    Erectile dysfunction due to arterial insufficiency Yes    BPH with urinary obstruction     Other hyperlipidemia     DM type 2 causing complication    DM, stable  Hyperlipidemia, stable      PLAN:    1. Will observe his LUTS as they don't bother him.  2. Discussed options for his severe ED (affected by above comorbidities).  He's like an IPP.  3. Will draw an A1c  4. Patient read the IPP handout and understands the risks associated with this procedure. He knows the risk of infection as well as surgery requirements if it were to become infected. He understands the impact on spontaneous and nocturnal erections, as well as need for replacement and repair, which was clearly outlined in verbal and written form. He was given the chance to ask questions and alternatives were discussed.  5. Visit today included increased complexity associated with the care of the episodic problem of severe ED addressed and managing the longitudinal care of the patient due to the serious and/or complex managed problem(s) requiring surgery.   6. Risks discussed were You  have elected to undergo placement of a penile prosthesis. Several devices are currently available in the marketplace, including those which are non-inflatable, versus two- or three-piece inflatable prostheses. All of these devices have the capacity to give you the opportunity to have a rigid penis on demand and to be used as frequently and as long as you would like. There are, therefore, many advantages to the use of the prosthesis which you have already discussed with your physician. The goal of this informed consent form is to identify the potential problems that have been recognized to occur with penile prosthesis placement and that you understand the risks of undergoing prosthetic placement. It is important to recognize that as a result of improvements in design as well as better surgical technique, that all of the risks listed below are less than they were even 10 years ago. It is also important to recognize that most of the available studies report on historical data for devices which are now either not manufactured or have undergone structural improvements to reduce those side effects. The complications are simply noted in random order and do not reflect their frequency.    Prosthesis mechanical failure occurs as a result of leakage of fluid from the inflatable types of devices. This typically occurs as a result of a fracture in the tubing, most commonly as it emerges out of the scrotal pump, but it can also be due to a leak from the erectile cylinders in the penis. It is felt that this occurs as a result of repetitive mechanical trauma, which weakens the tubing and causes it to crack. When the fluid leaks, it is not something you would be aware of. It does not cause pain. The fluid contained within the device is typically a sterile saline solution that will simply be reabsorbed by the body. What you will recognize is that when you squeeze the pump, there will be no transfer of fluid and no rigidity or  "inadequate rigidity. The most recent long-term data looking at 5-10 year success reports mechanical failure in the 5-10% range over that period of time. Looked at another way, 90-95% of men will have a functional prosthesis in 10 years. These devices are designed to be used for life. Each company has its own warrantee which you should discuss with your physician.    Infection is a disastrous complication which usually requires the removal of the prosthesis, as it is rare to be able to clear infection so long as the prosthesis is within the body. Occasionally, the infected prosthesis can be removed, the location of the device can be washed out vigorously with a special antibiotic salvage procedure and then a new device may be able to be immediately placed. When this is not possible, the infected device is removed and then a period of healing will follow where the device can be replaced after a period of healing (6 weeks to 6 months). Delayed replacement of a prosthesis after initial removal is a more complicated operation associated with the potential for not being able to replace the device because of scarring but other problems such as shortening of the penis or change in sensation and shape may also occur.    As a result of improved surgical technique and device design, infection rates are now markedly reduced, typically being reported in the <1-2% range for new prosthesis placements and up to 3% when the prosthesis is uninfected and has mechanically failed.    Bleeding and hematoma are rarely a problem. There is likely to be localized swelling as well as "black and blue" of the penis, groin area, and scrotal sack. These will resolve without treatment over 1-3 weeks. Rarely a scrotal hematoma (collection of blood) will develop which can be treated with rest; it will reabsorb with time or, if it is growing in size or painful, it may need to be evacuated surgically (opened up and washed out). The risk of needing a " blood transfusion after penile prosthesis placement is extremely rare to nonexistent.    Post-operative pain is variable and can be minimal, but in most men it is quite significant. Your physician will provide you with adequate pain medication to help control the pain, but not necessarily eliminate it entirely. As the prosthesis heals, there will be complete resolution of the pain, such that you will typically not even be aware that the prosthesis is within your body unless you were to touch it directly. Complete pain resolution will most commonly occur within 4-12 weeks postoperatively. Post-operative pain is typically managed with oral narcotic agents. You should be aware that these drugs can cause constipation as well as sleepiness; therefore, you should not be in any position where you might need to make important decision or driving until the pain is under better control without the need for narcotic pain killers. It is recommended that during the first several days after the operation, that you spend as little time as possible on your feet, as this will encourage healing, reduce swelling, and result in more rapid resolution of pain.    Loss of length -  Penile shortening is probably the reason that most men are disappointed with the outcome from their prosthesis surgery. Studies have shown that when the flaccid penile stretched length is measured preoperatively, that the actual loss of length following placement of the prosthesis is on average no more than one centimeter (1/3 inch). To reduce the likelihood of loss of length, the surgeon will do his best to place the proper size device that fits your penis. You can expect that the length of your penis will be much like what you see when you grab the head of the penis and pull it straight out away from your body. Many men who believe they have lost length in their penis following prosthesis surgery in fact did not really lose length because of the surgery, but  rather because they have not had had a full erection for some time during which there may have been some loss of tissue elasticity and thereby shortening of the penis. In addition, they may have gained some weight in the pubic area which will cover the penis and make it look shorter. Lastly, they may be expecting that the postoperative result will be like the erections they had when they were a much younger man. Although the goal is to make the penis as long as possible, one can expect that the rigidity of the penis will be much like the erections obtained as a younger man.    Decreased girth - Girth is typically not substantially changed as most cylinders can expand and fill the penis satisfactorily, but if there has been scarring within the tissues of the penis, this can prevent expansion and result in a narrower appearing penis. The surgeon will do his best to put the largest cylinder in the penis, but there are limitations to which the tissues can expand. Decreased girth is not a common complaint.    Sensory loss - By and large the surgical techniques being used to avoid injury to the sensory nerves of the penis. Therefore, there is rarely any significant change in the sexual sensation of the penis. Some men find that it takes longer for them to experience orgasm. This may occur because they can simply inflate the penis without any sexuall arousal, and then it will seem to take longer for them to become aroused, resulting in the prolonged time to orgasm. Therefore, proper sexual stimulation is important to enjoy the entire sexual experience with a prosthesis.    Change in shape - The overall shape or configuration of the penis is rarely altered as a result of placement of any type of prosthesis, but if there is some unidentified scarring involving the penis such as that which occurs with Peyronie's disease, some curvature or indentations including hourglass narrowing can be identified along the shaft. Typically,  "in the postoperative period, the prosthesis will cause an internal tissue expansion which will correct these deformities. This may take 6-9 months occur.    Erosion or cylinder extrusion - If the tissues in the tips of the penis are weakened either by previous internal penile disease or as a result of the surgery, the prosthetic cylinder may migrate distally into the head of the penis and may appear to be ready to poke through the skin or the urethra. By all means, if such an irregularity is seen, one should address it with your doctor before the prosthesis is exposed so that it can be corrected without developing infection. This problem occurs in <1% of cases.    Pump problems - These include difficulty in activating or deactivating the pump as well as change of pump location due to migration or fixation of the pump to the scrotal skin. These problems are also quite unusual but can happen as a result of an altered healing process or a malposition of the pump by the implanting surgeon. If the pump were to migrate or become fixed or difficult to manipulate because of its position, a simple outpatient scrotal procedure can be performed to reposition the pump which is almost universally successful. This procedure is performed in no more than 1% of cases.    Prosthesis care - In the postoperative period, it is best to take the antibiotics prescribed by your physician, reduce your physical activity to reduce swelling and enhance healing, take pain medicine for your comfort, and avoid submergingthe incision during bathing for a minimum of 1-4 weeks. Specific bathing instructions will be issued by your physician. It is not uncommon to have an inflatable prosthesis partially fill during the postoperative period involuntarily. This is called "auto-inflation." To prevent this problem, it is important that once the prosthesis is activated, which is typically 4-6 weeks after surgery, that you perform what is known as "cycling" " of the device. Cycling means complete inflation and then complete deflation of the penile cylinders twice per day for one month. In doing this, the tissues around the prosthesis are softened and stretched allowing complete deflation of the device into the reservoir. It also will allow you to become more familiar with operating the device and make it easier for you to activate it quickly and inconspicuously when you want to engage in sexual relations. If you have an inflatable device with a scrotal pump, it is best to inflate it without twisting it. The repetitive twisting of the pump can weaken the connections between the tubing and the pump and is the most common cause for mechanical failure of the prosthesis.    It is hoped that this review will inform you of the potential problems associated with your prosthesis and as a result, will make for more realistic expectations regarding the outcome.      Copy to:

## 2025-01-29 ENCOUNTER — TELEPHONE (OUTPATIENT)
Dept: UROLOGY | Facility: CLINIC | Age: 55
End: 2025-01-29
Payer: MEDICARE

## 2025-02-03 NOTE — ANESTHESIA PAT ROS NOTE
02/03/2025  Abel Hackett is a 54 y.o., male.      Pre-op Assessment          Review of Systems           Anesthesia Assessment: Preoperative EQUATION    Planned Procedure: Procedure(s) (LRB):  REMOVAL, PENILE IMPLANT (N/A)  INSERTION, PENILE PROSTHESIS, INFLATABLE (N/A)  Requested Anesthesia Type:General  Surgeon: Kevin Garcia MD  Service: Urology  Known or anticipated Date of Surgery:4/1/2025    Surgeon notes: reviewed    Electronic QUestionnaire Assessment completed via nurse interview with patient.        Triage considerations:     Previous anesthesia records:GETA and No problems  6/13/2024 Lap Deidre  Airway:  Mallampati: I   Mouth Opening: Normal  TM Distance: Normal  Tongue: Normal  Neck ROM: Normal ROM    Placement Time: 0821 , created via procedure documentation Method of Intubation: Video Laryngoscopy Mask Ventilation: Easy Intubated: Postinduction Blade: Goel #4 Airway Device Size: 7.5 Cuff Inflation: Minimal occlusive pressure Placement Verified By: Capnometry Complicating Factors: None Findings Post-Intubation: Bilateral breath sounds;Atraumatic/Condition of teeth unchanged Secured at: Lips Complications: None     Last PCP note: within 3 months , within [facility]   Subspecialty notes: Cardiology: General, Neurology, Psychiatry, Urology    Other important co-morbidities: DM2, HLD, JEANNINE, and Diffuse Lewy body disease, Hx stroke, Hx CABG, Hx stent, depression, Migraine       Tests already available:  Available tests,  within 3 months , within [facility] .   1/24/2025 CBC, BMP, A1c (7.4)    Optimization:  Anesthesia Preop Clinic Assessment  Indicated.    Medical Opinion Indicated.       Plan:    Testing:  BMP, EKG, and Hematology Profile   Pre-anesthesia  visit       Visit focus: concerns in complex and/or prolonged anesthesia, position other than supine     Consultation:IM Perioperative  Hospitalist and OS Cardiology Clearance (Dr. Gumaro ELLIS) and ASA instructions     Patient  has previously scheduled Medical Appointment: 3/18/2025    Navigation: Tests Scheduled.              Consults scheduled.             Results will be tracked by Preop Clinic.    2/5/2025 Received OS records and testing (8/2024) via fax from [facility]. (Scanned to media) Clearance and Aspirin instructions notably absent, resent fax request after speaking with [facility] staff.   32yo  @ 39.1w  Dx: Scheduled pLTCS 2/2 breech presentation  Dx: GDMA2    - Admit to L&D  - NPO  - EFM/TOCO  - FS 79  - IV access, CBC/T&C/RPR  - Pepcid and Bicitra as per pre-op policy  - Anesthesia consult   - Blood transfusion consent  - Operative Consent to be discussed and obtained by Dr. Schmitt  - Plan to move to OR on time schedule  - Discussed with Dr. Keshia Spence, PAC   32yo  @ 39.1w  Dx: Scheduled pLTCS 2/ breech presentation  Dx: GDMA2, hypothyroidism    - Admit to L&D  - NPO  - EFM/TOCO  - FS 79  - 100 mcg Synthroid QD  - IV access, CBC/T&C/RPR  - Pepcid and Bicitra as per pre-op policy  - Anesthesia consult   - Blood transfusion consent  - Operative Consent to be discussed and obtained by Dr. Schmitt  - Plan to move to OR on time schedule  - Discussed with Dr. Keshia Spence, PAC   32yo  @ 39.1w  Dx: Scheduled pLTCS 2/2 breech presentation  Dx: GDMA2, hypothyroidism  Dx: Gestational thrombocytopenia    - Admit to L&D  - NPO  - EFM/TOCO  - FS 79  - 100 mcg Synthroid QD  - IV access, CBC/T&C/RPR  - Pepcid and Bicitra as per pre-op policy  - Anesthesia consult   - Blood transfusion consent  - Operative Consent to be discussed and obtained by Dr. Schmitt  - Plan to move to OR on time schedule  - Discussed with Dr. Keshia Spence, PAC

## 2025-02-05 ENCOUNTER — HOSPITAL ENCOUNTER (OUTPATIENT)
Dept: RADIOLOGY | Facility: HOSPITAL | Age: 55
Discharge: HOME | End: 2025-02-05
Attending: FAMILY MEDICINE
Payer: MEDICARE

## 2025-02-05 DIAGNOSIS — M54.50 ACUTE MIDLINE LOW BACK PAIN WITHOUT SCIATICA: ICD-10-CM

## 2025-02-05 PROCEDURE — 72110 X-RAY EXAM L-2 SPINE 4/>VWS: CPT | Mod: 26,,, | Performed by: RADIOLOGY

## 2025-02-05 PROCEDURE — 72110 X-RAY EXAM L-2 SPINE 4/>VWS: CPT | Mod: TC

## 2025-02-05 RX ORDER — GABAPENTIN 300 MG/1
900 CAPSULE ORAL NIGHTLY
Qty: 180 CAPSULE | Refills: 1 | Status: SHIPPED | OUTPATIENT
Start: 2025-02-05

## 2025-02-17 ENCOUNTER — PATIENT MESSAGE (OUTPATIENT)
Dept: ADMINISTRATIVE | Facility: OTHER | Age: 55
End: 2025-02-17
Payer: MEDICARE

## 2025-02-21 DIAGNOSIS — Z00.00 ENCOUNTER FOR MEDICARE ANNUAL WELLNESS EXAM: ICD-10-CM

## 2025-02-24 ENCOUNTER — PATIENT MESSAGE (OUTPATIENT)
Facility: CLINIC | Age: 55
End: 2025-02-24
Payer: MEDICARE

## 2025-02-25 ENCOUNTER — OFFICE VISIT (OUTPATIENT)
Dept: PSYCHIATRY | Facility: CLINIC | Age: 55
End: 2025-02-25

## 2025-02-25 ENCOUNTER — PATIENT MESSAGE (OUTPATIENT)
Dept: NEUROLOGY | Facility: CLINIC | Age: 55
End: 2025-02-25
Payer: MEDICARE

## 2025-02-25 DIAGNOSIS — F41.1 GENERALIZED ANXIETY DISORDER: Primary | ICD-10-CM

## 2025-02-25 DIAGNOSIS — F41.9 ANXIETY: ICD-10-CM

## 2025-02-25 DIAGNOSIS — Z65.8 PSYCHOSOCIAL STRESSORS: ICD-10-CM

## 2025-02-25 RX ORDER — SERTRALINE HYDROCHLORIDE 100 MG/1
200 TABLET, FILM COATED ORAL DAILY
Qty: 60 TABLET | Refills: 3 | Status: SHIPPED | OUTPATIENT
Start: 2025-02-25

## 2025-02-25 NOTE — PROGRESS NOTES
"    02/25/2025  8:40 AM  Abel Hackett  [id number]    Outpatient Psychiatry Follow-Up Visit (MD/NP)    2/25/2025    Clinical Status of Patient:  Outpatient (Ambulatory)    Chief Complaint:  Abel Hackett is a 54 y.o. male who presents today for follow-up of anxiety.  Met with patient.      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 I don't yell or scream, I just get aggravated rather quickly, people ask me the same question again, I get aggravated, I just got aggravated with a nurse because my PA for belsomra wasn't done, I'm never like that." Reports irritability daily for about the last 4 months, "I noticed because it was happening more often... I would snap at my wife, and I've never reacted that way in my life."    CLAUDIA symptoms are infrequent.      Psychiatric Review Of Systems - Is patient experiencing or having changes in:  Depressed mood: no  sleep: "good now, Dr. Espinosa took me off seroquel.. I sleep fine now."  appetite: no  energy/anergy: "okay"  interest/pleasure/anhedonia: no  anxiety/panic: infrequent  guilty/hopelessness/worthlessness: no  concentration: no  Memory: "not all that great"  S.I.B.s/risky behavior: no  Irritability: yes  Substance abuse: no  Racing thoughts: no  Impulsive behaviors: no  Paranoia: no  AVH: no          Psychotherapy:  Target symptoms: depression, anxiety   Why chosen therapy is appropriate versus another modality: relevant to diagnosis  Outcome monitoring methods: self-report  Therapeutic intervention type: supportive psychotherapy  Topics discussed/themes: stress related to medical comorbidities, building skills sets for symptom management, symptom recognition  The patient's response to the intervention is accepting. The patient's progress toward treatment goals is good. "   Duration of intervention: 16 minutes.          Medical Review of Systems   Review of Systems   Constitutional:  Negative for chills and fever.   HENT:  Negative for hearing loss.    Eyes:  Negative for blurred vision.   Respiratory:  Negative for shortness of breath.    Cardiovascular:  Negative for chest pain and palpitations.   Gastrointestinal:  Negative for constipation, diarrhea and nausea.   Genitourinary:  Negative for dysuria.   Musculoskeletal:  Negative for back pain and neck pain.   Skin:  Negative for rash.   Neurological:  Negative for dizziness and headaches.   Endo/Heme/Allergies:  Negative for environmental allergies.       Past Medical, Family and Social History: The patient's past medical, family and social history have been reviewed and updated as appropriate within the electronic medical record - see encounter notes.      Social History     Socioeconomic History    Marital status:    Tobacco Use    Smoking status: Never    Smokeless tobacco: Never   Substance and Sexual Activity    Alcohol use: Not Currently     Comment: occassional    Drug use: No    Sexual activity: Yes     Partners: Female     Social Drivers of Health     Financial Resource Strain: Low Risk  (1/17/2025)    Overall Financial Resource Strain (CARDIA)     Difficulty of Paying Living Expenses: Not hard at all   Food Insecurity: No Food Insecurity (1/17/2025)    Hunger Vital Sign     Worried About Running Out of Food in the Last Year: Never true     Ran Out of Food in the Last Year: Never true   Transportation Needs: No Transportation Needs (3/20/2024)    PRAPARE - Transportation     Lack of Transportation (Medical): No     Lack of Transportation (Non-Medical): No   Physical Activity: Sufficiently Active (1/17/2025)    Exercise Vital Sign     Days of Exercise per Week: 5 days     Minutes of Exercise per Session: 40 min   Stress: No Stress Concern Present (1/17/2025)    [employer] -  "Occupational Stress Questionnaire     Feeling of Stress : Not at all   Housing Stability: Low Risk  (3/20/2024)    Housing Stability Vital Sign     Unable to Pay for Housing in the Last Year: No     Number of Places Lived in the Last Year: 1     Unstable Housing in the Last Year: No         Compliance: yes    Side effects: None    Risk Parameters:  Patient reports no suicidal ideation  Patient reports no homicidal ideation  Patient reports no self-injurious behavior  Patient reports no violent behavior      Exam (detailed: at least 9 elements; comprehensive: all 15 elements)       Vitals could not be obtained 2/2 virtual visit          CONSTITUTIONAL  General Appearance: unremarkable, age appropriate    MUSCULOSKELETAL  Muscle Strength and Tone:no tremor, no tic  Abnormal Involuntary Movements: No  Gait and Station: non-ataxic    PSYCHIATRIC   Level of Consciousness: awake and alert   Orientation: person, place, and situation  Grooming: Casually dressed and Well groomed  Psychomotor Behavior: normal, cooperative  Speech: normal tone, normal rate, normal pitch, normal volume  Language: grossly intact  Mood: "irritable"  Affect: Consistent with mood  Thought Process: linear, logical  Associations: intact   Thought Content: DENIES suicidal ideation and DENIES homicidal ideation  Perceptions: denies hallucinations  Memory: Able to recall past events, Remote intact, and Recent intact  Attention:Attends to interview without distraction  Fund of Knowledge: Aware of current events and Vocabulary appropriate   Estimate if Intelligence:  Average based on work/education history, vocabulary and mental status exam  Insight: has awareness of illness  Judgment: behavior is adequate to circumstances        Assessment and Diagnosis   Status/Progress: Based on the examination today, the patient's problem(s) is/are improved.  New problems have not been presented today.   Co-morbidities are complicating management of the primary " condition.            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 Instructed to call 911 or present to ER for emergency including SI or HI.    - Discussed diagnosis, risks and benefits of proposed treatment above vs alternative treatments vs no treatment, and potential side effects of these treatments. Discussed the inherent unpredictability of treatment. The patient expresses understanding of the above and displays the capacity to agree with this treatment given said understanding. Patient also agrees that, currently, the benefits outweigh the risks and would like to pursue this treatment at this time.     - Any medications being used off-label were discussed with the patient inclusive of the evidence base for the use of the medications and consent was obtained for the off-label use of the medication.     The patient location is: home    Visit type: audiovisual    Face to Face time with patient: 20  30 minutes of total time spent on the encounter, which includes face to face time and non-face to face time preparing to see the patient (eg, review of tests), Obtaining and/or reviewing separately obtained history, Documenting clinical information in the electronic or other health record, Independently interpreting results (not separately reported) and communicating results to the patient/family/caregiver, or Care coordination (not separately reported).     Each  patient to whom he or she provides medical services by telemedicine is:  (1) informed of the relationship between the physician and patient and the respective role of any other health care provider with respect to management of the patient; and (2) notified that he or she may decline to receive medical services by telemedicine and may withdraw from such care at any time.        Froylan Marie III, MD    Return to Clinic:  4 m

## 2025-02-26 ENCOUNTER — PATIENT MESSAGE (OUTPATIENT)
Facility: CLINIC | Age: 55
End: 2025-02-26
Payer: MEDICARE

## 2025-03-12 ENCOUNTER — PATIENT MESSAGE (OUTPATIENT)
Dept: ADMINISTRATIVE | Facility: OTHER | Age: 55
End: 2025-03-12
Payer: MEDICARE

## 2025-03-16 PROBLEM — Z86.73 HISTORY OF STROKE: Status: ACTIVE | Noted: 2025-03-16

## 2025-03-17 ENCOUNTER — HOSPITAL ENCOUNTER (OUTPATIENT)
Dept: CARDIOLOGY | Facility: CLINIC | Age: 55
Discharge: HOME | End: 2025-03-17
Payer: MEDICARE

## 2025-03-17 ENCOUNTER — LAB VISIT (OUTPATIENT)
Dept: LAB | Facility: HOSPITAL | Age: 55
End: 2025-03-17
Attending: ANESTHESIOLOGY
Payer: MEDICARE

## 2025-03-17 ENCOUNTER — PATIENT MESSAGE (OUTPATIENT)
Dept: UROLOGY | Facility: CLINIC | Age: 55
End: 2025-03-17
Payer: MEDICARE

## 2025-03-17 ENCOUNTER — OFFICE VISIT (OUTPATIENT)
Dept: UROLOGY | Facility: CLINIC | Age: 55
End: 2025-03-17
Payer: MEDICARE

## 2025-03-17 ENCOUNTER — OFFICE VISIT (OUTPATIENT)
Dept: INTERNAL MEDICINE | Facility: CLINIC | Age: 55
End: 2025-03-17
Payer: MEDICARE

## 2025-03-17 VITALS
HEART RATE: 90 BPM | BODY MASS INDEX: 27.75 KG/M2 | HEIGHT: 69 IN | WEIGHT: 187.38 LBS | OXYGEN SATURATION: 96 % | TEMPERATURE: 99 F | SYSTOLIC BLOOD PRESSURE: 112 MMHG | DIASTOLIC BLOOD PRESSURE: 62 MMHG

## 2025-03-17 DIAGNOSIS — E11.8 DM TYPE 2 CAUSING COMPLICATION: ICD-10-CM

## 2025-03-17 DIAGNOSIS — Z86.73 HISTORY OF STROKE: ICD-10-CM

## 2025-03-17 DIAGNOSIS — N13.8 BPH WITH URINARY OBSTRUCTION: ICD-10-CM

## 2025-03-17 DIAGNOSIS — F02.80 DIFFUSE LEWY BODY DISEASE: ICD-10-CM

## 2025-03-17 DIAGNOSIS — Z01.818 PREOPERATIVE TESTING: ICD-10-CM

## 2025-03-17 DIAGNOSIS — N52.01 ERECTILE DYSFUNCTION DUE TO ARTERIAL INSUFFICIENCY: ICD-10-CM

## 2025-03-17 DIAGNOSIS — I25.709 CORONARY ARTERY DISEASE INVOLVING CORONARY BYPASS GRAFT OF NATIVE HEART WITH ANGINA PECTORIS: ICD-10-CM

## 2025-03-17 DIAGNOSIS — G47.33 OBSTRUCTIVE SLEEP APNEA (ADULT) (PEDIATRIC): ICD-10-CM

## 2025-03-17 DIAGNOSIS — G31.83 DIFFUSE LEWY BODY DISEASE: ICD-10-CM

## 2025-03-17 DIAGNOSIS — E11.8 DM TYPE 2 CAUSING COMPLICATION: Primary | ICD-10-CM

## 2025-03-17 DIAGNOSIS — Z01.818 PREOPERATIVE EXAMINATION: Primary | ICD-10-CM

## 2025-03-17 DIAGNOSIS — E11.311 TYPE 2 DIABETES MELLITUS WITH RETINOPATHY AND MACULAR EDEMA, WITHOUT LONG-TERM CURRENT USE OF INSULIN, UNSPECIFIED LATERALITY, UNSPECIFIED RETINOPATHY SEVERITY: ICD-10-CM

## 2025-03-17 DIAGNOSIS — I48.0 PAF (PAROXYSMAL ATRIAL FIBRILLATION): ICD-10-CM

## 2025-03-17 DIAGNOSIS — G43.009 MIGRAINE WITHOUT AURA AND WITHOUT STATUS MIGRAINOSUS, NOT INTRACTABLE: ICD-10-CM

## 2025-03-17 DIAGNOSIS — E78.5 HYPERLIPIDEMIA, UNSPECIFIED HYPERLIPIDEMIA TYPE: ICD-10-CM

## 2025-03-17 DIAGNOSIS — N40.1 BPH WITH URINARY OBSTRUCTION: ICD-10-CM

## 2025-03-17 DIAGNOSIS — F41.1 GENERALIZED ANXIETY DISORDER: ICD-10-CM

## 2025-03-17 LAB
ANION GAP SERPL CALC-SCNC: 8 MMOL/L (ref 8–16)
BUN SERPL-MCNC: 26 MG/DL (ref 6–20)
CALCIUM SERPL-MCNC: 9.6 MG/DL (ref 8.7–10.5)
CHLORIDE SERPL-SCNC: 109 MMOL/L (ref 95–110)
CO2 SERPL-SCNC: 24 MMOL/L (ref 23–29)
CREAT SERPL-MCNC: 0.8 MG/DL (ref 0.5–1.4)
ERYTHROCYTE [DISTWIDTH] IN BLOOD BY AUTOMATED COUNT: 13.1 % (ref 11.5–14.5)
EST. GFR  (NO RACE VARIABLE): >60 ML/MIN/1.73 M^2
ESTIMATED AVG GLUCOSE: 137 MG/DL (ref 68–131)
GLUCOSE SERPL-MCNC: 112 MG/DL (ref 70–110)
HBA1C MFR BLD: 6.4 % (ref 4–5.6)
HCT VFR BLD AUTO: 47.6 % (ref 40–54)
HGB BLD-MCNC: 15.4 G/DL (ref 14–18)
MCH RBC QN AUTO: 28.3 PG (ref 27–31)
MCHC RBC AUTO-ENTMCNC: 32.4 G/DL (ref 32–36)
MCV RBC AUTO: 88 FL (ref 82–98)
OHS QRS DURATION: 88 MS
OHS QTC CALCULATION: 386 MS
PLATELET # BLD AUTO: 213 K/UL (ref 150–450)
PMV BLD AUTO: 10.1 FL (ref 9.2–12.9)
POTASSIUM SERPL-SCNC: 5.1 MMOL/L (ref 3.5–5.1)
RBC # BLD AUTO: 5.44 M/UL (ref 4.6–6.2)
SODIUM SERPL-SCNC: 141 MMOL/L (ref 136–145)
WBC # BLD AUTO: 5.25 K/UL (ref 3.9–12.7)

## 2025-03-17 PROCEDURE — 3051F HG A1C>EQUAL 7.0%<8.0%: CPT | Mod: CPTII,S$GLB,, | Performed by: NURSE PRACTITIONER

## 2025-03-17 PROCEDURE — 99999 PR PBB SHADOW E&M-EST. PATIENT-LVL IV: CPT | Mod: PBBFAC,,, | Performed by: NURSE PRACTITIONER

## 2025-03-17 PROCEDURE — 3066F NEPHROPATHY DOC TX: CPT | Mod: CPTII,S$GLB,, | Performed by: NURSE PRACTITIONER

## 2025-03-17 PROCEDURE — 3074F SYST BP LT 130 MM HG: CPT | Mod: CPTII,S$GLB,, | Performed by: NURSE PRACTITIONER

## 2025-03-17 PROCEDURE — 93010 ELECTROCARDIOGRAM REPORT: CPT | Mod: S$GLB,,, | Performed by: INTERNAL MEDICINE

## 2025-03-17 PROCEDURE — 3008F BODY MASS INDEX DOCD: CPT | Mod: CPTII,S$GLB,, | Performed by: NURSE PRACTITIONER

## 2025-03-17 PROCEDURE — 3078F DIAST BP <80 MM HG: CPT | Mod: CPTII,S$GLB,, | Performed by: NURSE PRACTITIONER

## 2025-03-17 PROCEDURE — 3061F NEG MICROALBUMINURIA REV: CPT | Mod: CPTII,S$GLB,, | Performed by: NURSE PRACTITIONER

## 2025-03-17 PROCEDURE — 85027 COMPLETE CBC AUTOMATED: CPT | Performed by: ANESTHESIOLOGY

## 2025-03-17 PROCEDURE — 83036 HEMOGLOBIN GLYCOSYLATED A1C: CPT

## 2025-03-17 PROCEDURE — 99215 OFFICE O/P EST HI 40 MIN: CPT | Mod: S$GLB,,, | Performed by: NURSE PRACTITIONER

## 2025-03-17 PROCEDURE — 93005 ELECTROCARDIOGRAM TRACING: CPT | Mod: S$GLB,,, | Performed by: ANESTHESIOLOGY

## 2025-03-17 PROCEDURE — 1160F RVW MEDS BY RX/DR IN RCRD: CPT | Mod: CPTII,S$GLB,, | Performed by: NURSE PRACTITIONER

## 2025-03-17 PROCEDURE — 99499 UNLISTED E&M SERVICE: CPT | Mod: S$GLB,,, | Performed by: UROLOGY

## 2025-03-17 PROCEDURE — 80048 BASIC METABOLIC PNL TOTAL CA: CPT | Performed by: ANESTHESIOLOGY

## 2025-03-17 PROCEDURE — 1159F MED LIST DOCD IN RCRD: CPT | Mod: CPTII,S$GLB,, | Performed by: NURSE PRACTITIONER

## 2025-03-17 NOTE — OUTPATIENT SUBJECTIVE & OBJECTIVE
Outpatient Subjective & Objective      Chief Complaint: Preoperative evaulation, perioperative medical management, and complication reduction plan.     Functional Capacity: walks 1 mile 4x weekly without CP/SOB.       Anesthesia issues: None    Difficulty mouth opening: No    Steroid use in the last 12 months:  No    Dental Issues: None    Family anesthesia difficulty: None     Family Hx of Thrombosis: None    Past Medical History:   Diagnosis Date    Coronary artery disease     Depression     Diabetes mellitus     Diabetes mellitus, type 2     History of right coronary artery stent placement     done at [facility]    Hx of arterial ischemic stroke 03/21/2023    Hx of CABG 04/12/2024    Stroke          Past Medical History Pertinent Negatives:   Diagnosis Date Noted    Asthma 03/17/2025    COPD (chronic obstructive pulmonary disease) 03/17/2025    Deep vein thrombosis 03/17/2025    Disorder of kidney and ureter 03/17/2025    Hypertension 03/17/2025    Pulmonary embolism 03/17/2025    Seizures 03/17/2025    Thyroid disease 03/17/2025         Past Surgical History:   Procedure Laterality Date    ANGIOGRAPHY N/A 10/2023    COLONOSCOPY N/A 08/02/2022    Procedure: COLONOSCOPY;  Surgeon: Keira Wheat MD;  Location: [facility];  Service: Endoscopy;  Laterality: N/A;    coronary stents      x6    ELBOW SURGERY      LAPAROSCOPIC CHOLECYSTECTOMY N/A 06/13/2024    Procedure: CHOLECYSTECTOMY, LAPAROSCOPIC;  Surgeon: Kevin Ray MD;  Location: [facility];  Service: General;  Laterality: N/A;    REFRACTIVE SURGERY  04/13/2016    ROTATOR CUFF REPAIR Right     VASECTOMY         Review of Systems   Constitutional:  Negative for chills, fatigue, fever and unexpected weight change.   HENT:  Negative for congestion, hearing loss, rhinorrhea, sore throat, tinnitus and trouble swallowing.    Eyes:  Negative for visual disturbance.   Respiratory:  Negative for cough, chest tightness, shortness of breath and wheezing.    Cardiovascular:   "Negative for chest pain, palpitations and leg swelling.   Gastrointestinal:  Negative for constipation and diarrhea.        Denies Fatty liver, Hepatitis   Genitourinary:  Negative for decreased urine volume, difficulty urinating, dysuria, frequency, hematuria and urgency.   Musculoskeletal:  Negative for arthralgias, back pain, neck pain and neck stiffness.        S/P fall last week- attributes to Lewy Body disease   Neurological:  Positive for headaches (migraines). Negative for dizziness, syncope, weakness and numbness.   Hematological:  Does not bruise/bleed easily.   Psychiatric/Behavioral:  Negative for sleep disturbance and suicidal ideas.               VITALS  Visit Vitals  /62 (BP Location: Left arm, Patient Position: Sitting)   Pulse 90   Temp 98.8 °F (37.1 °C) (Oral)   Ht 5' 9" (1.753 m)   Wt 85 kg (187 lb 6.3 oz)   SpO2 96%   BMI 27.67 kg/m²          Physical Exam  Vitals reviewed.   Constitutional:       General: He is not in acute distress.     Appearance: He is well-developed.   HENT:      Head: Normocephalic.      Nose: Nose normal.      Mouth/Throat:      Pharynx: No oropharyngeal exudate.   Eyes:      General:         Right eye: No discharge.         Left eye: No discharge.      Conjunctiva/sclera: Conjunctivae normal.      Pupils: Pupils are equal, round, and reactive to light.   Neck:      Thyroid: No thyromegaly.      Vascular: No carotid bruit or JVD.      Trachea: No tracheal deviation.   Cardiovascular:      Rate and Rhythm: Normal rate and regular rhythm.      Pulses:           Carotid pulses are 2+ on the right side and 2+ on the left side.       Dorsalis pedis pulses are 2+ on the right side and 2+ on the left side.        Posterior tibial pulses are 2+ on the right side and 2+ on the left side.      Heart sounds: Normal heart sounds. No murmur heard.  Pulmonary:      Effort: Pulmonary effort is normal. No respiratory distress.      Breath sounds: Normal breath sounds. No stridor. No " wheezing, rhonchi or rales.   Abdominal:      General: Bowel sounds are normal. There is no distension.      Palpations: Abdomen is soft.      Tenderness: There is no abdominal tenderness. There is no guarding.   Musculoskeletal:      Cervical back: Normal range of motion. No pain with movement.      Right lower leg: No edema.      Left lower leg: No edema.   Lymphadenopathy:      Cervical: No cervical adenopathy.   Skin:     General: Skin is warm and dry.      Capillary Refill: Capillary refill takes less than 2 seconds.      Findings: No erythema or rash.   Neurological:      Mental Status: He is alert and oriented to person, place, and time.   Psychiatric:         Behavior: Behavior normal. Behavior is cooperative.          Significant Labs:  Lab Results   Component Value Date    WBC 5.25 03/17/2025    HGB 15.4 03/17/2025    HCT 47.6 03/17/2025     03/17/2025    CHOL 126 07/22/2024    TRIG 173 (A) 07/22/2024    HDL 54 07/22/2024    LDLDIRECT 71 03/06/2023    ALT 41 11/22/2024    AST 30 11/22/2024     03/17/2025    K 5.1 03/17/2025     03/17/2025    CREATININE 0.8 03/17/2025    BUN 26 (H) 03/17/2025    CO2 24 03/17/2025    TSH 1.711 01/03/2024    PSA 0.99 11/22/2024    INR 1.5 11/11/2023    HGBA1C 6.4 (H) 03/17/2025           EKG:   Results for orders placed or performed during the hospital encounter of 03/17/25   EKG 12-lead    Collection Time: 03/17/25 12:04 PM   Result Value Ref Range    QRS Duration 88 ms    OHS QTC Calculation 386 ms    Narrative    Test Reason : Z01.818,    Vent. Rate :  90 BPM     Atrial Rate :  90 BPM     P-R Int : 178 ms          QRS Dur :  88 ms      QT Int : 316 ms       P-R-T Axes :  67 117  33 degrees    QTcB Int : 386 ms    Baseline Artifact  Normal sinus rhythm  Right axis deviation  Abnormal ECG  When compared with ECG of 21-Oct-2023 19:15,  No significant change was found  Confirmed by Rufus Tate ([id number]) on 3/17/2025 12:11:16 PM    Referred By: YOVANNY GEORGE            Confirmed By: Rufus Taet       2D ECHO:  TTE:  PHYSICIAN INTERPRETATION by Sam Villanueva:   Left Ventricle: LV systolic function is normal. No left ventricular wall motion abnormalities.   Right Ventricle: The right ventricular size is normal. Global RV systolic function is normal.   Inferior Vena Cava: The inferior vena cava is normal in size and demonstrates normal respiratory variation.   Pericardium: There is a small pericardial effusion. No echocardiographic evidence of hemodynamic compromise.     Summary:    1. Normal left ventricular systolic function.    2. Right ventricular systolic function is normal.    3. There is small pericardial effusion. No hemodynamic compromise.     Electronically signed by Sam Villanueva   Signature Date/Time: 11/17/2023/1:18:38 PM         Nuclear Stress 6/2023        Imaging   L-Spine xray 2/5/25  FINDINGS:  There is transitional lumbosacral anatomy with partial sacralization of L5.     No spondylolisthesis or change in alignment with flexion and extension position.  No displaced fracture with preserved vertebral body heights.  Mild degenerative disc disease at every level L1-2 through L4-5.  Atherosclerosis.        Electronically signed by: Dioni Haile  Date:                                            02/05/2025  Time:                                           15:16          Active Cardiac Conditions: None      Revised Cardiac Risk Index   High -Risk Surgery  Intraperitoneal; Intrathoracic; suprainguinal vascular Yes- + 1 No- 0   History of Ischemic Heart Disease   (Hx of MI/positive exercise test/current chest pain due to ischemia/use of nitrate therapy/EKG with pathological Q waves) Yes- + 1 No- 0   History of CHF  (Pulmonary edema/bilateral rales or S3 gallop/PND/CXR showing pulmonary vascular redistribution) Yes- + 1 No- 0   History of CVA   (Prior stroke or TIA) Yes- + 1 No- 0   Pre-operative treatment with insulin Yes- + 1 No- 0   Pre-operative creatinine >  2mg/dl Yes- + 1 No- 0   Total: 2      Risk Status:  Estimated risk of cardiac complications after non-cardiac surgery using the Revised Cardiac Risk Index for Preoperative risk is 10.1 %      ARISCAT (Kaur) risk index: Low: 1.6% risk of post-op pulmonary complications; Intermediate: 13.3% risk of post-op pulmonary complications if surgery is > 3 hours.     American Society of Anesthesiologists Physical Status classification (ASA): 3             Outpatient Subjective & Objective

## 2025-03-17 NOTE — DISCHARGE INSTRUCTIONS
Your surgery has been scheduled for:______4/1/25____________________________________    You should report to:  ____[facility], located on the [place] side of the first floor of the           [facility] ([phone])  __X__[facility], located on the [facility] side of the            Second floor of the [facility] ([phone])  ____3rd Floor [facility] located on the [facility] side of the [facility] [phone]  ____[facility]: located at [address] [facility] [address] [place] [address]. [address]     Please Note   Tell your doctor if you take Aspirin, products containing Aspirin, herbal medications  or blood thinners, such as Coumadin, Ticlid, or Plavix.  (Consult your provider regarding holding or stopping before surgery).  Arrange for someone to drive you home following surgery.  You will not be allowed to leave the surgical facility alone or drive yourself home following sedation and anesthesia.    Before Surgery  Stop taking all herbal medications, vitamins, and supplements 7 days prior to surgery  No Motrin/Advil (Ibuprofen) 7 days before surgery  No Aleve (Naproxen) 7 days before surgery   No Goody's/BC Powder 7 days before surgery  Refrain from drinking alcoholic beverages for 24 hours before and after surgery  Stop or limit smoking at least 24 hours prior to surgery  You may take Tylenol for pain    Night before Surgery  Do not eat or drink after midnight  Take a shower or bath (shower is recommended).  Bathe with Hibiclens soap or an antibacterial soap from the neck down.  If not supplied by your surgeon, hibiclens soap will need to be purchased over the counter in pharmacy.  Rinse soap off thoroughly.  Shampoo your hair with your regular shampoo    The Day of Surgery  Take another bath or shower with hibiclens or any antibacterial soap, to reduce the chance of infection.  Take heart and  blood pressure medications with a small sip of water, as advised by the perioperative team.  Do not take fluid pills  You may brush your teeth and rinse your mouth, but do not swallow any additional water.   Do not apply perfumes, powder, body lotions or deodorant on the day of surgery.  Nail polish should be removed.  Do not wear makeup or moisturizer  Wear comfortable clothes, such as a button front shirt and loose fitting pants.  Leave all jewelry, including body piercings, and valuables at home.    Bring any devices you will need after surgery such as crutches or canes.  If you have sleep apnea, please bring your CPAP machine  In the event that your physical condition changes including the onset of a cold or respiratory illness, or if you have to delay or cancel your surgery, please notify your surgeon.

## 2025-03-17 NOTE — ASSESSMENT & PLAN NOTE
Not using CPAP; improved with weight loss  Recommend caution with sedating medication that can cause respiratory depression.   Patients with untreated JEANNINE have an increased risk of stroke, HTN, and heart disease.

## 2025-03-17 NOTE — H&P (VIEW-ONLY)
Urology ([facility]) H&P  Staff: Kevin Garcia MD    CC: Erectile Dysfunction    HPI:  Abel Hackett is a 54 y.o. male who c/o severe ED.  He's tried and failed oral meds, ICI, MUSE, and a CONSTANTIN.     He has LUTS.  Nocturia x 0-1. Is pleased with how he voids.     REVIEW OF SYSTEMS:     Abel Hackett denies headache, blurred vision, fever, nausea, vomiting, chills, abdominal pain, chest pain, sore throat, bleeding per rectum, cough, SOB, recent loss of consciousness, recent mental status changes, seizures, dizziness, or upper or lower extremity weakness.     BRIDGET  1. 1  2. 1  3. 0  4. 0  5. 0    ROS:  Neg except per HPI    Past Medical History:   Diagnosis Date    Depression     Diabetes mellitus     Diabetes mellitus, type 2     History of right coronary artery stent placement     done at [facility]    Hx of arterial ischemic stroke 03/21/2023    Hx of CABG 04/12/2024       Past Surgical History:   Procedure Laterality Date    ANGIOGRAPHY N/A 10/2023    COLONOSCOPY N/A 08/02/2022    Procedure: COLONOSCOPY;  Surgeon: Keira Wheat MD;  Location: [facility];  Service: Endoscopy;  Laterality: N/A;    coronary stents      x6    LAPAROSCOPIC CHOLECYSTECTOMY N/A 6/13/2024    Procedure: CHOLECYSTECTOMY, LAPAROSCOPIC;  Surgeon: Kevin Ray MD;  Location: [facility] OR;  Service: General;  Laterality: N/A;    REFRACTIVE SURGERY  04/13/2016    VASECTOMY         Social History     Socioeconomic History    Marital status:    Tobacco Use    Smoking status: Never    Smokeless tobacco: Never   Substance and Sexual Activity    Alcohol use: Not Currently     Comment: occassional    Drug use: No    Sexual activity: Yes     Partners: Female     Social Drivers of Health     Financial Resource Strain: Low Risk  (1/17/2025)    Overall Financial Resource Strain (CARDIA)     Difficulty of Paying Living Expenses: Not hard at all   Food Insecurity: No Food Insecurity (1/17/2025)    Hunger Vital Sign     Worried About Running Out of Food  "in the Last Year: Never true     Ran Out of Food in the Last Year: Never true   Transportation Needs: No Transportation Needs (3/20/2024)    PRAPARE - Transportation     Lack of Transportation (Medical): No     Lack of Transportation (Non-Medical): No   Physical Activity: Sufficiently Active (1/17/2025)    Exercise Vital Sign     Days of Exercise per Week: 5 days     Minutes of Exercise per Session: 40 min   Stress: No Stress Concern Present (1/17/2025)    [employer] - Occupational Stress Questionnaire     Feeling of Stress : Not at all   Housing Stability: Low Risk  (3/20/2024)    Housing Stability Vital Sign     Unable to Pay for Housing in the Last Year: No     Number of Places Lived in the Last Year: 1     Unstable Housing in the Last Year: No       Family History   Problem Relation Name Age of Onset    Heart disease Mother      Hypertension Mother      Diabetes Mother      Heart attack Mother      Heart disease Father      Hypertension Father      Diabetes Father      Heart attack Father      Heart attack Maternal Uncle      Heart attack Maternal Grandfather         Review of patient's allergies indicates:  No Known Allergies    Medications Ordered Prior to Encounter[1]    Anticoagulation:  Yes, 81 mg.     Physical Exam:  Estimated body mass index is 29.46 kg/m² as calculated from the following:    Height as of 1/24/25: 5' 9" (1.753 m).    Weight as of 1/27/25: 90.5 kg (199 lb 8.3 oz).    General: No acute distress, well developed. AAOx3  Head: Normocephalic, Atraumatic  Eyes: Extra-occular movements intact, No discharge  Neck: supple, symmetrical, trachea midline  Lungs: normal respiratory effort, no respiratory distress, no wheezes  CV: regular rate, 2+ pulses  Abdomen: soft, non-tender, non-distended, no organomegaly  :  The penis has no evidence of plaques or induration. The urethral meatus is normal. The testes, epididymides, and cord structures are normal in size and " contour bilaterally. The scrotum is normal in size and contour.   MSK: no edema, no deformities, normal ROM  Skin: skin color, texture, turgor normal.  Neurologic: no focal deficits, sensation intact    Labs:    Urine dipstick today - negative for all components.     Lab Results   Component Value Date    WBC 4.99 01/24/2025    HGB 15.3 01/24/2025    HCT 48.7 01/24/2025    MCV 87 01/24/2025     01/24/2025           BMP  Lab Results   Component Value Date     01/24/2025    K 5.1 01/24/2025     01/24/2025    CO2 23 01/24/2025    BUN 24 (H) 01/24/2025    CREATININE 1.1 01/24/2025    CALCIUM 10.4 01/24/2025    ANIONGAP 10 01/24/2025    EGFRNORACEVR >60 01/24/2025       Lab Results   Component Value Date    PSA 0.99 11/22/2024       Imaging:    CT RENAL STONE STUDY ABD PELVIS WO     CLINICAL HISTORY:  Flank pain, kidney stone suspected; Right upper quadrant pain     TECHNIQUE:  Low dose axial images, sagittal and coronal reformations were obtained from the lung bases to the pubic symphysis.  Contrast was not administered.     COMPARISON:  None     FINDINGS:  The liver, spleen, pancreas, and adrenal glands are unremarkable.  There has been a cholecystectomy.     There are a few punctate stones of both kidneys.  There is no ureterolithiasis or hydroureteronephrosis.  There is small simple cysts of each kidney.     The bowel is nondilated.  The appendix is normal.  The colon is unremarkable.     There is mild calcification of the aorta without aneurysm.  There has been a sternotomy.  Degenerative change of the spine typical for age is present.     Impression:     Punctate bilateral nonobstructive nephrolithiasis.     Cholecystectomy.    Assessment: Abel Hackett is a 54 y.o. male with severe ED.     Plan:     1. To OR on 4/1/2025 for IPP placement.   2. Consents signed   3. I have explained the risk, benefits, and alternatives of the procedure in detail. Specifically, the risks regarding placement of a  penile prosthesis were discussed including: chance of prosthesis mechanical failure with 90-95% of men having a functional prosthesis 10 years after surgery, infection of prosthesis requiring removal of the device with rates as low as <1-2%, bleeding/hematoma, injury to the urethra or nearby structures, loss of penile length or girth, sensory loss, or erosion of cylinder. The postoperative course was discussed regarding antibiotics, care of the incision, and activation at 4-6 weeks. The patient voices understanding and all questions have been answered. The patient agrees to proceed as planned.  4. Told to stop all NSAIDS, ASA one week prior to surgery.  5. Told to call the Urology clinic regarding any new skin lesions or infections prior to the surgery   6. Hemoglobin A1c 6.4   7. The patient has been cleared for surgery.       Colt Beth MD          [1]   Current Outpatient Medications on File Prior to Visit   Medication Sig Dispense Refill    aspirin (ECOTRIN) 81 MG EC tablet Take 81 mg by mouth once daily.      atorvastatin (LIPITOR) 40 MG tablet Take 40 mg by mouth once daily.      clonazePAM (KLONOPIN) 0.25 MG TbDL Take 1 tablet (0.25 mg total) by mouth every evening. 60 tablet 2    empagliflozin-metformin (SYNJARDY XR) 25-1,000 mg TBph Take 1 tablet by mouth daily 90 tablet 1    evolocumab (REPATHA SURECLICK) 140 mg/mL PnIj Inject 1 pen into the skin once every 2 weeks. 2 mL 12    gabapentin (NEURONTIN) 300 MG capsule Take 3 capsules (900 mg total) by mouth every evening. 180 capsule 1    levETIRAcetam (KEPPRA) 250 MG Tab Take 1 tablet (250 mg total) by mouth every evening. (Patient not taking: Reported on 1/27/2025) 30 tablet 11    metoprolol succinate (TOPROL-XL) 100 MG 24 hr tablet Take 100 mg by mouth once daily.      nitroGLYCERIN (NITROSTAT) 0.4 MG SL tablet PLACE 1 TABLET UNDER TONGUE EVERY 5 MINUTES FOR CHEST PAIN. GO TO ER IF CHEST PAIN PERSISTS AFTER 3 DOSES. 25 tablet 3    pantoprazole  (PROTONIX) 40 MG tablet Take 1 tablet orally once a day 90 tablet 4    QUEtiapine (SEROQUEL) 25 MG Tab Take 1 tablet (25 mg total) by mouth every evening. 90 tablet 3    rimegepant (NURTEC) 75 mg odt Take 1 tablet (75 mg total) by mouth daily as needed for Migraine. Place ODT tablet on the tongue; alternatively the ODT tablet may be placed under the tongue 8 tablet 11    sertraline (ZOLOFT) 100 MG tablet Take 2 tablets (200 mg total) by mouth once daily. 60 tablet 3    suvorexant (BELSOMRA) 20 mg Tab Take 1 tablet by mouth every evening. 28 tablet 5    tamsulosin (FLOMAX) 0.4 mg Cap Take 1 capsule (0.4 mg total) by mouth once daily. 30 capsule 2    tirzepatide (MOUNJARO) 15 mg/0.5 mL PnIj Inject 15 mg into the skin every 7 days. 12 Pen 1     No current facility-administered medications on file prior to visit.

## 2025-03-17 NOTE — ASSESSMENT & PLAN NOTE
Unaware of diagnosis  Denies: Hematuria/incomplete bladder emptying/frequency/nocturia/dysuria  There is an increased risk of post-op urinary retention.

## 2025-03-17 NOTE — ASSESSMENT & PLAN NOTE
Incidental finding; no ED visits  No residual deficits  Followed per Neurology    CTA Head 2023  Impression:     Remote lacunar-type infarctions in the right cerebellar hemisphere.  No new parenchymal hypoattenuation is seen to suggest an acute infarction and there is no intracranial hemorrhage.  No abnormal parenchymal enhancement.     No intracranial arterial abnormalities.     MRI Head 2023  Impression:     Two small areas of remote infarction are seen in the right cerebellar hemisphere.  No new infarction is seen.     Stable focus of FLAIR signal abnormality in the left posterior frontal white matter, unchanged from prior study likely related to chronic microvascular ischemic disease.

## 2025-03-17 NOTE — ASSESSMENT & PLAN NOTE
Currently takes:  Synjardy/Mounjaro     Most recent A1c is 6.4  Managed per PCP    Denies peripheral neuropathy.   Denies visual changes. Up to date with eye exams.   Micro changes: Denies- Nephropathy; Has retinopathy  Macro changes: Denies-  Carotid, Peripheral disease ; Has CAD

## 2025-03-17 NOTE — ASSESSMENT & PLAN NOTE
Treated with: clonazepam/Zoloft; controlled symptoms.   Manged per Psych   Denies suicidal/ homicidal ideations.

## 2025-03-17 NOTE — PROGRESS NOTES
"[facility]  Progress Note    Patient Name: Abel Hackett  MRN: [id number]  Date of Evaluation- 03/18/2025  PCP- Keira Wheat MD    Future cases for Abel Hackett [[id number]]       Case ID Status Date Time Ger Procedure Provider Location    [id number] [facility] 4/1/2025  9:50  REMOVAL, PENILE IMPLANT Kevin Garcia MD [[id number]] [facility] OR 2ND FLR            HPI:  This is a 54 y.o. male  who presents today for a preoperative evaluation in preparation for insertion of penile prosthesis.  Surgery is indicated for erectile dysfunction due to arterial insufficiency.   Patient is new to me.  The history has been obtained by speaking with the patient and reviewing the electronic medical record and/or outside health information. Significant health conditions for the perioperative period are discussed below in assessment and plan.   Patient reports current health status to be "better".  Denies any new symptoms before surgery.       Subjective/ Objective:     Chief Complaint: Preoperative evaulation, perioperative medical management, and complication reduction plan.     Functional Capacity: walks 1 mile 4x weekly without CP/SOB.       Anesthesia issues: None    Difficulty mouth opening: No    Steroid use in the last 12 months:  No    Dental Issues: None    Family anesthesia difficulty: None     Family Hx of Thrombosis: None    Past Medical History:   Diagnosis Date    Coronary artery disease     Depression     Diabetes mellitus     Diabetes mellitus, type 2     History of right coronary artery stent placement     done at [facility]    Hx of arterial ischemic stroke 03/21/2023    Hx of CABG 04/12/2024    Stroke          Past Medical History Pertinent Negatives:   Diagnosis Date Noted    Asthma 03/17/2025    COPD (chronic obstructive pulmonary disease) 03/17/2025    Deep vein thrombosis 03/17/2025    Disorder of kidney and ureter 03/17/2025    Hypertension 03/17/2025    Pulmonary embolism 03/17/2025    Seizures " "03/17/2025    Thyroid disease 03/17/2025         Past Surgical History:   Procedure Laterality Date    ANGIOGRAPHY N/A 10/2023    COLONOSCOPY N/A 08/02/2022    Procedure: COLONOSCOPY;  Surgeon: Keira Wheat MD;  Location: [facility];  Service: Endoscopy;  Laterality: N/A;    coronary stents      x6    ELBOW SURGERY      LAPAROSCOPIC CHOLECYSTECTOMY N/A 06/13/2024    Procedure: CHOLECYSTECTOMY, LAPAROSCOPIC;  Surgeon: Kevin Ray MD;  Location: [facility];  Service: General;  Laterality: N/A;    REFRACTIVE SURGERY  04/13/2016    ROTATOR CUFF REPAIR Right     VASECTOMY         Review of Systems   Constitutional:  Negative for chills, fatigue, fever and unexpected weight change.   HENT:  Negative for congestion, hearing loss, rhinorrhea, sore throat, tinnitus and trouble swallowing.    Eyes:  Negative for visual disturbance.   Respiratory:  Negative for cough, chest tightness, shortness of breath and wheezing.    Cardiovascular:  Negative for chest pain, palpitations and leg swelling.   Gastrointestinal:  Negative for constipation and diarrhea.        Denies Fatty liver, Hepatitis   Genitourinary:  Negative for decreased urine volume, difficulty urinating, dysuria, frequency, hematuria and urgency.   Musculoskeletal:  Negative for arthralgias, back pain, neck pain and neck stiffness.        S/P fall last week- attributes to Lewy Body disease   Neurological:  Positive for headaches (migraines). Negative for dizziness, syncope, weakness and numbness.   Hematological:  Does not bruise/bleed easily.   Psychiatric/Behavioral:  Negative for sleep disturbance and suicidal ideas.               VITALS  Visit Vitals  /62 (BP Location: Left arm, Patient Position: Sitting)   Pulse 90   Temp 98.8 °F (37.1 °C) (Oral)   Ht 5' 9" (1.753 m)   Wt 85 kg (187 lb 6.3 oz)   SpO2 96%   BMI 27.67 kg/m²          Physical Exam  Vitals reviewed.   Constitutional:       General: He is not in acute distress.     Appearance: He is " well-developed.   HENT:      Head: Normocephalic.      Nose: Nose normal.      Mouth/Throat:      Pharynx: No oropharyngeal exudate.   Eyes:      General:         Right eye: No discharge.         Left eye: No discharge.      Conjunctiva/sclera: Conjunctivae normal.      Pupils: Pupils are equal, round, and reactive to light.   Neck:      Thyroid: No thyromegaly.      Vascular: No carotid bruit or JVD.      Trachea: No tracheal deviation.   Cardiovascular:      Rate and Rhythm: Normal rate and regular rhythm.      Pulses:           Carotid pulses are 2+ on the right side and 2+ on the left side.       Dorsalis pedis pulses are 2+ on the right side and 2+ on the left side.        Posterior tibial pulses are 2+ on the right side and 2+ on the left side.      Heart sounds: Normal heart sounds. No murmur heard.  Pulmonary:      Effort: Pulmonary effort is normal. No respiratory distress.      Breath sounds: Normal breath sounds. No stridor. No wheezing, rhonchi or rales.   Abdominal:      General: Bowel sounds are normal. There is no distension.      Palpations: Abdomen is soft.      Tenderness: There is no abdominal tenderness. There is no guarding.   Musculoskeletal:      Cervical back: Normal range of motion. No pain with movement.      Right lower leg: No edema.      Left lower leg: No edema.   Lymphadenopathy:      Cervical: No cervical adenopathy.   Skin:     General: Skin is warm and dry.      Capillary Refill: Capillary refill takes less than 2 seconds.      Findings: No erythema or rash.   Neurological:      Mental Status: He is alert and oriented to person, place, and time.   Psychiatric:         Behavior: Behavior normal. Behavior is cooperative.          Significant Labs:  Lab Results   Component Value Date    WBC 5.25 03/17/2025    HGB 15.4 03/17/2025    HCT 47.6 03/17/2025     03/17/2025    CHOL 126 07/22/2024    TRIG 173 (A) 07/22/2024    HDL 54 07/22/2024    LDLDIRECT 71 03/06/2023    ALT 41  11/22/2024    AST 30 11/22/2024     03/17/2025    K 5.1 03/17/2025     03/17/2025    CREATININE 0.8 03/17/2025    BUN 26 (H) 03/17/2025    CO2 24 03/17/2025    TSH 1.711 01/03/2024    PSA 0.99 11/22/2024    INR 1.5 11/11/2023    HGBA1C 6.4 (H) 03/17/2025           EKG:   Results for orders placed or performed during the hospital encounter of 03/17/25   EKG 12-lead    Collection Time: 03/17/25 12:04 PM   Result Value Ref Range    QRS Duration 88 ms    OHS QTC Calculation 386 ms    Narrative    Test Reason : Z01.818,    Vent. Rate :  90 BPM     Atrial Rate :  90 BPM     P-R Int : 178 ms          QRS Dur :  88 ms      QT Int : 316 ms       P-R-T Axes :  67 117  33 degrees    QTcB Int : 386 ms    Baseline Artifact  Normal sinus rhythm  Right axis deviation  Abnormal ECG  When compared with ECG of 21-Oct-2023 19:15,  No significant change was found  Confirmed by Rufus Tate ([id number]) on 3/17/2025 12:11:16 PM    Referred By: YOVANNY GEORGE           Confirmed By: Rufus Tate       2D ECHO:  TTE:  PHYSICIAN INTERPRETATION by Sam Villanueva:   Left Ventricle: LV systolic function is normal. No left ventricular wall motion abnormalities.   Right Ventricle: The right ventricular size is normal. Global RV systolic function is normal.   Inferior Vena Cava: The inferior vena cava is normal in size and demonstrates normal respiratory variation.   Pericardium: There is a small pericardial effusion. No echocardiographic evidence of hemodynamic compromise.     Summary:    1. Normal left ventricular systolic function.    2. Right ventricular systolic function is normal.    3. There is small pericardial effusion. No hemodynamic compromise.     Electronically signed by Sam Villanueva   Signature Date/Time: 11/17/2023/1:18:38 PM         Nuclear Stress 6/2023        Imaging   L-Spine xray 2/5/25  FINDINGS:  There is transitional lumbosacral anatomy with partial sacralization of L5.     No spondylolisthesis or change in  alignment with flexion and extension position.  No displaced fracture with preserved vertebral body heights.  Mild degenerative disc disease at every level L1-2 through L4-5.  Atherosclerosis.        Electronically signed by: Dioni Haile  Date:                                            02/05/2025  Time:                                           15:16          Active Cardiac Conditions: None      Revised Cardiac Risk Index   High -Risk Surgery  Intraperitoneal; Intrathoracic; suprainguinal vascular Yes- + 1 No- 0   History of Ischemic Heart Disease   (Hx of MI/positive exercise test/current chest pain due to ischemia/use of nitrate therapy/EKG with pathological Q waves) Yes- + 1 No- 0   History of CHF  (Pulmonary edema/bilateral rales or S3 gallop/PND/CXR showing pulmonary vascular redistribution) Yes- + 1 No- 0   History of CVA   (Prior stroke or TIA) Yes- + 1 No- 0   Pre-operative treatment with insulin Yes- + 1 No- 0   Pre-operative creatinine > 2mg/dl Yes- + 1 No- 0   Total: 2      Risk Status:  Estimated risk of cardiac complications after non-cardiac surgery using the Revised Cardiac Risk Index for Preoperative risk is 10.1 %      ARISCAT (Canet) risk index: Low: 1.6% risk of post-op pulmonary complications; Intermediate: 13.3% risk of post-op pulmonary complications if surgery is > 3 hours.     American Society of Anesthesiologists Physical Status classification (ASA): 3                            Assessment/Plan:     Erectile dysfunction due to arterial insufficiency  Scheduled with Dr. Garcia 4/1/25 for insertion of penile prosthesis.    Diffuse Lewy body disease  Able to carry on ADLs  Followed per Neurology; last seen 9/2024    Generalized anxiety disorder  Treated with: clonazepam/Zoloft; controlled symptoms.   Manged per Psych   Denies suicidal/ homicidal ideations.       Hyperlipidemia  Recommend healthy diet (DASH/Mediterranean) and exercise.   Patient should exercise 30 minutes at least five  times weekly once recovered from surgery.   Treated with: atorvastatin    PAF (paroxysmal atrial fibrillation)  One episode after CABG  Treated with ASA/metoprolol  Followed by outside cardiology      Coronary artery disease involving coronary bypass graft with angina pectoris  Denies history of MI   S/P CABG x 4; stents x 6   Treated with: ASA/metoprolol/statin/NTG prn  Denies symptoms of  CP/SOB/PND/Orthopnea/Palpitations/Syncope  Continue physical activity of at least 30 minutes of moderate aerobic activity 5 days weekly.      BPH with urinary obstruction  Unaware of diagnosis  Denies: Hematuria/incomplete bladder emptying/frequency/nocturia/dysuria  There is an increased risk of post-op urinary retention.    Type 2 diabetes mellitus with retinopathy and macular edema, without long-term current use of insulin, unspecified laterality, unspecified retinopathy severity  Currently takes:  Synjardy/Mounjaro     Most recent A1c is 6.4  Managed per PCP    Denies peripheral neuropathy.   Denies visual changes. Up to date with eye exams.   Micro changes: Denies- Nephropathy; Has retinopathy  Macro changes: Denies-  Carotid, Peripheral disease ; Has CAD      Obstructive sleep apnea (adult) (pediatric)  Not using CPAP; improved with weight loss  Recommend caution with sedating medication that can cause respiratory depression.   Patients with untreated JEANNINE have an increased risk of stroke, HTN, and heart disease.      History of stroke  Incidental finding; no ED visits  No residual deficits  Followed per Neurology    CTA Head 2023  Impression:     Remote lacunar-type infarctions in the right cerebellar hemisphere.  No new parenchymal hypoattenuation is seen to suggest an acute infarction and there is no intracranial hemorrhage.  No abnormal parenchymal enhancement.     No intracranial arterial abnormalities.     MRI Head 2023  Impression:     Two small areas of remote infarction are seen in the right cerebellar hemisphere.   No new infarction is seen.     Stable focus of FLAIR signal abnormality in the left posterior frontal white matter, unchanged from prior study likely related to chronic microvascular ischemic disease.            Migraine without aura or status migrainosus  Treated with Nurtec prn; episodes about 3x yearly  Followed by Neuro- stable.         Discussion/Management of Perioperative Care    Thromboembolic prophylaxis (VTE) Care: Risk factors for thrombosis include: surgical procedure.  I recommend prophylaxis of thromboembolism with the use of compression stockings/pneumatic devices, and/or pharmacologic agents. The benefits should outweigh the risks for pharmacologic prophylaxis in the perioperative period. I also encourage early ambulation if not contraindicated during the post-operative period.    Risk factors for post-operative pulmonary complications include:diabetes mellitus. To reduce the risk of pulmonary complications, prophylactic recommendations include: incentive spirometry use/deep breathing, end tidal carbon dioxide monitoring, and pain control.    I recommend monitoring sodium during the perioperative period. Pt. is currently on an SSRI which can be associated with SIADH. Surgical procedures are associated with hypersecretion of ADH as well.    Risk factors for renal complications include: diabetes mellitus. To reduce the risk of postoperative renal complications, I recommend the patient maintain adequate hydration.  Avoid/reduce NSAIDS and BLAND-2 inhibitors use as well as IV contrast for renal protection.    I recommend the use of appropriate prophylactic antibiotics to reduce the risk of surgical site infections.    The patient is at an increased risk for urinary retention due to : BPH/LUTS and urological procedure. I recommend to avoid/decrease the use of benzodiazepines, anticholinergics, and Benadryl in the perioperative period. I also recommend using opioids for the shortest period of time if  possible.     Diabetes Mellitus-I recommend monitoring the glucose in the perioperative period ( Before meals and bed time,if the patient is on oral feeds or every 6 hourly ,if the patient is NPO ).   Blood glucose target in hospitalized patients is 140-180. Oral Hypoglycemic agents are generally avoided during the hospital stay . If glucose is consistently elevated ,I recommend using a basal prandial Insulin regimen to control the glucose - as elevated glucose can be associated with adverse surgical outcomes. Please consider involving Hospital Medicine or Endocrinology , if any help is needed with Glucose control. Patient will be instructed based on the pre op clinic guidelines about adjustment of diabetic treatment (If applicable). These guidelines are per recommendations of the Endocrinology department.            This visit was focused on Preoperative evaluation, Perioperative Medical management, complication reduction plans. I suggest that the patient follows up with primary care or relevant sub specialists for ongoing health care.    I appreciate the opportunity to be involved in this patients care. Please feel free to contact me if there were any questions about this consultation.          I spent a total of 56 minutes on the day of the visit.  This includes face to face time and non-face to face time preparing to see the patient (e.g., review of tests), obtaining and/or reviewing separately obtained history, documenting clinical information in the electronic or other health record, independently interpreting results and communicating results to the patient/family/caregiver, or care coordinator.         Patient is optimized for surgery.   Optimized per outside cardiology- note in media 2/10/25      Nathanael Cabrera NP  Perioperative Medicine  [facility]

## 2025-03-17 NOTE — ASSESSMENT & PLAN NOTE
Denies history of MI   S/P CABG x 4; stents x 6   Treated with: ASA/metoprolol/statin/NTG prn  Denies symptoms of  CP/SOB/PND/Orthopnea/Palpitations/Syncope  Continue physical activity of at least 30 minutes of moderate aerobic activity 5 days weekly.

## 2025-03-17 NOTE — ASSESSMENT & PLAN NOTE
Recommend healthy diet (DASH/Mediterranean) and exercise.   Patient should exercise 30 minutes at least five times weekly once recovered from surgery.   Treated with: atorvastatin

## 2025-03-17 NOTE — PROGRESS NOTES
Urology ([facility]) H&P  Staff: Kevin Garcia MD    CC: Erectile Dysfunction    HPI:  Abel Hackett is a 54 y.o. male who c/o severe ED.  He's tried and failed oral meds, ICI, MUSE, and a CONSTANTIN.     He has LUTS.  Nocturia x 0-1. Is pleased with how he voids.     REVIEW OF SYSTEMS:     Abel Hackett denies headache, blurred vision, fever, nausea, vomiting, chills, abdominal pain, chest pain, sore throat, bleeding per rectum, cough, SOB, recent loss of consciousness, recent mental status changes, seizures, dizziness, or upper or lower extremity weakness.     BRIDGET  1. 1  2. 1  3. 0  4. 0  5. 0    ROS:  Neg except per HPI    Past Medical History:   Diagnosis Date    Depression     Diabetes mellitus     Diabetes mellitus, type 2     History of right coronary artery stent placement     done at [facility]    Hx of arterial ischemic stroke 03/21/2023    Hx of CABG 04/12/2024       Past Surgical History:   Procedure Laterality Date    ANGIOGRAPHY N/A 10/2023    COLONOSCOPY N/A 08/02/2022    Procedure: COLONOSCOPY;  Surgeon: Keira Wheat MD;  Location: [facility];  Service: Endoscopy;  Laterality: N/A;    coronary stents      x6    LAPAROSCOPIC CHOLECYSTECTOMY N/A 6/13/2024    Procedure: CHOLECYSTECTOMY, LAPAROSCOPIC;  Surgeon: Kevin Ray MD;  Location: [facility] OR;  Service: General;  Laterality: N/A;    REFRACTIVE SURGERY  04/13/2016    VASECTOMY         Social History     Socioeconomic History    Marital status:    Tobacco Use    Smoking status: Never    Smokeless tobacco: Never   Substance and Sexual Activity    Alcohol use: Not Currently     Comment: occassional    Drug use: No    Sexual activity: Yes     Partners: Female     Social Drivers of Health     Financial Resource Strain: Low Risk  (1/17/2025)    Overall Financial Resource Strain (CARDIA)     Difficulty of Paying Living Expenses: Not hard at all   Food Insecurity: No Food Insecurity (1/17/2025)    Hunger Vital Sign     Worried About Running Out of Food  "in the Last Year: Never true     Ran Out of Food in the Last Year: Never true   Transportation Needs: No Transportation Needs (3/20/2024)    PRAPARE - Transportation     Lack of Transportation (Medical): No     Lack of Transportation (Non-Medical): No   Physical Activity: Sufficiently Active (1/17/2025)    Exercise Vital Sign     Days of Exercise per Week: 5 days     Minutes of Exercise per Session: 40 min   Stress: No Stress Concern Present (1/17/2025)    [employer] [place] [employer] - Occupational Stress Questionnaire     Feeling of Stress : Not at all   Housing Stability: Low Risk  (3/20/2024)    Housing Stability Vital Sign     Unable to Pay for Housing in the Last Year: No     Number of Places Lived in the Last Year: 1     Unstable Housing in the Last Year: No       Family History   Problem Relation Name Age of Onset    Heart disease Mother      Hypertension Mother      Diabetes Mother      Heart attack Mother      Heart disease Father      Hypertension Father      Diabetes Father      Heart attack Father      Heart attack Maternal Uncle      Heart attack Maternal Grandfather         Review of patient's allergies indicates:  No Known Allergies    Medications Ordered Prior to Encounter[1]    Anticoagulation:  Yes, 81 mg.     Physical Exam:  Estimated body mass index is 29.46 kg/m² as calculated from the following:    Height as of 1/24/25: 5' 9" (1.753 m).    Weight as of 1/27/25: 90.5 kg (199 lb 8.3 oz).    General: No acute distress, well developed. AAOx3  Head: Normocephalic, Atraumatic  Eyes: Extra-occular movements intact, No discharge  Neck: supple, symmetrical, trachea midline  Lungs: normal respiratory effort, no respiratory distress, no wheezes  CV: regular rate, 2+ pulses  Abdomen: soft, non-tender, non-distended, no organomegaly  :  The penis has no evidence of plaques or induration. The urethral meatus is normal. The testes, epididymides, and cord structures are normal in size and " contour bilaterally. The scrotum is normal in size and contour.   MSK: no edema, no deformities, normal ROM  Skin: skin color, texture, turgor normal.  Neurologic: no focal deficits, sensation intact    Labs:    Urine dipstick today - negative for all components.     Lab Results   Component Value Date    WBC 4.99 01/24/2025    HGB 15.3 01/24/2025    HCT 48.7 01/24/2025    MCV 87 01/24/2025     01/24/2025           BMP  Lab Results   Component Value Date     01/24/2025    K 5.1 01/24/2025     01/24/2025    CO2 23 01/24/2025    BUN 24 (H) 01/24/2025    CREATININE 1.1 01/24/2025    CALCIUM 10.4 01/24/2025    ANIONGAP 10 01/24/2025    EGFRNORACEVR >60 01/24/2025       Lab Results   Component Value Date    PSA 0.99 11/22/2024       Imaging:    CT RENAL STONE STUDY ABD PELVIS WO     CLINICAL HISTORY:  Flank pain, kidney stone suspected; Right upper quadrant pain     TECHNIQUE:  Low dose axial images, sagittal and coronal reformations were obtained from the lung bases to the pubic symphysis.  Contrast was not administered.     COMPARISON:  None     FINDINGS:  The liver, spleen, pancreas, and adrenal glands are unremarkable.  There has been a cholecystectomy.     There are a few punctate stones of both kidneys.  There is no ureterolithiasis or hydroureteronephrosis.  There is small simple cysts of each kidney.     The bowel is nondilated.  The appendix is normal.  The colon is unremarkable.     There is mild calcification of the aorta without aneurysm.  There has been a sternotomy.  Degenerative change of the spine typical for age is present.     Impression:     Punctate bilateral nonobstructive nephrolithiasis.     Cholecystectomy.    Assessment: Abel Hackett is a 54 y.o. male with severe ED.     Plan:     1. To OR on 4/1/2025 for IPP placement.   2. Consents signed   3. I have explained the risk, benefits, and alternatives of the procedure in detail. Specifically, the risks regarding placement of a  penile prosthesis were discussed including: chance of prosthesis mechanical failure with 90-95% of men having a functional prosthesis 10 years after surgery, infection of prosthesis requiring removal of the device with rates as low as <1-2%, bleeding/hematoma, injury to the urethra or nearby structures, loss of penile length or girth, sensory loss, or erosion of cylinder. The postoperative course was discussed regarding antibiotics, care of the incision, and activation at 4-6 weeks. The patient voices understanding and all questions have been answered. The patient agrees to proceed as planned.  4. Told to stop all NSAIDS, ASA one week prior to surgery.  5. Told to call the Urology clinic regarding any new skin lesions or infections prior to the surgery   6. Hemoglobin A1c 6.4   7. The patient has been cleared for surgery.       Colt Beth MD          [1]   Current Outpatient Medications on File Prior to Visit   Medication Sig Dispense Refill    aspirin (ECOTRIN) 81 MG EC tablet Take 81 mg by mouth once daily.      atorvastatin (LIPITOR) 40 MG tablet Take 40 mg by mouth once daily.      clonazePAM (KLONOPIN) 0.25 MG TbDL Take 1 tablet (0.25 mg total) by mouth every evening. 60 tablet 2    empagliflozin-metformin (SYNJARDY XR) 25-1,000 mg TBph Take 1 tablet by mouth daily 90 tablet 1    evolocumab (REPATHA SURECLICK) 140 mg/mL PnIj Inject 1 pen into the skin once every 2 weeks. 2 mL 12    gabapentin (NEURONTIN) 300 MG capsule Take 3 capsules (900 mg total) by mouth every evening. 180 capsule 1    levETIRAcetam (KEPPRA) 250 MG Tab Take 1 tablet (250 mg total) by mouth every evening. (Patient not taking: Reported on 1/27/2025) 30 tablet 11    metoprolol succinate (TOPROL-XL) 100 MG 24 hr tablet Take 100 mg by mouth once daily.      nitroGLYCERIN (NITROSTAT) 0.4 MG SL tablet PLACE 1 TABLET UNDER TONGUE EVERY 5 MINUTES FOR CHEST PAIN. GO TO ER IF CHEST PAIN PERSISTS AFTER 3 DOSES. 25 tablet 3    pantoprazole  (PROTONIX) 40 MG tablet Take 1 tablet orally once a day 90 tablet 4    QUEtiapine (SEROQUEL) 25 MG Tab Take 1 tablet (25 mg total) by mouth every evening. 90 tablet 3    rimegepant (NURTEC) 75 mg odt Take 1 tablet (75 mg total) by mouth daily as needed for Migraine. Place ODT tablet on the tongue; alternatively the ODT tablet may be placed under the tongue 8 tablet 11    sertraline (ZOLOFT) 100 MG tablet Take 2 tablets (200 mg total) by mouth once daily. 60 tablet 3    suvorexant (BELSOMRA) 20 mg Tab Take 1 tablet by mouth every evening. 28 tablet 5    tamsulosin (FLOMAX) 0.4 mg Cap Take 1 capsule (0.4 mg total) by mouth once daily. 30 capsule 2    tirzepatide (MOUNJARO) 15 mg/0.5 mL PnIj Inject 15 mg into the skin every 7 days. 12 Pen 1     No current facility-administered medications on file prior to visit.

## 2025-03-17 NOTE — HPI
"This is a 54 y.o. male  who presents today for a preoperative evaluation in preparation for insertion of penile prosthesis.  Surgery is indicated for erectile dysfunction due to arterial insufficiency.   Patient is new to me.  The history has been obtained by speaking with the patient and reviewing the electronic medical record and/or outside health information. Significant health conditions for the perioperative period are discussed below in assessment and plan.   Patient reports current health status to be "better".  Denies any new symptoms before surgery.   "

## 2025-03-31 ENCOUNTER — TELEPHONE (OUTPATIENT)
Dept: UROLOGY | Facility: CLINIC | Age: 55
End: 2025-03-31
Payer: MEDICARE

## 2025-04-03 ENCOUNTER — ANESTHESIA EVENT (OUTPATIENT)
Dept: SURGERY | Facility: HOSPITAL | Age: 55
End: 2025-04-03
Payer: MEDICARE

## 2025-04-03 ENCOUNTER — ANESTHESIA (OUTPATIENT)
Dept: SURGERY | Facility: HOSPITAL | Age: 55
End: 2025-04-03
Payer: MEDICARE

## 2025-04-03 ENCOUNTER — HOSPITAL ENCOUNTER (OUTPATIENT)
Facility: HOSPITAL | Age: 55
Discharge: HOME | End: 2025-04-04
Attending: UROLOGY | Admitting: UROLOGY
Payer: MEDICARE

## 2025-04-03 DIAGNOSIS — N13.8 BPH WITH URINARY OBSTRUCTION: ICD-10-CM

## 2025-04-03 DIAGNOSIS — N40.1 BPH WITH URINARY OBSTRUCTION: ICD-10-CM

## 2025-04-03 DIAGNOSIS — N52.01 ERECTILE DYSFUNCTION DUE TO ARTERIAL INSUFFICIENCY: Primary | ICD-10-CM

## 2025-04-03 DIAGNOSIS — Z01.818 PREOPERATIVE TESTING: ICD-10-CM

## 2025-04-03 LAB
POCT GLUCOSE: 141 MG/DL (ref 70–110)
POCT GLUCOSE: 160 MG/DL (ref 70–110)
POCT GLUCOSE: 189 MG/DL (ref 70–110)
POCT GLUCOSE: 208 MG/DL (ref 70–110)

## 2025-04-03 PROCEDURE — 27201423 OPTIME MED/SURG SUP & DEVICES STERILE SUPPLY: Performed by: UROLOGY

## 2025-04-03 PROCEDURE — 25000003 PHARM REV CODE 250

## 2025-04-03 PROCEDURE — 63600175 PHARM REV CODE 636 W HCPCS: Performed by: ANESTHESIOLOGY

## 2025-04-03 PROCEDURE — 37000008 HC ANESTHESIA 1ST 15 MINUTES: Performed by: UROLOGY

## 2025-04-03 PROCEDURE — 25000003 PHARM REV CODE 250: Performed by: ANESTHESIOLOGY

## 2025-04-03 PROCEDURE — 25000003 PHARM REV CODE 250: Performed by: STUDENT IN AN ORGANIZED HEALTH CARE EDUCATION/TRAINING PROGRAM

## 2025-04-03 PROCEDURE — 94761 N-INVAS EAR/PLS OXIMETRY MLT: CPT

## 2025-04-03 PROCEDURE — 82962 GLUCOSE BLOOD TEST: CPT | Performed by: UROLOGY

## 2025-04-03 PROCEDURE — 63600175 PHARM REV CODE 636 W HCPCS: Performed by: UROLOGY

## 2025-04-03 PROCEDURE — 82962 GLUCOSE BLOOD TEST: CPT | Mod: 91 | Performed by: UROLOGY

## 2025-04-03 PROCEDURE — 71000015 HC POSTOP RECOV 1ST HR: Performed by: UROLOGY

## 2025-04-03 PROCEDURE — 36000707: Performed by: UROLOGY

## 2025-04-03 PROCEDURE — 63600175 PHARM REV CODE 636 W HCPCS

## 2025-04-03 PROCEDURE — 71000033 HC RECOVERY, INTIAL HOUR: Performed by: UROLOGY

## 2025-04-03 PROCEDURE — 37000009 HC ANESTHESIA EA ADD 15 MINS: Performed by: UROLOGY

## 2025-04-03 PROCEDURE — 63600175 PHARM REV CODE 636 W HCPCS: Performed by: STUDENT IN AN ORGANIZED HEALTH CARE EDUCATION/TRAINING PROGRAM

## 2025-04-03 PROCEDURE — C1889 IMPLANT/INSERT DEVICE, NOC: HCPCS | Performed by: UROLOGY

## 2025-04-03 PROCEDURE — 71000016 HC POSTOP RECOV ADDL HR: Performed by: UROLOGY

## 2025-04-03 PROCEDURE — 36000706: Performed by: UROLOGY

## 2025-04-03 PROCEDURE — C1813 PROSTHESIS, PENILE, INFLATAB: HCPCS | Performed by: UROLOGY

## 2025-04-03 PROCEDURE — 54405 INSERT MULTI-COMP PENIS PROS: CPT | Mod: ,,, | Performed by: UROLOGY

## 2025-04-03 DEVICE — NON-STACKABLE REAR TIP EXTENDERS FOR AMS 700 CX AND LGX CYLINDERS
Type: IMPLANTABLE DEVICE | Site: PENIS | Status: FUNCTIONAL
Brand: REAR TIP EXTENDER

## 2025-04-03 DEVICE — INFLATABLE PENILE PROSTHESIS, INHIBIZONE/PRECONNECTED - PENOSCROTAL 1 PUMP 2 CYLINDERS
Type: IMPLANTABLE DEVICE | Site: PENIS | Status: FUNCTIONAL
Brand: AMS 700 CX MS PUMP

## 2025-04-03 DEVICE — INFLATABLE PENILE PROSTHESIS WITH MS PUMP ACCESSORY KIT
Type: IMPLANTABLE DEVICE | Site: PENIS | Status: FUNCTIONAL
Brand: AMS 700 ACCESSORY KIT

## 2025-04-03 DEVICE — AMS 700 PENILE PROSTHESIS, 1 LOW PROFILE RESERVOIR, UP TO 100 ML, INHIBIZONE TREATED
Type: IMPLANTABLE DEVICE | Site: PENIS | Status: FUNCTIONAL
Brand: CONCEAL

## 2025-04-03 RX ORDER — SODIUM CHLORIDE 0.9 % (FLUSH) 0.9 %
10 SYRINGE (ML) INJECTION
Status: DISCONTINUED | OUTPATIENT
Start: 2025-04-03 | End: 2025-04-03 | Stop reason: HOSPADM

## 2025-04-03 RX ORDER — CLONAZEPAM 0.25 MG/1
0.25 TABLET, ORALLY DISINTEGRATING ORAL NIGHTLY
Status: DISCONTINUED | OUTPATIENT
Start: 2025-04-03 | End: 2025-04-04 | Stop reason: HOSPADM

## 2025-04-03 RX ORDER — FENTANYL CITRATE 50 UG/ML
25 INJECTION, SOLUTION INTRAMUSCULAR; INTRAVENOUS EVERY 5 MIN PRN
Status: COMPLETED | OUTPATIENT
Start: 2025-04-03 | End: 2025-04-03

## 2025-04-03 RX ORDER — PROPOFOL 10 MG/ML
VIAL (ML) INTRAVENOUS
Status: DISCONTINUED | OUTPATIENT
Start: 2025-04-03 | End: 2025-04-03

## 2025-04-03 RX ORDER — OXYCODONE HYDROCHLORIDE 5 MG/1
5 TABLET ORAL EVERY 6 HOURS PRN
Refills: 0 | Status: DISCONTINUED | OUTPATIENT
Start: 2025-04-03 | End: 2025-04-04 | Stop reason: HOSPADM

## 2025-04-03 RX ORDER — GENTAMICIN 40 MG/ML
INJECTION, SOLUTION INTRAMUSCULAR; INTRAVENOUS
Status: DISCONTINUED | OUTPATIENT
Start: 2025-04-03 | End: 2025-04-03 | Stop reason: HOSPADM

## 2025-04-03 RX ORDER — SERTRALINE HYDROCHLORIDE 100 MG/1
200 TABLET, FILM COATED ORAL DAILY
Status: DISCONTINUED | OUTPATIENT
Start: 2025-04-04 | End: 2025-04-04 | Stop reason: HOSPADM

## 2025-04-03 RX ORDER — TALC
6 POWDER (GRAM) TOPICAL NIGHTLY PRN
Status: DISCONTINUED | OUTPATIENT
Start: 2025-04-03 | End: 2025-04-04 | Stop reason: HOSPADM

## 2025-04-03 RX ORDER — LIDOCAINE HYDROCHLORIDE 10 MG/ML
1 INJECTION, SOLUTION EPIDURAL; INFILTRATION; INTRACAUDAL; PERINEURAL ONCE AS NEEDED
Status: COMPLETED | OUTPATIENT
Start: 2025-04-03 | End: 2025-04-03

## 2025-04-03 RX ORDER — FENTANYL CITRATE 50 UG/ML
INJECTION, SOLUTION INTRAMUSCULAR; INTRAVENOUS
Status: DISCONTINUED | OUTPATIENT
Start: 2025-04-03 | End: 2025-04-03

## 2025-04-03 RX ORDER — ROCURONIUM BROMIDE 10 MG/ML
INJECTION, SOLUTION INTRAVENOUS
Status: DISCONTINUED | OUTPATIENT
Start: 2025-04-03 | End: 2025-04-03

## 2025-04-03 RX ORDER — ACETAMINOPHEN 10 MG/ML
INJECTION, SOLUTION INTRAVENOUS
Status: DISCONTINUED | OUTPATIENT
Start: 2025-04-03 | End: 2025-04-03

## 2025-04-03 RX ORDER — MIDAZOLAM HYDROCHLORIDE 1 MG/ML
INJECTION INTRAMUSCULAR; INTRAVENOUS
Status: DISCONTINUED | OUTPATIENT
Start: 2025-04-03 | End: 2025-04-03

## 2025-04-03 RX ORDER — GABAPENTIN 300 MG/1
900 CAPSULE ORAL NIGHTLY
Status: DISCONTINUED | OUTPATIENT
Start: 2025-04-03 | End: 2025-04-04 | Stop reason: HOSPADM

## 2025-04-03 RX ORDER — ATORVASTATIN CALCIUM 40 MG/1
40 TABLET, FILM COATED ORAL DAILY
Status: DISCONTINUED | OUTPATIENT
Start: 2025-04-04 | End: 2025-04-04 | Stop reason: HOSPADM

## 2025-04-03 RX ORDER — DEXAMETHASONE SODIUM PHOSPHATE 4 MG/ML
INJECTION, SOLUTION INTRA-ARTICULAR; INTRALESIONAL; INTRAMUSCULAR; INTRAVENOUS; SOFT TISSUE
Status: DISCONTINUED | OUTPATIENT
Start: 2025-04-03 | End: 2025-04-03

## 2025-04-03 RX ORDER — PROCHLORPERAZINE EDISYLATE 5 MG/ML
5 INJECTION INTRAMUSCULAR; INTRAVENOUS ONCE AS NEEDED
Status: COMPLETED | OUTPATIENT
Start: 2025-04-03 | End: 2025-04-03

## 2025-04-03 RX ORDER — OXYCODONE AND ACETAMINOPHEN 5; 325 MG/1; MG/1
1 TABLET ORAL EVERY 4 HOURS PRN
Status: DISCONTINUED | OUTPATIENT
Start: 2025-04-03 | End: 2025-04-03

## 2025-04-03 RX ORDER — IBUPROFEN 200 MG
16 TABLET ORAL
Status: DISCONTINUED | OUTPATIENT
Start: 2025-04-03 | End: 2025-04-04 | Stop reason: HOSPADM

## 2025-04-03 RX ORDER — METOPROLOL SUCCINATE 100 MG/1
100 TABLET, EXTENDED RELEASE ORAL DAILY
Status: DISCONTINUED | OUTPATIENT
Start: 2025-04-04 | End: 2025-04-04 | Stop reason: HOSPADM

## 2025-04-03 RX ORDER — LIDOCAINE HYDROCHLORIDE 20 MG/ML
INJECTION, SOLUTION EPIDURAL; INFILTRATION; INTRACAUDAL; PERINEURAL
Status: DISCONTINUED | OUTPATIENT
Start: 2025-04-03 | End: 2025-04-03

## 2025-04-03 RX ORDER — PANTOPRAZOLE SODIUM 40 MG/1
40 TABLET, DELAYED RELEASE ORAL DAILY
Status: DISCONTINUED | OUTPATIENT
Start: 2025-04-04 | End: 2025-04-04 | Stop reason: HOSPADM

## 2025-04-03 RX ORDER — VANCOMYCIN HYDROCHLORIDE 1 G/20ML
INJECTION, POWDER, LYOPHILIZED, FOR SOLUTION INTRAVENOUS
Status: DISCONTINUED | OUTPATIENT
Start: 2025-04-03 | End: 2025-04-03 | Stop reason: HOSPADM

## 2025-04-03 RX ORDER — GLUCAGON 1 MG
1 KIT INJECTION
Status: DISCONTINUED | OUTPATIENT
Start: 2025-04-03 | End: 2025-04-03 | Stop reason: HOSPADM

## 2025-04-03 RX ORDER — DEXMEDETOMIDINE HYDROCHLORIDE 100 UG/ML
INJECTION, SOLUTION INTRAVENOUS
Status: DISCONTINUED | OUTPATIENT
Start: 2025-04-03 | End: 2025-04-03

## 2025-04-03 RX ORDER — ACETAMINOPHEN 325 MG/1
650 TABLET ORAL EVERY 6 HOURS
Status: DISCONTINUED | OUTPATIENT
Start: 2025-04-03 | End: 2025-04-04 | Stop reason: HOSPADM

## 2025-04-03 RX ORDER — INSULIN ASPART 100 [IU]/ML
0-10 INJECTION, SOLUTION INTRAVENOUS; SUBCUTANEOUS
Status: DISCONTINUED | OUTPATIENT
Start: 2025-04-03 | End: 2025-04-04 | Stop reason: HOSPADM

## 2025-04-03 RX ORDER — OXYCODONE HYDROCHLORIDE 5 MG/1
5 TABLET ORAL
Status: DISCONTINUED | OUTPATIENT
Start: 2025-04-03 | End: 2025-04-03 | Stop reason: HOSPADM

## 2025-04-03 RX ORDER — ONDANSETRON HYDROCHLORIDE 2 MG/ML
INJECTION, SOLUTION INTRAVENOUS
Status: DISCONTINUED | OUTPATIENT
Start: 2025-04-03 | End: 2025-04-03

## 2025-04-03 RX ORDER — IBUPROFEN 200 MG
24 TABLET ORAL
Status: DISCONTINUED | OUTPATIENT
Start: 2025-04-03 | End: 2025-04-04 | Stop reason: HOSPADM

## 2025-04-03 RX ORDER — GLUCAGON 1 MG
1 KIT INJECTION
Status: DISCONTINUED | OUTPATIENT
Start: 2025-04-03 | End: 2025-04-04 | Stop reason: HOSPADM

## 2025-04-03 RX ADMIN — ACETAMINOPHEN 1000 MG: 10 INJECTION INTRAVENOUS at 08:04

## 2025-04-03 RX ADMIN — LIDOCAINE HYDROCHLORIDE 100 MG: 20 INJECTION, SOLUTION EPIDURAL; INFILTRATION; INTRACAUDAL; PERINEURAL at 07:04

## 2025-04-03 RX ADMIN — ROCURONIUM BROMIDE 50 MG: 10 INJECTION, SOLUTION INTRAVENOUS at 07:04

## 2025-04-03 RX ADMIN — PROCHLORPERAZINE EDISYLATE 5 MG: 5 INJECTION INTRAMUSCULAR; INTRAVENOUS at 10:04

## 2025-04-03 RX ADMIN — INSULIN ASPART 2 UNITS: 100 INJECTION, SOLUTION INTRAVENOUS; SUBCUTANEOUS at 11:04

## 2025-04-03 RX ADMIN — VANCOMYCIN HYDROCHLORIDE 1250 MG: 1.25 INJECTION, POWDER, LYOPHILIZED, FOR SOLUTION INTRAVENOUS at 04:04

## 2025-04-03 RX ADMIN — PROPOFOL 30 MG: 10 INJECTION, EMULSION INTRAVENOUS at 08:04

## 2025-04-03 RX ADMIN — ROCURONIUM BROMIDE 20 MG: 10 INJECTION, SOLUTION INTRAVENOUS at 07:04

## 2025-04-03 RX ADMIN — DEXMEDETOMIDINE 4 MCG: 100 INJECTION, SOLUTION, CONCENTRATE INTRAVENOUS at 08:04

## 2025-04-03 RX ADMIN — VANCOMYCIN HYDROCHLORIDE 1250 MG: 1.25 INJECTION, POWDER, LYOPHILIZED, FOR SOLUTION INTRAVENOUS at 06:04

## 2025-04-03 RX ADMIN — FENTANYL CITRATE 25 MCG: 50 INJECTION INTRAMUSCULAR; INTRAVENOUS at 09:04

## 2025-04-03 RX ADMIN — OXYCODONE 5 MG: 5 TABLET ORAL at 01:04

## 2025-04-03 RX ADMIN — DEXAMETHASONE SODIUM PHOSPHATE 4 MG: 4 INJECTION, SOLUTION INTRAMUSCULAR; INTRAVENOUS at 07:04

## 2025-04-03 RX ADMIN — PROPOFOL 200 MG: 10 INJECTION, EMULSION INTRAVENOUS at 07:04

## 2025-04-03 RX ADMIN — CLONAZEPAM 0.25 MG: 0.25 TABLET, ORALLY DISINTEGRATING ORAL at 08:04

## 2025-04-03 RX ADMIN — GABAPENTIN 900 MG: 300 CAPSULE ORAL at 08:04

## 2025-04-03 RX ADMIN — SODIUM CHLORIDE: 9 INJECTION, SOLUTION INTRAVENOUS at 07:04

## 2025-04-03 RX ADMIN — SUGAMMADEX 200 MG: 100 INJECTION, SOLUTION INTRAVENOUS at 08:04

## 2025-04-03 RX ADMIN — ONDANSETRON 4 MG: 2 INJECTION INTRAMUSCULAR; INTRAVENOUS at 08:04

## 2025-04-03 RX ADMIN — FENTANYL CITRATE 100 MCG: 50 INJECTION, SOLUTION INTRAMUSCULAR; INTRAVENOUS at 07:04

## 2025-04-03 RX ADMIN — ROCURONIUM BROMIDE 15 MG: 10 INJECTION, SOLUTION INTRAVENOUS at 08:04

## 2025-04-03 RX ADMIN — MIDAZOLAM HYDROCHLORIDE 2 MG: 2 INJECTION, SOLUTION INTRAMUSCULAR; INTRAVENOUS at 07:04

## 2025-04-03 RX ADMIN — GENTAMICIN SULFATE 400 MG: 40 INJECTION, SOLUTION INTRAMUSCULAR; INTRAVENOUS at 06:04

## 2025-04-03 RX ADMIN — FENTANYL CITRATE 25 MCG: 50 INJECTION INTRAMUSCULAR; INTRAVENOUS at 10:04

## 2025-04-03 RX ADMIN — OXYCODONE 5 MG: 5 TABLET ORAL at 08:04

## 2025-04-03 RX ADMIN — OXYCODONE 5 MG: 5 TABLET ORAL at 09:04

## 2025-04-03 RX ADMIN — INSULIN ASPART 2 UNITS: 100 INJECTION, SOLUTION INTRAVENOUS; SUBCUTANEOUS at 06:04

## 2025-04-03 RX ADMIN — ACETAMINOPHEN 650 MG: 325 TABLET ORAL at 04:04

## 2025-04-03 RX ADMIN — LIDOCAINE HYDROCHLORIDE 10 MG: 10 INJECTION, SOLUTION EPIDURAL; INFILTRATION; INTRACAUDAL; PERINEURAL at 05:04

## 2025-04-03 NOTE — BRIEF OP NOTE
Joel Morrison - Surgery ([facility])  Brief Operative Note    Surgery Date: 4/3/2025     Surgeons and Role:     * Kevin Garcia MD - Primary     * Lasha Hawkins MD - Resident - Assisting        Pre-op Diagnosis:  Erectile dysfunction due to arterial insufficiency [N52.01]    Post-op Diagnosis:  Post-Op Diagnosis Codes:     * Erectile dysfunction due to arterial insufficiency [N52.01]    Procedure(s) (LRB):  INSERTION, PENILE PROSTHESIS, INFLATABLE (N/A)  RECONSTRUCTION, PENIS (N/A)    Anesthesia: General    Operative Findings:   Penile modeling and penile implant performed successfully    Estimated Blood Loss: * No values recorded between 4/3/2025  6:57 AM and 4/3/2025  8:58 AM *         Specimens:   Specimen (24h ago, onward)      None            * No specimens in log *

## 2025-04-03 NOTE — ANESTHESIA POSTPROCEDURE EVALUATION
Anesthesia Post Evaluation    Patient: Abel Hackett    Procedure(s) Performed: Procedure(s) (LRB):  INSERTION, PENILE PROSTHESIS, INFLATABLE (N/A)  RECONSTRUCTION, PENIS (N/A)    Final Anesthesia Type: general      Patient location during evaluation: PACU  Patient participation: Yes- Able to Participate  Level of consciousness: awake and alert  Post-procedure vital signs: reviewed and stable  Pain management: adequate  Airway patency: patent    PONV status at discharge: No PONV  Anesthetic complications: no      Cardiovascular status: hemodynamically stable  Respiratory status: unassisted, spontaneous ventilation and room air  Hydration status: euvolemic  Follow-up not needed.          Vitals Value Taken Time   /62 04/03/25 11:36   Temp 36.7 °C (98 °F) 04/03/25 11:36   Pulse 83 04/03/25 11:36   Resp 18 04/03/25 11:36   SpO2 94 % 04/03/25 11:36         Event Time   Out of Recovery 09:45:00         Pain/Pat Score: Pain Rating Prior to Med Admin: 6 (4/3/2025 10:02 AM)  Pat Score: 10 (4/3/2025 10:00 AM)

## 2025-04-03 NOTE — ASSESSMENT & PLAN NOTE
Abel Hackett is a 54 y.o. male with a history of erectile dysfunction refractory to medical management now s/p IPP placement.    - Progressing appropriately for post-operative period  - Dressing taken down on morning rounds, appropriate  - Ambulating, tolerated diet  - Fowler removed on morning rounds - void trial  - Regular diet  - Mupirocin daily x 3 days  - Abx: AM dose of vancomycin + gentamicin; 2 days dicloxacillin 500mg Q6 hours   - [facility]iralax daily  [facility]    Dispo: appropriate for discharge once voids and ambulates in halls

## 2025-04-03 NOTE — NURSING
55 y/o male AAoX4, arrived to floor from PACU. V/S stable. Orientated patient to room and use of call light. Verbalized understanding. NAD noted on room air. Surgical incision clean, dry,intact. Fowler intact. SCD applied. Safety measures in place. Wife at the bedside.

## 2025-04-03 NOTE — TRANSFER OF CARE
"Anesthesia Transfer of Care Note    Patient: Abel Hackett    Procedure(s) Performed: Procedure(s) (LRB):  INSERTION, PENILE PROSTHESIS, INFLATABLE (N/A)  RECONSTRUCTION, PENIS (N/A)    Patient location: PACU    Anesthesia Type: general    Transport from OR: Transported from OR on 6-10 L/min O2 by face mask with adequate spontaneous ventilation    Post pain: adequate analgesia    Post assessment: no apparent anesthetic complications and tolerated procedure well    Post vital signs: stable    Level of consciousness: responds to stimulation    Nausea/Vomiting: no nausea/vomiting    Complications: none    Transfer of care protocol was followed      Last vitals: Visit Vitals  /71 (BP Location: Left arm, Patient Position: Lying)   Pulse 85   Temp 36.7 °C (98.1 °F) (Temporal)   Resp 16   Ht 5' 9" (1.753 m)   Wt 85 kg (187 lb 6.3 oz)   SpO2 99%   BMI 27.67 kg/m²     "

## 2025-04-03 NOTE — OP NOTE
[facility]  Operative Note    Date: 04/03/2025    Pre-Op Diagnosis: Erectile Dysfunction     Post-Op Diagnosis: Erectile Dysfunction    Procedure(s) Performed:   1.  Placement of a 3 piece Inflatable Penile Prosthesis (AMS)  2. Penile reconstruction (plastics operation on the penis to correct angulation) via penile modeling    Specimen(s): None     Staff Surgeon: Kevin Garcia MD    Assistant Surgeon: Lasha Hawkins MD    Anesthesia: General endotracheal anesthesia    Indications:  Abel Hackett is a 54 y.o. male with a history of erectile dysfunction refractory to medical management.  After discussion with the patient, he has elected to undergo IPP.    Findings:    1.  15 cm cylinder was placed with 4 cm rear tip extender bilaterally  2.  100mL reservoir    Estimated Blood Loss: Minimal    Drains: 16 Thai huertas catheter    Procedure in detail:  After risks and benefits of the procedure were discussed with the patient, informed consent was obtained.  The patient  was taken back to the operating room and placed in the supine position.  TEDs and SCDs were applied and functioning. Vancomycin and gentamicin were given preoperatively.  General anesthesia was induced.  The patient was prepped and scrubbed with betadine for a total of 10 minutes.  A chloraprep was then used as further prep.We subsequently scrubbed our hands for 10 minutes.  He was draped in the usual sterile fashion, and an ioban was placed over the field.  A 16 Fr huertas catheter was placed using standard sterile technique and the bladder was drained.      An approximately 4 cm transverse incision was marked at the penoscrotal junction.  This was incised sharply with a 15 blade.  Bovie electrocautery was used to dissect down to Dartos fascia.  Dartos was then incised using Metzenbaum scissors down to the level of the corpora.  A [place] ring retractor was utilized for retraction.  The left corporal body was cleared of its  adventitial tissues, exposing the tunica albuginea. The tunica albuginea was then marked for a corporotomy. 2 Stay sutures of 2-0 vicryl were then placed.  A corporotomy was then made between the stay sutures.  The patient's corpora were dilated serially from 10 mm up to 13 mm using Ceballos dilators distally and Hegar dilators proximally.  Care was taken to ensure not to crossover during this portion of the procedure.  Once dilated, the patient's corporotomy was irrigated with antibiotic irrigation.  There was no evidence of urethral injury during this portion of the procedure.  The patient's corpora was then measured using the Alan dilator.  It measured 19 cm in total length.    Attention was then turned to the contralateral corporal body, and the exact same technique was performed.  Again, the adventitial tissues around it were cleared, exposing the tunica albuginea and it was marked for a corporotomy with the bovie.  A corporotomy was made after  2-0 Vicryl stay sutures were applied to either side of the corporotomy. Again, it was serially dilated from 10 mm up to 13 mm sequentially using Ceballos and Hegar dilators.  Again, care was taken not to crossover.  Again the corporal length was measured using the Alan.  It measured 19 cm in total length.    A 15 cm IPP with 4 rear tip extender bilaterally was assembled.  The Alan was then advanced into the corpora and the needle fired through the glans, first on the left and then on the right.  The left IPP was then placed within the corpora and the corpora was closed by tying the previously placed stay sutures.  The IPP was then placed on the right, and the corpora was closed by tying the previously placed stay sutures.    At this time, the device was inflated.  There was good glans support with no SST deformity. The penis was noted to have a curve consistent with peyronie's disease with a 45 degree curvature dorsally. We then performed a plastics operation on the  penis via penile modeling.  It was done in the standard fashion.  Once the device was fully inflated, it was bent 180 degree  oppositie the point the maximal curvature. This was held for 90 seconds. We then were able to pump the device with 2 more pumps of fluid. We then repeated the penile modeling bending the penis 180 degrees opposite the point of maximal curvature for 90 seconds. The penis was straight at this time.    Attention was then turned to the patient's left inguinal region.  The space of Retzius was entered just posterior to the patient's pubic bone by way of the inguinal ring.  Blunt finger dissection was used to clear space for the reservoir.  A 100 mL reservoir was then advanced and filled to 100 mL.  There was no back pressure identified on filling.       Immediately following this, a subdartos pocket was made in the dependent portion of the scrotum. The pump device was then advanced into the dartos pouch and secured with a pursestring 2-0 Vicryl suture.  The pump, cylinders and tubing were then connected to the reservoir in the standard fashion.    The device was then inflated again.  There was good glans support with no SST deformity and the penis was straight.    It should be noted that copious amounts of antibiotic irrigation was used throughout the case.    Dartos was reapproximated in three separate layers using 2-0 Vicryl in a running fashion.  Skin was then reapproximated using 3-0 chromic in a running horizontal mattress fashion.  Derma mantilla was applied and a mummy wrap was created with scrotal fluffs and support.      The patient tolerated the procedure well and was transferred to the recovery room in stable condition.    Disposition:  The patient will be admitted to the urology service for overnight observation.  We plan for a voiding trial tomorrow morning as well as morning IV antibiotics.  He will be discharged with PO antibiotics and instructions to not use his pump until instructed  to do so by Dr. Garcia.

## 2025-04-03 NOTE — INTERVAL H&P NOTE
The patient has been examined and the H&P has been reviewed:    I concur with the findings and no changes have occurred since H&P was written.    Surgery risks, benefits and alternative options discussed and understood by patient/family.    OR today for IPP. ASA last taken 10 days prior    There are no hospital problems to display for this patient.

## 2025-04-03 NOTE — NURSING TRANSFER
Nursing Transfer Note      4/3/2025   11:25 AM    Nurse giving handoff: OSCAR Landon   Nurse receiving handoff:KERVIN Ag      Transfer To: 502    Transfer via stretcher      Transported by PCT    Order for Tele Monitor? No    Additional Lines: Fowler Catheter    Medicines sent: insulin      Patient belongings transferred with patient: Yes    Chart send with patient: Yes    Notified: spouse    Patient reassessed at: 1115

## 2025-04-03 NOTE — PLAN OF CARE
55 y/o male AAOX4 lying supine in bed watching television. NAD noted on room air. Surgical incision clean, dry , intact. Fowler in place. PIV clean, dry, intact. No complaints or concerns voiced at this time. Safety measures in place. Verbalized understanding. Wife at the bedside.    Problem: Adult Inpatient Plan of Care  Goal: Plan of Care Review  Outcome: Progressing     Problem: Adult Inpatient Plan of Care  Goal: Optimal Comfort and Wellbeing  Outcome: Progressing     Problem: Diabetes Comorbidity  Goal: Blood Glucose Level Within Targeted Range  Outcome: Progressing     Problem: Wound  Goal: Optimal Functional Ability  Outcome: Progressing     Problem: Wound  Goal: Skin Health and Integrity  Outcome: Progressing

## 2025-04-03 NOTE — HPI
Abel Hackett is a 54 y.o. male with a history of erectile dysfunction refractory to medical management.

## 2025-04-03 NOTE — ANESTHESIA PREPROCEDURE EVALUATION
04/03/2025  Abel Hackett is a 54 y.o., male.  Past Medical History:   Diagnosis Date    Coronary artery disease     Depression     Diabetes mellitus     Diabetes mellitus, type 2     History of right coronary artery stent placement     [facility]    Hx of arterial ischemic stroke 03/21/2023    Hx of CABG 04/12/2024    Stroke      Past Surgical History:   Procedure Laterality Date    ANGIOGRAPHY N/A 10/2023    COLONOSCOPY N/A 08/02/2022    Procedure: COLONOSCOPY;  Surgeon: Keira Wheat MD;  Location: [facility];  Service: Endoscopy;  Laterality: N/A;    coronary stents      x6    ELBOW SURGERY      LAPAROSCOPIC CHOLECYSTECTOMY N/A 06/13/2024    Procedure: CHOLECYSTECTOMY, LAPAROSCOPIC;  Surgeon: Kevin Ray MD;  Location: [facility];  Service: General;  Laterality: N/A;    REFRACTIVE SURGERY  04/13/2016    ROTATOR CUFF REPAIR Right     VASECTOMY           Pre-op Assessment    I have reviewed the Patient Summary Reports.    I have reviewed the NPO Status.   I have reviewed the Medications.     Review of Systems  Anesthesia Hx:  No problems with previous Anesthesia   History of prior surgery of interest to airway management or planning: heart surgery.         Denies Family Hx of Anesthesia complications.    Denies Personal Hx of Anesthesia complications.                    Social:  Non-Smoker       Cardiovascular:  Exercise tolerance: good      CAD   CABG/stent          Denies ASH.   > 4 mets without ASH                           Pulmonary:         Denies Sleep Apnea. JEANNINE resolved with weight loss               Renal/:  Renal/ Normal                 Hepatic/GI:     GERD, well controlled    Taking GLP-1 Agonists Instructed to Hold for 7 Days           Neurological:   CVA                                    Endocrine:  Diabetes, type 2               Physical Exam  General: Well  nourished    Airway:  Mallampati: III / II  Mouth Opening: Normal  TM Distance: Normal  Tongue: Normal    Dental:  Intact    Chest/Lungs:  Normal Respiratory Rate    Heart:  Rate: Normal    Anesthesia Plan  Type of Anesthesia, risks & benefits discussed:    Anesthesia Type: Gen ETT  Intra-op Monitoring Plan: Standard ASA Monitors  Post Op Pain Control Plan: multimodal analgesia  Induction:  IV  Airway Plan: Video, Post-Induction  Informed Consent: Informed consent signed with the Patient and all parties understand the risks and agree with anesthesia plan.  All questions answered.   ASA Score: 3  Day of Surgery Review of History & Physical: H&P Update referred to the surgeon/provider.    Ready For Surgery From Anesthesia Perspective.   .       Home

## 2025-04-03 NOTE — PLAN OF CARE
[facility]  Discharge Assessment    Primary Care Provider: Keira Wheat MD     Discharge Assessment (most recent)       BRIEF DISCHARGE ASSESSMENT - 04/03/25 1323          Discharge Planning    Assessment Type Discharge Planning Brief Assessment     Resource/Environmental Concerns none     Support Systems Spouse/significant other     Equipment Currently Used at Home none     Current Living Arrangements home     Patient/Family Anticipated Services at Transition none     DME Needed Upon Discharge  none     Discharge Plan A Home with family                         Spoke with patient and wife at bedside to complete d/c planning assessment. Patient lives with spouse in a single story home without steps to enter. Independent with ADL's. No DME in home. Verified PCP, Pharmacy and health insurance. Patient will have help at home from his wife. Anticipate d/c in am if meeting post-op milestones.    Merline Boss RNCM  Case Management  [facility]  [phone]

## 2025-04-04 VITALS
TEMPERATURE: 98 F | WEIGHT: 189 LBS | HEART RATE: 82 BPM | SYSTOLIC BLOOD PRESSURE: 102 MMHG | BODY MASS INDEX: 27.99 KG/M2 | HEIGHT: 69 IN | OXYGEN SATURATION: 94 % | DIASTOLIC BLOOD PRESSURE: 58 MMHG | RESPIRATION RATE: 18 BRPM

## 2025-04-04 LAB — POCT GLUCOSE: 118 MG/DL (ref 70–110)

## 2025-04-04 PROCEDURE — 63600175 PHARM REV CODE 636 W HCPCS: Performed by: STUDENT IN AN ORGANIZED HEALTH CARE EDUCATION/TRAINING PROGRAM

## 2025-04-04 PROCEDURE — 25000003 PHARM REV CODE 250: Performed by: STUDENT IN AN ORGANIZED HEALTH CARE EDUCATION/TRAINING PROGRAM

## 2025-04-04 RX ORDER — DICLOXACILLIN SODIUM 250 MG/1
500 CAPSULE ORAL 4 TIMES DAILY
Qty: 16 CAPSULE | Refills: 0 | OUTPATIENT
Start: 2025-04-04 | End: 2025-04-06

## 2025-04-04 RX ORDER — DICLOXACILLIN SODIUM 500 MG/1
500 CAPSULE ORAL EVERY 6 HOURS
Qty: 8 CAPSULE | Refills: 0 | Status: SHIPPED | OUTPATIENT
Start: 2025-04-04 | End: 2025-04-06

## 2025-04-04 RX ORDER — OXYCODONE AND ACETAMINOPHEN 5; 325 MG/1; MG/1
1 TABLET ORAL EVERY 4 HOURS PRN
Qty: 10 TABLET | Refills: 0 | OUTPATIENT
Start: 2025-04-04

## 2025-04-04 RX ORDER — MUPIROCIN 20 MG/G
OINTMENT TOPICAL DAILY
Qty: 30 G | Refills: 0 | OUTPATIENT
Start: 2025-04-04 | End: 2025-04-07

## 2025-04-04 RX ORDER — MUPIROCIN 20 MG/G
OINTMENT TOPICAL DAILY
Qty: 22 G | Refills: 0 | Status: SHIPPED | OUTPATIENT
Start: 2025-04-04 | End: 2025-04-07

## 2025-04-04 RX ORDER — OXYCODONE AND ACETAMINOPHEN 5; 325 MG/1; MG/1
1 TABLET ORAL EVERY 4 HOURS PRN
Qty: 10 TABLET | Refills: 0 | Status: SHIPPED | OUTPATIENT
Start: 2025-04-04

## 2025-04-04 RX ORDER — MUPIROCIN 20 MG/G
OINTMENT TOPICAL DAILY
Qty: 22 G | Refills: 0 | Status: SHIPPED | OUTPATIENT
Start: 2025-04-04 | End: 2025-04-14

## 2025-04-04 RX ORDER — DICLOXACILLIN SODIUM 500 MG/1
500 CAPSULE ORAL EVERY 6 HOURS
Qty: 8 CAPSULE | Refills: 0 | OUTPATIENT
Start: 2025-04-04 | End: 2025-04-06

## 2025-04-04 RX ORDER — POLYETHYLENE GLYCOL 3350 17 G/17G
17 POWDER, FOR SOLUTION ORAL DAILY
Qty: 510 G | Refills: 0 | Status: SHIPPED | OUTPATIENT
Start: 2025-04-04

## 2025-04-04 RX ORDER — POLYETHYLENE GLYCOL 3350 17 G/17G
17 POWDER, FOR SOLUTION ORAL DAILY
Qty: 510 G | Refills: 0 | OUTPATIENT
Start: 2025-04-04

## 2025-04-04 RX ADMIN — ACETAMINOPHEN 650 MG: 325 TABLET ORAL at 06:04

## 2025-04-04 RX ADMIN — GENTAMICIN SULFATE 400 MG: 40 INJECTION, SOLUTION INTRAMUSCULAR; INTRAVENOUS at 05:04

## 2025-04-04 RX ADMIN — SERTRALINE 200 MG: 100 TABLET, FILM COATED ORAL at 08:04

## 2025-04-04 RX ADMIN — METOPROLOL SUCCINATE 100 MG: 100 TABLET, EXTENDED RELEASE ORAL at 08:04

## 2025-04-04 RX ADMIN — VANCOMYCIN HYDROCHLORIDE 1250 MG: 1.25 INJECTION, POWDER, LYOPHILIZED, FOR SOLUTION INTRAVENOUS at 04:04

## 2025-04-04 RX ADMIN — PANTOPRAZOLE SODIUM 40 MG: 40 TABLET, DELAYED RELEASE ORAL at 08:04

## 2025-04-04 RX ADMIN — ATORVASTATIN CALCIUM 40 MG: 40 TABLET, FILM COATED ORAL at 08:04

## 2025-04-04 RX ADMIN — ACETAMINOPHEN 650 MG: 325 TABLET ORAL at 12:04

## 2025-04-04 NOTE — DISCHARGE INSTRUCTIONS
Postop instructions for IPP    Expect some bruising and swelling around scrotum for the next few days    No sex or masturbation x 6 weeks    You will be sent home with antibiotics x 2 days. Take this every 6 hours.  Apply mupirocin ointment daily for 3 days.  You will be sent home with 10 tablets of percocet. Take this as needed for pain.  Take the miralax daily to make your bowel movements soft.    Wear tight fitting underwear, jock strap or boxers.   When you are in bed or in a chair, you can elevate your scrotum.  Ice to scrotum 30 min on and 30 min off for 3-5 days post-op    Ok to shower in 2 days  No baths or soaking baths  No sitting in standing water until seen by your physician and given permission    Do not restart any blood thinners for the first few days unless told by your cardiologist or your physician specifically, if you have any questions call your physician    Return to ER if:  -you have fever  -see discharge from incisions or penis  -inability to void  -severe pain not controlled with pain meds  -any redness or concern for infection of your pump    Call your physician or urology clinic with any concerns

## 2025-04-04 NOTE — DISCHARGE SUMMARY
[facility]  Urology  Discharge Summary      Patient Name: Martinez Pritchett  MRN: [id number]  Admission Date: 4/3/2025  Hospital Length of Stay: 0 days  Discharge Date and Time: 04/04/2025 7:06 AM  Attending Physician: Kevin Garcia MD   Discharging Provider: Milton Mc MD  Primary Care Provider: Keira Wheat MD     HPI:  Martinez Pritchett is a 54 y.o. male who c/o severe ED.  He's tried and failed oral meds, ICI, MUSE, and a CONSTANTIN.     He has LUTS.  Nocturia x 0-1. Is pleased with how he voids.     REVIEW OF SYSTEMS:     Martinez Pritchett denies headache, blurred vision, fever, nausea, vomiting, chills, abdominal pain, chest pain, sore throat, bleeding per rectum, cough, SOB, recent loss of consciousness, recent mental status changes, seizures, dizziness, or upper or lower extremity weakness.     BRIDGET  1. 1  2. 1  3. 0  4. 0  5. 0    Procedure(s) (LRB):  INSERTION, PENILE PROSTHESIS, INFLATABLE (N/A)  RECONSTRUCTION, PENIS (N/A)     Hospital Course:   Patient was admitted to the urology service. he was made NPO, given IVF, as well as pain and nausea control. Started on antibiotics. MARTINEZ PRITCHETT 54 y.o.male underwent: Procedure(s) (LRB):  INSERTION, PENILE PROSTHESIS, INFLATABLE (N/A)  RECONSTRUCTION, PENIS (N/A). The patient tolerated the procedure well, was transferred to recovery post-op, and then transferred to the floor for continuation of medical care. The patient's clinical condition progressively improved. Had ROBF. Leukocytosis improved. Patient was HDS throughout admission. By the time of discharge, he was meeting all post op milestones, tolerating a diet without nausea or vomiting, pain was well controlled with oral medications, and he was ambulating without difficulty. Voiding appropriately. On POD 1 the patient was discharged to home. On discharge, the patient's incisions were c/d/i and the surgical site was soft and appropriately tender to palpation. The patient will follow  "up in Dr. Garcia clinic in 2 weeks. Discussed POC and ED precautions with patient. Patient verbalized understanding and is agreeable to plan. All questions answered.    Please see hospital and op notes for further detail regarding patient's admission.    Patient's discharge was discussed with Dr. Garcia.             Indwelling Lines/Drains at time of discharge:   Lines/Drains/Airways       Drain  Duration                  Urethral Catheter 04/03/25 0746 Latex;Straight-tip 16 Fr. <1 day                    Significant Diagnostic Studies: Labs: CMP No results for input(s): "NA", "K", "CL", "CO2", "GLU", "BUN", "CREATININE", "CALCIUM", "PROT", "ALBUMIN", "BILITOT", "ALKPHOS", "AST", "ALT", "ANIONGAP", "ESTGFRAFRICA", "EGFRNONAA" in the last 48 hours. and CBC No results for input(s): "WBC", "HGB", "HCT", "PLT" in the last 48 hours.    Pending Diagnostic Studies:       None            Final Active Diagnoses:    Diagnosis Date Noted POA    PRINCIPAL PROBLEM:  Erectile dysfunction due to arterial insufficiency [N52.01] 01/27/2025 Yes      Problems Resolved During this Admission:        Discharged Condition: good    Disposition: Home or Self Care    Follow Up:   Follow-up Information       Kevin Garcia MD Follow up in 2 week(s).    Specialty: Urology  Why: For post operative follow up  Contact information:  [address] [place] [id number]  [phone]                             Patient Instructions:      Basic Metabolic Panel   Standing Status: Future Number of Occurrences: 1 Standing Exp. Date: 04/04/26     Order Specific Question Answer Comments   Send normal result to authorizing provider's In Basket if patient is active on MyChart: Yes      CBC Without Differential   Standing Status: Future Number of Occurrences: 1 Standing Exp. Date: 04/04/26     Order Specific Question Answer Comments   Send normal result to authorizing provider's In Basket if patient is active on MyChart: Yes      EKG 12-lead "   Standing Status: Future Number of Occurrences: 1 Standing Exp. Date: 02/03/26       Medications:  Reconciled Home Medications:      Medication List        ASK your doctor about these medications      aspirin 81 MG EC tablet  Commonly known as: ECOTRIN  Take 81 mg by mouth once daily.     atorvastatin 40 MG tablet  Commonly known as: LIPITOR  Take 40 mg by mouth once daily.     BELSOMRA 20 mg Tab  Generic drug: suvorexant  Take 1 tablet by mouth every evening.     clonazePAM 0.25 MG Tbdl  Commonly known as: KlonoPIN  Take 1 tablet (0.25 mg total) by mouth every evening.     gabapentin 300 MG capsule  Commonly known as: NEURONTIN  Take 3 capsules (900 mg total) by mouth every evening.     metoprolol succinate 100 MG 24 hr tablet  Commonly known as: TOPROL-XL  Take 100 mg by mouth once daily.     MOUNJARO 15 mg/0.5 mL Pnij  Generic drug: tirzepatide  Inject 15 mg into the skin every 7 days.     nitroGLYCERIN 0.4 MG SL tablet  Commonly known as: NITROSTAT  PLACE 1 TABLET UNDER TONGUE EVERY 5 MINUTES FOR CHEST PAIN. GO TO ER IF CHEST PAIN PERSISTS AFTER 3 DOSES.     NURTEC 75 mg odt  Generic drug: rimegepant  Take 1 tablet (75 mg total) by mouth daily as needed for Migraine. Place ODT tablet on the tongue; alternatively the ODT tablet may be placed under the tongue     pantoprazole 40 MG tablet  Commonly known as: PROTONIX  Take 1 tablet orally once a day     REPATHA SURECLICK 140 mg/mL Pnij  Generic drug: evolocumab  Inject 1 pen into the skin once every 2 weeks.     sertraline 100 MG tablet  Commonly known as: ZOLOFT  Take 2 tablets (200 mg total) by mouth once daily.     SYNJARDY XR 25-1,000 mg Tbph  Generic drug: empagliflozin-metformin  Take 1 tablet by mouth daily              Milton Mc MD           Patient was seen and examined on the date of discharge and determined to be suitable for discharge.  Total time spent preparing discharge services: 20 minutes.  Time was spent speaking with consultants and case  management, reviewing records, and/or discussing the plan of care with patient/family.    Milton Mc, DO  Urology   PGY-1

## 2025-04-04 NOTE — SUBJECTIVE & OBJECTIVE
"Interval History: No acute events overnight. Patient resting comfortably in bed this morning. Tolerating diet with no N/V. Pain is well controlled. Has not ambulated yet. Dressing taken down on morning rounds. Fowler removed on morning rounds.     Objective:     Temp:  [97.8 °F (36.6 °C)-98.6 °F (37 °C)] 97.9 °F (36.6 °C)  Pulse:  [77-87] 82  Resp:  [15-23] 18  SpO2:  [92 %-98 %] 96 %  BP: (109-158)/(58-83) 116/70     Body mass index is 27.91 kg/m².           Drains       Drain  Duration                  Urethral Catheter 04/03/25 0746 Latex;Straight-tip 16 Fr. <1 day                     Physical Exam  Vitals reviewed.   Constitutional:       General: He is awake. He is not in acute distress.  HENT:      Head: Normocephalic and atraumatic.   Cardiovascular:      Rate and Rhythm: Normal rate.   Pulmonary:      Effort: Pulmonary effort is normal. No respiratory distress.   Abdominal:      General: There is no distension.   Genitourinary:     Comments: Fowler draining CYU. Removed on AM rounds  Wound dressing taken down on morning rounds. Incisions healing appropriately, C/D/I.   Skin:     General: Skin is warm and dry.   Neurological:      General: No focal deficit present.      Mental Status: He is alert and oriented to person, place, and time.           Significant Labs:    BMP:  No results for input(s): "NA", "K", "CL", "CO2", "BUN", "CREATININE", "LABGLOM", "GLUCOSE", "CALCIUM" in the last 168 hours.    CBC:   No results for input(s): "WBC", "HGB", "HCT", "PLT" in the last 168 hours.    All pertinent labs results from the past 24 hours have been reviewed.    Significant Imaging:  All pertinent imaging results/findings from the past 24 hours have been reviewed.  "

## 2025-04-04 NOTE — PROGRESS NOTES
"[facility]  Urology  Progress Note    Patient Name: Abel Hackett  MRN: [id number]  Admission Date: 4/3/2025  Hospital Length of Stay: 0 days  Code Status: Prior   Attending Provider: Kevin Garcia MD   Primary Care Physician: Keira Wheat MD    Subjective:     HPI:  Abel Hackett is a 54 y.o. male with a history of erectile dysfunction refractory to medical management.    Interval History: No acute events overnight. Patient resting comfortably in bed this morning. Tolerating diet with no N/V. Pain is well controlled. Has not ambulated yet. Dressing taken down on morning rounds. Fowler removed on morning rounds.     Objective:     Temp:  [97.8 °F (36.6 °C)-98.6 °F (37 °C)] 97.9 °F (36.6 °C)  Pulse:  [77-87] 82  Resp:  [15-23] 18  SpO2:  [92 %-98 %] 96 %  BP: (109-158)/(58-83) 116/70     Body mass index is 27.91 kg/m².           Drains       Drain  Duration                  Urethral Catheter 04/03/25 0746 Latex;Straight-tip 16 Fr. <1 day                     Physical Exam  Vitals reviewed.   Constitutional:       General: He is awake. He is not in acute distress.  HENT:      Head: Normocephalic and atraumatic.   Cardiovascular:      Rate and Rhythm: Normal rate.   Pulmonary:      Effort: Pulmonary effort is normal. No respiratory distress.   Abdominal:      General: There is no distension.   Genitourinary:     Comments: Fowler draining CYU. Removed on AM rounds  Wound dressing taken down on morning rounds. Incisions healing appropriately, C/D/I.   Skin:     General: Skin is warm and dry.   Neurological:      General: No focal deficit present.      Mental Status: He is alert and oriented to person, place, and time.           Significant Labs:    BMP:  No results for input(s): "NA", "K", "CL", "CO2", "BUN", "CREATININE", "LABGLOM", "GLUCOSE", "CALCIUM" in the last 168 hours.    CBC:   No results for input(s): "WBC", "HGB", "HCT", "PLT" in the last 168 hours.    All pertinent labs results from the past " 24 hours have been reviewed.    Significant Imaging:  All pertinent imaging results/findings from the past 24 hours have been reviewed.    Assessment/Plan:     * Erectile dysfunction due to arterial insufficiency  Abel Hackett is a 54 y.o. male with a history of erectile dysfunction refractory to medical management now s/p IPP placement.    - Progressing appropriately for post-operative period  - Dressing taken down on morning rounds, appropriate  - Ambulating, tolerated diet  - Fowler removed on morning rounds - void trial  - Regular diet  - Mupirocin daily x 3 days  - Abx: AM dose of vancomycin + gentamicin; 2 days dicloxacillin 500mg Q6 hours   - Miralax daily  [facility]    Dispo: appropriate for discharge once voids and ambulates in halls        VTE Risk Mitigation (From admission, onward)      None            Su Mahan MD  Urology  [facility]

## 2025-04-04 NOTE — NURSING
Pt is being discharged today. Discharge papers will be given. IV will be taken out. Medicine will be received via pharmacy.

## 2025-04-04 NOTE — PLAN OF CARE
[facility]  Discharge Final Note    Primary Care Provider: Keira Wheat MD    Expected Discharge Date: 4/4/2025    Final Discharge Note (most recent)       Final Note - 04/04/25 1058          Final Note    Assessment Type Final Discharge Note     Anticipated Discharge Disposition Home or Self Care     What phone number can be called within the next 1-3 days to see how you are doing after discharge? --   [phone]                    Important Message from Medicare             Contact Info       Kevin Garcia MD   Specialty: Urology    [facility]  [facility] [address]   Phone: [phone]       Next Steps: Follow up in 2 week(s)    Instructions: For post operative follow up          Future Appointments   Date Time Provider Department Center   4/16/2025 10:00 AM Kevin Garcia MD [facility] UROLOGY [facility]   5/5/2025  9:45 AM Ethan Cavanaugh MD [facility] NEURO [place]   6/19/2025  2:00 PM Froylan Marie III, MD [facility] PSYCH [place]     Patient discharged home to care of family on 4/4/25.    Merline Boss RNCM  Case Management  [facility]  [phone]

## 2025-04-09 ENCOUNTER — PATIENT MESSAGE (OUTPATIENT)
Dept: ADMINISTRATIVE | Facility: OTHER | Age: 55
End: 2025-04-09
Payer: MEDICARE

## 2025-04-14 NOTE — TELEPHONE ENCOUNTER
No care due was identified.  [facility] Embedded Care Due Messages. Reference number: [id number].   4/14/2025 4:06:35 PM CDT

## 2025-04-15 RX ORDER — EMPAGLIFLOZIN, METFORMIN HYDROCHLORIDE 25; 1000 MG/1; MG/1
1 TABLET, EXTENDED RELEASE ORAL DAILY
Qty: 90 TABLET | Refills: 1 | Status: SHIPPED | OUTPATIENT
Start: 2025-04-15

## 2025-04-15 NOTE — TELEPHONE ENCOUNTER
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

## 2025-04-15 NOTE — TELEPHONE ENCOUNTER
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

## 2025-04-16 ENCOUNTER — OFFICE VISIT (OUTPATIENT)
Dept: UROLOGY | Facility: CLINIC | Age: 55
End: 2025-04-16
Payer: MEDICARE

## 2025-04-16 ENCOUNTER — TELEPHONE (OUTPATIENT)
Dept: UROLOGY | Facility: CLINIC | Age: 55
End: 2025-04-16

## 2025-04-16 ENCOUNTER — PATIENT MESSAGE (OUTPATIENT)
Dept: UROLOGY | Facility: CLINIC | Age: 55
End: 2025-04-16

## 2025-04-16 ENCOUNTER — HOSPITAL ENCOUNTER (EMERGENCY)
Facility: HOSPITAL | Age: 55
Discharge: HOME | End: 2025-04-16
Attending: EMERGENCY MEDICINE
Payer: MEDICARE

## 2025-04-16 VITALS
WEIGHT: 187.38 LBS | HEART RATE: 93 BPM | HEIGHT: 69 IN | SYSTOLIC BLOOD PRESSURE: 111 MMHG | DIASTOLIC BLOOD PRESSURE: 76 MMHG | BODY MASS INDEX: 27.75 KG/M2

## 2025-04-16 VITALS
TEMPERATURE: 98 F | OXYGEN SATURATION: 97 % | HEART RATE: 86 BPM | SYSTOLIC BLOOD PRESSURE: 106 MMHG | WEIGHT: 187 LBS | RESPIRATION RATE: 18 BRPM | HEIGHT: 69 IN | BODY MASS INDEX: 27.7 KG/M2 | DIASTOLIC BLOOD PRESSURE: 70 MMHG

## 2025-04-16 DIAGNOSIS — N52.01 ERECTILE DYSFUNCTION DUE TO ARTERIAL INSUFFICIENCY: Primary | ICD-10-CM

## 2025-04-16 DIAGNOSIS — T81.31XA SUPERFICIAL DISRUPTION OR DEHISCENCE OF OPERATION WOUND, INITIAL ENCOUNTER: Primary | ICD-10-CM

## 2025-04-16 PROBLEM — Z86.73 HISTORY OF STROKE: Status: RESOLVED | Noted: 2025-03-16 | Resolved: 2025-04-16

## 2025-04-16 PROBLEM — T81.30XA SUPERFICIAL DEHISCENCE OF WOUND: Status: ACTIVE | Noted: 2025-04-16

## 2025-04-16 PROCEDURE — 99999 PR PBB SHADOW E&M-EST. PATIENT-LVL IV: CPT | Mod: PBBFAC,,, | Performed by: UROLOGY

## 2025-04-16 PROCEDURE — 3044F HG A1C LEVEL LT 7.0%: CPT | Mod: CPTII,S$GLB,, | Performed by: UROLOGY

## 2025-04-16 PROCEDURE — 3061F NEG MICROALBUMINURIA REV: CPT | Mod: CPTII,S$GLB,, | Performed by: UROLOGY

## 2025-04-16 PROCEDURE — 3074F SYST BP LT 130 MM HG: CPT | Mod: CPTII,S$GLB,, | Performed by: UROLOGY

## 2025-04-16 PROCEDURE — 3066F NEPHROPATHY DOC TX: CPT | Mod: CPTII,S$GLB,, | Performed by: UROLOGY

## 2025-04-16 PROCEDURE — 99024 POSTOP FOLLOW-UP VISIT: CPT | Mod: S$GLB,,, | Performed by: UROLOGY

## 2025-04-16 PROCEDURE — 99282 EMERGENCY DEPT VISIT SF MDM: CPT

## 2025-04-16 PROCEDURE — 1159F MED LIST DOCD IN RCRD: CPT | Mod: CPTII,S$GLB,, | Performed by: UROLOGY

## 2025-04-16 PROCEDURE — 3078F DIAST BP <80 MM HG: CPT | Mod: CPTII,S$GLB,, | Performed by: UROLOGY

## 2025-04-16 PROCEDURE — 1160F RVW MEDS BY RX/DR IN RCRD: CPT | Mod: CPTII,S$GLB,, | Performed by: UROLOGY

## 2025-04-16 NOTE — CONSULTS
[facility]  Urology  Consult Note    Patient Name: Abel Hackett  MRN: [id number]  Admission Date: 4/16/2025  Hospital Length of Stay: 0   Code Status: Prior   Attending Provider: João Hartman MD   Consulting Provider: LATESHA CAMPBELL MD  Primary Care Physician: Keira Wheat MD  Principal Problem:<principal problem not specified>    Inpatient consult to Urology  Consult performed by: Latesha Campbell MD  Consult ordered by: João Hartman MD  Reason for consult: Superficial wound dehiscence          Subjective:     HPI:  Abel Hackett is a 54 y.o. M with ED s/p placement of 3 piece inflatable penile prosthetic and penile remodeling on 4/3/2025. He presents to the ED with concern for wound dehiscence.     He was evaluated this morning in clinic by Dr Garcia. He states shortly after clinic visit he noticed penoscrotal incision opened. He denies fevers, chills or incisional drainage or erythema. He denies dysuria or difficulty urinating.     Past Medical History:   Diagnosis Date    Coronary artery disease     Depression     Diabetes mellitus     Diabetes mellitus, type 2     History of right coronary artery stent placement     done at [facility]    History of stroke 03/16/2025    Hx of arterial ischemic stroke 03/21/2023    Hx of CABG 04/12/2024    Stroke        Past Surgical History:   Procedure Laterality Date    ANGIOGRAPHY N/A 10/2023    COLONOSCOPY N/A 08/02/2022    Procedure: COLONOSCOPY;  Surgeon: Keira Wheat MD;  Location: [facility];  Service: Endoscopy;  Laterality: N/A;    coronary stents      x6    ELBOW SURGERY      INSERTION OF INFLATABLE PENILE PROSTHESIS N/A 4/3/2025    Procedure: INSERTION, PENILE PROSTHESIS, INFLATABLE;  Surgeon: Kevin Garcia MD;  Location: [address];  Service: Urology;  Laterality: N/A;    LAPAROSCOPIC CHOLECYSTECTOMY N/A 06/13/2024    Procedure: CHOLECYSTECTOMY, LAPAROSCOPIC;  Surgeon: Kevin Ray MD;  Location: [facility];  Service: General;   Laterality: N/A;    RECONSTRUCTION OF PENIS N/A 4/3/2025    Procedure: RECONSTRUCTION, PENIS;  Surgeon: Kevin Garcia MD;  Location: [facility] OR [address] [place] [address];  Service: Urology;  Laterality: N/A;    REFRACTIVE SURGERY  04/13/2016    ROTATOR CUFF REPAIR Right     VASECTOMY         Review of patient's allergies indicates:  No Known Allergies    Family History       Problem Relation (Age of Onset)    Diabetes Mother, Father    Heart attack Mother, Father, Maternal Grandfather, Maternal Uncle    Heart disease Mother, Father    Hypertension Mother, Father    Kidney failure Brother            Tobacco Use    Smoking status: Never    Smokeless tobacco: Never   Substance and Sexual Activity    Alcohol use: Not Currently     Comment: occassional    Drug use: No    Sexual activity: Yes     Partners: Female       Review of Systems   Constitutional:  Negative for appetite change, chills and fever.   HENT:  Negative for congestion and sore throat.    Respiratory:  Negative for cough and wheezing.    Gastrointestinal:  Negative for abdominal pain, nausea and vomiting.   Genitourinary:  Negative for difficulty urinating, dysuria, hematuria and scrotal swelling.   Skin:  Positive for wound.   Neurological:  Negative for headaches.   Psychiatric/Behavioral:  Negative for agitation.        Objective:     Temp:  [98.1 °F (36.7 °C)-98.2 °F (36.8 °C)] 98.1 °F (36.7 °C)  Pulse:  [86-93] 86  Resp:  [18] 18  SpO2:  [97 %-98 %] 97 %  BP: (106-115)/(68-76) 106/70  Weight: 84.8 kg (187 lb)  Body mass index is 27.62 kg/m².           Drains       None                    Physical Exam  Constitutional:       General: He is not in acute distress.  HENT:      Head: Normocephalic.   Cardiovascular:      Rate and Rhythm: Normal rate.   Pulmonary:      Effort: Pulmonary effort is normal.   Abdominal:      General: There is no distension.      Palpations: Abdomen is soft.      Tenderness: There is no abdominal tenderness.   Genitourinary:     Comments:  "Penoscrotal incision with 0.5cm area of superficial dehiscence. Dartos appears intact. No visible prosthetic. No erythema, warmth or drainage  Musculoskeletal:         General: Normal range of motion.   Skin:     General: Skin is warm and dry.   Neurological:      Mental Status: He is alert.      Coordination: Coordination normal.   Psychiatric:         Behavior: Behavior normal.          Significant Labs:    BMP:  No results for input(s): "NA", "K", "CL", "CO2", "BUN", "CREATININE", "LABGLOM", "GLUCOSE", "CALCIUM" in the last 168 hours.    CBC:  No results for input(s): "WBC", "HGB", "HCT", "PLT" in the last 168 hours.    All pertinent labs results from the past 24 hours have been reviewed.    Significant Imaging:  All pertinent imaging results/findings from the past 24 hours have been reviewed.                    Assessment and Plan:     Superficial dehiscence of wound  Abel Hackett is a 54 y.o. M s/p placement of 3 piece inflatable penile prosthetic and penile remodeling on 4/3/2025. He presents to the ED with concern for wound dehiscence.     -- No indication for acute urologic intervention  -- Allow superficial dehiscence to heal by secondary intention  -- Will arrange follow up in 4 weeks for wound check with [facility]  -- Ok for discharge from urologic perspective        VTE Risk Mitigation (From admission, onward)      None            Thank you for your consult. I will sign off. Please contact us if you have any additional questions.    LATESHA BATES MD  Urology  [facility]  "

## 2025-04-16 NOTE — PROGRESS NOTES
CHIEF COMPLAINT:    Mr. Hackett is a 54 y.o. male presenting with ED.    PRESENTING ILLNESS:    Abel Hackett is a 54 y.o. male who c/o severe ED.  He's s/p placement of a 3 piece AMS with penile modeling on 4/3/25.    He has LUTS.  Nocturia x 0-1. Is pleased with how he voids.    REVIEW OF SYSTEMS:    Abel Hackett denies headache, blurred vision, fever, nausea, vomiting, chills, abdominal pain, chest pain, sore throat, bleeding per rectum, cough, SOB, recent loss of consciousness, recent mental status changes, seizures, dizziness, or upper or lower extremity weakness.    BRIDGET  1. 1  2. 0  3. 0  4. 0  5. 0      PATIENT HISTORY:    Past Medical History:   Diagnosis Date    Coronary artery disease     Depression     Diabetes mellitus     Diabetes mellitus, type 2     History of right coronary artery stent placement     done at [facility]    History of stroke 03/16/2025    Hx of arterial ischemic stroke 03/21/2023    Hx of CABG 04/12/2024    Stroke        Past Surgical History:   Procedure Laterality Date    ANGIOGRAPHY N/A 10/2023    COLONOSCOPY N/A 08/02/2022    Procedure: COLONOSCOPY;  Surgeon: Keira Wheat MD;  Location: [facility];  Service: Endoscopy;  Laterality: N/A;    coronary stents      x6    ELBOW SURGERY      INSERTION OF INFLATABLE PENILE PROSTHESIS N/A 4/3/2025    Procedure: INSERTION, PENILE PROSTHESIS, INFLATABLE;  Surgeon: Kevin Garcia MD;  Location: [address];  Service: Urology;  Laterality: N/A;    LAPAROSCOPIC CHOLECYSTECTOMY N/A 06/13/2024    Procedure: CHOLECYSTECTOMY, LAPAROSCOPIC;  Surgeon: Kevin Ray MD;  Location: [facility];  Service: General;  Laterality: N/A;    RECONSTRUCTION OF PENIS N/A 4/3/2025    Procedure: RECONSTRUCTION, PENIS;  Surgeon: Kevin Garcia MD;  Location: [address];  Service: Urology;  Laterality: N/A;    REFRACTIVE SURGERY  04/13/2016    ROTATOR CUFF REPAIR Right     VASECTOMY         Family History   Problem Relation Name Age of Onset     Heart disease Mother      Hypertension Mother      Diabetes Mother      Heart attack Mother      Heart disease Father      Hypertension Father      Diabetes Father      Heart attack Father      Kidney failure Brother      Heart attack Maternal Grandfather      Heart attack Maternal Uncle         Social History     Socioeconomic History    Marital status:    Tobacco Use    Smoking status: Never    Smokeless tobacco: Never   Substance and Sexual Activity    Alcohol use: Not Currently     Comment: occassional    Drug use: No    Sexual activity: Yes     Partners: Female     Social Drivers of Health     Financial Resource Strain: Low Risk  (1/17/2025)    Overall Financial Resource Strain (CARDIA)     Difficulty of Paying Living Expenses: Not hard at all   Food Insecurity: No Food Insecurity (1/17/2025)    Hunger Vital Sign     Worried About Running Out of Food in the Last Year: Never true     Ran Out of Food in the Last Year: Never true   Transportation Needs: No Transportation Needs (3/20/2024)    PRAPARE - Transportation     Lack of Transportation (Medical): No     Lack of Transportation (Non-Medical): No   Physical Activity: Sufficiently Active (1/17/2025)    Exercise Vital Sign     Days of Exercise per Week: 5 days     Minutes of Exercise per Session: 40 min   Stress: No Stress Concern Present (1/17/2025)    [employer] - Occupational Stress Questionnaire     Feeling of Stress : Not at all   Housing Stability: Low Risk  (3/20/2024)    Housing Stability Vital Sign     Unable to Pay for Housing in the Last Year: No     Number of Places Lived in the Last Year: 1     Unstable Housing in the Last Year: No       Allergies:  Patient has no known allergies.    Medications:    Current Outpatient Medications:     aspirin (ECOTRIN) 81 MG EC tablet, Take 81 mg by mouth once daily., Disp: , Rfl:     atorvastatin (LIPITOR) 40 MG tablet, Take 40 mg by mouth once daily., Disp: , Rfl:      clonazePAM (KLONOPIN) 0.25 MG TbDL, Take 1 tablet (0.25 mg total) by mouth every evening., Disp: 60 tablet, Rfl: 2    empagliflozin-metformin (SYNJARDY XR) 25-1,000 mg TBph, Take 1 tablet by mouth daily, Disp: 90 tablet, Rfl: 1    evolocumab (REPATHA SURECLICK) 140 mg/mL PnIj, Inject 1 pen into the skin once every 2 weeks., Disp: 2 mL, Rfl: 12    gabapentin (NEURONTIN) 300 MG capsule, Take 3 capsules (900 mg total) by mouth every evening., Disp: 180 capsule, Rfl: 1    metoprolol succinate (TOPROL-XL) 100 MG 24 hr tablet, Take 100 mg by mouth once daily., Disp: , Rfl:     mupirocin (BACTROBAN) 2 % ointment, Apply topically once daily. for 3 days, Disp: 22 g, Rfl: 0    nitroGLYCERIN (NITROSTAT) 0.4 MG SL tablet, PLACE 1 TABLET UNDER TONGUE EVERY 5 MINUTES FOR CHEST PAIN. GO TO ER IF CHEST PAIN PERSISTS AFTER 3 DOSES., Disp: 25 tablet, Rfl: 3    oxyCODONE-acetaminophen (PERCOCET) 5-325 mg per tablet, Take 1 tablet by mouth every 4 (four) hours as needed for Pain., Disp: 10 tablet, Rfl: 0    pantoprazole (PROTONIX) 40 MG tablet, Take 1 tablet orally once a day, Disp: 90 tablet, Rfl: 4    polyethylene glycol (GLYCOLAX) 17 gram/dose powder, Use to cap to measure 17g, mix with liquid, and take by mouth once daily., Disp: 510 g, Rfl: 0    rimegepant (NURTEC) 75 mg odt, Take 1 tablet (75 mg total) by mouth daily as needed for Migraine. Place ODT tablet on the tongue; alternatively the ODT tablet may be placed under the tongue, Disp: 8 tablet, Rfl: 11    sertraline (ZOLOFT) 100 MG tablet, Take 2 tablets (200 mg total) by mouth once daily., Disp: 60 tablet, Rfl: 3    suvorexant (BELSOMRA) 20 mg Tab, Take 1 tablet by mouth every evening., Disp: 28 tablet, Rfl: 5    tirzepatide (MOUNJARO) 15 mg/0.5 mL PnIj, Inject 15 mg into the skin every 7 days., Disp: 12 Pen, Rfl: 1    PHYSICAL EXAMINATION:    The patient generally appears in good health, is appropriately interactive, and is in no apparent distress.     Eyes: anicteric  sclerae, moist conjunctivae; no lid-lag; PERRLA     HENT: Atraumatic; oropharynx clear with moist mucous membranes and no mucosal ulcerations;normal hard and soft palate.  No evidence of lymphadenopathy.    Neck: Trachea midline.  No thyromegaly.    Skin: No lesions.    Mental: Cooperative with normal affect.  Is oriented to time, place, and person.    Neuro: Grossly intact.    Chest: Normal inspiratory effort.   No accessory muscles.  No audible wheezes.  Respirations symmetric on inspiration and expiration.    Heart: Regular rhythm.      Abdomen:  Soft, non-tender. No masses or organomegaly. Bladder is not palpable. No evidence of flank discomfort. No evidence of inguinal hernia.    Genitourinary: The penis has no evidence of plaques or induration. The urethral meatus is normal. The testes, epididymides, and cord structures are normal in size and contour bilaterally. The scrotum is normal in size and contour.  The IPP components are palpable in the penis and scrotum.    Normal anal sphincter tone. No rectal mass.    The prostate is 40 g. Normal landmarks. Lateral sulci. Median furrow intact.  No nodularity or induration. Seminal vesicles are normal.    Extremities: No clubbing, cyanosis, or edema      LABS:    UA dipped negative today  Lab Results   Component Value Date    PSA 0.99 11/22/2024       IMPRESSION:    Encounter Diagnoses   Name Primary?    Erectile dysfunction due to arterial insufficiency Yes   DM, stable  Hyperlipidemia, stable      PLAN:    1. Will observe his LUTS as they don't bother him.  2. RTC 4 weeks to inflate/deflate with an GLENROY.  3. RTC 3 months.    Copy to:

## 2025-04-16 NOTE — ED NOTES
Patient identifiers verified and correct for Mr Hackett  CC: Wound to base of penis SEE NN  APPEARANCE: awake and alert in NAD. PAIN  10/10  SKIN: warm, dry  open area to base of penis with no bleeding or surrounding redness, SEE PHOTOS   MUSCULOSKELETAL: Patient moving all extremities spontaneously, no obvious swelling or deformities noted. Ambulates independently.  RESPIRATORY: Denies shortness of breath.Respirations unlabored.   CARDIAC: Denies CP, 2+ distal pulses; no peripheral edema  ABDOMEN: S/ND/NT, Denies nausea  : voids spontaneously, denies difficulty  Neurologic: AAO x 4; follows commands equal strength in all extremities; denies numbness/tingling. Denies dizziness  Denies new weakness

## 2025-04-16 NOTE — ED NOTES
Patient states he had surgery 2 weeks ago, saw Urology today, no proceudure, reports difference in sutures upon return home, SEE PHOTOS

## 2025-04-16 NOTE — HPI
Abel Hackett is a 54 y.o. M with ED s/p placement of 3 piece inflatable penile prosthetic and penile remodeling on 4/3/2025. He presents to the ED with concern for wound dehiscence.     He was evaluated this morning in clinic by Dr Garcia. He states shortly after clinic visit he noticed penoscrotal incision opened. He denies fevers, chills or incisional drainage or erythema. He denies dysuria or difficulty urinating.

## 2025-04-16 NOTE — ED PROVIDER NOTES
Encounter Date: 4/16/2025       History     Chief Complaint   Patient presents with    Post-op Problem     Had a penile surgery 2 weeks ago. Had post op surgery this morning and said incision was ok. Patient concerned about a problem with stiches.      53 y/o male had penile implant placed 2 weeks ago by Dr Garcia.          Review of patient's allergies indicates:  No Known Allergies  Past Medical History:   Diagnosis Date    Coronary artery disease     Depression     Diabetes mellitus     Diabetes mellitus, type 2     History of right coronary artery stent placement     done at [facility]    History of stroke 03/16/2025    Hx of arterial ischemic stroke 03/21/2023    Hx of CABG 04/12/2024    Stroke      Past Surgical History:   Procedure Laterality Date    ANGIOGRAPHY N/A 10/2023    COLONOSCOPY N/A 08/02/2022    Procedure: COLONOSCOPY;  Surgeon: Keira Wheat MD;  Location: [facility];  Service: Endoscopy;  Laterality: N/A;    coronary stents      x6    ELBOW SURGERY      INSERTION OF INFLATABLE PENILE PROSTHESIS N/A 4/3/2025    Procedure: INSERTION, PENILE PROSTHESIS, INFLATABLE;  Surgeon: Kevin Garcia MD;  Location: [address];  Service: Urology;  Laterality: N/A;    LAPAROSCOPIC CHOLECYSTECTOMY N/A 06/13/2024    Procedure: CHOLECYSTECTOMY, LAPAROSCOPIC;  Surgeon: Kevin Ray MD;  Location: [facility];  Service: General;  Laterality: N/A;    RECONSTRUCTION OF PENIS N/A 4/3/2025    Procedure: RECONSTRUCTION, PENIS;  Surgeon: Kevin Garcia MD;  [facility];  Service: Urology;  Laterality: N/A;    REFRACTIVE SURGERY  04/13/2016    ROTATOR CUFF REPAIR Right     VASECTOMY       Family History   Problem Relation Name Age of Onset    Heart disease Mother      Hypertension Mother      Diabetes Mother      Heart attack Mother      Heart disease Father      Hypertension Father      Diabetes Father      Heart attack Father      Kidney failure Brother      Heart attack Maternal Grandfather       Heart attack Maternal Uncle       Social History[1]  Review of Systems    Physical Exam     Initial Vitals [04/16/25 1302]   BP Pulse Resp Temp SpO2   115/68 92 18 98.2 °F (36.8 °C) 98 %      MAP       --         Physical Exam    Constitutional: He appears well-developed and well-nourished. He is not diaphoretic. No distress.   Pulmonary/Chest: No respiratory distress.   Genitourinary:    Genitourinary Comments: Superficial wound dehiscence             ED Course   Procedures  Labs Reviewed   HEPATITIS C ANTIBODY   HEP C VIRUS HOLD SPECIMEN   HIV 1 / 2 ANTIBODY          Imaging Results    None          Medications - No data to display  Medical Decision Making  Superficial wound dehiscence.   Not infected.  Seen by urology    Amount and/or Complexity of Data Reviewed  Labs: ordered.                                      Clinical Impression:  Final diagnoses:  [T81.31XA] Superficial disruption or dehiscence of operation wound, initial encounter (Primary)          ED Disposition Condition    Discharge Good          ED Prescriptions    None       Follow-up Information       Follow up With Specialties Details Why Contact Info    Kevin Garcia MD Urology   [address] [place] [address]  [phone]                   [1]   Social History  Tobacco Use    Smoking status: Never    Smokeless tobacco: Never   Substance Use Topics    Alcohol use: Not Currently     Comment: occassional    Drug use: No        João Hartman MD  04/16/25 1611

## 2025-04-16 NOTE — SUBJECTIVE & OBJECTIVE
Past Medical History:   Diagnosis Date    Coronary artery disease     Depression     Diabetes mellitus     Diabetes mellitus, type 2     History of right coronary artery stent placement     done at [facility]    History of stroke 03/16/2025    Hx of arterial ischemic stroke 03/21/2023    Hx of CABG 04/12/2024    Stroke        Past Surgical History:   Procedure Laterality Date    ANGIOGRAPHY N/A 10/2023    COLONOSCOPY N/A 08/02/2022    Procedure: COLONOSCOPY;  Surgeon: Keira Wheat MD;  Location: [facility] ENDO;  Service: Endoscopy;  Laterality: N/A;    coronary stents      x6    ELBOW SURGERY      INSERTION OF INFLATABLE PENILE PROSTHESIS N/A 4/3/2025    Procedure: INSERTION, PENILE PROSTHESIS, INFLATABLE;  Surgeon: Kevin Garcia MD;  Location: [facility] OR 2ND FLR;  Service: Urology;  Laterality: N/A;    LAPAROSCOPIC CHOLECYSTECTOMY N/A 06/13/2024    Procedure: CHOLECYSTECTOMY, LAPAROSCOPIC;  Surgeon: Kevin Ray MD;  Location: [facility] OR;  Service: General;  Laterality: N/A;    RECONSTRUCTION OF PENIS N/A 4/3/2025    Procedure: RECONSTRUCTION, PENIS;  Surgeon: Kevin Garcia MD;  Location: [facility] OR 2ND FLR;  Service: Urology;  Laterality: N/A;    REFRACTIVE SURGERY  04/13/2016    ROTATOR CUFF REPAIR Right     VASECTOMY         Review of patient's allergies indicates:  No Known Allergies    Family History       Problem Relation (Age of Onset)    Diabetes Mother, Father    Heart attack Mother, Father, Maternal Grandfather, Maternal Uncle    Heart disease Mother, Father    Hypertension Mother, Father    Kidney failure Brother            Tobacco Use    Smoking status: Never    Smokeless tobacco: Never   Substance and Sexual Activity    Alcohol use: Not Currently     Comment: occassional    Drug use: No    Sexual activity: Yes     Partners: Female       Review of Systems   Constitutional:  Negative for appetite change, chills and fever.   HENT:  Negative for congestion and sore throat.    Respiratory:  Negative for cough  "and wheezing.    Gastrointestinal:  Negative for abdominal pain, nausea and vomiting.   Genitourinary:  Negative for difficulty urinating, dysuria, hematuria and scrotal swelling.   Skin:  Positive for wound.   Neurological:  Negative for headaches.   Psychiatric/Behavioral:  Negative for agitation.        Objective:     Temp:  [98.1 °F (36.7 °C)-98.2 °F (36.8 °C)] 98.1 °F (36.7 °C)  Pulse:  [86-93] 86  Resp:  [18] 18  SpO2:  [97 %-98 %] 97 %  BP: (106-115)/(68-76) 106/70  Weight: 84.8 kg (187 lb)  Body mass index is 27.62 kg/m².           Drains       None                    Physical Exam  Constitutional:       General: He is not in acute distress.  HENT:      Head: Normocephalic.   Cardiovascular:      Rate and Rhythm: Normal rate.   Pulmonary:      Effort: Pulmonary effort is normal.   Abdominal:      General: There is no distension.      Palpations: Abdomen is soft.      Tenderness: There is no abdominal tenderness.   Genitourinary:     Comments: Penoscrotal incision with 0.5cm area of superficial dehiscence. Dartos appears intact. No visible prosthetic. No erythema, warmth or drainage  Musculoskeletal:         General: Normal range of motion.   Skin:     General: Skin is warm and dry.   Neurological:      Mental Status: He is alert.      Coordination: Coordination normal.   Psychiatric:         Behavior: Behavior normal.          Significant Labs:    BMP:  No results for input(s): "NA", "K", "CL", "CO2", "BUN", "CREATININE", "LABGLOM", "GLUCOSE", "CALCIUM" in the last 168 hours.    CBC:  No results for input(s): "WBC", "HGB", "HCT", "PLT" in the last 168 hours.    All pertinent labs results from the past 24 hours have been reviewed.    Significant Imaging:  All pertinent imaging results/findings from the past 24 hours have been reviewed.                  "

## 2025-04-16 NOTE — ASSESSMENT & PLAN NOTE
Abel Hackett is a 54 y.o. M s/p placement of 3 piece inflatable penile prosthetic and penile remodeling on 4/3/2025. He presents to the ED with concern for wound dehiscence.     -- No indication for acute urologic intervention  -- Allow superficial dehiscence to heal by secondary intention  -- Will arrange follow up in 4 weeks for wound check with GLENROY  -- Ok for discharge from urologic perspective

## 2025-04-16 NOTE — TELEPHONE ENCOUNTER
Call placed to pt regarding message to office that pt had returned home after his office visit with Dr. Garcia today and his incision has opened; no pain, but did notice blood as well at the site. Dr. Garcia informed and stated the incision looked good when he saw him earlier. MD stated that if pt would like to be seen today, he should present to the ED, as there are no available appts today or tomorrow with MD. Pt stated he would not like to go to the ED, appt offered for 4/17 with GLENROY per MD; pt agreed, scheduled for 4/17.

## 2025-04-17 ENCOUNTER — PATIENT OUTREACH (OUTPATIENT)
Facility: OTHER | Age: 55
End: 2025-04-17
Payer: MEDICARE

## 2025-04-17 NOTE — PROGRESS NOTES
Kathe Hyman, Patient Care Assistant  ED Navigator  Emergency Department    Project: [facility] ED Navigator  Role: Community Health Worker    Date: 04/17/2025  Patient Name: Abel Hackett  MRN: [id number]  PCP: Keira Wheat MD    Assessment:     Abel Hackett is a 55 y.o. male who has presented to ED for Superficial disruption or dehiscence of operation wound, initial encounter. Patient has visited the ED 1 times in the past 3 months. Patient did contact Urology.     ED Navigator Initial Assessment    ED Navigator Enrollment Documentation  Consent to Services  Does patient consent to completing the assessment?: Yes  Contact  Method of Initial Contact: Phone  Transportation  Insurance Coverage  Do you have coverage/adequate coverage?: Yes  Specialist Appointment  Did the patient come to the ED to see a specialist?: Yes  Does the patient have a pending specialist referral?: No  Does the patient have a specialist appointment made?: Yes  PCP Follow Up Appointment  Medications  Is patient able to afford medication?: Yes  Psychological  Food  Communication/Education  Other Financial Concerns  Other Social Barriers/Concerns  Primary Barrier  Scheduled Appointment Date: 4/22/25  Plan: Provided information for [facility] 24/7 Nurse triage line, [phone] or [phone] ([phone])         Social History     Socioeconomic History    Marital status:    Tobacco Use    Smoking status: Never    Smokeless tobacco: Never   Substance and Sexual Activity    Alcohol use: Not Currently     Comment: occassional    Drug use: No    Sexual activity: Yes     Partners: Female     Social Drivers of Health     Financial Resource Strain: Low Risk  (4/17/2025)    Overall Financial Resource Strain (CARDIA)     Difficulty of Paying Living Expenses: Not hard at all   Food Insecurity: No Food Insecurity (4/17/2025)    Hunger Vital Sign     Worried About Running Out of Food in the Last Year: Never true     Ran Out of Food in the  Last Year: Never true   Transportation Needs: No Transportation Needs (4/17/2025)    PRAPARE - Transportation     Lack of Transportation (Medical): No     Lack of Transportation (Non-Medical): No   Physical Activity: Sufficiently Active (4/17/2025)    Exercise Vital Sign     Days of Exercise per Week: 7 days     Minutes of Exercise per Session: 30 min   Stress: No Stress Concern Present (4/17/2025)    [employer] - Occupational Stress Questionnaire     Feeling of Stress : Not at all   Housing Stability: Low Risk  (4/17/2025)    Housing Stability Vital Sign     Unable to Pay for Housing in the Last Year: No     Homeless in the Last Year: No       Plan:   Pt was seen in the ED on  04/17/2025 for   EDSuperficial disruption or dehiscence of operation wound, Navigator spoke with pt for Post ED visit follow up navigation to assist with scheduling a 7-day Post ED visit follow up appt. Pt does not have transportation issues and has no additional needs at this time. Closing Encounter.  Kathe Hyman        Appointment made with: Keira Wheat MD

## 2025-04-27 DIAGNOSIS — G31.84 MCI (MILD COGNITIVE IMPAIRMENT): ICD-10-CM

## 2025-04-27 DIAGNOSIS — G47.00 INSOMNIA, UNSPECIFIED TYPE: ICD-10-CM

## 2025-04-28 ENCOUNTER — PATIENT MESSAGE (OUTPATIENT)
Dept: UROLOGY | Facility: CLINIC | Age: 55
End: 2025-04-28
Payer: MEDICARE

## 2025-04-28 RX ORDER — SUVOREXANT 20 MG/1
1 TABLET, FILM COATED ORAL NIGHTLY
Qty: 30 TABLET | Refills: 5 | Status: SHIPPED | OUTPATIENT
Start: 2025-04-28

## 2025-05-05 ENCOUNTER — TELEPHONE (OUTPATIENT)
Dept: UROLOGY | Facility: CLINIC | Age: 55
End: 2025-05-05
Payer: MEDICARE

## 2025-05-05 ENCOUNTER — OFFICE VISIT (OUTPATIENT)
Dept: NEUROLOGY | Facility: CLINIC | Age: 55
End: 2025-05-05
Payer: MEDICARE

## 2025-05-05 ENCOUNTER — OFFICE VISIT (OUTPATIENT)
Dept: UROLOGY | Facility: CLINIC | Age: 55
End: 2025-05-05
Payer: MEDICARE

## 2025-05-05 ENCOUNTER — PATIENT MESSAGE (OUTPATIENT)
Facility: CLINIC | Age: 55
End: 2025-05-05
Payer: MEDICARE

## 2025-05-05 VITALS
SYSTOLIC BLOOD PRESSURE: 115 MMHG | BODY MASS INDEX: 27.33 KG/M2 | DIASTOLIC BLOOD PRESSURE: 73 MMHG | HEART RATE: 98 BPM | WEIGHT: 184.5 LBS | HEIGHT: 69 IN

## 2025-05-05 VITALS
DIASTOLIC BLOOD PRESSURE: 68 MMHG | OXYGEN SATURATION: 98 % | WEIGHT: 184.75 LBS | SYSTOLIC BLOOD PRESSURE: 102 MMHG | RESPIRATION RATE: 16 BRPM | BODY MASS INDEX: 27.36 KG/M2 | HEART RATE: 104 BPM | HEIGHT: 69 IN

## 2025-05-05 DIAGNOSIS — N13.8 BPH WITH URINARY OBSTRUCTION: ICD-10-CM

## 2025-05-05 DIAGNOSIS — N52.01 ERECTILE DYSFUNCTION DUE TO ARTERIAL INSUFFICIENCY: Primary | ICD-10-CM

## 2025-05-05 DIAGNOSIS — G47.52 RBD (REM BEHAVIORAL DISORDER): ICD-10-CM

## 2025-05-05 DIAGNOSIS — R47.89 WORD FINDING DIFFICULTY: ICD-10-CM

## 2025-05-05 DIAGNOSIS — R25.1 TREMOR: ICD-10-CM

## 2025-05-05 DIAGNOSIS — G25.3 POST HYPOXIC MYOCLONUS: ICD-10-CM

## 2025-05-05 DIAGNOSIS — N40.1 BPH WITH URINARY OBSTRUCTION: ICD-10-CM

## 2025-05-05 DIAGNOSIS — G31.84 MCI (MILD COGNITIVE IMPAIRMENT): Primary | ICD-10-CM

## 2025-05-05 PROCEDURE — 1160F RVW MEDS BY RX/DR IN RCRD: CPT | Mod: CPTII,S$GLB,, | Performed by: PSYCHIATRY & NEUROLOGY

## 2025-05-05 PROCEDURE — 3078F DIAST BP <80 MM HG: CPT | Mod: CPTII,S$GLB,,

## 2025-05-05 PROCEDURE — 3066F NEPHROPATHY DOC TX: CPT | Mod: CPTII,S$GLB,,

## 2025-05-05 PROCEDURE — 3078F DIAST BP <80 MM HG: CPT | Mod: CPTII,S$GLB,, | Performed by: PSYCHIATRY & NEUROLOGY

## 2025-05-05 PROCEDURE — 1159F MED LIST DOCD IN RCRD: CPT | Mod: CPTII,S$GLB,,

## 2025-05-05 PROCEDURE — 3061F NEG MICROALBUMINURIA REV: CPT | Mod: CPTII,S$GLB,, | Performed by: PSYCHIATRY & NEUROLOGY

## 2025-05-05 PROCEDURE — 99999 PR PBB SHADOW E&M-EST. PATIENT-LVL V: CPT | Mod: PBBFAC,,, | Performed by: PSYCHIATRY & NEUROLOGY

## 2025-05-05 PROCEDURE — 3008F BODY MASS INDEX DOCD: CPT | Mod: CPTII,S$GLB,, | Performed by: PSYCHIATRY & NEUROLOGY

## 2025-05-05 PROCEDURE — 3074F SYST BP LT 130 MM HG: CPT | Mod: CPTII,S$GLB,,

## 2025-05-05 PROCEDURE — 1159F MED LIST DOCD IN RCRD: CPT | Mod: CPTII,S$GLB,, | Performed by: PSYCHIATRY & NEUROLOGY

## 2025-05-05 PROCEDURE — 99214 OFFICE O/P EST MOD 30 MIN: CPT | Mod: S$GLB,,, | Performed by: PSYCHIATRY & NEUROLOGY

## 2025-05-05 PROCEDURE — 3074F SYST BP LT 130 MM HG: CPT | Mod: CPTII,S$GLB,, | Performed by: PSYCHIATRY & NEUROLOGY

## 2025-05-05 PROCEDURE — 3061F NEG MICROALBUMINURIA REV: CPT | Mod: CPTII,S$GLB,,

## 2025-05-05 PROCEDURE — 3066F NEPHROPATHY DOC TX: CPT | Mod: CPTII,S$GLB,, | Performed by: PSYCHIATRY & NEUROLOGY

## 2025-05-05 PROCEDURE — 99999 PR PBB SHADOW E&M-EST. PATIENT-LVL III: CPT | Mod: PBBFAC,,,

## 2025-05-05 PROCEDURE — 3044F HG A1C LEVEL LT 7.0%: CPT | Mod: CPTII,S$GLB,,

## 2025-05-05 PROCEDURE — 99024 POSTOP FOLLOW-UP VISIT: CPT | Mod: S$GLB,,,

## 2025-05-05 PROCEDURE — 3044F HG A1C LEVEL LT 7.0%: CPT | Mod: CPTII,S$GLB,, | Performed by: PSYCHIATRY & NEUROLOGY

## 2025-05-05 RX ORDER — SERTRALINE HYDROCHLORIDE 100 MG/1
200 TABLET, FILM COATED ORAL DAILY
Qty: 60 TABLET | Refills: 3 | Status: SHIPPED | OUTPATIENT
Start: 2025-05-05

## 2025-05-05 NOTE — TELEPHONE ENCOUNTER
Call placed to pt regarding appt scheduling. Pt unable to be reached, will reach out via [place].

## 2025-05-05 NOTE — PROGRESS NOTES
"    HPI: Abel Hackett is a 55 y.o. 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 For example, he he did not remember what he did with cup. He forgets acquaintances names easily. Has to think hard about the day of the week.     No longer on Keppra.     Myoclonus is variable and not clearly worse off of Keppra    Seroquel stopped and he is happy to be off of this  Gabapentin dose is 900mg at night and he is not having RLS currently/ was initially used for this.     Sleep is about 4-5 hours then some break and he usually goes back to sleep  Belsomra from Dr Espinosa seems to help      Headaches are very well controlled.    Nurtec PRN not really needed now      Skin biopsy done as noted below with Dr Espinosa      Tremor in the hands unchanged    He continues with well formed hallucinations and he has insight. This occurs about once per month.    States he is tolerating driving without accidents or incidents and was supported by Dr Espinosa to continue driving unless concerns.     CPAP compliant     ADLs around the home are independent    Remains off of work/ on long term disability     Driving without accidents or incident and no longer drives alone    ST never done / he never got a call.     He is seeing Dr Marie for mood disorder     Endocrinology visit done since the last visit/ back in PCP's care    A1C managed by PCP    Fasting labs are followed by PCP      Review of Systems   Constitutional:  Negative for fever.   HENT:  Negative for nosebleeds.  "   Eyes:  Negative for double vision.   Respiratory:  Negative for hemoptysis.    Cardiovascular:  Negative for leg swelling.   Gastrointestinal:  Negative for blood in stool.   Genitourinary:  Negative for hematuria.   Musculoskeletal:  Negative for falls.   Skin:  Negative for rash.   Neurological:  Positive for tremors.   Psychiatric/Behavioral:  Positive for memory loss.          I have reviewed all of this patient's past medical and surgical histories as well as family and social histories and active allergies and medications as documented in the electronic medical record.        Exam:  Gen Appearance, well developed/nourished in no apparent distress  CV: 2+ distal pulses with no edema or swelling  Neuro:  MS: Awake, alert, Sustains attention. Recent/remote memory intact to gross verbal testing, Language is full to spontaneous speech/comprehension. Fund of Knowledge is full  CN: Optic discs are flat with normal vasculature, PERRL, Extraoccular movements and visual fields are full. Normal facial sensation and strength, Hearing symmetric, Tongue and Palate are midline and strong. Shoulder Shrug symmetric and strong.  Motor: Normal bulk, tone, no abnormal movements at rest, mild-moderate tremor with hands out. 5/5 strength bilateral upper/lower extremities with 1+ reflexes and no clonus  Sensory: symmetric to  temp, and vibration. Romberg mildly positive  Cerebellar: Finger-nose,Heal-shin, Rapid alternating movements intact  Gait: Normal stance, no ataxia    Imagin2022 CT head:   Mild cortical atrophy without acute intracranial abnormality.    2022 MRI brain: Small focus of encephalomalacia right inferior cerebellum  MRA head and neck normal    2022 EEG: normal    2022 Holter monitor (done by PCP for palpitations complaint): Normal    3/2023 CTA head and neck: The left vertebral artery has approximately  60% diameter origin stenosis that extends 7 mm in length from the origin from the subclavian artery.   No other significant stenosis in the posterior circulation.    6/2023 MRI brain: Two small areas of remote infarction are seen in the right cerebellar hemisphere.  No new infarction is seen.     Stable focus of FLAIR signal abnormality in the left posterior frontal white matter, unchanged from prior study likely related to chronic microvascular ischemic disease.    6/2023 CTA head and neck: Remote lacunar-type infarctions in the right cerebellar hemisphere.  No new parenchymal hypoattenuation is seen to suggest an acute infarction and there is no intracranial hemorrhage.  No abnormal parenchymal enhancement.     No intracranial arterial abnormalities.    Labs: 2022 RPR, TSH, B12, CBC normal. CMP: elevated glucose consistent with his known poorly controlled DM2      2/2023 A1C 7.1, down from 8.5 eight months prior      6/2023 ESR normal    2024 7.2 A1C    2025 A1C 6.4    2024 Skin bx for synuclein:   Neurodegenerative Biomarkers - Skin  Name Reference Previous Values   Phosphorylation Alpha-Synuclein - Posterior Cervical (Left) normal Normal   2024-03-15      Phosphorylation Alpha-Synuclein - Distal Thigh (Left) normal Abnormal (H)   2024-03-15      Phosphorylation Alpha-Synuclein - Distal Leg (Left) normal Normal   2024-03-15          9/2023 Neuropsychological testing: Unfortunately, scores on stand-alone and embedded performance validity measures were again variable, with several falling below cutoffs, suggesting reduced effort and/or engagement in the testing process. As such, the current results could underestimate his functioning and results are interpreted cautiously. With this in mind, he continues to display relatively intact visuospatial constructional skills and verbal memory. Greater variablity is seen in working memory, executive functioning, and processing speed compared to his previous evaluation. Visual memory, verbal fluency (no split), and fine motor dexerity are again significantly reduced, with  "a lateralization pattern seen on fine motor dexterity testing (right hand/left brain worse than left hand/right brain). He made errors on temporal orientation questions. His affect appeared flatter during this evaluation and he reported minimal depressive and anxious symptoms on psychological screening questionnaires.        Overall, if his vascular health has indeed worsened, a cognitive cerebellar affective syndrome is likely to explain his current cognitive profile. If not, then our concern for the possiblity of an emerging neurodegenerative condition moves higher on the differential. His prescriptions of gabapentin and Lunesta may be contributing to some cognitive trouble. He appears to be on the cusp of receiving a diagnosis of dementia given some of the errors reported in his daily functioning and should continue to be monitored closely. The following recommendations are offered:     Assessment/Plan: Abel Hackett is a 55 y.o. male with memory loss  I recommend:     1. Noting mild cortical atrophy by CT head 7/2022    2. 7/2022 MRI brain (Done with Dr. Vila at [facility] [place]): Small focus of encephalomalacia right inferior cerebellum without atrophy mentioned  -Likely a small vessel CVA due to poorly controlled DM2 (was noncompliant)  -MRA head and neck normal at that time  3/2023 CTA head and neck:  "left vertebral artery has approximately  60% diameter origin stenosis that extends 7 mm in length from the origin from the subclavian artery." Treatment for this is anti-platelets, statin, DM2 control  -Now on anti-HTN meds and poorly controlled DM2 is ongoing. Goal less than 7. Referred to endocrinology and is now at goal. Continue anti-platelets and statin for CVA prevention. Cardiology added repatha for even better DLD control      3. Note: Patient is seeing cardiology for CAD (CIS) s/p CABG in 2023    4. His mom had  vascular dementia and dad had AD.   -EEG at [facility] unremarkable 2022    5. 9/2022  " "Neuropsychological testing inconclusive but noting concern "that his vascular risk factors were/are impacting his cognition"  -He did consult with vascular neurology who noted "remote R cerebellar infarct likely 2/2 to small vessel disease"   -Currently on ASA due to CAD with stent and also statin for DLD  -Repeat Neuropsychological testing in 9/2023: validity measure issues, but some decline in testing, noting. "Overall, if his vascular health has indeed worsened, a cognitive cerebellar affective syndrome is likely to explain his current cognitive profile. If not, then our concern for the possiblity of an emerging neurodegenerative condition moves higher on the differential. His prescriptions of gabapentin and Lunesta may be contributing to some cognitive trouble. He appears to be on the cusp of receiving a diagnosis of dementia given some of the errors reported in his daily functioning and should continue to be monitored closely. The following recommendations are offered:   -Recommend tight control of risk factors for vascular dementia including compliance with CPAP. 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 No longer needs this Also has some REM behavioral disorder and having rare visual hallucinations with good insight  -Also stopped seroquel/ not needed now. Reduce gabapentin (used for RLS) to 600mg nightly if able to reduce effects of centrally acting meds  -Tolerating driving well. Would need to stop for accidents or incidents.   -Speech therapy referral as recommended by neuropsychiatry  (Second request)    6. Tremor,? Benign essential tremor  -Father had " tremor  -No treatment recommended at this time.Montior given memory disorder      7. Worst headache of life May 2023  -Has had blurred in the left eye since prior stroke, which worsened and was thought to be DM2 related by opthalmology   -Pain resolved/ has episodic headaches now  -Update MRI brain and CTA head 6/2023 Reassuring. ESR not severely elevated for DM2 history  -Nurtec PRN to continue/ headaches are not frequent and variable. Avoid triptans and NSAIDs given CAD and CVA history          RTC  1 year for local follow up. He has ongoing follow up with Dr Espinosa as well

## 2025-05-05 NOTE — PROGRESS NOTES
[facility]  Urology Clinic Note    Date of Service: 05/05/2025     CHIEF COMPLAINT: Wound Check     History of Present Illness:   Abel Hackett is a 55 y.o. male who presents to today for a wound check after IPP placement for erectile dysfunction. He is established to our clinic but new to me. Last seen by Dr. Garcia on 4/16/2025 for his post op visit.   He's s/p placement of a 3 piece AMS with penile modeling on 4/3/25.     He went to the ER on 4/16/2025 stating that his stitches came out and wound was opening. Diagnosed with superficial disruption or dehiscence of operation wound.  He reports he has not seen any drainage or odor from the incision site.     Review of Symptoms:  Review of Systems   Constitutional:  Negative for chills and fever.   Respiratory:  Negative for shortness of breath and wheezing.    Cardiovascular:  Negative for chest pain.   Gastrointestinal:  Negative for abdominal pain, constipation, diarrhea, nausea and vomiting.   Genitourinary:  Negative for dysuria, flank pain, frequency, hematuria and urgency.     Patient History:  Past Medical History:   Diagnosis Date    Coronary artery disease     Depression     Diabetes mellitus     Diabetes mellitus, type 2     History of right coronary artery stent placement     done at [facility]    History of stroke 03/16/2025    Hx of arterial ischemic stroke 03/21/2023    Hx of CABG 04/12/2024    Stroke      Past Surgical History:   Procedure Laterality Date    ANGIOGRAPHY N/A 10/2023    COLONOSCOPY N/A 08/02/2022    Procedure: COLONOSCOPY;  Surgeon: Keira Wheat MD;  Location[facility];  Service: Endoscopy;  Laterality: N/A;    coronary stents      x6    ELBOW SURGERY      INSERTION OF INFLATABLE PENILE PROSTHESIS N/A 4/3/2025    Procedure: INSERTION, PENILE PROSTHESIS, INFLATABLE;  Surgeon: Kevin Garcia MD;  Location: [facility] OR [address] [place] [address];  Service: Urology;  Laterality: N/A;    LAPAROSCOPIC CHOLECYSTECTOMY N/A 06/13/2024     Procedure: CHOLECYSTECTOMY, LAPAROSCOPIC;  Surgeon: Kevin Ray MD;  Location: [facility] OR;  Service: General;  Laterality: N/A;    RECONSTRUCTION OF PENIS N/A 4/3/2025    Procedure: RECONSTRUCTION, PENIS;  Surgeon: Kevin Garcia MD;  Location: [facility] OR [address] [place] [address];  Service: Urology;  Laterality: N/A;    REFRACTIVE SURGERY  04/13/2016    ROTATOR CUFF REPAIR Right     VASECTOMY       Family History   Problem Relation Name Age of Onset    Heart disease Mother      Hypertension Mother      Diabetes Mother      Heart attack Mother      Heart disease Father      Hypertension Father      Diabetes Father      Heart attack Father      Kidney failure Brother      Heart attack Maternal Grandfather      Heart attack Maternal Uncle       Social History[1]    Allergies:  Patient has no known allergies.    Medications:  Current Medications[2]    OBJECTIVE:     There were no vitals filed for this visit.     Physical Exam  Constitutional:       Appearance: Normal appearance.   HENT:      Head: Normocephalic.   Pulmonary:      Effort: Pulmonary effort is normal. No respiratory distress.      Breath sounds: No wheezing.   Abdominal:      General: There is no distension.      Tenderness: There is no abdominal tenderness.   Genitourinary:     Penis: Normal and circumcised. No erythema, discharge or swelling.       Testes:         Right: Tenderness or swelling not present.         Left: Tenderness or swelling not present.      Comments: Superficial wound dehiscence. No erythema, swelling or inflammation to the area. Tissue is healing, no signs of infection present.   Neurological:      Mental Status: He is alert.   Psychiatric:         Mood and Affect: Mood normal.     LAB:      All laboratory values listed below was/were independently reviewed with patient at this clinic visit.    Lab Results   Component Value Date    BUN 26 (H) 03/17/2025    CREATININE 0.8 03/17/2025    WBC 5.25 03/17/2025    HGB 15.4 03/17/2025    HCT  47.6 03/17/2025     03/17/2025    AST 30 11/22/2024    ALT 41 11/22/2024    ALKPHOS 74 11/22/2024    ALBUMIN 3.8 11/22/2024    HGBA1C 6.4 (H) 03/17/2025     Lab Results   Component Value Date    PSA 0.99 11/22/2024     Lab Results   Component Value Date    CREATININE 0.8 03/17/2025    EGFRNORACEVR >60.0 03/17/2025     IMPRESSION:  Encounter Diagnoses   Name Primary?    Erectile dysfunction due to arterial insufficiency Yes    BPH with urinary obstruction      ASSESSMENT/PLAN:     Plan: I spent a total of 30 minutes on the day of the visit. This includes face to face time and non-face to face time preparing to see the patient (eg, review of tests), obtaining and/or reviewing separately obtained history, documenting clinical information in the electronic or other health record, independently interpreting results and communicating results to the patient/family/caregiver, or care coordinator.  Reviewed the possible contributory factors.  Reviewed possible management if needed.  Discussed incision site findings. No signs of infection.   Instructed to keep area dry and keep the penis up to promote healing.  Do not use the penis until cleared.  Return to the ER with fever, severe abdominal pain, significant drainage from wounds.  Inflate/Deflate follow up scheduled with Sonya SANCHEZ on 5/19/25.       MABLE Manrique            [1]  Social History  Socioeconomic History    Marital status:    Tobacco Use    Smoking status: Never    Smokeless tobacco: Never   Substance and Sexual Activity    Alcohol use: Not Currently     Comment: occassional    Drug use: No    Sexual activity: Yes     Partners: Female     Social Drivers of Health     Financial Resource Strain: Low Risk  (4/17/2025)    Overall Financial Resource Strain (CARDIA)     Difficulty of Paying Living Expenses: Not hard at all   Food Insecurity: No Food Insecurity (4/17/2025)    Hunger Vital Sign     Worried About Running Out of Food in the  Last Year: Never true     Ran Out of Food in the Last Year: Never true   Transportation Needs: No Transportation Needs (4/17/2025)    PRAPARE - Transportation     Lack of Transportation (Medical): No     Lack of Transportation (Non-Medical): No   Physical Activity: Sufficiently Active (4/17/2025)    Exercise Vital Sign     Days of Exercise per Week: 7 days     Minutes of Exercise per Session: 30 min   Stress: No Stress Concern Present (4/17/2025)    [employer] - Occupational Stress Questionnaire     Feeling of Stress : Not at all   Housing Stability: Low Risk  (4/17/2025)    Housing Stability Vital Sign     Unable to Pay for Housing in the Last Year: No     Homeless in the Last Year: No   [2]    Current Outpatient Medications:     aspirin (ECOTRIN) 81 MG EC tablet, Take 81 mg by mouth once daily., Disp: , Rfl:     atorvastatin (LIPITOR) 40 MG tablet, Take 40 mg by mouth once daily., Disp: , Rfl:     clonazePAM (KLONOPIN) 0.25 MG TbDL, Take 1 tablet (0.25 mg total) by mouth every evening., Disp: 60 tablet, Rfl: 2    empagliflozin-metformin (SYNJARDY XR) 25-1,000 mg TBph, Take 1 tablet by mouth daily, Disp: 90 tablet, Rfl: 1    evolocumab (REPATHA SURECLICK) 140 mg/mL PnIj, Inject 1 pen into the skin once every 2 weeks., Disp: 2 mL, Rfl: 12    gabapentin (NEURONTIN) 300 MG capsule, Take 3 capsules (900 mg total) by mouth every evening., Disp: 180 capsule, Rfl: 1    metoprolol succinate (TOPROL-XL) 100 MG 24 hr tablet, Take 100 mg by mouth once daily., Disp: , Rfl:     nitroGLYCERIN (NITROSTAT) 0.4 MG SL tablet, PLACE 1 TABLET UNDER TONGUE EVERY 5 MINUTES FOR CHEST PAIN. GO TO ER IF CHEST PAIN PERSISTS AFTER 3 DOSES., Disp: 25 tablet, Rfl: 3    oxyCODONE-acetaminophen (PERCOCET) 5-325 mg per tablet, Take 1 tablet by mouth every 4 (four) hours as needed for Pain., Disp: 10 tablet, Rfl: 0    pantoprazole (PROTONIX) 40 MG tablet, Take 1 tablet orally once a day, Disp: 90  tablet, Rfl: 4    polyethylene glycol (GLYCOLAX) 17 gram/dose powder, Use to cap to measure 17g, mix with liquid, and take by mouth once daily., Disp: 510 g, Rfl: 0    rimegepant (NURTEC) 75 mg odt, Take 1 tablet (75 mg total) by mouth daily as needed for Migraine. Place ODT tablet on the tongue; alternatively the ODT tablet may be placed under the tongue, Disp: 8 tablet, Rfl: 11    sertraline (ZOLOFT) 100 MG tablet, Take 2 tablets (200 mg total) by mouth once daily., Disp: 60 tablet, Rfl: 3    suvorexant (BELSOMRA) 20 mg Tab, Take 1 tablet by mouth every evening., Disp: 30 tablet, Rfl: 5    tirzepatide (MOUNJARO) 15 mg/0.5 mL PnIj, Inject 15 mg into the skin every 7 days., Disp: 12 Pen, Rfl: 1

## 2025-05-19 ENCOUNTER — OFFICE VISIT (OUTPATIENT)
Dept: UROLOGY | Facility: CLINIC | Age: 55
End: 2025-05-19
Payer: MEDICARE

## 2025-05-19 VITALS
BODY MASS INDEX: 27.56 KG/M2 | HEART RATE: 100 BPM | WEIGHT: 186.06 LBS | SYSTOLIC BLOOD PRESSURE: 108 MMHG | HEIGHT: 69 IN | DIASTOLIC BLOOD PRESSURE: 66 MMHG

## 2025-05-19 DIAGNOSIS — N40.1 BPH WITH URINARY OBSTRUCTION: Primary | ICD-10-CM

## 2025-05-19 DIAGNOSIS — N52.01 ERECTILE DYSFUNCTION DUE TO ARTERIAL INSUFFICIENCY: Chronic | ICD-10-CM

## 2025-05-19 DIAGNOSIS — N13.8 BPH WITH URINARY OBSTRUCTION: Primary | ICD-10-CM

## 2025-05-19 PROCEDURE — 3066F NEPHROPATHY DOC TX: CPT | Mod: CPTII,S$GLB,,

## 2025-05-19 PROCEDURE — 1160F RVW MEDS BY RX/DR IN RCRD: CPT | Mod: CPTII,S$GLB,,

## 2025-05-19 PROCEDURE — 3061F NEG MICROALBUMINURIA REV: CPT | Mod: CPTII,S$GLB,,

## 2025-05-19 PROCEDURE — 3078F DIAST BP <80 MM HG: CPT | Mod: CPTII,S$GLB,,

## 2025-05-19 PROCEDURE — 3044F HG A1C LEVEL LT 7.0%: CPT | Mod: CPTII,S$GLB,,

## 2025-05-19 PROCEDURE — 3074F SYST BP LT 130 MM HG: CPT | Mod: CPTII,S$GLB,,

## 2025-05-19 PROCEDURE — 1159F MED LIST DOCD IN RCRD: CPT | Mod: CPTII,S$GLB,,

## 2025-05-19 PROCEDURE — 99024 POSTOP FOLLOW-UP VISIT: CPT | Mod: S$GLB,,,

## 2025-05-19 PROCEDURE — 99999 PR PBB SHADOW E&M-EST. PATIENT-LVL III: CPT | Mod: PBBFAC,,,

## 2025-05-19 NOTE — PROGRESS NOTES
CHIEF COMPLAINT:  Inflate/deflate IPP      HISTORY OF PRESENTING ILLINESS:  Abel Hackett is a 55 y.o. male is a patient of Dr Wilcox. He had a 3 piece AMS placed with penile modeling on 4/3/25.    He had his 2 week follow up with Dr Garcia on 4/16/25. He presented to the ED later that day with wound dehiscence which was not infected.   He then saw MARILYN Grayson with our dept in clinic on 5/5/25 for a wound check.     He presents today with his spouse for his first inflate/deflate. He denies any s/s of infection. He is no longer uses anything for pain relief and states the incisions finally healed.     REVIEW OF SYSTEMS:  Review of Systems   Constitutional:  Negative for chills and fever.   Gastrointestinal:  Negative for abdominal pain, nausea and vomiting.   Genitourinary:  Negative for dysuria, flank pain, frequency, hematuria and urgency.   Neurological:  Negative for dizziness and headaches.         PATIENT HISTORY:    Past Medical History:   Diagnosis Date    Coronary artery disease     Depression     Diabetes mellitus     Diabetes mellitus, type 2     History of right coronary artery stent placement     done at [facility]    History of stroke 03/16/2025    Hx of arterial ischemic stroke 03/21/2023    Hx of CABG 04/12/2024    Stroke        Past Surgical History:   Procedure Laterality Date    ANGIOGRAPHY N/A 10/2023    COLONOSCOPY N/A 08/02/2022    Procedure: COLONOSCOPY;  Surgeon: Keira Wheat MD;  Location: [facility];  Service: Endoscopy;  Laterality: N/A;    coronary stents      x6    ELBOW SURGERY      INSERTION OF INFLATABLE PENILE PROSTHESIS N/A 4/3/2025    Procedure: INSERTION, PENILE PROSTHESIS, INFLATABLE;  Surgeon: Kevin Garcia MD;  Location: [address];  Service: Urology;  Laterality: N/A;    LAPAROSCOPIC CHOLECYSTECTOMY N/A 06/13/2024    Procedure: CHOLECYSTECTOMY, LAPAROSCOPIC;  Surgeon: Kevin Ray MD;  Location: [facility];  Service: General;  Laterality: N/A;    RECONSTRUCTION OF  PENIS N/A 4/3/2025    Procedure: RECONSTRUCTION, PENIS;  Surgeon: Kevin Garcia MD;  Location: [facility] OR [address] [place] [address];  Service: Urology;  Laterality: N/A;    REFRACTIVE SURGERY  04/13/2016    ROTATOR CUFF REPAIR Right     VASECTOMY         Family History   Problem Relation Name Age of Onset    Heart disease Mother      Hypertension Mother      Diabetes Mother      Heart attack Mother      Heart disease Father      Hypertension Father      Diabetes Father      Heart attack Father      Kidney failure Brother      Heart attack Maternal Grandfather      Heart attack Maternal Uncle         Social History[1]    Allergies:  Patient has no known allergies.    Medications:  Current Medications[2]    PHYSICAL EXAMINATION:  Physical Exam  Vitals reviewed.   Constitutional:       Appearance: Normal appearance.   HENT:      Head: Normocephalic and atraumatic.   Pulmonary:      Effort: Pulmonary effort is normal. No respiratory distress.   Genitourinary:     Penis: Normal.       Testes: Normal.      Comments: Incision closed and healed. IPP in place; able to identify parts with ease  Skin:     Capillary Refill: Capillary refill takes less than 2 seconds.   Neurological:      General: No focal deficit present.      Mental Status: He is alert.   Psychiatric:         Mood and Affect: Mood normal.         Behavior: Behavior normal.         Thought Content: Thought content normal.           LABS:            Lab Results   Component Value Date    PSA 0.99 11/22/2024       Lab Results   Component Value Date    CREATININE 0.8 03/17/2025    EGFRNORACEVR >60.0 03/17/2025               IMPRESSION:    Encounter Diagnoses   Name Primary?    BPH with urinary obstruction Yes    Erectile dysfunction due to arterial insufficiency          Assessment:       1. BPH with urinary obstruction    2. Erectile dysfunction due to arterial insufficiency        Plan:       We discussed his IPP and the expectations.   Emphasized the importance of cycling daily  at very least and why it is important to do so.  This will make it a lot easier to use and enjoy.  Discussed that he can never over inflate and cycle too many times.  He was able to easily identify the IPP components.  I was able to easily cycle his IPP.  He then cycled it 2x without a lot of difficulty.  We discussed different tips on how to cycle device.  He can take OTC Tylenol or motrin (if allowed) for any initial discomfort until gets use to IPP.  Again discussed importance of cycling daily.  RTC any problems with cycling.  F/u with Dr. Jose barrientos.        ANDRÉS Curiel         [1]   Social History  Socioeconomic History    Marital status:    Tobacco Use    Smoking status: Never    Smokeless tobacco: Never   Substance and Sexual Activity    Alcohol use: Not Currently     Comment: occassional    Drug use: No    Sexual activity: Yes     Partners: Female     Social Drivers of Health     Financial Resource Strain: Low Risk  (4/17/2025)    Overall Financial Resource Strain (CARDIA)     Difficulty of Paying Living Expenses: Not hard at all   Food Insecurity: No Food Insecurity (4/17/2025)    Hunger Vital Sign     Worried About Running Out of Food in the Last Year: Never true     Ran Out of Food in the Last Year: Never true   Transportation Needs: No Transportation Needs (4/17/2025)    PRAPARE - Transportation     Lack of Transportation (Medical): No     Lack of Transportation (Non-Medical): No   Physical Activity: Sufficiently Active (4/17/2025)    Exercise Vital Sign     Days of Exercise per Week: 7 days     Minutes of Exercise per Session: 30 min   Stress: No Stress Concern Present (4/17/2025)    [employer] - Occupational Stress Questionnaire     Feeling of Stress : Not at all   Housing Stability: Low Risk  (4/17/2025)    Housing Stability Vital Sign     Unable to Pay for Housing in the Last Year: No     Homeless in the Last Year: No   [2]   Current Outpatient  Medications:     aspirin (ECOTRIN) 81 MG EC tablet, Take 81 mg by mouth once daily., Disp: , Rfl:     atorvastatin (LIPITOR) 40 MG tablet, Take 40 mg by mouth once daily., Disp: , Rfl:     clonazePAM (KLONOPIN) 0.25 MG TbDL, Take 1 tablet (0.25 mg total) by mouth every evening., Disp: 60 tablet, Rfl: 2    empagliflozin-metformin (SYNJARDY XR) 25-1,000 mg TBph, Take 1 tablet by mouth daily, Disp: 90 tablet, Rfl: 1    evolocumab (REPATHA SURECLICK) 140 mg/mL PnIj, Inject 1 pen into the skin once every 2 weeks., Disp: 2 mL, Rfl: 12    gabapentin (NEURONTIN) 300 MG capsule, Take 3 capsules (900 mg total) by mouth every evening., Disp: 180 capsule, Rfl: 1    metoprolol succinate (TOPROL-XL) 100 MG 24 hr tablet, Take 100 mg by mouth once daily., Disp: , Rfl:     nitroGLYCERIN (NITROSTAT) 0.4 MG SL tablet, PLACE 1 TABLET UNDER TONGUE EVERY 5 MINUTES FOR CHEST PAIN. GO TO ER IF CHEST PAIN PERSISTS AFTER 3 DOSES., Disp: 25 tablet, Rfl: 3    oxyCODONE-acetaminophen (PERCOCET) 5-325 mg per tablet, Take 1 tablet by mouth every 4 (four) hours as needed for Pain., Disp: 10 tablet, Rfl: 0    pantoprazole (PROTONIX) 40 MG tablet, Take 1 tablet orally once a day, Disp: 90 tablet, Rfl: 4    polyethylene glycol (GLYCOLAX) 17 gram/dose powder, Use to cap to measure 17g, mix with liquid, and take by mouth once daily., Disp: 510 g, Rfl: 0    rimegepant (NURTEC) 75 mg odt, Take 1 tablet (75 mg total) by mouth daily as needed for Migraine. Place ODT tablet on the tongue; alternatively the ODT tablet may be placed under the tongue, Disp: 8 tablet, Rfl: 11    sertraline (ZOLOFT) 100 MG tablet, Take 2 tablets (200 mg total) by mouth once daily., Disp: 60 tablet, Rfl: 3    suvorexant (BELSOMRA) 20 mg Tab, Take 1 tablet by mouth every evening., Disp: 30 tablet, Rfl: 5    tirzepatide (MOUNJARO) 15 mg/0.5 mL PnIj, Inject 15 mg into the skin every 7 days., Disp: 12 Pen, Rfl: 1

## 2025-05-20 LAB
LEFT EYE DM RETINOPATHY: POSITIVE
RIGHT EYE DM RETINOPATHY: POSITIVE

## 2025-06-02 RX ORDER — GABAPENTIN 300 MG/1
900 CAPSULE ORAL NIGHTLY
Qty: 180 CAPSULE | Refills: 1 | Status: SHIPPED | OUTPATIENT
Start: 2025-06-02

## 2025-06-12 ENCOUNTER — PATIENT OUTREACH (OUTPATIENT)
Dept: ADMINISTRATIVE | Facility: HOSPITAL | Age: 55
End: 2025-06-12
Payer: MEDICARE

## 2025-06-12 NOTE — PROGRESS NOTES
External Records received -Uploaded and Hyper Linked Diabetic Eye exam  in [facility]       [employer] for eye exam claims

## 2025-06-12 NOTE — LETTER
AUTHORIZATION FOR RELEASE OF   CONFIDENTIAL INFORMATION        We are seeing Abel Hackett, date of birth 1970, in the clinic at [facility]. Keira Wheat MD is the patient's PCP. Abel Hackett has an outstanding lab/procedure at the time we reviewed his chart. In order to help keep his health information updated, he has authorized us to request the following medical record(s):                                           ( X )  EYE EXAM     10/23/23         Please fax records to [facility], Hartman, Megan M., MD,  at [phone] or email to [email or web address].         Patient Name: Abel Hackett  : 1970  Patient Phone #: [phone]              Abel Hackett  MRN: [id number]  : 1970  Age: 54 y.o.  Sex: male         Patient/Legal Guardian Signature  This signature was collected at 01/15/2025           _______________________________   Printed Name/Relationship to Patient      Consent for Examination and Treatment: I hereby authorize the providers and employees of [facility] ([facility]) to provide medical treatment/services which includes, but is not limited to, performing and administering tests and diagnostic procedures that are deemed necessary, including, but not limited to, imaging examinations, blood tests and other laboratory procedures as may be required by the hospital, clinic, or may be ordered by my physician(s) or persons working under the general and/or special instructions of my physician(s).      I understand and agree that this consent covers all authorized persons, including but not limited to physicians, residents, nurse practitioners, physicians' assistants, specialists, consultants, student nurses, and independently contracted physicians, who are called upon by the physician in charge, to carry out the diagnostic procedures and medical or surgical treatment.     I hereby authorize [facility] to retain or dispose of any specimens or  tissue, should there be such remaining from any test or procedure.     I hereby authorize and give consent for [facility] providers and employees to take photographs, images or videotapes of such diagnostic, surgical or treatment procedures of Patient as may be required by [facility] or as may be ordered by a physician. I further acknowledge and agree that [facility] may use cameras or other devices for patient monitoring.     I am aware that the practice of medicine is not an exact science, and I acknowledge that no guarantees have been made to me as to the outcome of any tests, procedures or treatment.     Authorization for Release of Information: I understand that my insurance company and/or their agents may need information necessary to make determinations about payment/reimbursement. I hereby provide authorization to release to all insurance companies, their successors, assignees, other parties with whom they may have contracted, or others acting on their behalf, that are involved with payment for any hospital and/or clinic charges incurred by the patient, any information that they request and deem necessary for payment/reimbursement, and/or quality review.  I further authorize the release of my health information to physicians or other health care practitioners on staff who are involved in my health care now and in the future, and to other health care providers, entities, or institutions for the purpose of my continued care and treatment, including referrals.     REGISTRATION AUTHORIZATION  Form No. 98510 (Rev. 3/25/2024)    Page 1 of 3                       Medicare Patient's Certification and Authorization to Release Information and Payment Request:  I certify that the information given by me in applying for payment under Title XVIII of the Social Security Act is correct. I authorize any méndez of medical or other information about me to release to the Social SecurityAdministration, or its intermediaries or carriers,  any information needed for this or a related Medicare claim. I request that payment of authorized benefits be made on my behalf.     Assignment of Insurance Benefits:   I hereby authorize any and all insurance companies, health plans, defined   benefit plans, health insurers or any entity that is or may be responsible for payment of my medical expenses to pay all hospital and medical benefits now due, and to become due and payable to me under any hospital benefits, sick benefits, injury benefits or any other benefit for services rendered to me, including Major Medical Benefits, direct to [facility] and all independently contracted physicians. I assign any and all rights that I may have against any and all insurance companies, health plans, defined benefit plans, health insurers or any entity that is or may be responsible for payment of my medical expenses, including, but not limited to any right to appeal a denial of a claim, any right to bring any action, lawsuit, administrative proceeding, or other cause of action on my behalf. I specifically assign my right to pursue litigation against any and all insurance companies, health plans, defined benefit plans, health insurers or any entity that is or may be responsible for payment of my medical expenses based upon a refusal to pay charges.            E. Valuables: It is understood and agreed that [facility] is not liable for the damage to or loss of any money, jewelry,   documents, dentures, eye glasses, hearing aids, prosthetics, or other property of value.     F. Computer Equipment: I understand and agree that should I choose to use computer equipment owned by [facility] or if I choose to access the Internet via [facility]s network, I do so at my own risk. [facility] is not responsible for any damage to my computer equipment or to any damages of any type that might arise from my loss of equipment or data.     G. Acceptance of Financial Responsibility:  I agree that in  consideration of the services and   supplies that have been   or will be furnished to the patient, I am hereby obligated to pay all charges made for or on the account of the patient according to the standard rates (in effect at the time the services and supplies are delivered) established by [facility], including its Patient Financial Assistance Policy to the extent it is applicable. I understand that I am responsible for all charges, or portions thereof, not covered by insurance or other sources. Patient refunds will be distributed only after balances at all [facility] facilities are paid.     H. Communication Authorization:  I hereby authorize [facility] and its representatives, along with any billing service   or  who may work on their behalf, to contact me on   my cell phone and/or home phone using pre- recorded messages, artificial voice messages, automatic telephone dialing devices or other computer assisted technology, or by electronic      mail, text messaging, or by any other form of electronic communication. This includes, but is not limited to, appointment reminders, yearly physical exam reminders, preventive care reminders, patient campaigns, welcome calls, and calls about account balances on my account or any account on which I am listed as a guarantor. I understand I have the right to opt out of these communications at any time.      Relationship  Between  Facility and  Provider:      I understand that some, but not all, providers furnishing services to the patient are not employees or agents of [facility]. The patient is under the care and supervision of his/her attending physician, and it is the responsibility of the facility and its nursing staff to carry out the instructions of such physicians. It is the responsibility of the patient's physician/designee to obtain the patient's informed consent, when required, for medical or surgical treatment, special diagnostic or therapeutic procedures, or  hospital services rendered for the patient under the special instructions of the physician/designee.           REGISTRATION AUTHORIZATION  Form No. 15287 (Rev. 3/25/2024)    Page 2 of 3                       Immunizations: [facility] shares immunization information with state sponsored health departments to help you and your doctor keep track of your immunization records. By signing, you consent to have this information shared with the health department in your state:                                [place] - LINKS ([place] Immunization Network for Kids Statewide)                                [place] - MIIX ([place] Immunization Information eXchange)                                [place] - ImmPRINT (Immunization Patient Registry with Integrated Technology)     TERM: This authorization is valid for this and subsequent care/treatment I receive at [facility] and will remain valid unless/until revoked in writing by me.     [facility]: As used in this document, [facility] means all [facility] owned and managed facilities, including, but not limited to, all health centers, surgery centers, clinics, urgent care centers, and hospitals.         [facility] complies with applicable Federal civil rights laws and does not discriminate on the basis of race, color, national origin, age, disability, or sex.  ATENCIÓN: si habla jany, tiene a queen disposición servicios gratuitos de asistencia lingüística. Javi al [phone].  CHÚ Ý: N?u b?n nói Ti?ng Vi?t, có các d?ch v? h? tr? ngôn ng? mi?n phí dành cho b?n. G?i s? [phone].        REGISTRATION AUTHORIZATION  Form No. 71751 (Rev. 3/25/2024)   Page 3 of 3

## 2025-06-12 NOTE — LETTER
AUTHORIZATION FOR RELEASE OF   CONFIDENTIAL INFORMATION        We are seeing Abel Hackett, date of birth 1970, in the clinic at [facility]. Keira Wheat MD is the patient's PCP. Abel Hackett has an outstanding lab/procedure at the time we reviewed his chart. In order to help keep his health information updated, he has authorized us to request the following medical record(s):                                           ( X )  EYE EXAM              Please fax records to [facility], Hartman, Megan M., MD,  at [phone] or email to [email or web address].         Patient Name: Abel Hackett  : 1970  Patient Phone #: [phone]              Abel Hackett  MRN: [id number]  : 1970  Age: 54 y.o.  Sex: male         Patient/Legal Guardian Signature  This signature was collected at 01/15/2025           _______________________________   Printed Name/Relationship to Patient      Consent for Examination and Treatment: I hereby authorize the providers and employees of [facility] ([facility]) to provide medical treatment/services which includes, but is not limited to, performing and administering tests and diagnostic procedures that are deemed necessary, including, but not limited to, imaging examinations, blood tests and other laboratory procedures as may be required by the hospital, clinic, or may be ordered by my physician(s) or persons working under the general and/or special instructions of my physician(s).      I understand and agree that this consent covers all authorized persons, including but not limited to physicians, residents, nurse practitioners, physicians' assistants, specialists, consultants, student nurses, and independently contracted physicians, who are called upon by the physician in charge, to carry out the diagnostic procedures and medical or surgical treatment.     I hereby authorize [facility] to retain or dispose of any specimens or tissue, should  there be such remaining from any test or procedure.     I hereby authorize and give consent for [facility] providers and employees to take photographs, images or videotapes of such diagnostic, surgical or treatment procedures of Patient as may be required by [facility] or as may be ordered by a physician. I further acknowledge and agree that [facility] may use cameras or other devices for patient monitoring.     I am aware that the practice of medicine is not an exact science, and I acknowledge that no guarantees have been made to me as to the outcome of any tests, procedures or treatment.     Authorization for Release of Information: I understand that my insurance company and/or their agents may need information necessary to make determinations about payment/reimbursement. I hereby provide authorization to release to all insurance companies, their successors, assignees, other parties with whom they may have contracted, or others acting on their behalf, that are involved with payment for any hospital and/or clinic charges incurred by the patient, any information that they request and deem necessary for payment/reimbursement, and/or quality review.  I further authorize the release of my health information to physicians or other health care practitioners on staff who are involved in my health care now and in the future, and to other health care providers, entities, or institutions for the purpose of my continued care and treatment, including referrals.     REGISTRATION AUTHORIZATION  Form No. 24286 (Rev. 3/25/2024)    Page 1 of 3                       Medicare Patient's Certification and Authorization to Release Information and Payment Request:  I certify that the information given by me in applying for payment under Title XVIII of the Social Security Act is correct. I authorize any méndez of medical or other information about me to release to the Social SecurityAdministration, or its intermediaries or carriers, any  information needed for this or a related Medicare claim. I request that payment of authorized benefits be made on my behalf.     Assignment of Insurance Benefits:   I hereby authorize any and all insurance companies, health plans, defined   benefit plans, health insurers or any entity that is or may be responsible for payment of my medical expenses to pay all hospital and medical benefits now due, and to become due and payable to me under any hospital benefits, sick benefits, injury benefits or any other benefit for services rendered to me, including Major Medical Benefits, direct to [facility] and all independently contracted physicians. I assign any and all rights that I may have against any and all insurance companies, health plans, defined benefit plans, health insurers or any entity that is or may be responsible for payment of my medical expenses, including, but not limited to any right to appeal a denial of a claim, any right to bring any action, lawsuit, administrative proceeding, or other cause of action on my behalf. I specifically assign my right to pursue litigation against any and all insurance companies, health plans, defined benefit plans, health insurers or any entity that is or may be responsible for payment of my medical expenses based upon a refusal to pay charges.            E. Valuables: It is understood and agreed that [facility] is not liable for the damage to or loss of any money, jewelry,   documents, dentures, eye glasses, hearing aids, prosthetics, or other property of value.     F. Computer Equipment: I understand and agree that should I choose to use computer equipment owned by [facility] or if I choose to access the Internet via [facility]s network, I do so at my own risk. [facility] is not responsible for any damage to my computer equipment or to any damages of any type that might arise from my loss of equipment or data.     G. Acceptance of Financial Responsibility:  I agree that in consideration of  the services and   supplies that have been   or will be furnished to the patient, I am hereby obligated to pay all charges made for or on the account of the patient according to the standard rates (in effect at the time the services and supplies are delivered) established by [facility], including its Patient Financial Assistance Policy to the extent it is applicable. I understand that I am responsible for all charges, or portions thereof, not covered by insurance or other sources. Patient refunds will be distributed only after balances at all [facility] facilities are paid.     H. Communication Authorization:  I hereby authorize [facility] and its representatives, along with any billing service   or  who may work on their behalf, to contact me on   my cell phone and/or home phone using pre- recorded messages, artificial voice messages, automatic telephone dialing devices or other computer assisted technology, or by electronic      mail, text messaging, or by any other form of electronic communication. This includes, but is not limited to, appointment reminders, yearly physical exam reminders, preventive care reminders, patient campaigns, welcome calls, and calls about account balances on my account or any account on which I am listed as a guarantor. I understand I have the right to opt out of these communications at any time.      Relationship  Between  Facility and  Provider:      I understand that some, but not all, providers furnishing services to the patient are not employees or agents of [facility]. The patient is under the care and supervision of his/her attending physician, and it is the responsibility of the facility and its nursing staff to carry out the instructions of such physicians. It is the responsibility of the patient's physician/designee to obtain the patient's informed consent, when required, for medical or surgical treatment, special diagnostic or therapeutic procedures, or hospital services  rendered for the patient under the special instructions of the physician/designee.           REGISTRATION AUTHORIZATION  Form No. 06652 (Rev. 3/25/2024)    Page 2 of 3                       Immunizations: [facility] shares immunization information with state sponsored health departments to help you and your doctor keep track of your immunization records. By signing, you consent to have this information shared with the health department in your state:                                [place] - LINKS ([place] Immunization Network for Kids Statewide)                                [place] - MIIX ([place] Immunization Information eXchange)                                [place] - ImmPRINT (Immunization Patient Registry with Integrated Technology)     TERM: This authorization is valid for this and subsequent care/treatment I receive at [facility] and will remain valid unless/until revoked in writing by me.     [facility]: As used in this document, [facility] means all [facility] owned and managed facilities, including, but not limited to, all health centers, surgery centers, clinics, urgent care centers, and hospitals.         [facility] complies with applicable Federal civil rights laws and does not discriminate on the basis of race, color, national origin, age, disability, or sex.  ATENCIÓN: si habla jany, tiene a queen disposición servicios gratuitos de asistencia lingüística. Javi al [phone].  CHÚ Ý: N?u b?n nói Ti?ng Vi?t, có các d?ch v? h? tr? ngôn ng? mi?n phí dành cho b?n. G?i s? [phone].        REGISTRATION AUTHORIZATION  Form No. 84217 (Rev. 3/25/2024)   Page 3 of 3

## 2025-06-15 ENCOUNTER — PATIENT MESSAGE (OUTPATIENT)
Dept: ADMINISTRATIVE | Facility: OTHER | Age: 55
End: 2025-06-15
Payer: MEDICARE

## 2025-06-16 ENCOUNTER — PATIENT MESSAGE (OUTPATIENT)
Facility: CLINIC | Age: 55
End: 2025-06-16
Payer: MEDICARE

## 2025-06-17 ENCOUNTER — PATIENT OUTREACH (OUTPATIENT)
Facility: OTHER | Age: 55
End: 2025-06-17
Payer: MEDICARE

## 2025-06-17 NOTE — PROGRESS NOTES
Patient outreached to assess if he received the care he was needing. I was unable to reach pt. I did see pt went thru with his Urology appt and has been completed. Close encounter. Kathe Hyman

## 2025-06-19 ENCOUNTER — OFFICE VISIT (OUTPATIENT)
Dept: PSYCHIATRY | Facility: CLINIC | Age: 55
End: 2025-06-19
Payer: MEDICARE

## 2025-06-19 DIAGNOSIS — F41.1 GENERALIZED ANXIETY DISORDER: Primary | ICD-10-CM

## 2025-06-19 DIAGNOSIS — R41.89 COGNITIVE IMPAIRMENT: ICD-10-CM

## 2025-06-19 DIAGNOSIS — Z65.8 PSYCHOSOCIAL STRESSORS: ICD-10-CM

## 2025-06-19 NOTE — PROGRESS NOTES
"    06/19/2025  8:40 AM  Abel Hackett  [id number]    Outpatient Psychiatry Follow-Up Visit (MD/NP)    6/19/2025    Clinical Status of Patient:  Outpatient (Ambulatory)    Chief Complaint:  Abel Hackett is a 55 y.o. male who presents today for follow-up of anxiety.  Met with patient.      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    He states his anxiety levels are variable, "I do have it, especially in a big social situation, I can get through it, but the by the time it's over, like a big function or a family event, after I'm exhausted and beforehand I get very anxious."        Psychiatric Review Of Systems - Is patient experiencing or having changes in:  Depressed mood: no  sleep: no, takes klonopin and belsomra from Dr. Espinosa  appetite: no  energy/anergy: "fine"  interest/pleasure/anhedonia: no  anxiety/panic: variable  guilty/hopelessness/worthlessness: no  concentration: no  Memory: yes  S.I.B.s/risky behavior: no  Irritability: no  Substance abuse: no  Racing thoughts: no  Impulsive behaviors: no  Paranoia: no  AVH: yes, "very specific, little girl with blonde hair, four or five feet tall, she will walk in the room, I'll see her out the corner of my eye," "at least once a week," pt reports no distress or issue from this symptom, "I'm used to it now, doesn't scare me or anything"                  Medical Review of Systems   Review of Systems "   Constitutional:  Negative for chills and fever.   HENT:  Negative for hearing loss.    Eyes:  Negative for blurred vision.   Respiratory:  Negative for shortness of breath.    Cardiovascular:  Negative for chest pain and palpitations.   Gastrointestinal:  Negative for constipation, diarrhea and nausea.   Genitourinary:  Negative for dysuria.   Musculoskeletal:  Negative for back pain and neck pain.   Skin:  Negative for rash.   Neurological:  Negative for dizziness and headaches.   Endo/Heme/Allergies:  Negative for environmental allergies.       Past Medical, Family and Social History: The patient's past medical, family and social history have been reviewed and updated as appropriate within the electronic medical record - see encounter notes.      Social History     Socioeconomic History    Marital status:    Tobacco Use    Smoking status: Never    Smokeless tobacco: Never   Substance and Sexual Activity    Alcohol use: Not Currently     Comment: occassional    Drug use: No    Sexual activity: Yes     Partners: Female     Social Drivers of Health     Financial Resource Strain: Low Risk  (4/17/2025)    Overall Financial Resource Strain (CARDIA)     Difficulty of Paying Living Expenses: Not hard at all   Food Insecurity: No Food Insecurity (4/17/2025)    Hunger Vital Sign     Worried About Running Out of Food in the Last Year: Never true     Ran Out of Food in the Last Year: Never true   Transportation Needs: No Transportation Needs (4/17/2025)    PRAPARE - Transportation     Lack of Transportation (Medical): No     Lack of Transportation (Non-Medical): No   Physical Activity: Sufficiently Active (4/17/2025)    Exercise Vital Sign     Days of Exercise per Week: 7 days     Minutes of Exercise per Session: 30 min   Stress: No Stress Concern Present (4/17/2025)    [employer] [place] [employer] - Occupational Stress Questionnaire     Feeling of Stress : Not at all   Housing Stability: Low Risk   "(4/17/2025)    Housing Stability Vital Sign     Unable to Pay for Housing in the Last Year: No     Homeless in the Last Year: No         Compliance: yes    Side effects: None    Risk Parameters:  Patient reports no suicidal ideation  Patient reports no homicidal ideation  Patient reports no self-injurious behavior  Patient reports no violent behavior      Exam (detailed: at least 9 elements; comprehensive: all 15 elements)       Vitals could not be obtained 2/2 virtual visit          CONSTITUTIONAL  General Appearance: unremarkable, age appropriate    MUSCULOSKELETAL  Muscle Strength and Tone:no tremor, no tic  Abnormal Involuntary Movements: No  Gait and Station: non-ataxic    PSYCHIATRIC   Level of Consciousness: awake and alert   Orientation: person, place, and situation  Grooming: Casually dressed and Well groomed  Psychomotor Behavior: normal, cooperative  Speech: normal tone, normal rate, normal pitch, normal volume  Language: grossly intact  Mood: "good"  Affect: Consistent with mood  Thought Process: linear, logical  Associations: intact   Thought Content: DENIES suicidal ideation and DENIES homicidal ideation  Perceptions: denies hallucinations  Memory: Able to recall past events, Remote intact, and Recent intact  Attention:Attends to interview without distraction  Fund of Knowledge: Aware of current events and Vocabulary appropriate   Estimate if Intelligence:  Average based on work/education history, vocabulary and mental status exam  Insight: has awareness of illness  Judgment: behavior is adequate to circumstances        Assessment and Diagnosis   Status/Progress: Based on the examination today, the patient's problem(s) is/are improved.  New problems have not been presented today.   Co-morbidities are complicating management of the primary condition.            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 Instructed to call 911 or present to ER for emergency including SI or HI.    - Discussed diagnosis, risks and benefits of proposed treatment above vs alternative treatments vs no treatment, and potential side effects of these treatments. Discussed the inherent unpredictability of treatment. The patient expresses understanding of the above and displays the capacity to agree with this treatment given said understanding. Patient also agrees that, currently, the benefits outweigh the risks and would like to pursue this treatment at this time.     - Any medications being used off-label were discussed with the patient inclusive of the evidence base for the use of the medications and consent was obtained for the off-label use of the medication.     The patient location is: home    Visit type: audiovisual    Face to Face time with patient: 15  20 minutes of total time spent on the encounter, which includes face to face time and non-face to face time preparing to see the patient (eg, review of tests), Obtaining and/or reviewing separately obtained history, Documenting clinical information in the electronic or other health record, Independently interpreting results (not separately reported) and communicating results to the patient/family/caregiver, or Care coordination (not separately reported).     Each patient to whom he or she provides medical  services by telemedicine is:  (1) informed of the relationship between the physician and patient and the respective role of any other health care provider with respect to management of the patient; and (2) notified that he or she may decline to receive medical services by telemedicine and may withdraw from such care at any time.        Froylan Marie III, MD    Return to Clinic:  4 m

## 2025-07-08 ENCOUNTER — PATIENT MESSAGE (OUTPATIENT)
Dept: ADMINISTRATIVE | Facility: OTHER | Age: 55
End: 2025-07-08
Payer: MEDICARE

## 2025-07-08 DIAGNOSIS — G47.52 RBD (REM BEHAVIORAL DISORDER): ICD-10-CM

## 2025-07-08 NOTE — TELEPHONE ENCOUNTER
Last ordered: 1/07/2025 - 1/07/2026    Last seen: 9/17/2024 - Dr. Espinosa    5/05/2025 - Dr. Cavanaugh    Upcoming appt: 7/14/2025 - Seda

## 2025-07-10 RX ORDER — CLONAZEPAM 0.25 MG/1
0.25 TABLET, ORALLY DISINTEGRATING ORAL NIGHTLY
Qty: 60 TABLET | Refills: 2 | Status: SHIPPED | OUTPATIENT
Start: 2025-07-10 | End: 2026-07-10

## 2025-07-13 PROBLEM — I20.89 STABLE ANGINA PECTORIS: Status: ACTIVE | Noted: 2022-11-07

## 2025-07-13 PROBLEM — I25.9 IHD (ISCHEMIC HEART DISEASE): Status: ACTIVE | Noted: 2022-11-07

## 2025-07-13 PROBLEM — E11.69 DM TYPE 2 WITH DIABETIC MIXED HYPERLIPIDEMIA: Status: ACTIVE | Noted: 2025-02-11

## 2025-07-13 PROBLEM — Z98.890 S/P PERICARDIOCENTESIS: Status: ACTIVE | Noted: 2023-11-28

## 2025-07-13 PROBLEM — Z95.1 S/P CABG X 4: Status: ACTIVE | Noted: 2023-11-28

## 2025-07-13 PROBLEM — F02.80 LEWY BODY DEMENTIA WITHOUT BEHAVIORAL DISTURBANCE: Status: ACTIVE | Noted: 2025-02-11

## 2025-07-13 PROBLEM — E78.2 DM TYPE 2 WITH DIABETIC MIXED HYPERLIPIDEMIA: Status: ACTIVE | Noted: 2025-02-11

## 2025-07-13 PROBLEM — G31.83 LEWY BODY DEMENTIA WITHOUT BEHAVIORAL DISTURBANCE: Status: ACTIVE | Noted: 2025-02-11

## 2025-07-13 PROBLEM — Z95.5 HISTORY OF CORONARY ARTERY STENT PLACEMENT: Status: ACTIVE | Noted: 2022-11-07

## 2025-07-13 RX ORDER — SUVOREXANT 20 MG/1
TABLET, FILM COATED ORAL
COMMUNITY
Start: 2025-02-11

## 2025-07-13 RX ORDER — METHOCARBAMOL 500 MG/1
1000 TABLET, FILM COATED ORAL 3 TIMES DAILY
COMMUNITY
Start: 2025-02-02 | End: 2025-07-14

## 2025-07-13 RX ORDER — EVOLOCUMAB 140 MG/ML
INJECTION, SOLUTION SUBCUTANEOUS
COMMUNITY
Start: 2024-08-12

## 2025-07-13 RX ORDER — GABAPENTIN 300 MG/1
CAPSULE ORAL
COMMUNITY
Start: 2025-02-11 | End: 2025-07-14 | Stop reason: SDUPTHER

## 2025-07-13 RX ORDER — CLONAZEPAM 0.5 MG/1
TABLET ORAL
COMMUNITY
Start: 2025-02-11 | End: 2025-07-14 | Stop reason: SDUPTHER

## 2025-07-13 RX ORDER — TAMSULOSIN HYDROCHLORIDE 0.4 MG/1
CAPSULE ORAL
COMMUNITY
Start: 2025-02-11 | End: 2025-07-14

## 2025-07-13 RX ORDER — KETOROLAC TROMETHAMINE 10 MG/1
TABLET, FILM COATED ORAL
COMMUNITY
Start: 2025-02-11 | End: 2025-07-14

## 2025-07-14 ENCOUNTER — OFFICE VISIT (OUTPATIENT)
Facility: CLINIC | Age: 55
End: 2025-07-14
Payer: MEDICARE

## 2025-07-14 VITALS
SYSTOLIC BLOOD PRESSURE: 106 MMHG | WEIGHT: 180.13 LBS | HEART RATE: 94 BPM | HEIGHT: 69 IN | BODY MASS INDEX: 26.68 KG/M2 | DIASTOLIC BLOOD PRESSURE: 68 MMHG

## 2025-07-14 DIAGNOSIS — Z96.0 S/P INSERTION OF PENILE IMPLANT: ICD-10-CM

## 2025-07-14 DIAGNOSIS — F02.80 DIFFUSE LEWY BODY DISEASE: Primary | ICD-10-CM

## 2025-07-14 DIAGNOSIS — G31.83 DIFFUSE LEWY BODY DISEASE: Primary | ICD-10-CM

## 2025-07-14 DIAGNOSIS — G47.33 OSA (OBSTRUCTIVE SLEEP APNEA): ICD-10-CM

## 2025-07-14 PROCEDURE — 3066F NEPHROPATHY DOC TX: CPT | Mod: CPTII,S$GLB,, | Performed by: NURSE PRACTITIONER

## 2025-07-14 PROCEDURE — 3008F BODY MASS INDEX DOCD: CPT | Mod: CPTII,S$GLB,, | Performed by: NURSE PRACTITIONER

## 2025-07-14 PROCEDURE — 99215 OFFICE O/P EST HI 40 MIN: CPT | Mod: S$GLB,,, | Performed by: NURSE PRACTITIONER

## 2025-07-14 PROCEDURE — 3061F NEG MICROALBUMINURIA REV: CPT | Mod: CPTII,S$GLB,, | Performed by: NURSE PRACTITIONER

## 2025-07-14 PROCEDURE — 3078F DIAST BP <80 MM HG: CPT | Mod: CPTII,S$GLB,, | Performed by: NURSE PRACTITIONER

## 2025-07-14 PROCEDURE — 3074F SYST BP LT 130 MM HG: CPT | Mod: CPTII,S$GLB,, | Performed by: NURSE PRACTITIONER

## 2025-07-14 PROCEDURE — 99999 PR PBB SHADOW E&M-EST. PATIENT-LVL IV: CPT | Mod: PBBFAC,,, | Performed by: NURSE PRACTITIONER

## 2025-07-14 PROCEDURE — 96116 NUBHVL XM PHYS/QHP 1ST HR: CPT | Mod: 59,S$GLB,, | Performed by: NURSE PRACTITIONER

## 2025-07-14 PROCEDURE — 3044F HG A1C LEVEL LT 7.0%: CPT | Mod: CPTII,S$GLB,, | Performed by: NURSE PRACTITIONER

## 2025-07-14 PROCEDURE — 1160F RVW MEDS BY RX/DR IN RCRD: CPT | Mod: CPTII,S$GLB,, | Performed by: NURSE PRACTITIONER

## 2025-07-14 PROCEDURE — 1159F MED LIST DOCD IN RCRD: CPT | Mod: CPTII,S$GLB,, | Performed by: NURSE PRACTITIONER

## 2025-07-14 NOTE — PROGRESS NOTES
[facility]  [facility]     PATIENT: Abel Hackett  VISIT DATE: 2025  MRN: [id number]  PRIMARY PROVIDER: Keira Wheat MD  : 1970    Recent Clinical History:    Chief Complaint: Annual follow-up     Clinical Interim:       The patient is a right-handed male who presents today with his wife for his annual follow-up appointment. Since his last visit on 2024, he reports a further decline in memory, tremors, and balance, with worsening movement and balance particularly noted in the mornings. He describes increasing forgetfulness, evidenced by difficulty recalling the day of the week, frequently misplacing objects such as his drink, and having trouble remembering names. Recently, while attempting to type his email address--a longstanding account--he was unable to recall it and had to ask his wife for assistance. Despite his wife encouraging him to write things down to aid memory, he declines to do so.    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 The patient reports that while this change did not significantly enhance his memory, it has resulted in improved mood. He continues to take gabapentin at bedtime for restless leg syndrome. He experiences frequent headaches, which he associates with poor sleep quality. While taking Belsomra, he averages four to five hours of sleep per night. He remains noncompliant with CPAP therapy, citing traumatic associations with a past cardiac tamponade event during CPAP use. He reports that the smell of the CPAP mask triggers unpleasant memories, even after switching to a nasal pillow interface. Referral to sleep medicine was discussed to explore alternative options for improving CPAP compliance. The patient does not nap during the day but may lie down to rest without sleeping.    He reports experiencing visual hallucinations approximately once weekly, typically  seeing a woman in a red dress or a young girl with blonde hair out of the corner of his eye. These hallucinations are non-verbal, non-distressing, and disappear when lights are turned on.    He underwent inflatable penile prosthesis (IPP) placement for erectile dysfunction on April 3, 2025, and was seen for post-operative follow-up on April 16, 2025. Subsequently, he required an emergency department visit for wound dehiscence at the surgical site and was instructed to allow healing by secondary intention. He reports that the wound has since healed without further complications.    He remains on long-term disability and frequently travels to [place] to visit his daughter, who is attending college. He reports driving independently without episodes of getting lost or disorientation. His appetite has decreased since initiating Mounjaro 15 mg, and he has lost nine pounds. Despite his wifes concern regarding his delayed first meal, typically around 3:00 PM, and potential effects on blood glucose and cognition, he prefers to continue his current regimen as he feels well at this weight and his most recent HbA1c is 6.2.    His psychiatrist has recommended discontinuing sertraline due to potential cognitive side effects; however, the patient prefers to continue the medication, prioritizing mood stability over memory concerns. Referrals to physical therapy for gait instability and sleep medicine for sleep optimization and CPAP compliance have been discussed and agreed upon. The patient and his wife verbalized understanding of the plan and are aware to contact the office with any further questions or concerns.          Relevant History of Baseline Neurocognitive Phenotype:    Developmental Milestones: The patient/family report no known birth complications or early life problems. The patient met all developmental milestones.  Learning Neurodivergence: The patient/family report no signs or symptoms suggestive of developmental  learning disorder.  Educational History: HS AA. + 2 years Emergency Medical Science BA. + 2 years Management MA.+ 2 years Administration  Estimated Formal Educational Experience: 18  Career/Skill Reserve: The patient has an extensive background, having worked as a paramedic for 12 years. 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 In 2022, there were reports of short-term and long-term issues, with further details noted in 2022.  Retired:       Relevant Neurotrauma History:        Relevant Family History:    Relevant General History:  Mother -  at age 74 LOAD/VAD  Father -  at age 74 CVA/DM2 onset 30s, migraine, brain tumor/meningioma  Paternal family Unknown  Maternal Grandmother - cancer  Brother -  50s with ESRD with early onset DM2 onset 30s  Brother - mild DM2  Daughter - lives with family  Relevant Neurocognitive Disorder History:  Mother - VAD/LOAD onset late 60s  Relevant Movement Disorder History:  Father - Tremors onset 30-40s  Relevant Motor Neuron Disease History:  The patient/family denies a history of ALS, MND, PLS.  Relevant Developmental/Neurodivergent History:  Brother - developmental disorder  Relevant Psychiatric History:  The patient/family denies a history of MDD, BD, CLAUDIA, Schizophrenia.  Known Genetic Profile: There is no relevant genetic biomarkers available on record.            Clinical History Per Electronic Medical Record:    Past Medical History  Depression  Diabetes mellitus  Diabetes mellitus, type 2  History of right coronary artery stent placement  Biliary dyskinesia 2024  Hx of CABG 2024  Coronary artery disease involving coronary bypass graft with angina pectoris 2024  Myoclonus 2024  MCI (mild cognitive impairment) with memory loss 2024  PAF (paroxysmal atrial fibrillation) 01/15/2024  Mild cognitive impairment of uncertain or unknown  etiology 09/20/2023  Cognitive impairment 03/21/2023  Hx of arterial ischemic stroke 03/21/2023  Psychosocial stressors 03/02/2023  Positive colorectal cancer screening using Cologuard test 08/02/2022  Obstructive sleep apnea (adult) (pediatric)  Atypical chest pain 04/25/2016  Anxiety 04/19/2016  Type 2 diabetes mellitus with retinopathy and macular edema, without long-term current use of insulin, unspecified laterality, unspecified retinopathy severity 04/06/2016  Generalized anxiety disorder 04/06/2016  Hyperlipidemia 12/23/2014  Impotence of organic origin 12/23/2014  Insomnia 07/10/2013  Uncontrolled type 2 diabetes mellitus 07/10/2013  Diabetes mellitus 09/06/2012  Ulnar nerve entrapment 09/06/2012  Past Surgical History  Angiography N/a 10/2023  Colonoscopy N/a 08/02/2022  Coronary Stents  Laparoscopic Cholecystectomy N/a 6/13/2024  Refractive Surgery 04/13/2016  Vasectomy  Current Medication(s) Prior to Appointment  aspirin (ECOTRIN) 81 MG EC tablet Take 81 mg by mouth once daily  atorvastatin (LIPITOR) 40 MG tablet Take 40 mg by mouth once daily  empagliflozin-metformin (SYNJARDY XR) 25-1,000 mg TBph Take 1 tablet by mouth Daily  evolocumab (REPATHA SURECLICK) 140 mg/mL PnIj INJECT ONE PEN INTO THE SKIN EVERY 2 WEEK  evolocumab (REPATHA SURECLICK) 140 mg/mL PnIj Inject 1 pen into the skin once every 2 week  gabapentin (NEURONTIN) 300 MG capsule Take 3 capsules (900 mg total) by mouth every evening. 180 capsule 1  levETIRAcetam (KEPPRA) 250 MG Tab Take 1 tablet (250 mg total) by mouth every evening. 30 tablet 11  metoprolol succinate (TOPROL-XL) 100 MG 24 hr tablet Take 100 mg by mouth once daily  nitroGLYCERIN (NITROSTAT) 0.4 MG SL tablet  nitroGLYCERIN (NITROSTAT) 0.4 MG SL tablet PLACE 1 TABLET UNDER TONGUE EVERY 5 MINUTES FOR CHEST PAIN. GO TO ER IF CHEST PAIN PERSISTS AFTER 3 DOSES. 25 tablet 3  nitroGLYCERIN (NITROSTAT) 0.4 MG SL tablet PLACE 1 TABLET UNDER TONGUE EVERY 5 MINUTES FOR CHEST PAIN. GO TO  ER IF CHEST PAIN PERSISTS AFTER 3 DOSES. 25 tablet 3  pantoprazole (PROTONIX) 40 MG tablet Take 1 tablet orally once a day  pantoprazole (PROTONIX) 40 MG tablet Take 1 tablet orally once a day  QUEtiapine (SEROQUEL) 25 MG Tab Take 1 tablet (25 mg total) by mouth every evening. 90 tablet 3  rimegepant (NURTEC) 75 mg odt Take 1 tablet (75 mg total) by mouth daily  sertraline (ZOLOFT) 100 MG tablet Take 2 tablets (200 mg total) by mouth once daily  suvorexant (BELSOMRA) 10 mg Tab Take 1 tablet by mouth every evening. 30 tablet 5  tirzepatide (MOUNJARO) 15 mg/0.5 mL PnIj Inject 15 mg into the skin every 7 day  Allergies  No Alergies on Chart            Review of cognitive, visuospatial, motor, sensory, and behavioral systems:     Memory  The patient's memory has worsened in the past few years.  The patient does repeat statements or asks the same question repeatedly.  The patient does have difficulty remembering recent important conversations.  The patient does have difficulty remembering recent events.  The patient does not forget information within minutes.  The patient's recent retrograde memory is intact.  The patient's remote memory is intact.  Attention  The patient's attention and concentration are impaired.  The patient does not have attentional fluctuations.  The patient does not have difficulty maintaining selective attention.  The patient does become easily distracted.  The patient does have difficulty with divided attention.  Executive  The patient's cognitive processing speed is slower.  The patient does have difficulty with working memory.  The patient does misplace personal items (e.g., keys, cell phone, wallet) more frequently.  The patient does have difficulty keeping track of @HIS@ medications.  The patient does have difficulty with planning/organizing/completing multistep tasks.  The patient does have difficulty with executive attention.  The patient does have difficulty with flexible thinking.  The  patient does not have difficulty with response inhibition.  The patient denies new impulsivity or rash/careless actions.  The patient's judgment is intact.  Language  The patient's speech is affected.  The patient does not forget people's names more frequently.  The patient does have word-finding difficulties.  The patient's speech is fluent and non-effortful.  The patient's speech is grammatically intact.  The patient does make inaccurate word substitutions.  The patient does not have difficulty reading.  The patient does not appear to have impaired comprehension.  Visuospatial  The patient has new visuospatial problems.  The patient does not become confused or disoriented in *new*, unfamiliar places.  The patient does not have trouble with navigation.  The patient does not get lost in familiar places.  The patient does not have visuospatial disorientation.  The patient does not have difficulty recognizing objects or faces.  The patient denies problems with driving or parking.  Motor/Coordination  The patient does have difficulty with walking.  The patient does feel imbalanced.  The patient denies having fallen.  The patient does not appear to have new muscle weakness.  The patient does have difficulty buttoning shirts, operating zippers, or manipulating tools/utensils.  The patient's handwriting has not become micrographic.  The patient does have a resting tremor.  The patient does report having new involuntary movements and/or muscle jerking. Comment: nocturnal jerking - worse since 2023  The patient does not have swallowing difficulty.  The patient denies new muscle cramps and twitching.  Sensory  The patient denies new numbness, tingling, paresthesias, or pain.  The patient reports new loss of vision, blurry vision, and/or double vision.  The patient reports a recent loss of hearing and/or worsening tinnitus.  The patient denies anosmia.  Sleep  The patient reports difficulty sleeping.  The patient does have  difficulty going to sleep.  The patient reports difficulty staying asleep and/or frequently awakening at night.  The patient does snore and/or have witnessed apneas while sleeping.  When the patient wakes up in the morning, the patient does not feel well-rested.  The patient denies dream-enactment behavior.  The patient has reported symptoms suggestive of restless leg syndrome.  Behavior  The patient's personality has changed.  The patient does not have symptoms of disinhibition and social inappropriateness.  The patient does not have symptoms to suggest a loss of manners or decorum.  The patient does not appear apathetic or has decreased motivation.  The patient does not appear to have a change in inertia.  The patient's emotional expression has changed.  The patient does have emotional blunting or lability.  The patient does not have symptoms of irritability and mood lability.  The patient does not have symptoms of agitation, aggression, or violent outbursts.  The patient's insight into their health and situation is intact.  The patient's personal hygiene is intact.  The patient is not exhibiting a diminished response to other people's needs and feelings.  The patient is not exhibiting a diminished social interest, interrelatedness, or personal warmth.  The patient denies restlessness.  The patient denies new and/or worsening simple repetitive behaviors.  The patient's speech has not become simplified or become repetitive/stereotyped.  The patient denies new/worsening complex repetitive/ritualistic compulsions and behaviors.  The patient does not have symptoms of hyper-religiosity or dogmatism.  The patient's interests/pleasures have not become restrictive, simplified, interrupting, or repetitive.  The patient denies a change of self-stimulating behavior.  The patient denies any changes in eating behavior.  The patient denies increased consumption of food or substances.  The patient denies oral exploration or  consumption of inedible objects.  Psychiatric  The patient does not feel depressed.  The patient is not exhibiting symptoms of social withdrawal/indifference.  The patient does have anxiety.  The patient does not exhibit cycling behavior.  The patient does not exhibit hyperactive behavior.  The patient is not exhibited symptoms of paranoia.  The patient does not have delusions.  The patient does have hallucinations.   The patient does not have a history of sensitivity to neuroleptic/psychotropic medications.  Medical Review of Systems  The patient does not have constipation.  The patient does not have urinary incontinence.  The patient denies orthostatic lightheadedness.  The patient's weight is unstable. Comment: 8 lbs since last visit.             Functional Capacity Assessment: Neurological Wellbeing, Intelligence, and Social Evaluation:     Instrumental Activities of Daily Living:   Communal Operations: Mild level of inability to perform independantly.  Finances: Mild level of inability to perform independantly.  Housework: Normal level of functional independance.  Personal Health: Normal level of functional independance.  Basic Activities of Daily Living:   Axial Motor: Normal level of functional independance.  Grooming: Normal level of functional independance.  Hygeine: Normal level of functional independance.  Oropharngeal Motor: Normal level of functional independance.  Personal Health: Normal level of functional independance.  Toiletry: Normal level of functional independance.  Functional Capacity Scales:   Lois Instrumental Activities of Daily Living Scale: Score: 0/8 suggestive of Normal.  Functional Assessment Staging Tool (FAST Scale): Score: 2/15 suggestive of Mild Cognitive Impairment (Stage 3).  [facility] Functional Assessment Scale (FAS): Score: 2/30 suggestive of Normal.  Clinical Dementia Rating Sum of Boxes: Score: 2/18 suggestive of Normal.            Laboratories:     Neurodegenerative Biomarkers  - Skin  Name Reference Previous Values   Phosphorylation Alpha-Synuclein - Posterior Cervical (Left) normal Normal   2024-03-15      Phosphorylation Alpha-Synuclein - Distal Thigh (Left) normal Abnormal (H)   2024-03-15      Phosphorylation Alpha-Synuclein - Distal Leg (Left) normal Normal   2024-03-15      Metabolic Screening  Name Reference Previous Values   Cholesterol Total 120 - 199 mg/dL 135   2023-03-06      Triglycerides 30 - 150 mg/dL 139   2023-03-06      HDL 40 - 75 mg/dL 47   2023-03-06      VLDL MG/DL 28 (E)   2023-03-06      LDL Direct MG/DL 71 (E)   2023-03-06      LDL Calculated 0 - 160 MG/DL 63   2023-03-06      Hemoglobin A1C External 4.0 - 5.6 % 7.9 (H)   2023-03-06      Estimated Avg Glucose 68 - 131 mg/dL 180 (H)   2023-03-06      BNP 0 - 99 pg/mL 10   2023-03-06      Lactic Acid, Plasma (Organic Acid) <=4000.0 nmol/mL 2120.9   2023-03-06      2-Hydroxybutyric Acid <=124.0 nmol/mL 155.2 (H)   2023-03-06      3-Hydroxybutyric Acid <=700.0 nmol/mL 462.8   2023-03-06      Pyruvic Acid, Plasma (Organic Acid) <=350.0 nmol/mL 92.1   2023-03-06      cis-Aconitic acid <=9.0 nmol/mL 2.3   2023-03-06      3-Hydroxypropionic Acid <=12.4 nmol/mL 4.9   2023-03-06      5CG-3-PQUYHAJCMCCDL ACID <=2.5 nmol/mL 1.4   2023-03-06      3OH-ISOVALERIC ACID <=15.4 nmol/mL 1.0   2023-03-06      Succinic Acid, Plasma (Organic Acid) <=10.0 nmol/mL 4.2   2023-03-06      FUMARIC ACID <=5.0 nmol/mL 3.0   2023-03-06      3-METHYLGLUTACONIC ACID <=1.6 nmol/mL 1.6   2023-03-06      MALIC ACID <=20.0 nmol/mL 8.9   2023-03-06      2-Ketobutyric Acid <=16.0 nmol/mL 9.3   2023-03-06      2-Ketoisovaleric Acid, Plasma (Organic Acid) <=35.0 nmol/mL 15.0   2023-03-06      2-Keto-3-Methyvaleric Acid, Plasma (Organic Acid) <=70.0 nmol/mL 50.4   2023-03-06      2-Ketoisocaproic Acid, Plasma (Organic Acid) <=70.0 nmol/mL 57.4   2023-03-06      2-Ketoglutaric Acid <=40.0 nmol/mL 11.8   2023-03-06       Neuroendocrine/Electrolyte Screening  Name Reference Previous Values   Creatinine 0.5 - 1.4 mg/dL 1.2   2023-03-06      TSH 0.400 - 4.000 uIU/mL 1.711   2023-03-06      T4 Total 4.5 - 11.5 ug/dL 9.0   2023-03-06      Neuro-Inflammatory Screening - Serum  Name Reference Previous Values   Sed Rate 0 - 36 mm/Hr 18 (H)   2023-03-06      Methlymalonic Acid <0.40 umol/L 0.18   2023-03-06      Neuro-Nutritional Screening  Name Reference Previous Values   B12 Def, 2-Methylcitric Acid <=1.0 nmol/mL 0.7   2023-03-06      Folate 4.0 - 24.0 ng/mL 6.8   2023-03-06      Vitamin B12 180 - 914 ng/L 400   2023-03-06      Thiamine 38 - 122 ug/L 102   2023-03-06      B12 Def. Methylmalonic Acid <=0.40 nmol/mL 0.16   2023-03-06      Neurodegenerative Biomarkers - CSF  Name Reference Previous Values   Abeta42 >834 pg/mL 45   2023-03-06      Neurodegenerative Biomarkers - Serum   Name Reference Previous Values   Abeta40 pg/mL 273   2023-03-06      Neuro-Infectious Screening  Name Reference Previous Values   Syphilis Treponemal Ab Nonreactive Nonreactive   2023-03-06      Neurodegenerative Biomarkers - Serum  Name Reference Previous Values   Phospho-Tau (181P) <0.97 pg/mL 0.59   2023-03-06      Neurofilament Light Chain, Plasma <=37.9 pg/mL 14.9   2023-03-06      Coagulopathy Screening  Name Reference Previous Values   PT Lupus Anticoagulant 12.0 - 15.5 sec 12.9   2023-03-06      PTT Lupus Anticoagulant 32 - 48 sec 31 (H)   2023-03-06      Thrombin Time 14.7 - 19.5 sec Not Applicable   2023-03-06      Reptilase Time <=21.9 sec Not Applicable   2023-03-06      PTT Heparin Neutralized 32 - 48 sec Not Applicable   2023-03-06      PTT-LA Mix 32 - 48 sec Not Applicable   2023-03-06      DRVVT Screen 33 - 44 sec 35 (H)   2023-03-06      dRVVT Incubated 1:1 Mix 33 - 44 sec Not Applicable   2023-03-06      dRVVT Confirm Negative ratio Not Applicable   2023-03-06      Beta-2 Glyco 1 IgG <=20 SGU 12.0 (H)   2023-03-06       Beta-2 Glyco 1 IgM <=20 SMU 2.4   2023-03-06      Beta-2 Glyco 1 IgA <=20 MISTY 4.6   2023-03-06      APA Isotype IgG 0.00 - 14.99 GPL 9.40   2023-03-06      APA Isotype IgM 0.00 - 12.49 MPL 9.40   2023-03-06      Protein C Activity 70 - 150 % 141   2023-03-06      Protein S Activity 65 - 160 % 149   2023-03-06      Antithrombin III 83 - 118 % 118   2023-03-06      Anti-Xa Qualitative Interpretation Not Present Not Performed   2023-03-06      Anticoagulant Medication Neutralization Not Performed Not Performed   2023-03-06      Neutralized dRVVT Screen Ratio <=1.20 Not Performed   2023-03-06                Neurological Relevant Procedures:    Electroencephalogram performed on 01/18/2024  Formal Interpretation: During the maximally alert state, the posterior dominant rhythm consisted of moderate irregular 9-10 Hz activity which was seen symmetrically over the occipital, parietal and posterior temporal regions. The PDR was reactive to eye opening and attenuated but returned with eye closure. No abnormal activity was noted with eye closure. A moderate amplitude somewhat irregular mid-range beta activity was present and had a symmetrical distribution over both hemispheres. Drowsiness and sleep were not recorded. No lateralized nor focal features were present and no spike or sharp waves were recorded.  Provider Interpretation: No evidence of epileptic abnormalities  Electrocardiogram performed on 10/21/2023  Formal Interpretation: Vent. Rate : 099 BPM Atrial Rate : 099 BPM P-R Int : 172 ms QRS Dur : 080 ms QT Int : 328 ms P-R-T Axes : 050 105 015 degrees QTc Int : 420 ms Normal sinus rhythm Rightward axis Borderline Abnormal ECG  Provider Interpretation: Received ECG has no evidence of sinus node disease. HR (>=50-60). Prolonged MN interval (>0.22 s). Broad QRS complex (> 0.12 s).  Electrocardiogram performed on 10/21/2023  Formal Interpretation: Vent. Rate : 099 BPM Atrial Rate : 099 BPM P-R Int : 172 ms QRS  Dur : 080 ms QT Int : 328 ms P-R-T Axes : 050 105 015 degrees QTc Int : 420 ms Normal sinus rhythm Rightward axis Borderline Abnormal ECG  Provider Interpretation: Received ECG has no evidence of sinus node disease. HR (>=50-60). Prolonged LA interval (>0.22 s). Broad QRS complex (> 0.12 s).            Neurologically Relevant Imaging:    MRI brain/head without contrast performed on 01/18/2024  Radiologist Interpretation: Age-appropriate generalized cerebral volume loss with mild chronic microvascular ischemic disease with a remote lacunar-type infarction in the right cerebellar hemisphere. There is no evidence for an acute infarction or intracranial hemorrhage.  Provider Interpretation: Normal Brain Imaging. No significant cortical/subcortical atrophy. No significant T2/FLAIR hyperintensities, DWI/ADC/SWI abnormalities  T1-weighted (T1W) Image Summary: Mild generalized cortical atrophy posterior>frontal, dorsal>ventral, lateral>medial without a clear degenerative patterning.  FLAIR/T2-weighted (T2W) Image Summary: No Significant hyperintensities appreciated on MRI T2/FLAIR  Diffusion Weighted Imaging with ADC Mapping Summary: No Significant DWI hyperintensities/hypointensities. No ADC correlation.  Susceptibility-weighted imaging (SWI) and/or GRE Summary: No Significant hypointensities to suggest cortical/subcortical hemosiderin deposition.  MRI brain/head without contrast performed on 01/18/2024  Radiologist Interpretation: Age-appropriate generalized cerebral volume loss with mild chronic microvascular ischemic disease with a remote lacunar-type infarction in the right cerebellar hemisphere. There is no evidence for an acute infarction or intracranial hemorrhage.  Provider Interpretation: Unchanged since early 2023- Mild generalized cortical atrophy posterior>frontal, dorsal>ventral, lateral>medial without a clear degenerative patterning. age indeterminate right cerebellar infarct with mild microvascular  disease  T1-weighted (T1W) Image Summary: Unchanged since early 2023 - Mild generalized cortical atrophy posterior>frontal, dorsal>ventral, lateral>medial without a clear degenerative patterning. Intact corpus callosum with normal volume ratio. intact midbrain and brainstem normal volume and ratio. Intact hippocampal structures, volume ratio  FLAIR/T2-weighted (T2W) Image Summary: Unchanged since early 2023- scattered subcortical microvascular disease. Age infarcts in the bilateral cerebellum. Right greater than left. Otherwise stable. Mild parahippocampal stippling mild anterior periventricular capping.  Diffusion Weighted Imaging with ADC Mapping Summary: No Significant DWI hyperintensities/hypointensities. No ADC correlation.  Susceptibility-weighted imaging (SWI) and/or GRE Summary: No Significant hypointensities to suggest cortical/subcortical hemosiderin deposition.  MRI brain/head without contrast performed on 06/26/2023  Radiologist Interpretation: Two small areas of remote infarction are seen in the right cerebellar hemisphere. No new infarction is seen. Stable focus of FLAIR signal abnormality in the left posterior frontal white matter, unchanged from prior study likely related to chronic microvascular ischemic disease.  Provider Interpretation: Mild generalized cortical atrophy posterior>frontal, dorsal>ventral, lateral>medial without a clear degenerative patterning. age indeterminate right cerebellar infarct with mild microvascular disease  T1-weighted (T1W) Image Summary: Mild generalized cortical atrophy posterior>frontal, dorsal>ventral, lateral>medial without a clear degenerative patterning. Intact corpus callosum with normal volume ratio. intact midbrain and brainstem normal volume and ratio. Intact hippocampal structures, volume ratio  FLAIR/T2-weighted (T2W) Image Summary: scattered subcortical microvascular disease. Age infarcts in the bilateral cerebellum. Right greater than left. Otherwise  stable. Mild parahippocampal stippling mild anterior periventricular capping.  Diffusion Weighted Imaging with ADC Mapping Summary: No Significant DWI hyperintensities/hypointensities. No ADC correlation.  Susceptibility-weighted imaging (SWI) and/or GRE Summary: No Significant hypointensities to suggest cortical/subcortical hemosiderin deposition.            Clinical Summary:     The patient is a 55-year-old male with a relevant past medical history of DLB, CLAUDIA, DM2 onset 30s, CVD, CAD, MD, HLD, HTN and JEANNINE who presents reporting a 4-year history of step-wise progressive neurocognitive impairment.  Neurobehavioral Review of Systems Interpretation: The review of systems indicates a range of neurocognitive and neurological deficits, suggesting dysfunctions in both cortical and subcortical regions. Memory impairments, including general memory, event recall, and working memory, are linked to hippocampal and frontal lobe pathologies. Issues with attention, processing speed, and planning point towards frontal lobe and prefrontal cortex involvement. Motor skill abnormalities like tremor, jerks, and imbalance, along with sleep disturbances such as JEANNINE and EDS, suggest basal ganglia and brainstem dysfunctions. Emotional and behavioral changes hint at disruptions in the limbic system and associated networks.  Neurological Examination Interpretation: The neurological examination revealed deficits in arousal, attention, and thought processes, indicating dysfunctions potentially linked to frontal and parietal cortical regions. Impaired sensory fibers (A? and A?/C fibers) and abnormal reflexes suggest disruptions in both cortical and subcortical pathways, including the spinal cord and peripheral nerves. Motor impairments such as difficulty rising, altered tone, myoclonus, and issues with speed and stance point to possible basal ganglia and cerebellar network pathologies. Additionally, diminished insight further supports the  involvement of frontal lobe dysfunction.  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    The stage of the patient's clinical presentation meets the criteria for Mild Cognitive Impairment (MCI) as defined by the National [place] [employer] Aging-Alzheimer's Association(Jose et al., 2011, Alzheimer's & Dementia). This diagnosis is characterized by concerns regarding an intraindividual change in cognition, impairment in one or more cognitive domains, and preservation of independence in functional abilities. Notably, the patient is not demented.   [place] Instrumental Activities of Daily Living Scale: 0/8 suggestive of Normal.  Functional Assessment Staging Tool (FAST Scale): 2/15 suggestive of Mild Cognitive Impairment (Stage 3).  Clinical Dementia Rating Sum of Boxes (CDR-SOB): 2/18 suggestive of Normal.  [facility] Functional Assessment Scale (FAS): 2/30 suggestive of Normal.     The patient's clinical syndromic presentation has features suggestive of Dementia with Lewy Bodies (McJigar et al., Neurology 2005) and Vascular contributions to cognitive impairment and dementia (Shubham et al., Neurology 1993).    the patient's initial clinical syndrome was suggestive of Vascular contributions to cognitive impairment (Shubham et al., Neurology 1993).  Fluctuating, stepwise progression of cognitive deficits.  Personality and mood changes, abulia, depression, emotional incontinence, or other subcortical deficits including psychomotor retardation and abnormal executive function.    However over the last year between 2023 to 2024 clinical presentation is increasingly concerning for early signs of Dementia with Lewy Bodies (DLB)   Prominent or persistent memory impairment may not necessarily occur in the early stages  but is usually evident with progression.  Deficits on tests of attention, executive function, and visuospatial ability may be especially prominent.  Fluctuating cognition with pronounced variations in attention and alertness.  Recurrent visual hallucinations.  Spontaneous motor features of parkinsonism.  REM sleep behavior disorder.  There is a high probability that the patient's clinical presentation is indicative of early signs of an alpha-synuclein mediated disease. This condition may have been provoked by hypoxemia and metabolic deficits, exacerbating cortical hyperexcitability in the posterior hemisphere, manifested as cortical myoclonus. 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    At present, all neurodegenerative diseases can only be diagnosed with 100% certainty through a brain autopsy. The suspected neuropathology underlying the patient's neurocognitive impairment is suggestive of Lewy body disease/alpha-synucleinopathy (LBD) and Vascular Contributions to Cognitive Impairment and Dementia (VCID).    Serum fluid protein biomarkers include Phospho-Tau (181P) Serum: 0.59 (N) on 03/06/2023  There are no cerebrospinal fluid protein biomarkers available on record.  There are no dermatological protein biomarkers available on record.  There is no relevant genetic biomarkers available on record.  There are no radiographic biomarkers available on record.              Impression:   Impression Summary:  The patient is a right-handed male presenting for his annual follow-up with his wife. Since his last visit in September 2024, he reports progressive decline in memory, tremors, and balance, with worsening symptoms in the mornings. Cognitive difficulties include frequent forgetfulness, misplacing objects, difficulty recalling names, and an isolated episode of being unable to recall his longstanding email address. Mood has improved following discontinuation of  Keppra and Seroquel, though memory has not significantly benefited. He continues gabapentin for RLS, Belsomra for sleep (averaging 4-5 hours/night), and remains noncompliant with CPAP due to traumatic associations with a past cardiac event. Referral to sleep medicine has been discussed. Visual hallucinations occur weekly, are non-distressing, and resolve when lights are turned on.    Post-IPP surgery in April 2025, he experienced wound dehiscence that has since healed. He remains on long-term disability, frequently travels to visit family, and drives without disorientation. Appetite has decreased on Mounjaro 15 mg, resulting in a 9-lb weight loss, with HbA1c at 6.2. His wife expresses concern about delayed meals and potential low glucose affecting cognition. The patient prefers to continue sertraline despite concerns for cognitive side effects, prioritizing mood stability. Referrals to physical therapy and sleep medicine have been recommended and accepted. Patient and spouse are aware to contact the office with any new or worsening concern      The above observations were discussed with the patient and their supporting historian(s). We have discussed the additional diagnostic(s) and/or management below.            Care Management Plan:       Optimize Neurocognitive Impairment and Quality  Continue the MIND Diet and other lifestyle behavior that may help maintain brain health  Optimize Behavioral Management and Quality.  No indication for memantine at this time  Continue Zoloft 200 mg daily  Continue   clonazepam 0.25 mg at bedtime.  Continue  Gabapentin to 300 mg at bedtime as needed  Follow-up with psychiatry as scheduled.   Optimize Sleep Hygiene and Quality  We discussed and recommended additional diagnostic/management of sleep disorder to optimize brain health and longevity.  f/u sleep medicine specialist for symptoms consistent with sleep apnea  We have discussed sleep hygiene and behavior  Continue Belsomra   20   Optimize Complex Medical Disorder and Quality.  f/u Endocrinology for evaluation early-onset diabetes in his 30s with a family history of multiple relatives with early onset diabetes with multiple medical complications - consider alternatives to Ozempic due to stepwise progressive decline since starting in mid 2022  f/u hematology - hypercoagulable state of unclear etiology  Optimize Movement Disorder and Quality  Placed a referral for PT    Thank you for entrusting us with the care of your patient. Should you have any questions or concerns, please feel free to contact us at your convenience.     It was a pleasure to see the patient, and we look forward to their follow-up visit.     This note was dictated using the [email or web address] Flu[employer] Direct voice recognition program. Please be aware that word recognition errors may occasionally occur and might be overlooked during review.                      Billing Statement:     I spent a total of 52 minutes (from 01:36 PM to 02:28 PM) on 07/14/2025, engaging in person in a face-to-face consultation with the patient. More than 50% of this time was devoted to counseling on symptoms, treatment plans, risks, therapeutic options, lifestyle modifications, and safety concerns related to the above diagnoses.     A Review of Systems was completed, encompassing 10 of the 14 systems. All findings were negative, except those noted in the History of Present Illness (HPI) and Review of Systems (ROS) Sections. The systems reviewed were Constitutional (Const), Eyes, Ear/Nose/Throat (ENT), Respiratory (Resp), Cardiovascular (CV), Gastrointestinal (GI), Genitourinary (), Musculoskeletal (MSK), Skin, and Neurological (Neuro).     I spent a total of 10 minutes reviewing and summarizing records from outside physicians on the day of the clinical evaluation. This review and summary were conducted as described in the History of Present Illness (HPI) and Assessment.     I performed a neurobehavioral  status examination on the day of clinical evaluation that included a clinical assessment of thinking, reasoning, and judgment to ensure a comprehensive approach in managing the complex and evolving needs of the patient's neurocognitive condition. Please see above HPI and ROS for full details. This exam was performed on the day of clinical evaluation and included 18 minutes spent on direct face-to-face clinical observation and interview with the patient and 20 minutes spent interpreting test results and preparing the report. The total time of 38 minutes spent on the neurobehavioral status examination is not included in the time spent on evaluation and management coding.     Total Billing time spent on encounter/documentation for this patient's evaluation and management, not including the Neurobehavioral Status Examination: 44 minutes.            Signing Physician:  Seda Waters NP

## 2025-07-21 ENCOUNTER — PATIENT MESSAGE (OUTPATIENT)
Dept: ADMINISTRATIVE | Facility: HOSPITAL | Age: 55
End: 2025-07-21
Payer: MEDICARE

## 2025-07-22 ENCOUNTER — OFFICE VISIT (OUTPATIENT)
Dept: SLEEP MEDICINE | Facility: CLINIC | Age: 55
End: 2025-07-22
Payer: MEDICARE

## 2025-07-22 DIAGNOSIS — G25.81 RLS (RESTLESS LEGS SYNDROME): ICD-10-CM

## 2025-07-22 DIAGNOSIS — G47.00 INSOMNIA, UNSPECIFIED TYPE: ICD-10-CM

## 2025-07-22 DIAGNOSIS — G47.33 OSA (OBSTRUCTIVE SLEEP APNEA): ICD-10-CM

## 2025-07-22 DIAGNOSIS — G47.30 SLEEP APNEA, UNSPECIFIED TYPE: Primary | ICD-10-CM

## 2025-07-22 PROCEDURE — 98006 SYNCH AUDIO-VIDEO EST MOD 30: CPT | Mod: 95,,,

## 2025-07-22 PROCEDURE — 3066F NEPHROPATHY DOC TX: CPT | Mod: CPTII,95,,

## 2025-07-22 PROCEDURE — 3044F HG A1C LEVEL LT 7.0%: CPT | Mod: CPTII,95,,

## 2025-07-22 PROCEDURE — 3061F NEG MICROALBUMINURIA REV: CPT | Mod: CPTII,95,,

## 2025-07-22 NOTE — PROGRESS NOTES
The patient location is: [place]  The chief complaint leading to consultation is: Sleep Apnea    Visit type: audiovisual    Face to Face time with patient: 10  25 minutes of total time spent on the encounter, which includes face to face time and non-face to face time preparing to see the patient (eg, review of tests), Obtaining and/or reviewing separately obtained history, Documenting clinical information in the electronic or other health record, Independently interpreting results (not separately reported) and communicating results to the patient/family/caregiver, or Care coordination (not separately reported).     Each patient to whom he or she provides medical services by telemedicine is:  (1) informed of the relationship between the physician and patient and the respective role of any other health care provider with respect to management of the patient; and (2) notified that he or she may decline to receive medical services by telemedicine and may withdraw from such care at any time.    Notes:     CC: Sleep Apnea     Referred by Seda Waters NP for a sleep evaluation.     HPI     Subjective    HPI:  Sleep Apnea:  Symptoms:    [] Loud snoring[] Witnessed apneas [] Gasping [] Choking[x] Interrupted Sleep []  Nocturia []  Non refreshing sleep [x] Excessive daytime sleepiness   []  Morning headaches [] Nasal Congestion [] Dry Mouth [] Excessive Daytime Fatigue [] None  Duration:   [] Days  [] Months  [x] Years  Comments: Patient presents for sleep apnea follow up, previously diagnosed with JEANNINE in 2018 with HST showing Moderate JEANNINE, AHI 29, weight 227lbs, 180lb most recent weight. With significant weight loss the patient believed he no longer needed cpap therapy and stopped using. With diagnosis of lewy body dementia, neurologist is concerned about potential obstructive sleep apnea and would like to get it ruled out. Patient reports interrupted sleep and occasional excessive daytime sleepiness. PMH significant for  dementia, Afib, CLAUDIA, DM2, CAD, CABGx4, Insomnia.      Wake up Feeling: [x] Refreshed  [] Non refreshed [] Occasionally Tired  Do You Take Naps: [x] Yes [] No       Insomnia  Symptoms: [] Difficulty Falling Asleep [x] Difficulty Staying Asleep [] Frequent Nocturnal Awakenings [] Poor Sleep Quality [] Non-Restorative Sleep [] Early Morning Awakening  Current Episode Severity: [] Mild [x] Moderate [] Severe  Treatments: [x] Medications []  Sleep Hygiene & Stimulus Control []  Sleep Restriction []  CBTI [] Relaxation Techniques  Treatment Effects: [x] Decreased Sleep Latency [x] Sleep Consolidation [] Improved Sleep Quality []  Medication Side Effects [] No Improvement  Insomnia: [] Improving [] Worsening [] Unchanged [x] Stable  Comments: patient reports difficulty staying asleep, current treatments include clonazepam  0.25 mg nightly and belsomra 20 mg. These medications have reduced nightime awakenings to 1 time per week.        RLS  Symptoms (Must be worse at night): [x] Uncomfortable urge to move the legs [] Abnormal sensation in legs [] Tingling [] Burning [] Crawling Sensation []  Aching [] Augmentation  Duration of Symptoms: [] < 15 mins [] 15-30 mins [] 30 - 60 mins []  hours  Episode Frequency: [] Occasional [] Weekly [] Daily  Treatments: [] Iron [x] Gabapentin [] Requip [] Mirapex [] Clonazepam [] Sleep Hygiene [] Avoiding Triggers [] Stretching [] Exercise [] Warm Baths [] Walking  Iron Status: []  Ferritin > 50 [] Ferritin < 50  Treatment Effects: [] Symptoms Resolved [x] Symptoms Reduced [] Medication Side Effects []  No Improvement  Augmentation: [] Yes [] No  RLS: [] Improving [] Worsening [] Unchanged [x] Stable  Comments: patient reports an uncomfortable urge to move the legs, current treatments include gabapentin 900 mg nightly. Symptoms are resolved with gabapentin, has not had symptoms in over a year.           7/15/2025     4:07 PM   EPWORTH SLEEPINESS SCALE TOTAL SCORE    Total score 4         Patient-reported     (Validated Sleepiness Questionnaire with higher score indicating greater sleepiness (0-24)    STOP BANG Questionnaire  Patient diagnosed with Obstructive Sleep Apnea?: No  Has loud snoring: No  Disturbed sleep, daytime fatigue, daytime somnolence: Yes  Observed to have interrupted breathing during sleep: No  Takes medication for high blood pressure: Yes  Not taking BP medication but supposed to be: No  Recent BMI (Calculated): 26.6  Is BMI greater than 35 kg/m2?: 0=No  Age older than 50 years old?: 1=Yes  Has large neck size >40cm (15.7in., large male shirt size, large male collar size >16): No  Gender - Male: 1=Yes  STOP-Bang Total Score: 4  (Validated Stop Bang Questionnaire with higher score indicating greater risk of JEANNINE (0-2, 3-4, 5-8)) Risk: Moderate        7/15/2025     4:10 PM   Sleep Clinic New Patient   What time do you go to bed on a week day? (Give a range) 10-11   What time do you go to bed on a day off? (Give a range) 10-11   How long does it take you to fall asleep? (Give a range) 10 mins   On average, how many times per night do you wake up? 1   How long does it take you to fall back into sleep? (Give a range) 10 kayli   What time do you wake up to start your day on a week day? (Give a range) 8   What time do you wake up to start your day on a day off? (Give a range) 9   What time do you get out of bed? (Give a range) 9   On average, how many hours do you sleep? 8-10   On average, how many naps do you take per day? 0   Rate your sleep quality from 0 to 5 (0-poor, 5-great). 4   Have you experienced:  Weight loss?   How much weight have you lost or gained (in lbs.) in the last year? 9   On average, how many times per night do you go to the bathroom?  0   Have you ever had a sleep study/CPAP machine/surgery for sleep apnea? Yes   Have you ever had a CPAP machine for sleep apnea? Yes   Have you ever had surgery for sleep apnea? No       Current Sleep Medication(s): Gabapentin 900 mg  nightly  Belsomra 20 mg nightly  Clonazepam 0.25 mg nightly          Objective    Records Reviewed:   Lab Results   Component Value Date    TSH 1.711 01/03/2024    CO2 24 03/17/2025    HGBA1C 6.4 (H) 03/17/2025      No echocardiogram results found for the past 12 months  Sleep Studies: Diagnostic: Date: 11/20218, .HST, AHI: 29, RDI: 53, Min O2: 85.7%, moderate JEANNINE  Scan on 12/7/2018  1:58 PM   Objective:  Vitals: There were no vitals taken for this visit.   Exam:  Gen: Well Appearing, demonstrates insight  Skin: No rash or lesions on bridge of nose or mouth          Assessment & Plan  JEANNINE (obstructive sleep apnea)  PSG to evaluate sleep apnea  Orders:    Ambulatory referral/consult to Sleep Disorders    Sleep apnea, unspecified type  Diagnostic Testing:  Polysomnogram (PSG) is indicated due to comorbid conditions: CAD, Hypertension, Anxiety, Cognitive Impairment, and Dementia with symptoms: interrupted sleep, restless sleep, cognitive dysfunction, and short term memory concerns  Education & Treatment Options:  Discussed the etiology and consequences of untreated JEANNINE, including risks of cardiovascular disease, hypertension, stroke, metabolic dysfunction, and excessive daytime sleepiness.  Reviewed treatment options:  Positive Airway Pressure (PAP) therapy, Weight loss Positional therapy, Oral appliance therapy, Inspire  Lifestyle Modifications: Advised weight management, alcohol reduction, and optimizing sleep hygiene.  Safety Precautions: Recommended avoiding driving if experiencing excessive daytime sleepiness.  Next Steps:  If JEANNINE is confirmed, patient agrees to trial APAP  If PAP therapy is initiated, follow-up within 31-90 days to assess compliance and effectiveness.  Results will be communicated by Mychart   Orders:    Polysomnogram (CPAP will be added if patient meets diagnostic criteria.); Future    Insomnia, unspecified type  Discussed sleep hygiene measures including regular sleep schedule, optimal sleep  environment, and relaxing presleep rituals.  Avoid daytime naps.  Avoid caffeine after noon.  Recommended daily exercise.  Educational materials distributed.  Continue taking medication(s)       RLS (restless legs syndrome)  Discussed sleep hygiene measures including regular sleep schedule, optimal sleep environment, and relaxing presleep rituals.  Reduce caffeine, nicotine, and alcohol intake, especially in evening  Stay Hydrated  Avoid long periods of inactivity  Stretching  Medications: gabapentin 900 mg nightly

## 2025-07-22 NOTE — PATIENT INSTRUCTIONS
SLEEP LAB (Luz or Ethan) will contact you to schedule the sleep study. Their number is [phone] (ext [phone]). Please call them if you do not hear from them in 2 weeks from now.  The [facility] is located on 7th floor of the [facility]; [facility] is located in [facility] ( 3rd floor [facility]).    SLEEP CLINIC (my assistant) will call you when the sleep study results are ready or I will message you through the portal with the results as we have discussed - if you have not heard from us by 2 weeks from the date of the study, or you can use [facility] to contact me.    Our clinic phone number is [phone] (ext 1)       You are advised to abstain from driving should you feel sleepy or drowsy.

## 2025-07-22 NOTE — ASSESSMENT & PLAN NOTE
Discussed sleep hygiene measures including regular sleep schedule, optimal sleep environment, and relaxing presleep rituals.  Avoid daytime naps.  Avoid caffeine after noon.  Recommended daily exercise.  Educational materials distributed.  Continue taking medication(s)

## 2025-07-23 ENCOUNTER — PATIENT OUTREACH (OUTPATIENT)
Dept: ADMINISTRATIVE | Facility: HOSPITAL | Age: 55
End: 2025-07-23
Payer: MEDICARE

## 2025-07-23 DIAGNOSIS — E11.69 DM TYPE 2 WITH DIABETIC MIXED HYPERLIPIDEMIA: Primary | ICD-10-CM

## 2025-07-23 DIAGNOSIS — E78.2 DM TYPE 2 WITH DIABETIC MIXED HYPERLIPIDEMIA: Primary | ICD-10-CM

## 2025-07-23 NOTE — PROGRESS NOTES
Portal active: Active  Chart reviewed, immunization record updated.  No new results noted on [facility] or [facility] web site.  Care Everywhere updated.   [facility] Hypertension Program Eligibility: Not Eligible   [facility] Diabetes Program Eligibility: Not Eligible   Patient care coordination note  Upcoming PCP visit updated.  Next PCP visit (No follow up scheduled at this time)  LOV with PCP 01/24/2025   Patient responded via patient portal in response to a campaign questionnaire to schedule a lipid panel. Ordered and scheduled Lipid Panel. Sent Portal message to patient regarding appointment details.

## 2025-07-26 ENCOUNTER — PATIENT MESSAGE (OUTPATIENT)
Facility: CLINIC | Age: 55
End: 2025-07-26
Payer: MEDICARE

## 2025-07-28 ENCOUNTER — PATIENT MESSAGE (OUTPATIENT)
Facility: CLINIC | Age: 55
End: 2025-07-28
Payer: MEDICARE

## 2025-07-28 ENCOUNTER — PATIENT OUTREACH (OUTPATIENT)
Dept: ADMINISTRATIVE | Facility: HOSPITAL | Age: 55
End: 2025-07-28
Payer: MEDICARE

## 2025-07-28 ENCOUNTER — TELEPHONE (OUTPATIENT)
Dept: ADMINISTRATIVE | Facility: HOSPITAL | Age: 55
End: 2025-07-28
Payer: MEDICARE

## 2025-07-28 ENCOUNTER — TELEPHONE (OUTPATIENT)
Dept: FAMILY MEDICINE | Facility: CLINIC | Age: 55
End: 2025-07-28
Payer: MEDICARE

## 2025-07-28 DIAGNOSIS — G47.00 INSOMNIA, UNSPECIFIED TYPE: ICD-10-CM

## 2025-07-28 DIAGNOSIS — G31.84 MCI (MILD COGNITIVE IMPAIRMENT): ICD-10-CM

## 2025-07-28 RX ORDER — SUVOREXANT 20 MG/1
1 TABLET, FILM COATED ORAL NIGHTLY
Qty: 90 TABLET | Refills: 1 | Status: SHIPPED | OUTPATIENT
Start: 2025-07-28

## 2025-07-28 NOTE — TELEPHONE ENCOUNTER
Good morning,    Mr. Abel Hackett is requesting that additional labs be added to his upcoming appointment on 07/30/2025. Specifically, he would like the following labs included: CMP, Microalbumin, A1c, and CBC. He notes that it has been a while since these labs were last done. Please advise.    Thank you,  [place]on

## 2025-07-30 ENCOUNTER — LAB VISIT (OUTPATIENT)
Dept: LAB | Facility: HOSPITAL | Age: 55
End: 2025-07-30
Attending: FAMILY MEDICINE
Payer: MEDICARE

## 2025-07-30 DIAGNOSIS — E11.69 DM TYPE 2 WITH DIABETIC MIXED HYPERLIPIDEMIA: ICD-10-CM

## 2025-07-30 DIAGNOSIS — E78.2 DM TYPE 2 WITH DIABETIC MIXED HYPERLIPIDEMIA: ICD-10-CM

## 2025-07-30 LAB
CHOLEST SERPL-MCNC: 76 MG/DL (ref 120–199)
CHOLEST/HDLC SERPL: 1.8 {RATIO} (ref 2–5)
HDLC SERPL-MCNC: 43 MG/DL (ref 40–75)
HDLC SERPL: 56.6 % (ref 20–50)
LDLC SERPL CALC-MCNC: 11 MG/DL (ref 63–159)
NONHDLC SERPL-MCNC: 33 MG/DL
TRIGL SERPL-MCNC: 110 MG/DL (ref 30–150)

## 2025-07-30 PROCEDURE — 36415 COLL VENOUS BLD VENIPUNCTURE: CPT

## 2025-07-30 PROCEDURE — 80061 LIPID PANEL: CPT

## 2025-07-30 NOTE — TELEPHONE ENCOUNTER
I am just seeing this now - but he wasn't due for an A1c or a micro and his recent bmp was normal.  Does he have an upcoming appt to see me?  I would like his lipid panel forwarded to cards.  mh

## 2025-07-31 ENCOUNTER — TELEPHONE (OUTPATIENT)
Dept: SLEEP MEDICINE | Facility: OTHER | Age: 55
End: 2025-07-31
Payer: MEDICARE

## 2025-07-31 ENCOUNTER — TELEPHONE (OUTPATIENT)
Dept: NEUROLOGY | Facility: CLINIC | Age: 55
End: 2025-07-31
Payer: MEDICARE

## 2025-07-31 ENCOUNTER — PATIENT MESSAGE (OUTPATIENT)
Facility: CLINIC | Age: 55
End: 2025-07-31
Payer: MEDICARE

## 2025-07-31 NOTE — TELEPHONE ENCOUNTER
Patient notified. I faxed over his lipid to [facility] [employer] and scheduled an appt for him to come in and see you.

## 2025-08-05 ENCOUNTER — OFFICE VISIT (OUTPATIENT)
Dept: FAMILY MEDICINE | Facility: CLINIC | Age: 55
End: 2025-08-05
Payer: MEDICARE

## 2025-08-05 VITALS
DIASTOLIC BLOOD PRESSURE: 72 MMHG | WEIGHT: 178.56 LBS | HEIGHT: 69 IN | OXYGEN SATURATION: 98 % | HEART RATE: 82 BPM | RESPIRATION RATE: 16 BRPM | SYSTOLIC BLOOD PRESSURE: 114 MMHG | BODY MASS INDEX: 26.45 KG/M2

## 2025-08-05 DIAGNOSIS — G31.83 LEWY BODY DEMENTIA WITHOUT BEHAVIORAL DISTURBANCE: ICD-10-CM

## 2025-08-05 DIAGNOSIS — I25.709 CORONARY ARTERY DISEASE INVOLVING CORONARY BYPASS GRAFT OF NATIVE HEART WITH ANGINA PECTORIS: ICD-10-CM

## 2025-08-05 DIAGNOSIS — G31.84 MCI (MILD COGNITIVE IMPAIRMENT) WITH MEMORY LOSS: Primary | ICD-10-CM

## 2025-08-05 DIAGNOSIS — G47.33 OBSTRUCTIVE SLEEP APNEA (ADULT) (PEDIATRIC): ICD-10-CM

## 2025-08-05 DIAGNOSIS — I20.89 STABLE ANGINA PECTORIS: ICD-10-CM

## 2025-08-05 DIAGNOSIS — E11.311 TYPE 2 DIABETES MELLITUS WITH RETINOPATHY AND MACULAR EDEMA, WITHOUT LONG-TERM CURRENT USE OF INSULIN, UNSPECIFIED LATERALITY, UNSPECIFIED RETINOPATHY SEVERITY: ICD-10-CM

## 2025-08-05 DIAGNOSIS — Z00.00 PHYSICAL EXAM, ANNUAL: ICD-10-CM

## 2025-08-05 DIAGNOSIS — E78.5 HYPERLIPIDEMIA, UNSPECIFIED HYPERLIPIDEMIA TYPE: ICD-10-CM

## 2025-08-05 DIAGNOSIS — Z95.1 S/P CABG X 4: ICD-10-CM

## 2025-08-05 DIAGNOSIS — F02.80 LEWY BODY DEMENTIA WITHOUT BEHAVIORAL DISTURBANCE: ICD-10-CM

## 2025-08-05 DIAGNOSIS — F41.1 GENERALIZED ANXIETY DISORDER: ICD-10-CM

## 2025-08-05 DIAGNOSIS — I48.0 PAF (PAROXYSMAL ATRIAL FIBRILLATION): ICD-10-CM

## 2025-08-05 DIAGNOSIS — Z95.5 HISTORY OF CORONARY ARTERY STENT PLACEMENT: ICD-10-CM

## 2025-08-05 PROCEDURE — 3074F SYST BP LT 130 MM HG: CPT | Mod: CPTII,S$GLB,, | Performed by: FAMILY MEDICINE

## 2025-08-05 PROCEDURE — 99214 OFFICE O/P EST MOD 30 MIN: CPT | Mod: 25,S$GLB,, | Performed by: FAMILY MEDICINE

## 2025-08-05 PROCEDURE — 1159F MED LIST DOCD IN RCRD: CPT | Mod: CPTII,S$GLB,, | Performed by: FAMILY MEDICINE

## 2025-08-05 PROCEDURE — 99396 PREV VISIT EST AGE 40-64: CPT | Mod: GZ,S$GLB,, | Performed by: FAMILY MEDICINE

## 2025-08-05 PROCEDURE — 3008F BODY MASS INDEX DOCD: CPT | Mod: CPTII,S$GLB,, | Performed by: FAMILY MEDICINE

## 2025-08-05 PROCEDURE — 3061F NEG MICROALBUMINURIA REV: CPT | Mod: CPTII,S$GLB,, | Performed by: FAMILY MEDICINE

## 2025-08-05 PROCEDURE — 3044F HG A1C LEVEL LT 7.0%: CPT | Mod: CPTII,S$GLB,, | Performed by: FAMILY MEDICINE

## 2025-08-05 PROCEDURE — 3078F DIAST BP <80 MM HG: CPT | Mod: CPTII,S$GLB,, | Performed by: FAMILY MEDICINE

## 2025-08-05 PROCEDURE — 99999 PR PBB SHADOW E&M-EST. PATIENT-LVL III: CPT | Mod: PBBFAC,,, | Performed by: FAMILY MEDICINE

## 2025-08-05 PROCEDURE — 3066F NEPHROPATHY DOC TX: CPT | Mod: CPTII,S$GLB,, | Performed by: FAMILY MEDICINE

## 2025-08-05 NOTE — PROGRESS NOTES
Subjective:       Patient ID: Abel Hackett is a 55 y.o. male.    Chief Complaint: Annual Exam (Annual check-up )    History of Present Illness    Patient presents today for follow up He reports worsening memory issues confirmed by neurology. He experiences significant difficulty with basic cognitive tasks, including struggling to mentally process and complete simple tasks such as inserting a screw. He describes intermittent daytime hallucinations which he acknowledges and reports do not cause distress. Neurological symptoms impact his ability to perform fine motor tasks, with tremors complicating manual activities. He expresses interest in potential treatment options, including experimental therapies or clinical trials, though none are currently available. Echocardiogram was performed yesterday as routine. Eye exam with retina specialist was completed recently with stable findings. A1C was excellent a few months ago. Cholesterol levels are very low, with LDL at 11, which was noted to be potentially a record low level. Current medications include:  - Aspirin  - Atorvastatin  - Klonopin at night  - Synjardy  - Gabapentin 3 tablets at night  - Metoprolol 100 mg  - Pentocall  - Nurtec for headaches  - Zoloft 200 mg  - Belsomra for sleep  - Mounjaro 15 mg    Neurology specialist recommends continuing Zoloft despite potential hallucination side effects, stating benefits outweigh potential risks. Metoprolol has been in despute as well. ?need for stopping statin.         Objective:          Physical Exam  Vitals and nursing note reviewed.   Constitutional:       General: He is not in acute distress.     Appearance: He is well-developed.   HENT:      Head: Normocephalic and atraumatic.   Eyes:      Conjunctiva/sclera: Conjunctivae normal.      Pupils: Pupils are equal, round, and reactive to light.   Neck:      Thyroid: No thyromegaly.   Cardiovascular:      Rate and Rhythm: Normal rate and regular rhythm.      Pulses:            Dorsalis pedis pulses are 2+ on the right side and 2+ on the left side.        Posterior tibial pulses are 2+ on the right side and 2+ on the left side.      Heart sounds: Normal heart sounds.   Pulmonary:      Effort: Pulmonary effort is normal. No respiratory distress.      Breath sounds: Normal breath sounds. No wheezing.   Abdominal:      General: Bowel sounds are normal.      Palpations: Abdomen is soft.      Tenderness: There is no abdominal tenderness.   Musculoskeletal:         General: Normal range of motion.      Cervical back: Normal range of motion and neck supple.   Feet:      Right foot:      Protective Sensation: 5 sites tested.  5 sites sensed.      Skin integrity: Skin integrity normal.      Left foot:      Protective Sensation: 5 sites tested.  5 sites sensed.      Skin integrity: Skin integrity normal.   Lymphadenopathy:      Cervical: No cervical adenopathy.   Skin:     General: Skin is warm and dry.      Findings: No rash.   Neurological:      Mental Status: He is alert and oriented to person, place, and time.   Psychiatric:         Behavior: Behavior normal.      Comments: Flat affect         Assessment:           1. MCI (mild cognitive impairment) with memory loss    2. Lewy body dementia without behavioral disturbance    3. Generalized anxiety disorder    4. Stable angina pectoris    5. S/P CABG x 4    6. PAF (paroxysmal atrial fibrillation)    7. Hyperlipidemia, unspecified hyperlipidemia type    8. History of coronary artery stent placement    9. Coronary artery disease involving coronary bypass graft of native heart with angina pectoris    10. 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 Obstructive sleep apnea (adult) (pediatric)    12. Physical exam, annual        Plan:     Assessment & Plan    DEMENTIA WITH BEHAVIORAL DISTURBANCE:  - Patient experiences  intermittent memory issues and hallucinations, which are perceived as worsening but not bothersome to the patient.  - Discussed the impact of Zoloft on hallucinations and decided to continue it at 200 mg despite potential exacerbation of symptoms.    TYPE 2 DIABETES:  - Diabetes is well controlled with no concerns about bleeding or other complications.    LIPOPROTEIN DEFICIENCY:  - LDL cholesterol is very low at 11, which is considered a success by cardiology.  - Continuing Repatha and atorvastatin for management.  - Sent message to cardiology regarding possible adjustment of cholesterol medication due to these very low levels.    LACK OF COORDINATION:  - Patient experiences tremors affecting coordination, evidenced by difficulty with fine motor tasks such as putting a screw into wood.    DEPRESSION:  - Continuing Zoloft 200 mg for management.    ANXIETY DISORDER:  - Continuing Klonopin at night for management.    INSOMNIA:  - Continuing Belsomra for management.    HEADACHE:  - Continuing Nurtec for management.    FOLLOW-UP:  - Follow up in 3 months.         Able was seen today for annual exam.    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    Return to clinic in November for psa, hba1c      RTC if condition acutely worsens or  any other concerns, otherwise RTC as scheduled      This note was generated with the assistance of ambient listening technology. Verbal consent was obtained by the patient and accompanying visitor(s) for the recording of patient appointment to facilitate this note. I attest to having reviewed and edited the generated note for accuracy, though some syntax or spelling errors may persist. Please contact the author of this note for any clarification.   .deep

## 2025-08-20 ENCOUNTER — HOSPITAL ENCOUNTER (OUTPATIENT)
Dept: SLEEP MEDICINE | Facility: HOSPITAL | Age: 55
Discharge: HOME | End: 2025-08-20
Payer: MEDICARE

## 2025-08-20 DIAGNOSIS — G47.30 SLEEP APNEA, UNSPECIFIED TYPE: ICD-10-CM

## 2025-08-20 PROCEDURE — 95810 POLYSOM 6/> YRS 4/> PARAM: CPT

## 2025-08-21 PROBLEM — G47.30 SLEEP APNEA: Status: ACTIVE | Noted: 2025-08-21

## 2025-08-25 PROBLEM — Z98.890 S/P PERICARDIOCENTESIS: Status: RESOLVED | Noted: 2023-11-28 | Resolved: 2025-08-25

## 2025-09-06 DIAGNOSIS — G47.30 SLEEP APNEA, UNSPECIFIED: Primary | ICD-10-CM

## (undated) DEVICE — ELECTRODE REM PLYHSV RETURN 9

## (undated) DEVICE — SYR IRRIGATION BULB STER 60ML

## (undated) DEVICE — SPONGE COTTON TRAY 4X4IN

## (undated) DEVICE — GOWN SURGICAL X-LARGE

## (undated) DEVICE — SCISSOR 5MMX35CM DIRECT DRIVE

## (undated) DEVICE — SUT CHROMIC 3-0 SH 27IN GUT

## (undated) DEVICE — PANTIES FEMININE NAPKIN LG/XLG

## (undated) DEVICE — PENCIL ROCKER SWITCH 10FT CORD

## (undated) DEVICE — Device

## (undated) DEVICE — DECANTER FLUID TRNSF WHITE 9IN

## (undated) DEVICE — GAUZE FLUFF XXLG 36X36 2 PLY

## (undated) DEVICE — DRAPE STERI INSTRUMENT 1018

## (undated) DEVICE — BAG DRAIN ANTI REFLUX 2000ML

## (undated) DEVICE — APPLIER CLIP EPIX UNIV 5X34

## (undated) DEVICE — PACK SCROTO-PAK LONE STAR

## (undated) DEVICE — SYR 30CC LUER LOCK

## (undated) DEVICE — TIP YANKAUERS BULB NO VENT

## (undated) DEVICE — BAG TISS RETRV MONARCH 10MM

## (undated) DEVICE — BANDAGE ROLL COTTN 4.5INX4.1YD

## (undated) DEVICE — CLIPPER BLADE MOD 4406 (CAREF)

## (undated) DEVICE — ELECTRODE ELECSURG L-HK 32CM

## (undated) DEVICE — BLADE SURG #15 CARBON STEEL

## (undated) DEVICE — SUT 2-0 VICRYL / SH (J417)

## (undated) DEVICE — TUBING LAPARSCOPIC INSUFFLATIN

## (undated) DEVICE — GLOVE SURG BIOGEL LATEX SZ 7.5

## (undated) DEVICE — GAUZE SPONGE PEANUT STRL

## (undated) DEVICE — NDL SAFETY 25G X 1.5 ECLIPSE

## (undated) DEVICE — BNDG COFLEX FOAM LF2 ST 4X5YD

## (undated) DEVICE — ELECTRODE MEGADYNE RETURN DUAL

## (undated) DEVICE — APPLICATOR CHLORAPREP ORN 26ML

## (undated) DEVICE — SOL NACL 0.9% IV INJ 1000ML

## (undated) DEVICE — ADHESIVE DERMABOND ADVANCED

## (undated) DEVICE — TRAY SKIN SCRUB WET PREMIUM

## (undated) DEVICE — DISSECTOR EPIX LAPA 5MMX35CM

## (undated) DEVICE — RETRACTOR STAY SPIRA  20MM

## (undated) DEVICE — BLADE SURG CARBON STEEL SZ11

## (undated) DEVICE — RETRACTOR LONE STAR 28.3X18.3

## (undated) DEVICE — SYR 10CC LUER LOCK

## (undated) DEVICE — BOWL STERILE LARGE 32OZ

## (undated) DEVICE — DRAPE INCISE IOBAN 2 23X17IN

## (undated) DEVICE — SUT VICRYL 3-0 27 SH

## (undated) DEVICE — CASSETTE DRAPE

## (undated) DEVICE — NDL INSUF ULTRA VERESS 120MM

## (undated) DEVICE — SYR 50CC LL

## (undated) DEVICE — HANDLE CTRL PSTL GRIP E/S S/I

## (undated) DEVICE — LUBRICANT SURGILUBE 2 OZ

## (undated) DEVICE — CUP MEDICINE STERILE 2OZ

## (undated) DEVICE — DRAPE LAP T SHT W/ INSTR PAD

## (undated) DEVICE — LINER GLOVE POWDERFREE 8

## (undated) DEVICE — DRAPE LAPSCP CHOLE 122X102X78

## (undated) DEVICE — KIT ANTIFOG

## (undated) DEVICE — TRAY MINOR GEN SURG OMC

## (undated) DEVICE — GLOVE PROTEXIS LTX MICRO 8

## (undated) DEVICE — DRESSING XEROFORM NONADH 1X8IN

## (undated) DEVICE — NDL ECLIPSE SAFETY 18GX1-1/2IN